# Patient Record
Sex: MALE | Race: WHITE | Employment: PART TIME | URBAN - METROPOLITAN AREA
[De-identification: names, ages, dates, MRNs, and addresses within clinical notes are randomized per-mention and may not be internally consistent; named-entity substitution may affect disease eponyms.]

---

## 2017-01-01 ENCOUNTER — APPOINTMENT (OUTPATIENT)
Dept: PHYSICAL THERAPY | Facility: CLINIC | Age: 57
DRG: 025 | End: 2017-01-01
Attending: NEUROLOGICAL SURGERY
Payer: COMMERCIAL

## 2017-01-01 ENCOUNTER — APPOINTMENT (OUTPATIENT)
Dept: RADIATION ONCOLOGY | Facility: CLINIC | Age: 57
End: 2017-01-01
Attending: RADIOLOGY
Payer: COMMERCIAL

## 2017-01-01 ENCOUNTER — CARE COORDINATION (OUTPATIENT)
Dept: ONCOLOGY | Facility: CLINIC | Age: 57
End: 2017-01-01

## 2017-01-01 ENCOUNTER — OFFICE VISIT (OUTPATIENT)
Dept: NEUROSURGERY | Facility: CLINIC | Age: 57
End: 2017-01-01

## 2017-01-01 ENCOUNTER — OFFICE VISIT (OUTPATIENT)
Dept: NEUROLOGY | Facility: CLINIC | Age: 57
End: 2017-01-01

## 2017-01-01 ENCOUNTER — ANESTHESIA EVENT (OUTPATIENT)
Dept: SURGERY | Facility: CLINIC | Age: 57
DRG: 025 | End: 2017-01-01
Payer: COMMERCIAL

## 2017-01-01 ENCOUNTER — CARE COORDINATION (OUTPATIENT)
Dept: CARE COORDINATION | Facility: CLINIC | Age: 57
End: 2017-01-01

## 2017-01-01 ENCOUNTER — TELEPHONE (OUTPATIENT)
Dept: ONCOLOGY | Facility: CLINIC | Age: 57
End: 2017-01-01

## 2017-01-01 ENCOUNTER — TELEPHONE (OUTPATIENT)
Dept: FAMILY MEDICINE | Facility: CLINIC | Age: 57
End: 2017-01-01

## 2017-01-01 ENCOUNTER — PRE VISIT (OUTPATIENT)
Dept: NEUROLOGY | Facility: CLINIC | Age: 57
End: 2017-01-01

## 2017-01-01 ENCOUNTER — ONCOLOGY VISIT (OUTPATIENT)
Dept: ONCOLOGY | Facility: CLINIC | Age: 57
End: 2017-01-01

## 2017-01-01 ENCOUNTER — ANESTHESIA (OUTPATIENT)
Dept: SURGERY | Facility: CLINIC | Age: 57
DRG: 025 | End: 2017-01-01
Payer: COMMERCIAL

## 2017-01-01 ENCOUNTER — APPOINTMENT (OUTPATIENT)
Dept: CT IMAGING | Facility: CLINIC | Age: 57
DRG: 025 | End: 2017-01-01
Attending: STUDENT IN AN ORGANIZED HEALTH CARE EDUCATION/TRAINING PROGRAM
Payer: COMMERCIAL

## 2017-01-01 ENCOUNTER — APPOINTMENT (OUTPATIENT)
Dept: MRI IMAGING | Facility: CLINIC | Age: 57
DRG: 025 | End: 2017-01-01
Attending: STUDENT IN AN ORGANIZED HEALTH CARE EDUCATION/TRAINING PROGRAM
Payer: COMMERCIAL

## 2017-01-01 ENCOUNTER — ONCOLOGY VISIT (OUTPATIENT)
Dept: ONCOLOGY | Facility: CLINIC | Age: 57
End: 2017-01-01
Attending: PHYSICIAN ASSISTANT
Payer: COMMERCIAL

## 2017-01-01 ENCOUNTER — APPOINTMENT (OUTPATIENT)
Dept: LAB | Facility: CLINIC | Age: 57
End: 2017-01-01
Attending: PHYSICIAN ASSISTANT
Payer: COMMERCIAL

## 2017-01-01 ENCOUNTER — APPOINTMENT (OUTPATIENT)
Dept: MRI IMAGING | Facility: CLINIC | Age: 57
DRG: 025 | End: 2017-01-01
Attending: NEUROLOGICAL SURGERY
Payer: COMMERCIAL

## 2017-01-01 ENCOUNTER — ONCOLOGY VISIT (OUTPATIENT)
Dept: ONCOLOGY | Facility: CLINIC | Age: 57
End: 2017-01-01
Attending: INTERNAL MEDICINE
Payer: COMMERCIAL

## 2017-01-01 ENCOUNTER — HOSPITAL ENCOUNTER (OUTPATIENT)
Dept: MRI IMAGING | Facility: CLINIC | Age: 57
Discharge: HOME | End: 2017-10-31
Attending: RADIOLOGY | Admitting: RADIOLOGY
Payer: COMMERCIAL

## 2017-01-01 ENCOUNTER — ONCOLOGY VISIT (OUTPATIENT)
Dept: RADIATION ONCOLOGY | Facility: CLINIC | Age: 57
End: 2017-01-01

## 2017-01-01 ENCOUNTER — APPOINTMENT (OUTPATIENT)
Dept: OCCUPATIONAL THERAPY | Facility: CLINIC | Age: 57
DRG: 025 | End: 2017-01-01
Attending: NEUROLOGICAL SURGERY
Payer: COMMERCIAL

## 2017-01-01 VITALS
BODY MASS INDEX: 29.33 KG/M2 | WEIGHT: 198 LBS | DIASTOLIC BLOOD PRESSURE: 80 MMHG | SYSTOLIC BLOOD PRESSURE: 135 MMHG | HEART RATE: 113 BPM | HEIGHT: 69 IN

## 2017-01-01 VITALS
WEIGHT: 207.8 LBS | BODY MASS INDEX: 30.69 KG/M2 | SYSTOLIC BLOOD PRESSURE: 124 MMHG | HEART RATE: 113 BPM | DIASTOLIC BLOOD PRESSURE: 70 MMHG

## 2017-01-01 VITALS
OXYGEN SATURATION: 97 % | HEART RATE: 80 BPM | HEIGHT: 69 IN | DIASTOLIC BLOOD PRESSURE: 92 MMHG | BODY MASS INDEX: 29.98 KG/M2 | WEIGHT: 202.4 LBS | TEMPERATURE: 98 F | RESPIRATION RATE: 17 BRPM | SYSTOLIC BLOOD PRESSURE: 132 MMHG

## 2017-01-01 VITALS
DIASTOLIC BLOOD PRESSURE: 86 MMHG | SYSTOLIC BLOOD PRESSURE: 119 MMHG | HEART RATE: 91 BPM | BODY MASS INDEX: 30.72 KG/M2 | WEIGHT: 208 LBS

## 2017-01-01 VITALS
HEIGHT: 69 IN | TEMPERATURE: 97 F | WEIGHT: 210 LBS | RESPIRATION RATE: 18 BRPM | SYSTOLIC BLOOD PRESSURE: 118 MMHG | OXYGEN SATURATION: 97 % | BODY MASS INDEX: 31.1 KG/M2 | HEART RATE: 82 BPM | DIASTOLIC BLOOD PRESSURE: 82 MMHG

## 2017-01-01 VITALS
HEART RATE: 84 BPM | SYSTOLIC BLOOD PRESSURE: 132 MMHG | BODY MASS INDEX: 31.31 KG/M2 | DIASTOLIC BLOOD PRESSURE: 91 MMHG | WEIGHT: 212 LBS

## 2017-01-01 VITALS
SYSTOLIC BLOOD PRESSURE: 132 MMHG | BODY MASS INDEX: 31.47 KG/M2 | HEART RATE: 80 BPM | DIASTOLIC BLOOD PRESSURE: 93 MMHG | WEIGHT: 213.1 LBS

## 2017-01-01 VITALS
DIASTOLIC BLOOD PRESSURE: 89 MMHG | HEIGHT: 69 IN | HEART RATE: 86 BPM | WEIGHT: 210.5 LBS | SYSTOLIC BLOOD PRESSURE: 123 MMHG | BODY MASS INDEX: 31.18 KG/M2

## 2017-01-01 VITALS
DIASTOLIC BLOOD PRESSURE: 81 MMHG | TEMPERATURE: 97 F | BODY MASS INDEX: 31.07 KG/M2 | OXYGEN SATURATION: 97 % | SYSTOLIC BLOOD PRESSURE: 121 MMHG | HEART RATE: 88 BPM | RESPIRATION RATE: 16 BRPM | WEIGHT: 209.8 LBS | HEIGHT: 69 IN

## 2017-01-01 VITALS
BODY MASS INDEX: 29.89 KG/M2 | WEIGHT: 202.4 LBS | SYSTOLIC BLOOD PRESSURE: 132 MMHG | DIASTOLIC BLOOD PRESSURE: 92 MMHG | HEART RATE: 80 BPM

## 2017-01-01 VITALS
WEIGHT: 211.8 LBS | SYSTOLIC BLOOD PRESSURE: 107 MMHG | DIASTOLIC BLOOD PRESSURE: 82 MMHG | HEART RATE: 87 BPM | BODY MASS INDEX: 31.26 KG/M2

## 2017-01-01 VITALS
OXYGEN SATURATION: 98 % | RESPIRATION RATE: 16 BRPM | TEMPERATURE: 97.6 F | DIASTOLIC BLOOD PRESSURE: 88 MMHG | SYSTOLIC BLOOD PRESSURE: 134 MMHG | HEART RATE: 79 BPM

## 2017-01-01 VITALS — BODY MASS INDEX: 30.92 KG/M2 | WEIGHT: 209.5 LBS

## 2017-01-01 DIAGNOSIS — C71.2 GLIOBLASTOMA MULTIFORME OF TEMPORAL LOBE (H): Primary | ICD-10-CM

## 2017-01-01 DIAGNOSIS — C71.2 MALIGNANT NEOPLASM OF TEMPORAL LOBE OF BRAIN (H): Primary | ICD-10-CM

## 2017-01-01 DIAGNOSIS — R56.9 SEIZURES (H): ICD-10-CM

## 2017-01-01 DIAGNOSIS — C71.2 GLIOBLASTOMA MULTIFORME OF TEMPORAL LOBE (H): ICD-10-CM

## 2017-01-01 DIAGNOSIS — Z98.890 POST-OPERATIVE STATE: ICD-10-CM

## 2017-01-01 DIAGNOSIS — Z79.899 ENCOUNTER FOR LONG-TERM (CURRENT) USE OF MEDICATIONS: ICD-10-CM

## 2017-01-01 DIAGNOSIS — G93.89 BRAIN MASS: ICD-10-CM

## 2017-01-01 DIAGNOSIS — C71.9 GLIOBLASTOMA (H): Primary | ICD-10-CM

## 2017-01-01 DIAGNOSIS — G40.201 PARTIAL SYMPTOMATIC EPILEPSY WITH COMPLEX PARTIAL SEIZURES, NOT INTRACTABLE, WITH STATUS EPILEPTICUS (H): Primary | ICD-10-CM

## 2017-01-01 DIAGNOSIS — J06.9 UPPER RESPIRATORY TRACT INFECTION, UNSPECIFIED TYPE: ICD-10-CM

## 2017-01-01 LAB
ABO + RH BLD: NORMAL
ALBUMIN SERPL-MCNC: 3.4 G/DL (ref 3.4–5)
ALBUMIN SERPL-MCNC: 3.5 G/DL (ref 3.4–5)
ALBUMIN SERPL-MCNC: 3.6 G/DL (ref 3.4–5)
ALBUMIN SERPL-MCNC: 3.6 G/DL (ref 3.4–5)
ALP SERPL-CCNC: 67 U/L (ref 40–150)
ALP SERPL-CCNC: 79 U/L (ref 40–150)
ALP SERPL-CCNC: 81 U/L (ref 40–150)
ALP SERPL-CCNC: 97 U/L (ref 40–150)
ALT SERPL W P-5'-P-CCNC: 29 U/L (ref 0–70)
ALT SERPL W P-5'-P-CCNC: 31 U/L (ref 0–70)
ALT SERPL W P-5'-P-CCNC: 31 U/L (ref 0–70)
ALT SERPL W P-5'-P-CCNC: 32 U/L (ref 0–70)
ANION GAP SERPL CALCULATED.3IONS-SCNC: 11 MMOL/L (ref 3–14)
ANION GAP SERPL CALCULATED.3IONS-SCNC: 5 MMOL/L (ref 3–14)
ANION GAP SERPL CALCULATED.3IONS-SCNC: 7 MMOL/L (ref 3–14)
ANION GAP SERPL CALCULATED.3IONS-SCNC: 9 MMOL/L (ref 3–14)
APTT PPP: 25 SEC (ref 22–37)
AST SERPL W P-5'-P-CCNC: 13 U/L (ref 0–45)
AST SERPL W P-5'-P-CCNC: 20 U/L (ref 0–45)
AST SERPL W P-5'-P-CCNC: 20 U/L (ref 0–45)
AST SERPL W P-5'-P-CCNC: 22 U/L (ref 0–45)
BASOPHILS # BLD AUTO: 0 10E9/L (ref 0–0.2)
BASOPHILS NFR BLD AUTO: 0.1 %
BASOPHILS NFR BLD AUTO: 0.1 %
BASOPHILS NFR BLD AUTO: 0.2 %
BASOPHILS NFR BLD AUTO: 0.6 %
BILIRUB SERPL-MCNC: 0.3 MG/DL (ref 0.2–1.3)
BILIRUB SERPL-MCNC: 0.5 MG/DL (ref 0.2–1.3)
BILIRUB SERPL-MCNC: 0.6 MG/DL (ref 0.2–1.3)
BILIRUB SERPL-MCNC: 0.7 MG/DL (ref 0.2–1.3)
BLD GP AB SCN SERPL QL: NORMAL
BLD GP AB SCN SERPL QL: NORMAL
BLOOD BANK CMNT PATIENT-IMP: NORMAL
BLOOD BANK CMNT PATIENT-IMP: NORMAL
BUN SERPL-MCNC: 15 MG/DL (ref 7–30)
BUN SERPL-MCNC: 16 MG/DL (ref 7–30)
BUN SERPL-MCNC: 18 MG/DL (ref 7–30)
BUN SERPL-MCNC: 20 MG/DL (ref 7–30)
BUN SERPL-MCNC: 22 MG/DL (ref 7–30)
BUN SERPL-MCNC: 22 MG/DL (ref 7–30)
CALCIUM SERPL-MCNC: 8.1 MG/DL (ref 8.5–10.1)
CALCIUM SERPL-MCNC: 8.4 MG/DL (ref 8.5–10.1)
CALCIUM SERPL-MCNC: 8.4 MG/DL (ref 8.5–10.1)
CALCIUM SERPL-MCNC: 8.8 MG/DL (ref 8.5–10.1)
CALCIUM SERPL-MCNC: 8.8 MG/DL (ref 8.5–10.1)
CALCIUM SERPL-MCNC: 9.1 MG/DL (ref 8.5–10.1)
CHLORIDE SERPL-SCNC: 100 MMOL/L (ref 94–109)
CHLORIDE SERPL-SCNC: 104 MMOL/L (ref 94–109)
CHLORIDE SERPL-SCNC: 105 MMOL/L (ref 94–109)
CO2 SERPL-SCNC: 21 MMOL/L (ref 20–32)
CO2 SERPL-SCNC: 25 MMOL/L (ref 20–32)
CO2 SERPL-SCNC: 25 MMOL/L (ref 20–32)
CO2 SERPL-SCNC: 27 MMOL/L (ref 20–32)
CO2 SERPL-SCNC: 27 MMOL/L (ref 20–32)
CO2 SERPL-SCNC: 30 MMOL/L (ref 20–32)
COPATH REPORT: NORMAL
CREAT SERPL-MCNC: 0.79 MG/DL (ref 0.66–1.25)
CREAT SERPL-MCNC: 0.89 MG/DL (ref 0.66–1.25)
CREAT SERPL-MCNC: 0.94 MG/DL (ref 0.66–1.25)
CREAT SERPL-MCNC: 1.01 MG/DL (ref 0.66–1.25)
CREAT SERPL-MCNC: 1.03 MG/DL (ref 0.66–1.25)
CREAT SERPL-MCNC: 1.1 MG/DL (ref 0.66–1.25)
CRP SERPL-MCNC: 34 MG/L (ref 0–8)
DIFFERENTIAL METHOD BLD: ABNORMAL
DIFFERENTIAL METHOD BLD: NORMAL
EOSINOPHIL # BLD AUTO: 0 10E9/L (ref 0–0.7)
EOSINOPHIL # BLD AUTO: 0.1 10E9/L (ref 0–0.7)
EOSINOPHIL # BLD AUTO: 0.2 10E9/L (ref 0–0.7)
EOSINOPHIL # BLD AUTO: 0.3 10E9/L (ref 0–0.7)
EOSINOPHIL NFR BLD AUTO: 0.1 %
EOSINOPHIL NFR BLD AUTO: 1.1 %
EOSINOPHIL NFR BLD AUTO: 2.5 %
EOSINOPHIL NFR BLD AUTO: 5 %
ERYTHROCYTE [DISTWIDTH] IN BLOOD BY AUTOMATED COUNT: 12.2 % (ref 10–15)
ERYTHROCYTE [DISTWIDTH] IN BLOOD BY AUTOMATED COUNT: 12.3 % (ref 10–15)
ERYTHROCYTE [DISTWIDTH] IN BLOOD BY AUTOMATED COUNT: 13.1 % (ref 10–15)
ERYTHROCYTE [DISTWIDTH] IN BLOOD BY AUTOMATED COUNT: 13.5 % (ref 10–15)
ERYTHROCYTE [DISTWIDTH] IN BLOOD BY AUTOMATED COUNT: 13.8 % (ref 10–15)
ERYTHROCYTE [DISTWIDTH] IN BLOOD BY AUTOMATED COUNT: 14 % (ref 10–15)
ERYTHROCYTE [SEDIMENTATION RATE] IN BLOOD BY WESTERGREN METHOD: 16 MM/H (ref 0–20)
FLUAV H1 2009 PAND RNA SPEC QL NAA+PROBE: NEGATIVE
FLUAV H1 RNA SPEC QL NAA+PROBE: NEGATIVE
FLUAV H3 RNA SPEC QL NAA+PROBE: NEGATIVE
FLUAV RNA SPEC QL NAA+PROBE: NEGATIVE
FLUAV+FLUBV RNA SPEC QL NAA+PROBE: NEGATIVE
FLUAV+FLUBV RNA SPEC QL NAA+PROBE: NEGATIVE
FLUBV RNA SPEC QL NAA+PROBE: NEGATIVE
GFR SERPL CREATININE-BSD FRML MDRD: 68 ML/MIN/1.7M2
GFR SERPL CREATININE-BSD FRML MDRD: 74 ML/MIN/1.7M2
GFR SERPL CREATININE-BSD FRML MDRD: 75 ML/MIN/1.7M2
GFR SERPL CREATININE-BSD FRML MDRD: 83 ML/MIN/1.7M2
GFR SERPL CREATININE-BSD FRML MDRD: 87 ML/MIN/1.7M2
GFR SERPL CREATININE-BSD FRML MDRD: >90 ML/MIN/1.7M2
GLUCOSE BLDC GLUCOMTR-MCNC: 100 MG/DL (ref 70–99)
GLUCOSE BLDC GLUCOMTR-MCNC: 101 MG/DL (ref 70–99)
GLUCOSE BLDC GLUCOMTR-MCNC: 102 MG/DL (ref 70–99)
GLUCOSE BLDC GLUCOMTR-MCNC: 103 MG/DL (ref 70–99)
GLUCOSE BLDC GLUCOMTR-MCNC: 104 MG/DL (ref 70–99)
GLUCOSE BLDC GLUCOMTR-MCNC: 106 MG/DL (ref 70–99)
GLUCOSE BLDC GLUCOMTR-MCNC: 107 MG/DL (ref 70–99)
GLUCOSE BLDC GLUCOMTR-MCNC: 107 MG/DL (ref 70–99)
GLUCOSE BLDC GLUCOMTR-MCNC: 111 MG/DL (ref 70–99)
GLUCOSE BLDC GLUCOMTR-MCNC: 113 MG/DL (ref 70–99)
GLUCOSE BLDC GLUCOMTR-MCNC: 114 MG/DL (ref 70–99)
GLUCOSE BLDC GLUCOMTR-MCNC: 114 MG/DL (ref 70–99)
GLUCOSE BLDC GLUCOMTR-MCNC: 115 MG/DL (ref 70–99)
GLUCOSE BLDC GLUCOMTR-MCNC: 116 MG/DL (ref 70–99)
GLUCOSE BLDC GLUCOMTR-MCNC: 117 MG/DL (ref 70–99)
GLUCOSE BLDC GLUCOMTR-MCNC: 119 MG/DL (ref 70–99)
GLUCOSE BLDC GLUCOMTR-MCNC: 120 MG/DL (ref 70–99)
GLUCOSE BLDC GLUCOMTR-MCNC: 122 MG/DL (ref 70–99)
GLUCOSE BLDC GLUCOMTR-MCNC: 124 MG/DL (ref 70–99)
GLUCOSE BLDC GLUCOMTR-MCNC: 127 MG/DL (ref 70–99)
GLUCOSE BLDC GLUCOMTR-MCNC: 127 MG/DL (ref 70–99)
GLUCOSE BLDC GLUCOMTR-MCNC: 128 MG/DL (ref 70–99)
GLUCOSE BLDC GLUCOMTR-MCNC: 129 MG/DL (ref 70–99)
GLUCOSE BLDC GLUCOMTR-MCNC: 132 MG/DL (ref 70–99)
GLUCOSE BLDC GLUCOMTR-MCNC: 148 MG/DL (ref 70–99)
GLUCOSE BLDC GLUCOMTR-MCNC: 169 MG/DL (ref 70–99)
GLUCOSE BLDC GLUCOMTR-MCNC: 189 MG/DL (ref 70–99)
GLUCOSE BLDC GLUCOMTR-MCNC: 92 MG/DL (ref 70–99)
GLUCOSE BLDC GLUCOMTR-MCNC: 95 MG/DL (ref 70–99)
GLUCOSE BLDC GLUCOMTR-MCNC: 99 MG/DL (ref 70–99)
GLUCOSE BLDC GLUCOMTR-MCNC: 99 MG/DL (ref 70–99)
GLUCOSE SERPL-MCNC: 106 MG/DL (ref 70–99)
GLUCOSE SERPL-MCNC: 107 MG/DL (ref 70–99)
GLUCOSE SERPL-MCNC: 112 MG/DL (ref 70–99)
GLUCOSE SERPL-MCNC: 83 MG/DL (ref 70–99)
GLUCOSE SERPL-MCNC: 85 MG/DL (ref 70–99)
GLUCOSE SERPL-MCNC: 96 MG/DL (ref 70–99)
HADV DNA SPEC QL NAA+PROBE: NEGATIVE
HADV DNA SPEC QL NAA+PROBE: NEGATIVE
HBA1C MFR BLD: 5.3 % (ref 4.3–6)
HCT VFR BLD AUTO: 37 % (ref 40–53)
HCT VFR BLD AUTO: 39 % (ref 40–53)
HCT VFR BLD AUTO: 40.2 % (ref 40–53)
HCT VFR BLD AUTO: 41.9 % (ref 40–53)
HCT VFR BLD AUTO: 42.2 % (ref 40–53)
HCT VFR BLD AUTO: 42.5 % (ref 40–53)
HGB BLD-MCNC: 12.7 G/DL (ref 13.3–17.7)
HGB BLD-MCNC: 13.2 G/DL (ref 13.3–17.7)
HGB BLD-MCNC: 13.4 G/DL (ref 13.3–17.7)
HGB BLD-MCNC: 13.5 G/DL (ref 13.3–17.7)
HGB BLD-MCNC: 13.8 G/DL (ref 13.3–17.7)
HGB BLD-MCNC: 14 G/DL (ref 13.3–17.7)
HMPV RNA SPEC QL NAA+PROBE: NEGATIVE
HPIV1 RNA SPEC QL NAA+PROBE: NEGATIVE
HPIV2 RNA SPEC QL NAA+PROBE: NEGATIVE
HPIV3 RNA SPEC QL NAA+PROBE: NEGATIVE
IMM GRANULOCYTES # BLD: 0 10E9/L (ref 0–0.4)
IMM GRANULOCYTES # BLD: 0.1 10E9/L (ref 0–0.4)
IMM GRANULOCYTES NFR BLD: 0.1 %
IMM GRANULOCYTES NFR BLD: 0.2 %
IMM GRANULOCYTES NFR BLD: 0.4 %
IMM GRANULOCYTES NFR BLD: 1.1 %
INR PPP: 1.12 (ref 0.86–1.14)
INTERPRETATION ECG - MUSE: NORMAL
LEVETIRACETAM SERPL-MCNC: 31 UG/ML (ref 12–46)
LYMPHOCYTES # BLD AUTO: 1 10E9/L (ref 0.8–5.3)
LYMPHOCYTES # BLD AUTO: 1.5 10E9/L (ref 0.8–5.3)
LYMPHOCYTES # BLD AUTO: 1.9 10E9/L (ref 0.8–5.3)
LYMPHOCYTES # BLD AUTO: 2.2 10E9/L (ref 0.8–5.3)
LYMPHOCYTES NFR BLD AUTO: 10.8 %
LYMPHOCYTES NFR BLD AUTO: 26.6 %
LYMPHOCYTES NFR BLD AUTO: 26.8 %
LYMPHOCYTES NFR BLD AUTO: 28.3 %
MCH RBC QN AUTO: 30.8 PG (ref 26.5–33)
MCH RBC QN AUTO: 31.1 PG (ref 26.5–33)
MCH RBC QN AUTO: 31.2 PG (ref 26.5–33)
MCH RBC QN AUTO: 31.3 PG (ref 26.5–33)
MCH RBC QN AUTO: 31.4 PG (ref 26.5–33)
MCH RBC QN AUTO: 31.5 PG (ref 26.5–33)
MCHC RBC AUTO-ENTMCNC: 32.2 G/DL (ref 31.5–36.5)
MCHC RBC AUTO-ENTMCNC: 32.7 G/DL (ref 31.5–36.5)
MCHC RBC AUTO-ENTMCNC: 32.8 G/DL (ref 31.5–36.5)
MCHC RBC AUTO-ENTMCNC: 32.9 G/DL (ref 31.5–36.5)
MCHC RBC AUTO-ENTMCNC: 34.3 G/DL (ref 31.5–36.5)
MCHC RBC AUTO-ENTMCNC: 34.4 G/DL (ref 31.5–36.5)
MCV RBC AUTO: 91 FL (ref 78–100)
MCV RBC AUTO: 91 FL (ref 78–100)
MCV RBC AUTO: 95 FL (ref 78–100)
MCV RBC AUTO: 96 FL (ref 78–100)
MICROBIOLOGIST REVIEW: ABNORMAL
MONOCYTES # BLD AUTO: 0.4 10E9/L (ref 0–1.3)
MONOCYTES # BLD AUTO: 0.5 10E9/L (ref 0–1.3)
MONOCYTES # BLD AUTO: 0.7 10E9/L (ref 0–1.3)
MONOCYTES # BLD AUTO: 0.8 10E9/L (ref 0–1.3)
MONOCYTES NFR BLD AUTO: 12.3 %
MONOCYTES NFR BLD AUTO: 3.9 %
MONOCYTES NFR BLD AUTO: 7.8 %
MONOCYTES NFR BLD AUTO: 9.9 %
MRSA DNA SPEC QL NAA+PROBE: NEGATIVE
NEUTROPHILS # BLD AUTO: 3 10E9/L (ref 1.6–8.3)
NEUTROPHILS # BLD AUTO: 4.2 10E9/L (ref 1.6–8.3)
NEUTROPHILS # BLD AUTO: 5.1 10E9/L (ref 1.6–8.3)
NEUTROPHILS # BLD AUTO: 7.9 10E9/L (ref 1.6–8.3)
NEUTROPHILS NFR BLD AUTO: 55.1 %
NEUTROPHILS NFR BLD AUTO: 61.2 %
NEUTROPHILS NFR BLD AUTO: 61.8 %
NEUTROPHILS NFR BLD AUTO: 84 %
NRBC # BLD AUTO: 0 10*3/UL
NRBC BLD AUTO-RTO: 0 /100
PLATELET # BLD AUTO: 164 10E9/L (ref 150–450)
PLATELET # BLD AUTO: 209 10E9/L (ref 150–450)
PLATELET # BLD AUTO: 216 10E9/L (ref 150–450)
PLATELET # BLD AUTO: 219 10E9/L (ref 150–450)
PLATELET # BLD AUTO: 244 10E9/L (ref 150–450)
PLATELET # BLD AUTO: 302 10E9/L (ref 150–450)
POTASSIUM SERPL-SCNC: 4.1 MMOL/L (ref 3.4–5.3)
POTASSIUM SERPL-SCNC: 4.2 MMOL/L (ref 3.4–5.3)
POTASSIUM SERPL-SCNC: 4.4 MMOL/L (ref 3.4–5.3)
POTASSIUM SERPL-SCNC: 4.6 MMOL/L (ref 3.4–5.3)
PROT SERPL-MCNC: 7 G/DL (ref 6.8–8.8)
PROT SERPL-MCNC: 7.1 G/DL (ref 6.8–8.8)
PROT SERPL-MCNC: 7.1 G/DL (ref 6.8–8.8)
PROT SERPL-MCNC: 7.2 G/DL (ref 6.8–8.8)
RBC # BLD AUTO: 4.08 10E12/L (ref 4.4–5.9)
RBC # BLD AUTO: 4.23 10E12/L (ref 4.4–5.9)
RBC # BLD AUTO: 4.27 10E12/L (ref 4.4–5.9)
RBC # BLD AUTO: 4.38 10E12/L (ref 4.4–5.9)
RBC # BLD AUTO: 4.39 10E12/L (ref 4.4–5.9)
RBC # BLD AUTO: 4.47 10E12/L (ref 4.4–5.9)
RHINOVIRUS RNA SPEC QL NAA+PROBE: NEGATIVE
RSV RNA SPEC NAA+PROBE: POSITIVE
RSV RNA SPEC QL NAA+PROBE: NEGATIVE
RSV RNA SPEC QL NAA+PROBE: POSITIVE
SODIUM SERPL-SCNC: 134 MMOL/L (ref 133–144)
SODIUM SERPL-SCNC: 136 MMOL/L (ref 133–144)
SODIUM SERPL-SCNC: 136 MMOL/L (ref 133–144)
SODIUM SERPL-SCNC: 138 MMOL/L (ref 133–144)
SODIUM SERPL-SCNC: 139 MMOL/L (ref 133–144)
SODIUM SERPL-SCNC: 139 MMOL/L (ref 133–144)
SPECIMEN EXP DATE BLD: NORMAL
SPECIMEN EXP DATE BLD: NORMAL
SPECIMEN SOURCE: ABNORMAL
SPECIMEN SOURCE: ABNORMAL
SPECIMEN SOURCE: NORMAL
WBC # BLD AUTO: 16 10E9/L (ref 4–11)
WBC # BLD AUTO: 22.5 10E9/L (ref 4–11)
WBC # BLD AUTO: 5.5 10E9/L (ref 4–11)
WBC # BLD AUTO: 6.8 10E9/L (ref 4–11)
WBC # BLD AUTO: 8.3 10E9/L (ref 4–11)
WBC # BLD AUTO: 9.4 10E9/L (ref 4–11)

## 2017-01-01 PROCEDURE — 36415 COLL VENOUS BLD VENIPUNCTURE: CPT | Performed by: INTERNAL MEDICINE

## 2017-01-01 PROCEDURE — 86850 RBC ANTIBODY SCREEN: CPT | Performed by: STUDENT IN AN ORGANIZED HEALTH CARE EDUCATION/TRAINING PROGRAM

## 2017-01-01 PROCEDURE — 25000125 ZZHC RX 250: Performed by: NURSE PRACTITIONER

## 2017-01-01 PROCEDURE — 77386 ZZH IMRT TREATMENT DELIVERY, COMPLEX: CPT | Performed by: RADIOLOGY

## 2017-01-01 PROCEDURE — 40000014 ZZH STATISTIC ARTERIAL MONITORING DAILY

## 2017-01-01 PROCEDURE — 81403 MOPATH PROCEDURE LEVEL 4: CPT | Performed by: INTERNAL MEDICINE

## 2017-01-01 PROCEDURE — 40000803 ZZHCL STATISTIC DNA ISOL HIGH PURITY: Performed by: INTERNAL MEDICINE

## 2017-01-01 PROCEDURE — 12000001 ZZH R&B MED SURG/OB UMMC

## 2017-01-01 PROCEDURE — 81287 MGMT GENE PRMTR MTHYLTN ALYS: CPT | Performed by: INTERNAL MEDICINE

## 2017-01-01 PROCEDURE — 40000170 ZZH STATISTIC PRE-PROCEDURE ASSESSMENT II: Performed by: NEUROLOGICAL SURGERY

## 2017-01-01 PROCEDURE — 00B70ZZ EXCISION OF CEREBRAL HEMISPHERE, OPEN APPROACH: ICD-10-PCS | Performed by: NEUROLOGICAL SURGERY

## 2017-01-01 PROCEDURE — 25000132 ZZH RX MED GY IP 250 OP 250 PS 637: Performed by: NURSE PRACTITIONER

## 2017-01-01 PROCEDURE — 25000128 H RX IP 250 OP 636: Performed by: STUDENT IN AN ORGANIZED HEALTH CARE EDUCATION/TRAINING PROGRAM

## 2017-01-01 PROCEDURE — 40000556 ZZH STATISTIC PERIPHERAL IV START W US GUIDANCE

## 2017-01-01 PROCEDURE — 88307 TISSUE EXAM BY PATHOLOGIST: CPT | Performed by: NEUROLOGICAL SURGERY

## 2017-01-01 PROCEDURE — 00000159 ZZHCL STATISTIC H-SEND OUTS PREP: Performed by: NEUROLOGICAL SURGERY

## 2017-01-01 PROCEDURE — 25000125 ZZHC RX 250: Performed by: NEUROLOGICAL SURGERY

## 2017-01-01 PROCEDURE — 70553 MRI BRAIN STEM W/O & W/DYE: CPT

## 2017-01-01 PROCEDURE — 36415 COLL VENOUS BLD VENIPUNCTURE: CPT | Performed by: PHYSICIAN ASSISTANT

## 2017-01-01 PROCEDURE — 40000275 ZZH STATISTIC RCP TIME EA 10 MIN

## 2017-01-01 PROCEDURE — 87640 STAPH A DNA AMP PROBE: CPT | Performed by: NEUROLOGICAL SURGERY

## 2017-01-01 PROCEDURE — 77336 RADIATION PHYSICS CONSULT: CPT | Performed by: RADIOLOGY

## 2017-01-01 PROCEDURE — 71000017 ZZH RECOVERY PHASE 1 LEVEL 3 EA ADDTL HR: Performed by: NEUROLOGICAL SURGERY

## 2017-01-01 PROCEDURE — 85025 COMPLETE CBC W/AUTO DIFF WBC: CPT | Performed by: INTERNAL MEDICINE

## 2017-01-01 PROCEDURE — 25000128 H RX IP 250 OP 636: Performed by: NEUROLOGICAL SURGERY

## 2017-01-01 PROCEDURE — 90686 IIV4 VACC NO PRSV 0.5 ML IM: CPT | Performed by: NEUROLOGICAL SURGERY

## 2017-01-01 PROCEDURE — 12000003 ZZH R&B CRITICAL UMMC

## 2017-01-01 PROCEDURE — A9585 GADOBUTROL INJECTION: HCPCS | Performed by: NEUROLOGICAL SURGERY

## 2017-01-01 PROCEDURE — 40000193 ZZH STATISTIC PT WARD VISIT

## 2017-01-01 PROCEDURE — 99212 OFFICE O/P EST SF 10 MIN: CPT | Mod: ZF

## 2017-01-01 PROCEDURE — 87641 MR-STAPH DNA AMP PROBE: CPT | Performed by: NEUROLOGICAL SURGERY

## 2017-01-01 PROCEDURE — 27810169 ZZH OR IMPLANT GENERAL: Performed by: NEUROLOGICAL SURGERY

## 2017-01-01 PROCEDURE — 97530 THERAPEUTIC ACTIVITIES: CPT | Mod: GP | Performed by: REHABILITATION PRACTITIONER

## 2017-01-01 PROCEDURE — 86900 BLOOD TYPING SEROLOGIC ABO: CPT | Performed by: NURSE PRACTITIONER

## 2017-01-01 PROCEDURE — 85025 COMPLETE CBC W/AUTO DIFF WBC: CPT | Performed by: PHYSICIAN ASSISTANT

## 2017-01-01 PROCEDURE — 97165 OT EVAL LOW COMPLEX 30 MIN: CPT | Mod: GO | Performed by: OCCUPATIONAL THERAPIST

## 2017-01-01 PROCEDURE — 37000008 ZZH ANESTHESIA TECHNICAL FEE, 1ST 30 MIN: Performed by: NEUROLOGICAL SURGERY

## 2017-01-01 PROCEDURE — 97116 GAIT TRAINING THERAPY: CPT | Mod: GP | Performed by: REHABILITATION PRACTITIONER

## 2017-01-01 PROCEDURE — 00000146 ZZHCL STATISTIC GLUCOSE BY METER IP

## 2017-01-01 PROCEDURE — 97535 SELF CARE MNGMENT TRAINING: CPT | Mod: GO | Performed by: OCCUPATIONAL THERAPIST

## 2017-01-01 PROCEDURE — 8E09XBZ COMPUTER ASSISTED PROCEDURE OF HEAD AND NECK REGION: ICD-10-PCS | Performed by: NEUROLOGICAL SURGERY

## 2017-01-01 PROCEDURE — 25000132 ZZH RX MED GY IP 250 OP 250 PS 637: Performed by: NURSE ANESTHETIST, CERTIFIED REGISTERED

## 2017-01-01 PROCEDURE — 25000125 ZZHC RX 250: Performed by: NURSE ANESTHETIST, CERTIFIED REGISTERED

## 2017-01-01 PROCEDURE — 86901 BLOOD TYPING SEROLOGIC RH(D): CPT | Performed by: NURSE PRACTITIONER

## 2017-01-01 PROCEDURE — 99213 OFFICE O/P EST LOW 20 MIN: CPT | Mod: ZF

## 2017-01-01 PROCEDURE — 36415 COLL VENOUS BLD VENIPUNCTURE: CPT | Performed by: STUDENT IN AN ORGANIZED HEALTH CARE EDUCATION/TRAINING PROGRAM

## 2017-01-01 PROCEDURE — 99205 OFFICE O/P NEW HI 60 MIN: CPT | Mod: ZP | Performed by: INTERNAL MEDICINE

## 2017-01-01 PROCEDURE — 25000128 H RX IP 250 OP 636: Performed by: NURSE PRACTITIONER

## 2017-01-01 PROCEDURE — 70450 CT HEAD/BRAIN W/O DYE: CPT

## 2017-01-01 PROCEDURE — 80053 COMPREHEN METABOLIC PANEL: CPT | Performed by: PHYSICIAN ASSISTANT

## 2017-01-01 PROCEDURE — 36000076 ZZH SURGERY LEVEL 6 EA 15 ADDTL MIN - UMMC: Performed by: NEUROLOGICAL SURGERY

## 2017-01-01 PROCEDURE — 99214 OFFICE O/P EST MOD 30 MIN: CPT | Mod: ZP | Performed by: PHYSICIAN ASSISTANT

## 2017-01-01 PROCEDURE — 83036 HEMOGLOBIN GLYCOSYLATED A1C: CPT | Performed by: STUDENT IN AN ORGANIZED HEALTH CARE EDUCATION/TRAINING PROGRAM

## 2017-01-01 PROCEDURE — 88342 IMHCHEM/IMCYTCHM 1ST ANTB: CPT | Performed by: NEUROLOGICAL SURGERY

## 2017-01-01 PROCEDURE — 85610 PROTHROMBIN TIME: CPT | Performed by: STUDENT IN AN ORGANIZED HEALTH CARE EDUCATION/TRAINING PROGRAM

## 2017-01-01 PROCEDURE — 25000128 H RX IP 250 OP 636: Performed by: ANESTHESIOLOGY

## 2017-01-01 PROCEDURE — 99213 OFFICE O/P EST LOW 20 MIN: CPT | Mod: 25 | Performed by: RADIOLOGY

## 2017-01-01 PROCEDURE — 97110 THERAPEUTIC EXERCISES: CPT | Mod: GP

## 2017-01-01 PROCEDURE — 80053 COMPREHEN METABOLIC PANEL: CPT | Performed by: INTERNAL MEDICINE

## 2017-01-01 PROCEDURE — 25000132 ZZH RX MED GY IP 250 OP 250 PS 637: Performed by: STUDENT IN AN ORGANIZED HEALTH CARE EDUCATION/TRAINING PROGRAM

## 2017-01-01 PROCEDURE — 25000128 H RX IP 250 OP 636: Performed by: NURSE ANESTHETIST, CERTIFIED REGISTERED

## 2017-01-01 PROCEDURE — 87633 RESP VIRUS 12-25 TARGETS: CPT | Performed by: PHYSICIAN ASSISTANT

## 2017-01-01 PROCEDURE — 27210794 ZZH OR GENERAL SUPPLY STERILE: Performed by: NEUROLOGICAL SURGERY

## 2017-01-01 PROCEDURE — 36000074 ZZH SURGERY LEVEL 6 1ST 30 MIN - UMMC: Performed by: NEUROLOGICAL SURGERY

## 2017-01-01 PROCEDURE — 88331 PATH CONSLTJ SURG 1 BLK 1SPC: CPT | Performed by: NEUROLOGICAL SURGERY

## 2017-01-01 PROCEDURE — 40000193 ZZH STATISTIC PT WARD VISIT: Performed by: REHABILITATION PRACTITIONER

## 2017-01-01 PROCEDURE — 86901 BLOOD TYPING SEROLOGIC RH(D): CPT | Performed by: STUDENT IN AN ORGANIZED HEALTH CARE EDUCATION/TRAINING PROGRAM

## 2017-01-01 PROCEDURE — 97164 PT RE-EVAL EST PLAN CARE: CPT | Mod: GP | Performed by: REHABILITATION PRACTITIONER

## 2017-01-01 PROCEDURE — 27210995 ZZH RX 272: Performed by: NEUROLOGICAL SURGERY

## 2017-01-01 PROCEDURE — C9399 UNCLASSIFIED DRUGS OR BIOLOG: HCPCS | Performed by: NURSE ANESTHETIST, CERTIFIED REGISTERED

## 2017-01-01 PROCEDURE — 36415 COLL VENOUS BLD VENIPUNCTURE: CPT

## 2017-01-01 PROCEDURE — 25000131 ZZH RX MED GY IP 250 OP 636 PS 637: Performed by: STUDENT IN AN ORGANIZED HEALTH CARE EDUCATION/TRAINING PROGRAM

## 2017-01-01 PROCEDURE — 85027 COMPLETE CBC AUTOMATED: CPT | Performed by: NURSE PRACTITIONER

## 2017-01-01 PROCEDURE — 37000009 ZZH ANESTHESIA TECHNICAL FEE, EACH ADDTL 15 MIN: Performed by: NEUROLOGICAL SURGERY

## 2017-01-01 PROCEDURE — A9585 GADOBUTROL INJECTION: HCPCS | Performed by: RADIOLOGY

## 2017-01-01 PROCEDURE — 25000565 ZZH ISOFLURANE, EA 15 MIN: Performed by: NEUROLOGICAL SURGERY

## 2017-01-01 PROCEDURE — 80048 BASIC METABOLIC PNL TOTAL CA: CPT | Performed by: STUDENT IN AN ORGANIZED HEALTH CARE EDUCATION/TRAINING PROGRAM

## 2017-01-01 PROCEDURE — 40000133 ZZH STATISTIC OT WARD VISIT: Performed by: OCCUPATIONAL THERAPIST

## 2017-01-01 PROCEDURE — 86850 RBC ANTIBODY SCREEN: CPT | Performed by: NURSE PRACTITIONER

## 2017-01-01 PROCEDURE — 25000128 H RX IP 250 OP 636: Performed by: RADIOLOGY

## 2017-01-01 PROCEDURE — 86900 BLOOD TYPING SEROLOGIC ABO: CPT | Performed by: STUDENT IN AN ORGANIZED HEALTH CARE EDUCATION/TRAINING PROGRAM

## 2017-01-01 PROCEDURE — 97161 PT EVAL LOW COMPLEX 20 MIN: CPT | Mod: GP

## 2017-01-01 PROCEDURE — 87631 RESP VIRUS 3-5 TARGETS: CPT | Performed by: PHYSICIAN ASSISTANT

## 2017-01-01 PROCEDURE — 71000016 ZZH RECOVERY PHASE 1 LEVEL 3 FIRST HR: Performed by: NEUROLOGICAL SURGERY

## 2017-01-01 PROCEDURE — 85027 COMPLETE CBC AUTOMATED: CPT | Performed by: STUDENT IN AN ORGANIZED HEALTH CARE EDUCATION/TRAINING PROGRAM

## 2017-01-01 PROCEDURE — C1763 CONN TISS, NON-HUMAN: HCPCS | Performed by: NEUROLOGICAL SURGERY

## 2017-01-01 PROCEDURE — C1713 ANCHOR/SCREW BN/BN,TIS/BN: HCPCS | Performed by: NEUROLOGICAL SURGERY

## 2017-01-01 PROCEDURE — 70554 FMRI BRAIN BY TECH: CPT

## 2017-01-01 PROCEDURE — 97116 GAIT TRAINING THERAPY: CPT | Mod: GP

## 2017-01-01 PROCEDURE — 85730 THROMBOPLASTIN TIME PARTIAL: CPT | Performed by: STUDENT IN AN ORGANIZED HEALTH CARE EDUCATION/TRAINING PROGRAM

## 2017-01-01 DEVICE — IMP SCR SYN MATRIX LOW PRO 1.5X03MM SELF DRILL 04.503.103.01: Type: IMPLANTABLE DEVICE | Site: CRANIAL | Status: FUNCTIONAL

## 2017-01-01 DEVICE — IMP SCR SYN MATRIX LOW PRO 1.5X04MM SELF DRILL 04.503.104.01: Type: IMPLANTABLE DEVICE | Site: CRANIAL | Status: FUNCTIONAL

## 2017-01-01 DEVICE — IMP BUR HOLE COVER 17MM LOW PROFILE TI 421.527: Type: IMPLANTABLE DEVICE | Site: CRANIAL | Status: FUNCTIONAL

## 2017-01-01 DEVICE — GRAFT DURAGEN 2X2" ID220: Type: IMPLANTABLE DEVICE | Site: CRANIAL | Status: FUNCTIONAL

## 2017-01-01 RX ORDER — DEXAMETHASONE 4 MG/1
4 TABLET ORAL 2 TIMES DAILY WITH MEALS
Qty: 60 TABLET | Refills: 1 | Status: SHIPPED | OUTPATIENT
Start: 2017-01-01 | End: 2017-01-01

## 2017-01-01 RX ORDER — LABETALOL HYDROCHLORIDE 5 MG/ML
5-10 INJECTION, SOLUTION INTRAVENOUS EVERY 10 MIN PRN
Status: DISCONTINUED | OUTPATIENT
Start: 2017-01-01 | End: 2017-01-01 | Stop reason: HOSPADM

## 2017-01-01 RX ORDER — PROPOFOL 10 MG/ML
INJECTION, EMULSION INTRAVENOUS PRN
Status: DISCONTINUED | OUTPATIENT
Start: 2017-01-01 | End: 2017-01-01

## 2017-01-01 RX ORDER — LORAZEPAM 1 MG/1
1-2 TABLET ORAL
Status: COMPLETED | OUTPATIENT
Start: 2017-01-01 | End: 2017-01-01

## 2017-01-01 RX ORDER — ACETAMINOPHEN 325 MG/1
650 TABLET ORAL EVERY 4 HOURS PRN
Status: DISCONTINUED | OUTPATIENT
Start: 2017-01-01 | End: 2017-01-01

## 2017-01-01 RX ORDER — CEFAZOLIN SODIUM 1 G/3ML
1 INJECTION, POWDER, FOR SOLUTION INTRAMUSCULAR; INTRAVENOUS SEE ADMIN INSTRUCTIONS
Status: DISCONTINUED | OUTPATIENT
Start: 2017-01-01 | End: 2017-01-01 | Stop reason: HOSPADM

## 2017-01-01 RX ORDER — GADOBUTROL 604.72 MG/ML
10 INJECTION INTRAVENOUS ONCE
Status: COMPLETED | OUTPATIENT
Start: 2017-01-01 | End: 2017-01-01

## 2017-01-01 RX ORDER — GLYCOPYRROLATE 0.2 MG/ML
INJECTION, SOLUTION INTRAMUSCULAR; INTRAVENOUS PRN
Status: DISCONTINUED | OUTPATIENT
Start: 2017-01-01 | End: 2017-01-01

## 2017-01-01 RX ORDER — CEFAZOLIN SODIUM 2 G/100ML
2 INJECTION, SOLUTION INTRAVENOUS
Status: COMPLETED | OUTPATIENT
Start: 2017-01-01 | End: 2017-01-01

## 2017-01-01 RX ORDER — AMOXICILLIN 250 MG
1-2 CAPSULE ORAL 2 TIMES DAILY
Qty: 100 TABLET | Refills: 0 | Status: SHIPPED | OUTPATIENT
Start: 2017-01-01 | End: 2018-01-01

## 2017-01-01 RX ORDER — DEXAMETHASONE SODIUM PHOSPHATE 4 MG/ML
INJECTION, SOLUTION INTRA-ARTICULAR; INTRALESIONAL; INTRAMUSCULAR; INTRAVENOUS; SOFT TISSUE PRN
Status: DISCONTINUED | OUTPATIENT
Start: 2017-01-01 | End: 2017-01-01

## 2017-01-01 RX ORDER — FENTANYL CITRATE 50 UG/ML
INJECTION, SOLUTION INTRAMUSCULAR; INTRAVENOUS PRN
Status: DISCONTINUED | OUTPATIENT
Start: 2017-01-01 | End: 2017-01-01

## 2017-01-01 RX ORDER — NICOTINE POLACRILEX 4 MG
15-30 LOZENGE BUCCAL
Status: DISCONTINUED | OUTPATIENT
Start: 2017-01-01 | End: 2017-01-01

## 2017-01-01 RX ORDER — OXYCODONE HYDROCHLORIDE 5 MG/1
5-10 TABLET ORAL
Status: DISCONTINUED | OUTPATIENT
Start: 2017-01-01 | End: 2017-01-01 | Stop reason: HOSPADM

## 2017-01-01 RX ORDER — TEMOZOLOMIDE 20 MG/1
75 CAPSULE ORAL AT BEDTIME
Qty: 96 CAPSULE | Refills: 0 | Status: SHIPPED | OUTPATIENT
Start: 2017-01-01 | End: 2017-01-01

## 2017-01-01 RX ORDER — EPHEDRINE SULFATE 50 MG/ML
INJECTION, SOLUTION INTRAMUSCULAR; INTRAVENOUS; SUBCUTANEOUS PRN
Status: DISCONTINUED | OUTPATIENT
Start: 2017-01-01 | End: 2017-01-01

## 2017-01-01 RX ORDER — SODIUM CHLORIDE, SODIUM LACTATE, POTASSIUM CHLORIDE, CALCIUM CHLORIDE 600; 310; 30; 20 MG/100ML; MG/100ML; MG/100ML; MG/100ML
INJECTION, SOLUTION INTRAVENOUS CONTINUOUS
Status: DISCONTINUED | OUTPATIENT
Start: 2017-01-01 | End: 2017-01-01 | Stop reason: HOSPADM

## 2017-01-01 RX ORDER — SODIUM CHLORIDE 9 MG/ML
INJECTION, SOLUTION INTRAVENOUS CONTINUOUS
Status: DISCONTINUED | OUTPATIENT
Start: 2017-01-01 | End: 2017-01-01

## 2017-01-01 RX ORDER — FENTANYL CITRATE 50 UG/ML
25-50 INJECTION, SOLUTION INTRAMUSCULAR; INTRAVENOUS
Status: DISCONTINUED | OUTPATIENT
Start: 2017-01-01 | End: 2017-01-01 | Stop reason: HOSPADM

## 2017-01-01 RX ORDER — ACETAMINOPHEN 325 MG/1
975 TABLET ORAL EVERY 8 HOURS
Status: DISCONTINUED | OUTPATIENT
Start: 2017-01-01 | End: 2017-01-01 | Stop reason: HOSPADM

## 2017-01-01 RX ORDER — LIDOCAINE HYDROCHLORIDE 20 MG/ML
INJECTION, SOLUTION INFILTRATION; PERINEURAL PRN
Status: DISCONTINUED | OUTPATIENT
Start: 2017-01-01 | End: 2017-01-01

## 2017-01-01 RX ORDER — HYDRALAZINE HYDROCHLORIDE 20 MG/ML
10-20 INJECTION INTRAMUSCULAR; INTRAVENOUS EVERY 30 MIN PRN
Status: DISCONTINUED | OUTPATIENT
Start: 2017-01-01 | End: 2017-01-01

## 2017-01-01 RX ORDER — LABETALOL HYDROCHLORIDE 5 MG/ML
10-40 INJECTION, SOLUTION INTRAVENOUS EVERY 10 MIN PRN
Status: DISCONTINUED | OUTPATIENT
Start: 2017-01-01 | End: 2017-01-01 | Stop reason: HOSPADM

## 2017-01-01 RX ORDER — BUPIVACAINE HYDROCHLORIDE AND EPINEPHRINE 5; 5 MG/ML; UG/ML
INJECTION, SOLUTION PERINEURAL PRN
Status: DISCONTINUED | OUTPATIENT
Start: 2017-01-01 | End: 2017-01-01 | Stop reason: HOSPADM

## 2017-01-01 RX ORDER — ONDANSETRON 2 MG/ML
4 INJECTION INTRAMUSCULAR; INTRAVENOUS EVERY 6 HOURS PRN
Status: DISCONTINUED | OUTPATIENT
Start: 2017-01-01 | End: 2017-01-01 | Stop reason: HOSPADM

## 2017-01-01 RX ORDER — SODIUM CHLORIDE, SODIUM LACTATE, POTASSIUM CHLORIDE, CALCIUM CHLORIDE 600; 310; 30; 20 MG/100ML; MG/100ML; MG/100ML; MG/100ML
INJECTION, SOLUTION INTRAVENOUS CONTINUOUS
Status: DISCONTINUED | OUTPATIENT
Start: 2017-01-01 | End: 2017-01-01

## 2017-01-01 RX ORDER — CALCIUM CARBONATE 500 MG/1
1-2 TABLET, CHEWABLE ORAL 4 TIMES DAILY PRN
Status: DISCONTINUED | OUTPATIENT
Start: 2017-01-01 | End: 2017-01-01 | Stop reason: HOSPADM

## 2017-01-01 RX ORDER — HYDROMORPHONE HYDROCHLORIDE 1 MG/ML
.3-.5 INJECTION, SOLUTION INTRAMUSCULAR; INTRAVENOUS; SUBCUTANEOUS
Status: DISPENSED | OUTPATIENT
Start: 2017-01-01 | End: 2017-01-01

## 2017-01-01 RX ORDER — DEXTROSE MONOHYDRATE 25 G/50ML
25-50 INJECTION, SOLUTION INTRAVENOUS
Status: DISCONTINUED | OUTPATIENT
Start: 2017-01-01 | End: 2017-01-01

## 2017-01-01 RX ORDER — LIDOCAINE 40 MG/G
CREAM TOPICAL
Status: DISCONTINUED | OUTPATIENT
Start: 2017-01-01 | End: 2017-01-01

## 2017-01-01 RX ORDER — OXYCODONE HYDROCHLORIDE 5 MG/1
5-10 TABLET ORAL EVERY 4 HOURS PRN
Qty: 60 TABLET | Refills: 0 | Status: SHIPPED | OUTPATIENT
Start: 2017-01-01 | End: 2017-01-01

## 2017-01-01 RX ORDER — LABETALOL HYDROCHLORIDE 5 MG/ML
10-40 INJECTION, SOLUTION INTRAVENOUS EVERY 10 MIN PRN
Status: DISCONTINUED | OUTPATIENT
Start: 2017-01-01 | End: 2017-01-01

## 2017-01-01 RX ORDER — DEXAMETHASONE 4 MG/1
4 TABLET ORAL 2 TIMES DAILY WITH MEALS
Qty: 45 TABLET | Refills: 1 | Status: SHIPPED | OUTPATIENT
Start: 2017-01-01 | End: 2017-01-01

## 2017-01-01 RX ORDER — TEMOZOLOMIDE 20 MG/1
75 CAPSULE ORAL AT BEDTIME
Qty: 240 CAPSULE | Refills: 0 | Status: SHIPPED | OUTPATIENT
Start: 2017-01-01 | End: 2018-01-01

## 2017-01-01 RX ORDER — PANTOPRAZOLE SODIUM 40 MG/1
40 TABLET, DELAYED RELEASE ORAL
Qty: 30 TABLET | Refills: 0 | Status: SHIPPED | OUTPATIENT
Start: 2017-01-01 | End: 2018-01-01

## 2017-01-01 RX ORDER — PROCHLORPERAZINE MALEATE 10 MG
10 TABLET ORAL EVERY 6 HOURS PRN
Qty: 30 TABLET | Refills: 2 | Status: SHIPPED | OUTPATIENT
Start: 2017-01-01 | End: 2018-01-01

## 2017-01-01 RX ORDER — SODIUM CHLORIDE, SODIUM LACTATE, POTASSIUM CHLORIDE, CALCIUM CHLORIDE 600; 310; 30; 20 MG/100ML; MG/100ML; MG/100ML; MG/100ML
INJECTION, SOLUTION INTRAVENOUS CONTINUOUS PRN
Status: DISCONTINUED | OUTPATIENT
Start: 2017-01-01 | End: 2017-01-01

## 2017-01-01 RX ORDER — LEVETIRACETAM 1000 MG/1
1000 TABLET ORAL EVERY 12 HOURS
Qty: 180 TABLET | Refills: 1 | Status: SHIPPED | OUTPATIENT
Start: 2017-01-01 | End: 2017-01-01

## 2017-01-01 RX ORDER — ONDANSETRON 2 MG/ML
INJECTION INTRAMUSCULAR; INTRAVENOUS PRN
Status: DISCONTINUED | OUTPATIENT
Start: 2017-01-01 | End: 2017-01-01

## 2017-01-01 RX ORDER — DIMENHYDRINATE 50 MG/ML
25 INJECTION, SOLUTION INTRAMUSCULAR; INTRAVENOUS
Status: DISCONTINUED | OUTPATIENT
Start: 2017-01-01 | End: 2017-01-01 | Stop reason: HOSPADM

## 2017-01-01 RX ORDER — LABETALOL HYDROCHLORIDE 5 MG/ML
10 INJECTION, SOLUTION INTRAVENOUS
Status: COMPLETED | OUTPATIENT
Start: 2017-01-01 | End: 2017-01-01

## 2017-01-01 RX ORDER — HYDRALAZINE HYDROCHLORIDE 20 MG/ML
10-20 INJECTION INTRAMUSCULAR; INTRAVENOUS EVERY 30 MIN PRN
Status: DISCONTINUED | OUTPATIENT
Start: 2017-01-01 | End: 2017-01-01 | Stop reason: HOSPADM

## 2017-01-01 RX ORDER — LEVETIRACETAM 1000 MG/1
1000 TABLET ORAL EVERY 12 HOURS
Qty: 180 TABLET | Refills: 11 | Status: SHIPPED | OUTPATIENT
Start: 2017-01-01

## 2017-01-01 RX ORDER — MEDROXYPROGESTERONE ACETATE 150 MG/ML
INJECTION, SUSPENSION INTRAMUSCULAR
COMMUNITY
Start: 2017-01-01 | End: 2018-01-01

## 2017-01-01 RX ORDER — TEMOZOLOMIDE 20 MG/1
75 CAPSULE ORAL AT BEDTIME
Qty: 96 CAPSULE | Refills: 0 | Status: SHIPPED | OUTPATIENT
Start: 2017-01-01 | End: 2018-01-01

## 2017-01-01 RX ORDER — PANTOPRAZOLE SODIUM 40 MG/1
40 TABLET, DELAYED RELEASE ORAL
Status: DISCONTINUED | OUTPATIENT
Start: 2017-01-01 | End: 2017-01-01 | Stop reason: HOSPADM

## 2017-01-01 RX ORDER — ONDANSETRON 4 MG/1
4 TABLET, ORALLY DISINTEGRATING ORAL EVERY 30 MIN PRN
Status: DISCONTINUED | OUTPATIENT
Start: 2017-01-01 | End: 2017-01-01 | Stop reason: HOSPADM

## 2017-01-01 RX ORDER — AMOXICILLIN 250 MG
1-2 CAPSULE ORAL 2 TIMES DAILY
Status: DISCONTINUED | OUTPATIENT
Start: 2017-01-01 | End: 2017-01-01 | Stop reason: HOSPADM

## 2017-01-01 RX ORDER — PROCHLORPERAZINE MALEATE 10 MG
10 TABLET ORAL EVERY 6 HOURS PRN
Qty: 30 TABLET | Refills: 2 | Status: CANCELLED | OUTPATIENT
Start: 2017-01-01

## 2017-01-01 RX ORDER — PROCHLORPERAZINE MALEATE 5 MG
5-10 TABLET ORAL EVERY 6 HOURS PRN
Status: DISCONTINUED | OUTPATIENT
Start: 2017-01-01 | End: 2017-01-01 | Stop reason: HOSPADM

## 2017-01-01 RX ORDER — ONDANSETRON 2 MG/ML
4 INJECTION INTRAMUSCULAR; INTRAVENOUS EVERY 30 MIN PRN
Status: DISCONTINUED | OUTPATIENT
Start: 2017-01-01 | End: 2017-01-01

## 2017-01-01 RX ORDER — ESMOLOL HYDROCHLORIDE 10 MG/ML
INJECTION INTRAVENOUS PRN
Status: DISCONTINUED | OUTPATIENT
Start: 2017-01-01 | End: 2017-01-01

## 2017-01-01 RX ORDER — NALOXONE HYDROCHLORIDE 0.4 MG/ML
.1-.4 INJECTION, SOLUTION INTRAMUSCULAR; INTRAVENOUS; SUBCUTANEOUS
Status: DISCONTINUED | OUTPATIENT
Start: 2017-01-01 | End: 2017-01-01 | Stop reason: HOSPADM

## 2017-01-01 RX ORDER — ONDANSETRON 4 MG/1
4 TABLET, ORALLY DISINTEGRATING ORAL EVERY 6 HOURS PRN
Status: DISCONTINUED | OUTPATIENT
Start: 2017-01-01 | End: 2017-01-01 | Stop reason: HOSPADM

## 2017-01-01 RX ORDER — TEMOZOLOMIDE 20 MG/1
75 CAPSULE ORAL AT BEDTIME
Qty: 240 CAPSULE | Refills: 1 | Status: SHIPPED | OUTPATIENT
Start: 2017-01-01 | End: 2017-01-01

## 2017-01-01 RX ORDER — ONDANSETRON 4 MG/1
4 TABLET, ORALLY DISINTEGRATING ORAL EVERY 30 MIN PRN
Status: DISCONTINUED | OUTPATIENT
Start: 2017-01-01 | End: 2017-01-01

## 2017-01-01 RX ORDER — ONDANSETRON 8 MG/1
8 TABLET, FILM COATED ORAL DAILY
Qty: 28 TABLET | Refills: 1 | Status: SHIPPED | OUTPATIENT
Start: 2017-01-01 | End: 2017-01-01

## 2017-01-01 RX ORDER — ONDANSETRON 2 MG/ML
4 INJECTION INTRAMUSCULAR; INTRAVENOUS EVERY 30 MIN PRN
Status: DISCONTINUED | OUTPATIENT
Start: 2017-01-01 | End: 2017-01-01 | Stop reason: HOSPADM

## 2017-01-01 RX ADMIN — DEXAMETHASONE SODIUM PHOSPHATE 4 MG: 4 INJECTION, SOLUTION INTRA-ARTICULAR; INTRALESIONAL; INTRAMUSCULAR; INTRAVENOUS; SOFT TISSUE at 10:36

## 2017-01-01 RX ADMIN — DEXAMETHASONE SODIUM PHOSPHATE 4 MG: 4 INJECTION, SOLUTION INTRA-ARTICULAR; INTRALESIONAL; INTRAMUSCULAR; INTRAVENOUS; SOFT TISSUE at 17:46

## 2017-01-01 RX ADMIN — POLYETHYLENE GLYCOL 3350 17 G: 17 POWDER, FOR SOLUTION ORAL at 07:37

## 2017-01-01 RX ADMIN — PROPOFOL 50 MG: 10 INJECTION, EMULSION INTRAVENOUS at 16:49

## 2017-01-01 RX ADMIN — ACETAMINOPHEN 650 MG: 325 TABLET, FILM COATED ORAL at 06:57

## 2017-01-01 RX ADMIN — GADOBUTROL 10 ML: 604.72 INJECTION INTRAVENOUS at 11:50

## 2017-01-01 RX ADMIN — DEXAMETHASONE SODIUM PHOSPHATE 4 MG: 4 INJECTION, SOLUTION INTRA-ARTICULAR; INTRALESIONAL; INTRAMUSCULAR; INTRAVENOUS; SOFT TISSUE at 09:51

## 2017-01-01 RX ADMIN — DEXAMETHASONE SODIUM PHOSPHATE 4 MG: 4 INJECTION, SOLUTION INTRA-ARTICULAR; INTRALESIONAL; INTRAMUSCULAR; INTRAVENOUS; SOFT TISSUE at 22:57

## 2017-01-01 RX ADMIN — ONDANSETRON 4 MG: 2 INJECTION INTRAMUSCULAR; INTRAVENOUS at 20:25

## 2017-01-01 RX ADMIN — DEXAMETHASONE SODIUM PHOSPHATE 4 MG: 4 INJECTION, SOLUTION INTRA-ARTICULAR; INTRALESIONAL; INTRAMUSCULAR; INTRAVENOUS; SOFT TISSUE at 04:04

## 2017-01-01 RX ADMIN — DEXAMETHASONE SODIUM PHOSPHATE 4 MG: 4 INJECTION, SOLUTION INTRA-ARTICULAR; INTRALESIONAL; INTRAMUSCULAR; INTRAVENOUS; SOFT TISSUE at 04:48

## 2017-01-01 RX ADMIN — ACETAMINOPHEN 650 MG: 325 TABLET, FILM COATED ORAL at 08:26

## 2017-01-01 RX ADMIN — SENNOSIDES AND DOCUSATE SODIUM 2 TABLET: 8.6; 5 TABLET ORAL at 09:03

## 2017-01-01 RX ADMIN — LEVETIRACETAM 1000 MG: 100 INJECTION, SOLUTION INTRAVENOUS at 17:43

## 2017-01-01 RX ADMIN — GADOBUTROL 10 ML: 604.72 INJECTION INTRAVENOUS at 11:04

## 2017-01-01 RX ADMIN — PANTOPRAZOLE SODIUM 40 MG: 40 TABLET, DELAYED RELEASE ORAL at 08:41

## 2017-01-01 RX ADMIN — LEVETIRACETAM 1000 MG: 100 INJECTION, SOLUTION INTRAVENOUS at 04:48

## 2017-01-01 RX ADMIN — PANTOPRAZOLE SODIUM 40 MG: 40 TABLET, DELAYED RELEASE ORAL at 07:39

## 2017-01-01 RX ADMIN — LEVETIRACETAM 1000 MG: 100 INJECTION, SOLUTION INTRAVENOUS at 17:02

## 2017-01-01 RX ADMIN — LEVETIRACETAM 1 G: 100 INJECTION, SOLUTION INTRAVENOUS at 17:10

## 2017-01-01 RX ADMIN — DEXAMETHASONE SODIUM PHOSPHATE 4 MG: 4 INJECTION, SOLUTION INTRA-ARTICULAR; INTRALESIONAL; INTRAMUSCULAR; INTRAVENOUS; SOFT TISSUE at 11:05

## 2017-01-01 RX ADMIN — SODIUM CHLORIDE, POTASSIUM CHLORIDE, SODIUM LACTATE AND CALCIUM CHLORIDE: 600; 310; 30; 20 INJECTION, SOLUTION INTRAVENOUS at 16:06

## 2017-01-01 RX ADMIN — DEXAMETHASONE SODIUM PHOSPHATE 4 MG: 4 INJECTION, SOLUTION INTRA-ARTICULAR; INTRALESIONAL; INTRAMUSCULAR; INTRAVENOUS; SOFT TISSUE at 09:09

## 2017-01-01 RX ADMIN — ACETAMINOPHEN 975 MG: 325 TABLET, FILM COATED ORAL at 15:26

## 2017-01-01 RX ADMIN — ROCURONIUM BROMIDE 70 MG: 10 INJECTION INTRAVENOUS at 16:16

## 2017-01-01 RX ADMIN — PANTOPRAZOLE SODIUM 40 MG: 40 TABLET, DELAYED RELEASE ORAL at 09:04

## 2017-01-01 RX ADMIN — OXYCODONE HYDROCHLORIDE 5 MG: 5 TABLET ORAL at 03:24

## 2017-01-01 RX ADMIN — ESMOLOL HYDROCHLORIDE 5 MG: 10 INJECTION, SOLUTION INTRAVENOUS at 21:42

## 2017-01-01 RX ADMIN — DEXAMETHASONE SODIUM PHOSPHATE 4 MG: 4 INJECTION, SOLUTION INTRA-ARTICULAR; INTRALESIONAL; INTRAMUSCULAR; INTRAVENOUS; SOFT TISSUE at 16:17

## 2017-01-01 RX ADMIN — ROCURONIUM BROMIDE 30 MG: 10 INJECTION INTRAVENOUS at 17:24

## 2017-01-01 RX ADMIN — HYDROMORPHONE HYDROCHLORIDE 0.3 MG: 1 INJECTION, SOLUTION INTRAMUSCULAR; INTRAVENOUS; SUBCUTANEOUS at 20:53

## 2017-01-01 RX ADMIN — LEVETIRACETAM 1000 MG: 100 INJECTION, SOLUTION INTRAVENOUS at 05:32

## 2017-01-01 RX ADMIN — FENTANYL CITRATE 50 MCG: 50 INJECTION, SOLUTION INTRAMUSCULAR; INTRAVENOUS at 16:50

## 2017-01-01 RX ADMIN — LEVETIRACETAM 1000 MG: 100 INJECTION, SOLUTION INTRAVENOUS at 04:34

## 2017-01-01 RX ADMIN — POLYETHYLENE GLYCOL 400 AND PROPYLENE GLYCOL 2 DROP: 4; 3 SOLUTION/ DROPS OPHTHALMIC at 16:22

## 2017-01-01 RX ADMIN — DEXAMETHASONE SODIUM PHOSPHATE 4 MG: 4 INJECTION, SOLUTION INTRA-ARTICULAR; INTRALESIONAL; INTRAMUSCULAR; INTRAVENOUS; SOFT TISSUE at 04:08

## 2017-01-01 RX ADMIN — INSULIN ASPART 1 UNITS: 100 INJECTION, SOLUTION INTRAVENOUS; SUBCUTANEOUS at 09:51

## 2017-01-01 RX ADMIN — DEXAMETHASONE SODIUM PHOSPHATE 4 MG: 4 INJECTION, SOLUTION INTRA-ARTICULAR; INTRALESIONAL; INTRAMUSCULAR; INTRAVENOUS; SOFT TISSUE at 03:48

## 2017-01-01 RX ADMIN — DEXAMETHASONE SODIUM PHOSPHATE 4 MG: 4 INJECTION, SOLUTION INTRA-ARTICULAR; INTRALESIONAL; INTRAMUSCULAR; INTRAVENOUS; SOFT TISSUE at 10:43

## 2017-01-01 RX ADMIN — GADOBUTROL 10 ML: 604.72 INJECTION INTRAVENOUS at 17:50

## 2017-01-01 RX ADMIN — Medication 0.2 MG: at 16:10

## 2017-01-01 RX ADMIN — ESMOLOL HYDROCHLORIDE 10 MG: 10 INJECTION, SOLUTION INTRAVENOUS at 21:13

## 2017-01-01 RX ADMIN — FENTANYL CITRATE 50 MCG: 50 INJECTION, SOLUTION INTRAMUSCULAR; INTRAVENOUS at 16:48

## 2017-01-01 RX ADMIN — SODIUM CHLORIDE, POTASSIUM CHLORIDE, SODIUM LACTATE AND CALCIUM CHLORIDE: 600; 310; 30; 20 INJECTION, SOLUTION INTRAVENOUS at 16:32

## 2017-01-01 RX ADMIN — LEVETIRACETAM 1000 MG: 100 INJECTION, SOLUTION INTRAVENOUS at 16:38

## 2017-01-01 RX ADMIN — MIDAZOLAM HYDROCHLORIDE 2 MG: 1 INJECTION, SOLUTION INTRAMUSCULAR; INTRAVENOUS at 16:06

## 2017-01-01 RX ADMIN — DEXAMETHASONE SODIUM PHOSPHATE 4 MG: 4 INJECTION, SOLUTION INTRA-ARTICULAR; INTRALESIONAL; INTRAMUSCULAR; INTRAVENOUS; SOFT TISSUE at 04:31

## 2017-01-01 RX ADMIN — DEXAMETHASONE SODIUM PHOSPHATE 4 MG: 4 INJECTION, SOLUTION INTRA-ARTICULAR; INTRALESIONAL; INTRAMUSCULAR; INTRAVENOUS; SOFT TISSUE at 22:27

## 2017-01-01 RX ADMIN — LORAZEPAM 2 MG: 1 TABLET ORAL at 11:12

## 2017-01-01 RX ADMIN — LIDOCAINE HYDROCHLORIDE 40 MG: 20 INJECTION, SOLUTION INFILTRATION; PERINEURAL at 16:16

## 2017-01-01 RX ADMIN — PHENYLEPHRINE HYDROCHLORIDE 100 MCG: 10 INJECTION, SOLUTION INTRAMUSCULAR; INTRAVENOUS; SUBCUTANEOUS at 17:34

## 2017-01-01 RX ADMIN — LEVETIRACETAM 1000 MG: 100 INJECTION, SOLUTION INTRAVENOUS at 03:48

## 2017-01-01 RX ADMIN — INFLUENZA A VIRUS A/MICHIGAN/45/2015 X-275 (H1N1) ANTIGEN (FORMALDEHYDE INACTIVATED), INFLUENZA A VIRUS A/HONG KONG/4801/2014 X-263B (H3N2) ANTIGEN (FORMALDEHYDE INACTIVATED), INFLUENZA B VIRUS B/PHUKET/3073/2013 ANTIGEN (FORMALDEHYDE INACTIVATED), AND INFLUENZA B VIRUS B/BRISBANE/60/2008 ANTIGEN (FORMALDEHYDE INACTIVATED) 0.5 ML: 15; 15; 15; 15 INJECTION, SUSPENSION INTRAMUSCULAR at 11:22

## 2017-01-01 RX ADMIN — DEXAMETHASONE SODIUM PHOSPHATE 8 MG: 4 INJECTION, SOLUTION INTRA-ARTICULAR; INTRALESIONAL; INTRAMUSCULAR; INTRAVENOUS; SOFT TISSUE at 16:58

## 2017-01-01 RX ADMIN — LEVETIRACETAM 1000 MG: 100 INJECTION, SOLUTION INTRAVENOUS at 17:45

## 2017-01-01 RX ADMIN — SODIUM CHLORIDE: 9 INJECTION, SOLUTION INTRAVENOUS at 00:00

## 2017-01-01 RX ADMIN — ACETAMINOPHEN 650 MG: 325 TABLET, FILM COATED ORAL at 20:04

## 2017-01-01 RX ADMIN — ACETAMINOPHEN 975 MG: 325 TABLET, FILM COATED ORAL at 09:04

## 2017-01-01 RX ADMIN — DEXAMETHASONE SODIUM PHOSPHATE 4 MG: 4 INJECTION, SOLUTION INTRA-ARTICULAR; INTRALESIONAL; INTRAMUSCULAR; INTRAVENOUS; SOFT TISSUE at 09:00

## 2017-01-01 RX ADMIN — Medication 0.5 MG: at 07:37

## 2017-01-01 RX ADMIN — DEXAMETHASONE SODIUM PHOSPHATE 4 MG: 4 INJECTION, SOLUTION INTRA-ARTICULAR; INTRALESIONAL; INTRAMUSCULAR; INTRAVENOUS; SOFT TISSUE at 17:01

## 2017-01-01 RX ADMIN — SODIUM CHLORIDE, POTASSIUM CHLORIDE, SODIUM LACTATE AND CALCIUM CHLORIDE: 600; 310; 30; 20 INJECTION, SOLUTION INTRAVENOUS at 21:35

## 2017-01-01 RX ADMIN — SENNOSIDES AND DOCUSATE SODIUM 2 TABLET: 8.6; 5 TABLET ORAL at 07:37

## 2017-01-01 RX ADMIN — FENTANYL CITRATE 50 MCG: 50 INJECTION, SOLUTION INTRAMUSCULAR; INTRAVENOUS at 16:16

## 2017-01-01 RX ADMIN — ROCURONIUM BROMIDE 10 MG: 10 INJECTION INTRAVENOUS at 18:50

## 2017-01-01 RX ADMIN — PROPOFOL 40 MG: 10 INJECTION, EMULSION INTRAVENOUS at 17:36

## 2017-01-01 RX ADMIN — CEFAZOLIN SODIUM 2 G: 2 INJECTION, SOLUTION INTRAVENOUS at 16:45

## 2017-01-01 RX ADMIN — LEVETIRACETAM 1000 MG: 100 INJECTION, SOLUTION INTRAVENOUS at 18:13

## 2017-01-01 RX ADMIN — OXYCODONE HYDROCHLORIDE 10 MG: 5 TABLET ORAL at 07:37

## 2017-01-01 RX ADMIN — FENTANYL CITRATE 50 MCG: 50 INJECTION, SOLUTION INTRAMUSCULAR; INTRAVENOUS at 17:45

## 2017-01-01 RX ADMIN — DEXAMETHASONE SODIUM PHOSPHATE 4 MG: 4 INJECTION, SOLUTION INTRA-ARTICULAR; INTRALESIONAL; INTRAMUSCULAR; INTRAVENOUS; SOFT TISSUE at 15:27

## 2017-01-01 RX ADMIN — PANTOPRAZOLE SODIUM 40 MG: 40 INJECTION, POWDER, FOR SOLUTION INTRAVENOUS at 08:20

## 2017-01-01 RX ADMIN — LEVETIRACETAM 1000 MG: 100 INJECTION, SOLUTION INTRAVENOUS at 04:15

## 2017-01-01 RX ADMIN — DEXAMETHASONE SODIUM PHOSPHATE 4 MG: 4 INJECTION, SOLUTION INTRA-ARTICULAR; INTRALESIONAL; INTRAMUSCULAR; INTRAVENOUS; SOFT TISSUE at 22:03

## 2017-01-01 RX ADMIN — PANTOPRAZOLE SODIUM 40 MG: 40 TABLET, DELAYED RELEASE ORAL at 07:37

## 2017-01-01 RX ADMIN — FENTANYL CITRATE 50 MCG: 50 INJECTION, SOLUTION INTRAMUSCULAR; INTRAVENOUS at 20:59

## 2017-01-01 RX ADMIN — DEXAMETHASONE SODIUM PHOSPHATE 4 MG: 4 INJECTION, SOLUTION INTRA-ARTICULAR; INTRALESIONAL; INTRAMUSCULAR; INTRAVENOUS; SOFT TISSUE at 04:55

## 2017-01-01 RX ADMIN — Medication 0.2 MG: at 16:16

## 2017-01-01 RX ADMIN — Medication 10 MG: at 23:42

## 2017-01-01 RX ADMIN — ACETAMINOPHEN 975 MG: 325 TABLET, FILM COATED ORAL at 07:37

## 2017-01-01 RX ADMIN — SODIUM CHLORIDE: 9 INJECTION, SOLUTION INTRAVENOUS at 22:24

## 2017-01-01 RX ADMIN — CEFAZOLIN 1 G: 1 INJECTION, POWDER, FOR SOLUTION INTRAMUSCULAR; INTRAVENOUS at 18:45

## 2017-01-01 RX ADMIN — CEFAZOLIN SODIUM 1 G: 2 INJECTION, SOLUTION INTRAVENOUS at 20:34

## 2017-01-01 RX ADMIN — DEXAMETHASONE SODIUM PHOSPHATE 4 MG: 4 INJECTION, SOLUTION INTRA-ARTICULAR; INTRALESIONAL; INTRAMUSCULAR; INTRAVENOUS; SOFT TISSUE at 22:09

## 2017-01-01 RX ADMIN — ESMOLOL HYDROCHLORIDE 20 MG: 10 INJECTION, SOLUTION INTRAVENOUS at 17:42

## 2017-01-01 RX ADMIN — FENTANYL CITRATE 50 MCG: 50 INJECTION, SOLUTION INTRAMUSCULAR; INTRAVENOUS at 17:21

## 2017-01-01 RX ADMIN — ACETAMINOPHEN 975 MG: 325 TABLET, FILM COATED ORAL at 00:04

## 2017-01-01 RX ADMIN — DEXAMETHASONE SODIUM PHOSPHATE 4 MG: 4 INJECTION, SOLUTION INTRA-ARTICULAR; INTRALESIONAL; INTRAMUSCULAR; INTRAVENOUS; SOFT TISSUE at 04:19

## 2017-01-01 RX ADMIN — PROPOFOL 40 MG: 10 INJECTION, EMULSION INTRAVENOUS at 16:28

## 2017-01-01 RX ADMIN — PROPOFOL 150 MG: 10 INJECTION, EMULSION INTRAVENOUS at 16:16

## 2017-01-01 RX ADMIN — FENTANYL CITRATE 50 MCG: 50 INJECTION, SOLUTION INTRAMUSCULAR; INTRAVENOUS at 19:49

## 2017-01-01 RX ADMIN — OXYCODONE HYDROCHLORIDE 10 MG: 5 TABLET ORAL at 10:33

## 2017-01-01 RX ADMIN — DEXAMETHASONE SODIUM PHOSPHATE 4 MG: 4 INJECTION, SOLUTION INTRA-ARTICULAR; INTRALESIONAL; INTRAMUSCULAR; INTRAVENOUS; SOFT TISSUE at 16:13

## 2017-01-01 RX ADMIN — LEVETIRACETAM 1000 MG: 100 INJECTION, SOLUTION INTRAVENOUS at 05:42

## 2017-01-01 RX ADMIN — ACETAMINOPHEN 650 MG: 325 TABLET, FILM COATED ORAL at 17:11

## 2017-01-01 RX ADMIN — ACETAMINOPHEN 975 MG: 325 TABLET, FILM COATED ORAL at 23:44

## 2017-01-01 RX ADMIN — HYDROMORPHONE HYDROCHLORIDE 0.2 MG: 1 INJECTION, SOLUTION INTRAMUSCULAR; INTRAVENOUS; SUBCUTANEOUS at 20:37

## 2017-01-01 RX ADMIN — LEVETIRACETAM 1000 MG: 100 INJECTION, SOLUTION INTRAVENOUS at 04:55

## 2017-01-01 RX ADMIN — PANTOPRAZOLE SODIUM 40 MG: 40 INJECTION, POWDER, FOR SOLUTION INTRAVENOUS at 08:32

## 2017-01-01 RX ADMIN — OXYCODONE HYDROCHLORIDE 5 MG: 5 TABLET ORAL at 01:18

## 2017-01-01 RX ADMIN — DEXAMETHASONE SODIUM PHOSPHATE 4 MG: 4 INJECTION, SOLUTION INTRA-ARTICULAR; INTRALESIONAL; INTRAMUSCULAR; INTRAVENOUS; SOFT TISSUE at 22:28

## 2017-01-01 RX ADMIN — PROPOFOL 20 MG: 10 INJECTION, EMULSION INTRAVENOUS at 16:51

## 2017-01-01 RX ADMIN — SENNOSIDES AND DOCUSATE SODIUM 1 TABLET: 8.6; 5 TABLET ORAL at 20:56

## 2017-01-01 RX ADMIN — PANTOPRAZOLE SODIUM 40 MG: 40 INJECTION, POWDER, FOR SOLUTION INTRAVENOUS at 09:51

## 2017-01-01 RX ADMIN — SUGAMMADEX 200 MG: 100 INJECTION, SOLUTION INTRAVENOUS at 21:05

## 2017-01-01 RX ADMIN — INSULIN ASPART 1 UNITS: 100 INJECTION, SOLUTION INTRAVENOUS; SUBCUTANEOUS at 13:22

## 2017-01-01 RX ADMIN — DEXAMETHASONE SODIUM PHOSPHATE 4 MG: 4 INJECTION, SOLUTION INTRA-ARTICULAR; INTRALESIONAL; INTRAMUSCULAR; INTRAVENOUS; SOFT TISSUE at 10:18

## 2017-01-01 RX ADMIN — Medication 5 MG: at 20:30

## 2017-01-01 RX ADMIN — SENNOSIDES AND DOCUSATE SODIUM 2 TABLET: 8.6; 5 TABLET ORAL at 19:43

## 2017-01-01 RX ADMIN — SENNOSIDES AND DOCUSATE SODIUM 1 TABLET: 8.6; 5 TABLET ORAL at 20:04

## 2017-01-01 ASSESSMENT — VISUAL ACUITY
OU: NORMAL ACUITY
OU: BASELINE
OU: NORMAL ACUITY
OU: BASELINE
OU: NORMAL ACUITY
OU: BASELINE
OU: NORMAL ACUITY
OU: NORMAL ACUITY
OU: BASELINE
OU: NORMAL ACUITY

## 2017-01-01 ASSESSMENT — ENCOUNTER SYMPTOMS
MYALGIAS: 0
WEIGHT GAIN: 1
NERVOUS/ANXIOUS: 0
INCREASED ENERGY: 1
CHILLS: 0
SPEECH CHANGE: 0
FATIGUE: 1
BLURRED VISION: 0
ALTERED TEMPERATURE REGULATION: 0
BRUISES/BLEEDS EASILY: 0
NIGHT SWEATS: 0
CHILLS: 0
DYSURIA: 0
HEADACHES: 0
HALLUCINATIONS: 0
HEARTBURN: 0
FEVER: 0
COUGH: 0
DIZZINESS: 0
SHORTNESS OF BREATH: 0
POLYDIPSIA: 0
NAUSEA: 0
POLYPHAGIA: 0
DEPRESSION: 0
WEIGHT LOSS: 0
WEIGHT LOSS: 0
FEVER: 0
SENSORY CHANGE: 0
DECREASED APPETITE: 1
NECK PAIN: 0

## 2017-01-01 ASSESSMENT — PAIN DESCRIPTION - DESCRIPTORS
DESCRIPTORS: ACHING
DESCRIPTORS: HEADACHE
DESCRIPTORS: HEADACHE
DESCRIPTORS: ACHING

## 2017-01-01 ASSESSMENT — PAIN SCALES - GENERAL
PAINLEVEL: NO PAIN (0)
PAINLEVEL: MILD PAIN (3)

## 2017-02-27 ENCOUNTER — OFFICE VISIT (OUTPATIENT)
Dept: FAMILY MEDICINE | Facility: CLINIC | Age: 57
End: 2017-02-27
Payer: COMMERCIAL

## 2017-02-27 VITALS
HEIGHT: 69 IN | SYSTOLIC BLOOD PRESSURE: 110 MMHG | HEART RATE: 76 BPM | WEIGHT: 212 LBS | DIASTOLIC BLOOD PRESSURE: 70 MMHG | BODY MASS INDEX: 31.4 KG/M2

## 2017-02-27 DIAGNOSIS — R19.7 DIARRHEA, UNSPECIFIED TYPE: Primary | ICD-10-CM

## 2017-02-27 PROCEDURE — 83516 IMMUNOASSAY NONANTIBODY: CPT | Performed by: FAMILY MEDICINE

## 2017-02-27 PROCEDURE — 36415 COLL VENOUS BLD VENIPUNCTURE: CPT | Performed by: FAMILY MEDICINE

## 2017-02-27 PROCEDURE — 83516 IMMUNOASSAY NONANTIBODY: CPT | Mod: 59 | Performed by: FAMILY MEDICINE

## 2017-02-27 PROCEDURE — 99214 OFFICE O/P EST MOD 30 MIN: CPT | Performed by: FAMILY MEDICINE

## 2017-02-27 NOTE — PROGRESS NOTES
Subjective:  Seth Iglesias is a 56 year old male   Chief Complaint   Patient presents with     Patient Request     concerns with bloating, a lot of gas, diarrhea. X on and off for several months.  pt. has been watching/following gluteon free diet and watching what he is eating.       He has had a tendency towards diarrhea and some abdominal cramping for almost a year. He mentioned that when he saw one of my colleagues last summer and did have some stool specimens checked which were negative. He had a colonoscopy in 2011 which was normal. He has tried cutting out gluten in the last week and hasn't noticed much difference. He does admit that he enjoys drinking milk and dairy products but hasn't noticed a direct connection between consuming those and his symptoms. He ate some kind of a freeze-dried chocolate bar last week and then did have diarrhea and cramping        Encounter Diagnosis   Name Primary?     Diarrhea, unspecified type Yes       ROS:General: No change in weight, sleep or appetite.  Normal energy.  No fever or chills  Resp: No coughing, wheezing or shortness of breath  CV: No chest pains or palpitations  : No urinary frequency or dysuria, bladder or kidney problems    Medical, surgical, social, and family histories, medications and allergies reviewed and updated.    Objective:  Exam:    GENERAL APPEARANCE ADULT: Alert, no acute distress  EYES: PERRL, EOM normal, conjunctiva and lids normal  NECK: No adenopathy,masses or thyromegaly  RESP: lungs clear to auscultation   CV: normal rate, regular rhythm, no murmur or gallop  ABDOMEN: soft, no organomegaly, masses or tenderness      ASSESSMENT:  1. Diarrhea, unspecified type        PLAN:  Orders Placed This Encounter     Tissue transglutaminase jimi IgA and IgG     I ordered stool specimens for looking for infectious diarrhea. If none of these issues help, would entertain a diagnosis of irritable bowel syndrome and make some recommendations for  that    Patient Instructions   Continue to cut out gluten another week. Cut out all dairy for a week. If this helps, just take a lactaid capsule with any serving of dairy products.    If not helping the collect stool specimens again.

## 2017-02-27 NOTE — NURSING NOTE
"Chief Complaint   Patient presents with     Patient Request     concerns with bloating, a lot of gas, diarrhea. X on and off for several months.  pt. has been watching/following gluteon free diet and watching what he is eating.         Initial /70 (BP Location: Right arm, Patient Position: Chair, Cuff Size: Adult Large)  Pulse 76  Ht 5' 9\" (1.753 m)  Wt 212 lb (96.2 kg)  BMI 31.31 kg/m2 Estimated body mass index is 31.31 kg/(m^2) as calculated from the following:    Height as of this encounter: 5' 9\" (1.753 m).    Weight as of this encounter: 212 lb (96.2 kg).  Medication Reconciliation: complete     Eulalia Reyes, MEHDI      "

## 2017-02-27 NOTE — MR AVS SNAPSHOT
After Visit Summary   2/27/2017    Seth Iglesias    MRN: [id number]           Patient Information     Date Of Birth          1960        Visit Information        Provider Department      2/27/2017 4:00 PM JUSTYN Salmon MD [facility]        Today's Diagnoses     Diarrhea, unspecified type    -  1      Care Instructions    Continue to cut out gluten another week. Cut out all dairy for a week. If this helps, just take a lactaid capsule with any serving of dairy products.    If not helping the collect stool specimens again.        Follow-ups after your visit        Future tests that were ordered for you today     Open Future Orders        Priority Expected Expires Ordered    Giardia antigen Routine  2/27/2018 2/27/2017    Enteric Bacteria and Virus Panel by RANDA Stool Routine  2/27/2018 2/27/2017    Ova and Parasite Exam Routine Routine  2/27/2018 2/27/2017    Clostridium difficile toxin B PCR Routine  3/27/2017 2/27/2017            Who to contact     If you have questions or need follow up information about today's clinic visit or your schedule please contact [facility] [place] directly at [phone].  Normal or non-critical lab and imaging results will be communicated to you by [employer], letter or phone within 4 business days after the clinic has received the results. If you do not hear from us within 7 days, please contact the clinic through [employer] or phone. If you have a critical or abnormal lab result, we will notify you by phone as soon as possible.  Submit refill requests through [employer] or call your pharmacy and they will forward the refill request to us. Please allow 3 business days for your refill to be completed.          Additional Information About Your Visit        MyChart Information     [employer] lets you send messages to your doctor, view your test results, renew your prescriptions, schedule appointments and more. To sign up, go to  [email or web address] . Click on \"Log in\" on the left side of the screen, which will take you to the Welcome page. Then click on \"Sign up Now\" on the right side of the page.     You will be asked to enter the access code listed below, as well as some personal information. Please follow the directions to create your username and password.     Your access code is: [email or web address]  Expires: 2017  4:41 PM     Your access code will  in 90 days. If you need help or a new code, please call your [facility] clinic or [phone].        Care EveryWhere ID     This is your Care EveryWhere ID. This could be used by other organizations to access your [facility] medical records  [id number]        Your Vitals Were     Pulse Height BMI (Body Mass Index)             76 5' 9\" (1.753 m) 31.31 kg/m2          Blood Pressure from Last 3 Encounters:   17 110/70   16 114/83   03/18/15 128/80    Weight from Last 3 Encounters:   17 212 lb (96.2 kg)   16 214 lb (97.1 kg)   03/18/15 218 lb (98.9 kg)              We Performed the Following     Tissue transglutaminase jimi IgA and IgG        Primary Care Provider Office Phone # Fax #    R Nigel Salmon [phone]       [address] [address]        Thank you!     Thank you for choosing [facility]  for your care. Our goal is always to provide you with excellent care. Hearing back from our patients is one way we can continue to improve our services. Please take a few minutes to complete the written survey that you may receive in the mail after your visit with us. Thank you!             Your Updated Medication List - Protect others around you: Learn how to safely use, store and throw away your medicines at www.disposemymeds.org.          This list is accurate as of: 17  4:41 PM.  Always use your most recent med list.                   Brand Name Dispense Instructions for use    ENBREL " 50 MG/ML injection   Generic drug:  etanercept      50 MG EVERY WEEK

## 2017-02-27 NOTE — PATIENT INSTRUCTIONS
Continue to cut out gluten another week. Cut out all dairy for a week. If this helps, just take a lactaid capsule with any serving of dairy products.    If not helping the collect stool specimens again.

## 2017-02-28 LAB
TTG IGA SER-ACNC: 2 U/ML
TTG IGG SER-ACNC: 1 U/ML

## 2017-03-16 ENCOUNTER — HOSPITAL ENCOUNTER (EMERGENCY)
Facility: CLINIC | Age: 57
Discharge: HOME | End: 2017-03-16
Attending: EMERGENCY MEDICINE | Admitting: EMERGENCY MEDICINE
Payer: COMMERCIAL

## 2017-03-16 ENCOUNTER — APPOINTMENT (OUTPATIENT)
Dept: CT IMAGING | Facility: CLINIC | Age: 57
End: 2017-03-16
Attending: EMERGENCY MEDICINE
Payer: COMMERCIAL

## 2017-03-16 VITALS
OXYGEN SATURATION: 95 % | SYSTOLIC BLOOD PRESSURE: 149 MMHG | TEMPERATURE: 98 F | RESPIRATION RATE: 14 BRPM | DIASTOLIC BLOOD PRESSURE: 107 MMHG

## 2017-03-16 DIAGNOSIS — N23 RENAL COLIC: ICD-10-CM

## 2017-03-16 LAB
ALBUMIN SERPL-MCNC: 3.9 G/DL (ref 3.4–5)
ALP SERPL-CCNC: 72 U/L (ref 40–150)
ALT SERPL W P-5'-P-CCNC: 21 U/L (ref 0–70)
ANION GAP SERPL CALCULATED.3IONS-SCNC: 8 MMOL/L (ref 3–14)
AST SERPL W P-5'-P-CCNC: 14 U/L (ref 0–45)
BASOPHILS # BLD AUTO: 0 10E9/L (ref 0–0.2)
BASOPHILS NFR BLD AUTO: 0.2 %
BILIRUB SERPL-MCNC: 0.3 MG/DL (ref 0.2–1.3)
BUN SERPL-MCNC: 15 MG/DL (ref 7–30)
CALCIUM SERPL-MCNC: 8.5 MG/DL (ref 8.5–10.1)
CHLORIDE SERPL-SCNC: 107 MMOL/L (ref 94–109)
CO2 SERPL-SCNC: 28 MMOL/L (ref 20–32)
CREAT SERPL-MCNC: 1.07 MG/DL (ref 0.66–1.25)
DIFFERENTIAL METHOD BLD: NORMAL
EOSINOPHIL # BLD AUTO: 0.1 10E9/L (ref 0–0.7)
EOSINOPHIL NFR BLD AUTO: 1.3 %
ERYTHROCYTE [DISTWIDTH] IN BLOOD BY AUTOMATED COUNT: 12.6 % (ref 10–15)
GFR SERPL CREATININE-BSD FRML MDRD: 71 ML/MIN/1.7M2
GLUCOSE SERPL-MCNC: 104 MG/DL (ref 70–99)
HCT VFR BLD AUTO: 42 % (ref 40–53)
HGB BLD-MCNC: 13.9 G/DL (ref 13.3–17.7)
IMM GRANULOCYTES # BLD: 0 10E9/L (ref 0–0.4)
IMM GRANULOCYTES NFR BLD: 0.2 %
LYMPHOCYTES # BLD AUTO: 1.8 10E9/L (ref 0.8–5.3)
LYMPHOCYTES NFR BLD AUTO: 19.5 %
MCH RBC QN AUTO: 30.6 PG (ref 26.5–33)
MCHC RBC AUTO-ENTMCNC: 33.1 G/DL (ref 31.5–36.5)
MCV RBC AUTO: 93 FL (ref 78–100)
MONOCYTES # BLD AUTO: 0.6 10E9/L (ref 0–1.3)
MONOCYTES NFR BLD AUTO: 6.7 %
NEUTROPHILS # BLD AUTO: 6.6 10E9/L (ref 1.6–8.3)
NEUTROPHILS NFR BLD AUTO: 72.1 %
PLATELET # BLD AUTO: 212 10E9/L (ref 150–450)
POTASSIUM SERPL-SCNC: 3.8 MMOL/L (ref 3.4–5.3)
PROT SERPL-MCNC: 7.5 G/DL (ref 6.8–8.8)
RBC # BLD AUTO: 4.54 10E12/L (ref 4.4–5.9)
SODIUM SERPL-SCNC: 143 MMOL/L (ref 133–144)
WBC # BLD AUTO: 9.2 10E9/L (ref 4–11)

## 2017-03-16 PROCEDURE — 74176 CT ABD & PELVIS W/O CONTRAST: CPT

## 2017-03-16 PROCEDURE — 76705 ECHO EXAM OF ABDOMEN: CPT

## 2017-03-16 PROCEDURE — 80053 COMPREHEN METABOLIC PANEL: CPT | Performed by: EMERGENCY MEDICINE

## 2017-03-16 PROCEDURE — 25000128 H RX IP 250 OP 636: Performed by: EMERGENCY MEDICINE

## 2017-03-16 PROCEDURE — 99285 EMERGENCY DEPT VISIT HI MDM: CPT | Mod: 25

## 2017-03-16 PROCEDURE — 85025 COMPLETE CBC W/AUTO DIFF WBC: CPT | Performed by: EMERGENCY MEDICINE

## 2017-03-16 PROCEDURE — 76705 ECHO EXAM OF ABDOMEN: CPT | Mod: 26 | Performed by: EMERGENCY MEDICINE

## 2017-03-16 PROCEDURE — 96374 THER/PROPH/DIAG INJ IV PUSH: CPT

## 2017-03-16 PROCEDURE — 99285 EMERGENCY DEPT VISIT HI MDM: CPT | Mod: 25 | Performed by: EMERGENCY MEDICINE

## 2017-03-16 RX ORDER — HYDROCODONE BITARTRATE AND ACETAMINOPHEN 5; 325 MG/1; MG/1
1-2 TABLET ORAL EVERY 6 HOURS PRN
Qty: 15 TABLET | Refills: 0 | Status: SHIPPED | OUTPATIENT
Start: 2017-03-16 | End: 2017-09-28

## 2017-03-16 RX ORDER — IBUPROFEN 200 MG
800 TABLET ORAL EVERY 8 HOURS PRN
Qty: 60 TABLET | Refills: 0 | COMMUNITY
Start: 2017-03-16 | End: 2017-03-21

## 2017-03-16 RX ORDER — KETOROLAC TROMETHAMINE 30 MG/ML
30 INJECTION, SOLUTION INTRAMUSCULAR; INTRAVENOUS ONCE
Status: COMPLETED | OUTPATIENT
Start: 2017-03-16 | End: 2017-03-16

## 2017-03-16 RX ADMIN — KETOROLAC TROMETHAMINE 30 MG: 30 INJECTION, SOLUTION INTRAMUSCULAR at 06:32

## 2017-03-16 ASSESSMENT — ENCOUNTER SYMPTOMS
FLANK PAIN: 1
CHEST TIGHTNESS: 0
SHORTNESS OF BREATH: 0
VOMITING: 0
LIGHT-HEADEDNESS: 0
CHILLS: 0
ABDOMINAL PAIN: 0
HEADACHES: 0
FEVER: 0
FATIGUE: 0
BACK PAIN: 0
DYSURIA: 0
NAUSEA: 1
APPETITE CHANGE: 0
HEMATURIA: 0
DIARRHEA: 0

## 2017-03-16 NOTE — ED PROVIDER NOTES
History     Chief Complaint   Patient presents with     Flank Pain     right side     HPI  Seth Iglesias is a 56 year old male with a history of psoriasis presents for evaluation of right flank pain tonight.  Patient states he woke up abruptly from sleep (at 241am) with severe right posterior flank pain.  Pain associated with some mild nausea.  No diaphoresis, chest pain, or dyspnea.  Patient reports similar mild symptoms intermittently in the recent past but nothing this intense or severe.  Patient denies any history of hypertension or aortic aneurysm.  Patient is a nonsmoker.     I have reviewed the Medications, Allergies, Past Medical and Surgical History, and Social History in the Epic system.    Review of Systems   Constitutional: Negative for appetite change, chills, fatigue and fever.   HENT: Negative for congestion.    Respiratory: Negative for chest tightness and shortness of breath.    Gastrointestinal: Positive for nausea. Negative for abdominal pain, diarrhea and vomiting.   Genitourinary: Positive for flank pain (right-sided). Negative for decreased urine volume, dysuria and hematuria (urine dark tonight).   Musculoskeletal: Negative for back pain.   Skin: Negative for rash.   Neurological: Negative for light-headedness and headaches.   All other systems reviewed and are negative.      Physical Exam      Physical Exam   Constitutional: He is oriented to person, place, and time. He appears well-developed and well-nourished. No distress.   HENT:   Head: Normocephalic and atraumatic.   Mouth/Throat: Oropharynx is clear and moist.   Eyes: Conjunctivae are normal.   Cardiovascular: Normal rate and regular rhythm.    Pulmonary/Chest: Effort normal and breath sounds normal.   Abdominal: Soft. Bowel sounds are normal. There is no tenderness. There is no rebound, no guarding and no CVA tenderness.   Neurological: He is alert and oriented to person, place, and time.   Skin: Skin is warm and dry.    Psychiatric: He has a normal mood and affect.   Nursing note and vitals reviewed.      ED Course     ED Course   Comment By Time   Reviewed ct images: right mid ureteral stone with hydro Dominic Pittman MD 03/16 0554     Procedures  Results for orders placed during the hospital encounter of 03/16/17   POC US ABDOMEN LIMITED    Impression [facility] Procedure Note      Limited Bedside ED Aorta Ultrasound:    PROCEDURE: PERFORMED BY: Dr. Dominic Pittman  INDICATIONS:  Abdominal Pain and Flank pain    PROBE:  Low frequency convex probe  BODY LOCATION: Abdomen  FINDINGS:  The ultrasound was performed using longitudinal and transverse views.   Normal: Abdominal aorta < 4 cm.  MEASUREMENT:  2 cm     INTERPRETATION:  The evaluation of the aorta was of normal caliber (ie < 4cm in the transverse/longitudinal views) without evidence of aneurysm or dilation.  Aorta visualized in entirety from insertion into the intra-abdominal cavity to bifurcation into iliac vessels.}  IMAGE DOCUMENTATION: Images were archived to PACs system.    [facility] Procedure Note    Limited Bedside ED Renal Ultrasound:    PERFORMED BY: Dr. Dominic Pittman  INDICATIONS:  Flank Pain  PROBE: Low frequency convex probe  BODY LOCATION:  Abdomen  FINDINGS:  The ultrasound was performed with longitudinal and transverse views.   Right Kidney:   Hydronephrosis:  Moderate   Renal cyst:  None       INTERPRETATION:  Right kidney demonstrates hydronephrosis.  IMAGE DOCUMENTATION: Images were archived to PACs system.                 Results for orders placed or performed during the hospital encounter of 03/16/17   POC US ABDOMEN LIMITED    Impression    [facility] Procedure Note      Limited Bedside ED Aorta Ultrasound:    PROCEDURE: PERFORMED BY: Dr. Dominic Pittman  INDICATIONS:  Abdominal Pain and Flank pain    PROBE:  Low frequency convex probe  BODY LOCATION: Abdomen  FINDINGS:  The ultrasound was performed  using longitudinal and transverse views.   Normal: Abdominal aorta < 4 cm.  MEASUREMENT:  2 cm     INTERPRETATION:  The evaluation of the aorta was of normal caliber (ie < 4cm in the transverse/longitudinal views) without evidence of aneurysm or dilation.  Aorta visualized in entirety from insertion into the intra-abdominal cavity to bifurcation into iliac vessels.}  IMAGE DOCUMENTATION: Images were archived to PACs system.    [facility] Procedure Note    Limited Bedside ED Renal Ultrasound:    PERFORMED BY: Dr. Dominic Pittman  INDICATIONS:  Flank Pain  PROBE: Low frequency convex probe  BODY LOCATION:  Abdomen  FINDINGS:  The ultrasound was performed with longitudinal and transverse views.   Right Kidney:   Hydronephrosis:  Moderate   Renal cyst:  None       INTERPRETATION:  Right kidney demonstrates hydronephrosis.  IMAGE DOCUMENTATION: Images were archived to PACs system.            CT Abdomen Pelvis w/o Contrast (stone protocol)    Narrative    CT ABDOMEN AND PELVIS WITHOUT CONTRAST  3/16/2017 5:30 AM     HISTORY: Right flank pain.    COMPARISON: None.    TECHNIQUE: Without intravenous or oral contrast, helical sections were  acquired from the top of the diaphragm through the pubic symphysis.  Coronal reconstructions were generated. Radiation dose for this scan  was reduced using automated exposure control, adjustment of the mA  and/or kV according to the patient's size, or iterative reconstruction  technique. (Renal stone protocol).    FINDINGS:     Right urinary tract: 0.4 cm calculus in the proximal to mid ureter.  Mild to moderate dilatation of the more proximal ureter and intrarenal  collecting system. Mild perinephric haziness. No additional renal or  ureteral calculi.    Left urinary tract: No renal or ureteral calculi. No dilatation of the  intrarenal collecting system or ureter.    Urinary bladder: No visualized calculi. Moderate enlargement of the  prostate gland.    Remainder of the  abdomen and pelvis: The liver, spleen, pancreas and  adrenal glands are unremarkable to the limits of a noncontrast CT  scan. The gallbladder is present. The small and large bowel are normal  in caliber. The appendix is unremarkable. No bowel wall thickening,  pneumatosis or free intraperitoneal gas. No enlarged lymph nodes or  free fluid in the abdomen or pelvis. Circumscribed haziness in the  small bowel mesentery, suggestive of mesenteric panniculitis, of  doubtful clinical significance. Atherosclerotic calcification in the  abdominal aorta.    Scan through the lower chest is unremarkable.      Impression    IMPRESSION:   1. 0.4 cm proximal to mid right ureteral calculus, resulting in mild  to moderate obstruction.  2. No additional renal or ureteral calculi.    SAVITA RASCON MD   CBC with platelets, differential   Result Value Ref Range    WBC 9.2 4.0 - 11.0 10e9/L    RBC Count 4.54 4.4 - 5.9 10e12/L    Hemoglobin 13.9 13.3 - 17.7 g/dL    Hematocrit 42.0 40.0 - 53.0 %    MCV 93 78 - 100 fl    MCH 30.6 26.5 - 33.0 pg    MCHC 33.1 31.5 - 36.5 g/dL    RDW 12.6 10.0 - 15.0 %    Platelet Count 212 150 - 450 10e9/L    Diff Method Automated Method     % Neutrophils 72.1 %    % Lymphocytes 19.5 %    % Monocytes 6.7 %    % Eosinophils 1.3 %    % Basophils 0.2 %    % Immature Granulocytes 0.2 %    Absolute Neutrophil 6.6 1.6 - 8.3 10e9/L    Absolute Lymphocytes 1.8 0.8 - 5.3 10e9/L    Absolute Monocytes 0.6 0.0 - 1.3 10e9/L    Absolute Eosinophils 0.1 0.0 - 0.7 10e9/L    Absolute Basophils 0.0 0.0 - 0.2 10e9/L    Abs Immature Granulocytes 0.0 0 - 0.4 10e9/L   Comprehensive metabolic panel   Result Value Ref Range    Sodium 143 133 - 144 mmol/L    Potassium 3.8 3.4 - 5.3 mmol/L    Chloride 107 94 - 109 mmol/L    Carbon Dioxide 28 20 - 32 mmol/L    Anion Gap 8 3 - 14 mmol/L    Glucose 104 (H) 70 - 99 mg/dL    Urea Nitrogen 15 7 - 30 mg/dL    Creatinine 1.07 0.66 - 1.25 mg/dL    GFR Estimate 71 >60 mL/min/1.7m2    GFR  Estimate If Black 86 >60 mL/min/1.7m2    Calcium 8.5 8.5 - 10.1 mg/dL    Bilirubin Total 0.3 0.2 - 1.3 mg/dL    Albumin 3.9 3.4 - 5.0 g/dL    Protein Total 7.5 6.8 - 8.8 g/dL    Alkaline Phosphatase 72 40 - 150 U/L    ALT 21 0 - 70 U/L    AST 14 0 - 45 U/L         Assessments & Plan (with Medical Decision Making)  48-year-old male with history of psoriasis presents for evaluation of abrupt onset of severe right flank pain FROM SLEEP THIS MORNING.  PAIN ASSOCIATED WITH NAUSEA WITHOUT VOMITING.  THE PATIENT WAS MODERATELY HYPERTENSIVE ON ARRIVAL AND GIVEN HIS AGE WITH HYPERTENSION AND FLANK PAIN, CONCERN FOR POSSIBLE AORTIC ANEURYSM PROMPTED EVALUATION BY EMERGENCY BEDSIDE ULTRASOUND.  EVALUATION OF THE AORTA NORMAL.  RIGHT KIDNEY PORTABLE ULTRASOUND DID SHOW HYDRONEPHROSIS SUGGESTIVE OF KIDNEY STONE.  THIS PATIENT HAS NEVER BEEN DIAGNOSED WITH KIDNEY STONE BEFORE, A CT WAS OBTAINED TO EVALUATE FOR SIZE AND LOCATION OF STONE AND FOR OTHER POSSIBLE ETIOLOGIES.  CT confirmed presence of a 0.4 cm proximal mid right ureteral calculus with mild hydronephrosis proximally.  Symptoms controlled with Toradol and patient discharged home with plan to take ibuprofen 800 mg every 8 hours as well as Norco when necessary.  Patient advised that if symptoms not improving within 1 week, to schedule follow-up with urology.  Patient was advised that if symptoms become unmanageable, to return to the emergency department for repeat evaluation.       I have reviewed the nursing notes.    I have reviewed the findings, diagnosis, plan and need for follow up with the patient.    Discharge Medication List as of 3/16/2017  6:46 AM      START taking these medications    Details   ibuprofen (ADVIL/MOTRIN) 200 MG tablet Take 4 tablets (800 mg) by mouth every 8 hours as needed for mild pain, Disp-60 tablet, R-0, OTC      HYDROcodone-acetaminophen (NORCO) 5-325 MG per tablet Take 1-2 tablets by mouth every 6 hours as needed for moderate to severe  pain, Disp-15 tablet, R-0, Local Print             Final diagnoses:   Renal colic       3/16/2017   [facility] EMERGENCY DEPARTMENT     Pittman, Dominic Stanley MD  03/17/17 7514

## 2017-03-16 NOTE — ED AVS SNAPSHOT
" [facility] Emergency Department    [address] [facility] [address]    Phone:  [phone]    Fax:  [phone]                                       Seth Iglesias   MRN: [id number]    Department:  [facility] Emergency Department   Date of Visit:  3/16/2017           Patient Information     Date Of Birth          1960        Your diagnoses for this visit were:     Renal colic        You were seen by Dominic Pittman MD.      Follow-up Information     Follow up with [facility]. Schedule an appointment as soon as possible for a visit in 1 week.    Specialty:  Urology    Why:  If symptoms not improving    Contact information:    [address] [place] [id number] [phone]    Additional information:    The medical center is located at   [address] (between [address] and   [address] in [place], [place] miles north   of [place]).        Discharge Instructions          * KIDNEY STONE (w/ Colic)    The sharp cramping pain and nausea/vomiting that you have is due to a small stone which has formed in the kidney and is now passing down a narrow tube (ureter) on its way to your bladder. Once it reaches your bladder, the pain will stop. The stone may pass in your urine stream in one piece. [The size may be 1/16\" to 1/4\" (1-6mm)]. Or, the stone may also break up into kaylan fragments which you may not even notice.  Once you have had a kidney stone there is a risk for recurrence in the future.  HOME CARE:    Drink lots of fluid (at least 8-10 glasses of water a day).    Most stones will pass on their own, but may take from a few hours to a few days.    Each time you urinate, do so in a jar. Pour the urine from the jar through the strainer and into the toilet. Continue doing this until 24 hours after your pain stops. By then, if there was a kidney stone, it should pass from your bladder. Some stones dissolve into sand-like particles and pass right " through the strainer. In that case, you won't ever see a stone.    Save any stone that you find in the strainer and bring it to your doctor for analysis. It may be possible to prevent certain types of stones from forming. Therefore, it is important to know what kind of stone you have.    Try to stay as active as possible since this will help the stone pass. Do not stay in bed unless your pain prevents you from getting up. You may notice a red, pink or brown color to your urine. This is normal while passing a kidney stone.  FOLLOW UP with your doctor or return to this facility if the pain lasts more than 48 hours.  GET PROMPT MEDICAL ATTENTION if any of the following occur:    Pain that is not controlled by the medicine given    Repeated vomiting or unable to keep down fluids    Weakness, dizziness or fainting    Fever over 101  F (38.3  C)    Passage of solid red or brown urine (can't see through it) or urine with lots of blood clots    Unable to pass urine for 8 hours and increasing bladder pressure    9853-8979 [place] [address]. All rights reserved. This information is not intended as a substitute for professional medical care. Always follow your healthcare professional's instructions.      24 Hour Appointment Hotline       To make an appointment at any [facility] clinic, call [phone] ([phone]). If you don't have a family doctor or clinic, we will help you find one. [facility] clinics are conveniently located to serve the needs of you and your family.             Review of your medicines      START taking        Dose / Directions Last dose taken    HYDROcodone-acetaminophen 5-325 MG per tablet   Commonly known as:  NORCO   Dose:  1-2 tablet   Quantity:  15 tablet        Take 1-2 tablets by mouth every 6 hours as needed for moderate to severe pain   Refills:  0        ibuprofen 200 MG tablet   Commonly known as:  ADVIL/MOTRIN   Dose:  800 mg   Quantity:  60 tablet         Take 4 tablets (800 mg) by mouth every 8 hours as needed for mild pain   Refills:  0          Our records show that you are taking the medicines listed below. If these are incorrect, please call your family doctor or clinic.        Dose / Directions Last dose taken    ENBREL 50 MG/ML injection   Generic drug:  etanercept        50 MG EVERY WEEK   Refills:  0                Prescriptions were sent or printed at these locations (2 Prescriptions)                   Other Prescriptions                Not Printed or Sent (1 of 2)         ibuprofen (ADVIL/MOTRIN) 200 MG tablet                 Printed at Department/Unit printer (1 of 2)         HYDROcodone-acetaminophen (NORCO) 5-325 MG per tablet                Procedures and tests performed during your visit     CBC with platelets, differential    CT Abdomen Pelvis w/o Contrast (stone protocol)    Comprehensive metabolic panel    POC US ABDOMEN LIMITED      Orders Needing Specimen Collection     Ordered          03/16/17 0435  UA reflex to Microscopic - STAT, Prio: STAT, Needs to be Collected     Scheduled Task Status   03/16/17 0436 Collect UA reflex to Microscopic Open   Order Class:  PCU Collect                  Pending Results     Date and Time Order Name Status Description    3/16/2017 0512 CT Abdomen Pelvis w/o Contrast (stone protocol) Preliminary             Pending Culture Results     No orders found from 3/14/2017 to 3/17/2017.             Test Results from your hospital stay           3/16/2017  5:56 AM - Pittman, Dominic Stanley MD      [facility] Procedure Note      Limited Bedside ED Aorta Ultrasound:    PROCEDURE: PERFORMED BY: Dr. Dominic Pittman  INDICATIONS:  Abdominal Pain and Flank pain    PROBE:  Low frequency convex probe  BODY LOCATION: Abdomen  FINDINGS:  The ultrasound was performed using longitudinal and transverse views.   Normal: Abdominal aorta < 4 cm.  MEASUREMENT:  2 cm     INTERPRETATION:  The evaluation  of the aorta was of normal caliber (ie < 4cm in the transverse/longitudinal views) without evidence of aneurysm or dilation.  Aorta visualized in entirety from insertion into the intra-abdominal cavity to bifurcation into iliac vessels.}  IMAGE DOCUMENTATION: Images were archived to PACs system.    [facility] Procedure Note    Limited Bedside ED Renal Ultrasound:    PERFORMED BY: Dr. Dominic Pittman  INDICATIONS:  Flank Pain  PROBE: Low frequency convex probe  BODY LOCATION:  Abdomen  FINDINGS:  The ultrasound was performed with longitudinal and transverse views.   Right Kidney:   Hydronephrosis:  Moderate   Renal cyst:  None       INTERPRETATION:  Right kidney demonstrates hydronephrosis.  IMAGE DOCUMENTATION: Images were archived to PACs system.                  3/16/2017  6:36 AM - Interface, Radiant Ib      Narrative     CT ABDOMEN AND PELVIS WITHOUT CONTRAST  3/16/2017 5:30 AM     HISTORY: Right flank pain.    COMPARISON: None.    TECHNIQUE: Without intravenous or oral contrast, helical sections were  acquired from the top of the diaphragm through the pubic symphysis.  Coronal reconstructions were generated. Radiation dose for this scan  was reduced using automated exposure control, adjustment of the mA  and/or kV according to the patient's size, or iterative reconstruction  technique. (Renal stone protocol).    FINDINGS:     Right urinary tract: 0.4 cm calculus in the proximal to mid ureter.  Mild to moderate dilatation of the more proximal ureter and intrarenal  collecting system. Mild perinephric haziness. No additional renal or  ureteral calculi.    Left urinary tract: No renal or ureteral calculi. No dilatation of the  intrarenal collecting system or ureter.    Urinary bladder: No visualized calculi. Moderate enlargement of the  prostate gland.    Remainder of the abdomen and pelvis: The liver, spleen, pancreas and  adrenal glands are unremarkable to the limits of a noncontrast CT  scan. The  gallbladder is present. The small and large bowel are normal  in caliber. The appendix is unremarkable. No bowel wall thickening,  pneumatosis or free intraperitoneal gas. No enlarged lymph nodes or  free fluid in the abdomen or pelvis. Circumscribed haziness in the  small bowel mesentery, suggestive of mesenteric panniculitis, of  doubtful clinical significance. Atherosclerotic calcification in the  abdominal aorta.    Scan through the lower chest is unremarkable.        Impression     IMPRESSION:   1. 0.4 cm proximal to mid right ureteral calculus, resulting in mild  to moderate obstruction.  2. No additional renal or ureteral calculi.         3/16/2017  5:33 AM - Interface, Flexilab Results      Component Results     Component Value Ref Range & Units Status    WBC 9.2 4.0 - 11.0 10e9/L Final    RBC Count 4.54 4.4 - 5.9 10e12/L Final    Hemoglobin 13.9 13.3 - 17.7 g/dL Final    Hematocrit 42.0 40.0 - 53.0 % Final    MCV 93 78 - 100 fl Final    MCH 30.6 26.5 - 33.0 pg Final    MCHC 33.1 31.5 - 36.5 g/dL Final    RDW 12.6 10.0 - 15.0 % Final    Platelet Count 212 150 - 450 10e9/L Final    Diff Method Automated Method  Final    % Neutrophils 72.1 % Final    % Lymphocytes 19.5 % Final    % Monocytes 6.7 % Final    % Eosinophils 1.3 % Final    % Basophils 0.2 % Final    % Immature Granulocytes 0.2 % Final    Absolute Neutrophil 6.6 1.6 - 8.3 10e9/L Final    Absolute Lymphocytes 1.8 0.8 - 5.3 10e9/L Final    Absolute Monocytes 0.6 0.0 - 1.3 10e9/L Final    Absolute Eosinophils 0.1 0.0 - 0.7 10e9/L Final    Absolute Basophils 0.0 0.0 - 0.2 10e9/L Final    Abs Immature Granulocytes 0.0 0 - 0.4 10e9/L Final         3/16/2017  5:48 AM - Interface, Flexilab Results      Component Results     Component Value Ref Range & Units Status    Sodium 143 133 - 144 mmol/L Final    Potassium 3.8 3.4 - 5.3 mmol/L Final    Chloride 107 94 - 109 mmol/L Final    Carbon Dioxide 28 20 - 32 mmol/L Final    Anion Gap 8 3 - 14 mmol/L Final     "Glucose 104 (H) 70 - 99 mg/dL Final    Urea Nitrogen 15 7 - 30 mg/dL Final    Creatinine 1.07 0.66 - 1.25 mg/dL Final    GFR Estimate 71 >60 mL/min/1.7m2 Final    Non  GFR Calc    GFR Estimate If Black 86 >60 mL/min/1.7m2 Final    African American GFR Calc    Calcium 8.5 8.5 - 10.1 mg/dL Final    Bilirubin Total 0.3 0.2 - 1.3 mg/dL Final    Albumin 3.9 3.4 - 5.0 g/dL Final    Protein Total 7.5 6.8 - 8.8 g/dL Final    Alkaline Phosphatase 72 40 - 150 U/L Final    ALT 21 0 - 70 U/L Final    AST 14 0 - 45 U/L Final                Thank you for choosing [facility]       Thank you for choosing [facility] for your care. Our goal is always to provide you with excellent care. Hearing back from our patients is one way we can continue to improve our services. Please take a few minutes to complete the written survey that you may receive in the mail after you visit with us. Thank you!        [email or web address] Information     [email or web address] lets you send messages to your doctor, view your test results, renew your prescriptions, schedule appointments and more. To sign up, go to [email or web address] . Click on \"Log in\" on the left side of the screen, which will take you to the Welcome page. Then click on \"Sign up Now\" on the right side of the page.     You will be asked to enter the access code listed below, as well as some personal information. Please follow the directions to create your username and password.     Your access code is: [email or web address]  Expires: 2017  5:41 PM     Your access code will  in 90 days. If you need help or a new code, please call your [facility] clinic or [phone].        Care EveryWhere ID     This is your Care EveryWhere ID. This could be used by other organizations to access your [facility] medical records  [id number]        After Visit Summary       This is your record. Keep this with you and show to your community pharmacist(s) and doctor(s) at your next visit.                  "

## 2017-03-16 NOTE — DISCHARGE INSTRUCTIONS
"   * KIDNEY STONE (w/ Colic)    The sharp cramping pain and nausea/vomiting that you have is due to a small stone which has formed in the kidney and is now passing down a narrow tube (ureter) on its way to your bladder. Once it reaches your bladder, the pain will stop. The stone may pass in your urine stream in one piece. [The size may be 1/16\" to 1/4\" (1-6mm)]. Or, the stone may also break up into kaylan fragments which you may not even notice.  Once you have had a kidney stone there is a risk for recurrence in the future.  HOME CARE:    Drink lots of fluid (at least 8-10 glasses of water a day).    Most stones will pass on their own, but may take from a few hours to a few days.    Each time you urinate, do so in a jar. Pour the urine from the jar through the strainer and into the toilet. Continue doing this until 24 hours after your pain stops. By then, if there was a kidney stone, it should pass from your bladder. Some stones dissolve into sand-like particles and pass right through the strainer. In that case, you won't ever see a stone.    Save any stone that you find in the strainer and bring it to your doctor for analysis. It may be possible to prevent certain types of stones from forming. Therefore, it is important to know what kind of stone you have.    Try to stay as active as possible since this will help the stone pass. Do not stay in bed unless your pain prevents you from getting up. You may notice a red, pink or brown color to your urine. This is normal while passing a kidney stone.  FOLLOW UP with your doctor or return to this facility if the pain lasts more than 48 hours.  GET PROMPT MEDICAL ATTENTION if any of the following occur:    Pain that is not controlled by the medicine given    Repeated vomiting or unable to keep down fluids    Weakness, dizziness or fainting    Fever over 101  F (38.3  C)    Passage of solid red or brown urine (can't see through it) or urine with lots of blood clots    Unable " to pass urine for 8 hours and increasing bladder pressure    [id number] Rich[employer], [address] [place] [address], [place] [address]. All rights reserved. This information is not intended as a substitute for professional medical care. Always follow your healthcare professional's instructions.

## 2017-03-16 NOTE — ED AVS SNAPSHOT
[facility] Emergency Department    [address] [facility] [address]    Phone:  [phone]    Fax:  [phone]                                       Seth Iglesias   MRN: [id number]    Department:  [facility] Emergency Department   Date of Visit:  3/16/2017           After Visit Summary Signature Page     I have received my discharge instructions, and my questions have been answered. I have discussed any challenges I see with this plan with the nurse or doctor.    ..........................................................................................................................................  Patient/Patient Representative Signature      ..........................................................................................................................................  Patient Representative Print Name and Relationship to Patient    ..................................................               ................................................  Date                                            Time    ..........................................................................................................................................  Reviewed by Signature/Title    ...................................................              ..............................................  Date                                                            Time

## 2017-03-16 NOTE — ED NOTES
DC instructions reviewed with pt and spouse states understanding. Sent with printed RX. Pt has  home.

## 2017-09-19 ENCOUNTER — OFFICE VISIT (OUTPATIENT)
Dept: FAMILY MEDICINE | Facility: CLINIC | Age: 57
End: 2017-09-19
Payer: COMMERCIAL

## 2017-09-19 VITALS
BODY MASS INDEX: 31.4 KG/M2 | SYSTOLIC BLOOD PRESSURE: 120 MMHG | HEART RATE: 68 BPM | DIASTOLIC BLOOD PRESSURE: 82 MMHG | WEIGHT: 212 LBS | HEIGHT: 69 IN

## 2017-09-19 DIAGNOSIS — K52.9 CHRONIC DIARRHEA: ICD-10-CM

## 2017-09-19 DIAGNOSIS — Z00.00 ROUTINE GENERAL MEDICAL EXAMINATION AT A HEALTH CARE FACILITY: Primary | ICD-10-CM

## 2017-09-19 PROCEDURE — 99213 OFFICE O/P EST LOW 20 MIN: CPT | Mod: 25 | Performed by: FAMILY MEDICINE

## 2017-09-19 PROCEDURE — 99396 PREV VISIT EST AGE 40-64: CPT | Performed by: FAMILY MEDICINE

## 2017-09-19 NOTE — PROGRESS NOTES
SUBJECTIVE:   CC: Seth Iglesias is an 57 year old male who presents for preventative health visit.     Healthy Habits:    Do you get at least three servings of calcium containing foods daily (dairy, green leafy vegetables, etc.)? yes    Amount of exercise or daily activities, outside of work: pt. Is very active    Problems taking medications regularly No    Medication side effects: No    Have you had an eye exam in the past two years? no    Do you see a dentist twice per year? yes  Do you have sleep apnea, excessive snoring or daytime drowsiness? yes      Chronic diarrhea: He was seen last January because of some persistent diarrhea and had stool specimens collected which all came back normal. He then saw me again about a month later and the diarrhea continued. At that time he had tried cutting out gluten without much benefit, so I recommended that he also cut out dairy for 2 or 3 weeks, and again it did not make any difference. I gave him containers to collect a repeat stool specimens just to be sure we hadn't missed at the first time but he never got around to sending those in. His current situation is he will have multiple somewhat loose or soft stools per day. Every time he said feels like he needs to urinate he will sit down because he will either passed some gas or a small amount of liquid stool.            Today's PHQ-2 Score: PHQ-2 ( 1999 [employer]) 9/19/2017 2/27/2017   Q1: Little interest or pleasure in doing things 0 0   Q2: Feeling down, depressed or hopeless 0 0   PHQ-2 Score 0 0         Abuse: Current or Past(Physical, Sexual or Emotional)- No  Do you feel safe in your environment - Yes  Social History   Substance Use Topics     Smoking status: Never Smoker     Smokeless tobacco: Never Used     Alcohol use Yes      Comment: occasionally     The patient does not drink >3 drinks per day nor >7 drinks per week.    Last PSA:   PSA   Date Value Ref Range Status   07/08/2016 0.82 0 - 4 ug/L Final  "      Reviewed orders with patient. Reviewed health maintenance and updated orders accordingly - Yes      Reviewed and updated as needed this visit by clinical staff  Tobacco  Allergies  Med Hx  Surg Hx  Fam Hx  Soc Hx        Reviewed and updated as needed this visit by Provider              ROS:  C: NEGATIVE for fever, chills, change in weight  I: NEGATIVE for worrisome rashes, moles or lesions  E: NEGATIVE for vision changes or irritation  ENT: NEGATIVE for ear, mouth and throat problems  R: NEGATIVE for significant cough or SOB  CV: NEGATIVE for chest pain, palpitations or peripheral edema  GI: See history of present illness, otherwise negative   male: negative for dysuria, hematuria, decreased urinary stream, erectile dysfunction, urethral discharge  M: NEGATIVE for significant arthralgias or myalgia  N: NEGATIVE for weakness, dizziness or paresthesias  P: NEGATIVE for changes in mood or affect    OBJECTIVE:   /82 (BP Location: Right arm, Patient Position: Chair, Cuff Size: Adult Large)  Pulse 68  Ht 5' 9\" (1.753 m)  Wt 212 lb (96.2 kg)  BMI 31.31 kg/m2  EXAM:  GENERAL: healthy, alert and no distress  EYES: Eyes grossly normal to inspection, PERRL and conjunctivae and sclerae normal  HENT: ear canals and TM's normal, nose and mouth without ulcers or lesions  NECK: no adenopathy, no asymmetry, masses, or scars and thyroid normal to palpation  RESP: lungs clear to auscultation - no rales, rhonchi or wheezes  CV: regular rate and rhythm, normal S1 S2, no S3 or S4, no murmur, click or rub, no peripheral edema and peripheral pulses strong  ABDOMEN: soft, nontender, no hepatosplenomegaly, no masses and bowel sounds normal  MS: no gross musculoskeletal defects noted, no edema  SKIN: no suspicious lesions or rashes  NEURO: Normal strength and tone, mentation intact and speech normal  PSYCH: mentation appears normal, affect normal/bright    ASSESSMENT/PLAN:     ASSESSMENT:  1. Routine general medical " examination at a health care facility    2. Chronic diarrhea        I suspect he probably has irritable bowel syndrome/diarrhea predominant. Before making this diagnosis I think we should collect one more set of stool specimens. Then at this is negative would treat him with a binding agent such as cholestyramine    PLAN:  Orders Placed This Encounter     OFFICE/OUTPT VISIT,[facility]       Patient Instructions   Collect the stool specimens again just to be sure. If all negative, the would recommend a medication the binds up the stool some.          Preventive Health Recommendations  Male Ages 50 - 64    Yearly exam:             See your health care provider every year in order to  o   Review health changes.   o   Discuss preventive care.    o   Review your medicines if your doctor has prescribed any.     Have a cholesterol test every 5 years, or more frequently if you are at risk for high cholesterol/heart disease.     Have a diabetes test (fasting glucose) every three years. If you are at risk for diabetes, you should have this test more often.     Have a colonoscopy at age 50, or have a yearly FIT test (stool test). These exams will check for colon cancer.      Talk with your health care provider about whether or not a prostate cancer screening test (PSA) is right for you.    You should be tested each year for STDs (sexually transmitted diseases), if you re at risk.     Shots: Get a flu shot each year. Get a tetanus shot every 10 years.     Nutrition:    Eat at least 5 servings of fruits and vegetables daily.     Eat whole-grain bread, whole-wheat pasta and brown rice instead of white grains and rice.     Talk to your provider about Calcium and Vitamin D.     Lifestyle    Exercise for at least 150 minutes a week (30 minutes a day, 5 days a week). This will help you control your weight and prevent disease.     Limit alcohol to one drink per day.     No smoking.     Wear sunscreen to prevent skin cancer.     See  "your dentist every six months for an exam and cleaning.     See your eye doctor every 1 to 2 years.       COUNSELING:  Reviewed preventive health counseling, as reflected in patient instructions       Regular exercise       Healthy diet/nutrition       Vision screening       Hearing screening         reports that he has never smoked. He has never used smokeless tobacco.      Estimated body mass index is 31.31 kg/(m^2) as calculated from the following:    Height as of this encounter: 5' 9\" (1.753 m).    Weight as of this encounter: 212 lb (96.2 kg).   Weight management plan: Discussed healthy diet and exercise guidelines and patient will follow up in 12 months in clinic to re-evaluate.    Counseling Resources:  ATP IV Guidelines  Pooled Cohorts Equation Calculator  FRAX Risk Assessment  ICSI Preventive Guidelines  Dietary Guidelines for Americans, 2010  USDA's MyPlate  ASA Prophylaxis  Lung CA Screening    JUSTYN Salmon MD  [facility]  "

## 2017-09-19 NOTE — NURSING NOTE
"Chief Complaint   Patient presents with     Physical     Patient Request     concerns with gastro issues diarrhea frequent X 1 year.      Dizziness     light headed and funny taste in the mouth/or smell 2 times in the last week.        Initial /82 (BP Location: Right arm, Patient Position: Chair, Cuff Size: Adult Large)  Pulse 68  Ht 5' 9\" (1.753 m)  Wt 212 lb (96.2 kg)  BMI 31.31 kg/m2 Estimated body mass index is 31.31 kg/(m^2) as calculated from the following:    Height as of this encounter: 5' 9\" (1.753 m).    Weight as of this encounter: 212 lb (96.2 kg).  Medication Reconciliation: complete     Eulalia Reyes, CMA        "

## 2017-09-19 NOTE — PATIENT INSTRUCTIONS
Collect the stool specimens again just to be sure. If all negative, the would recommend a medication the binds up the stool some.          Preventive Health Recommendations  Male Ages 50   64    Yearly exam:             See your health care provider every year in order to  o   Review health changes.   o   Discuss preventive care.    o   Review your medicines if your doctor has prescribed any.     Have a cholesterol test every 5 years, or more frequently if you are at risk for high cholesterol/heart disease.     Have a diabetes test (fasting glucose) every three years. If you are at risk for diabetes, you should have this test more often.     Have a colonoscopy at age 50, or have a yearly FIT test (stool test). These exams will check for colon cancer.      Talk with your health care provider about whether or not a prostate cancer screening test (PSA) is right for you.    You should be tested each year for STDs (sexually transmitted diseases), if you re at risk.     Shots: Get a flu shot each year. Get a tetanus shot every 10 years.     Nutrition:    Eat at least 5 servings of fruits and vegetables daily.     Eat whole-grain bread, whole-wheat pasta and brown rice instead of white grains and rice.     Talk to your provider about Calcium and Vitamin D.     Lifestyle    Exercise for at least 150 minutes a week (30 minutes a day, 5 days a week). This will help you control your weight and prevent disease.     Limit alcohol to one drink per day.     No smoking.     Wear sunscreen to prevent skin cancer.     See your dentist every six months for an exam and cleaning.     See your eye doctor every 1 to 2 years.

## 2017-09-19 NOTE — MR AVS SNAPSHOT
After Visit Summary   9/19/2017    Seth Iglesias    MRN: [id number]           Patient Information     Date Of Birth          1960        Visit Information        Provider Department      9/19/2017 4:00 PM JUSTYN Salmon MD [facility] Instructions    Collect the stool specimens again just to be sure. If all negative, the would recommend a medication the binds up the stool some.          Preventive Health Recommendations  Male Ages 50 - 64    Yearly exam:             See your health care provider every year in order to  o   Review health changes.   o   Discuss preventive care.    o   Review your medicines if your doctor has prescribed any.     Have a cholesterol test every 5 years, or more frequently if you are at risk for high cholesterol/heart disease.     Have a diabetes test (fasting glucose) every three years. If you are at risk for diabetes, you should have this test more often.     Have a colonoscopy at age 50, or have a yearly FIT test (stool test). These exams will check for colon cancer.      Talk with your health care provider about whether or not a prostate cancer screening test (PSA) is right for you.    You should be tested each year for STDs (sexually transmitted diseases), if you re at risk.     Shots: Get a flu shot each year. Get a tetanus shot every 10 years.     Nutrition:    Eat at least 5 servings of fruits and vegetables daily.     Eat whole-grain bread, whole-wheat pasta and brown rice instead of white grains and rice.     Talk to your provider about Calcium and Vitamin D.     Lifestyle    Exercise for at least 150 minutes a week (30 minutes a day, 5 days a week). This will help you control your weight and prevent disease.     Limit alcohol to one drink per day.     No smoking.     Wear sunscreen to prevent skin cancer.     See your dentist every six months for an exam and cleaning.     See your eye doctor every 1 to 2 years.             "Follow-ups after your visit        Who to contact     If you have questions or need follow up information about today's clinic visit or your schedule please contact [facility] directly at [phone].  Normal or non-critical lab and imaging results will be communicated to you by MyChart, letter or phone within 4 business days after the clinic has received the results. If you do not hear from us within 7 days, please contact the clinic through MyChart or phone. If you have a critical or abnormal lab result, we will notify you by phone as soon as possible.  Submit refill requests through [employer] or call your pharmacy and they will forward the refill request to us. Please allow 3 business days for your refill to be completed.          Additional Information About Your Visit        [employer] Information     [employer]Clock lets you send messages to your doctor, view your test results, renew your prescriptions, schedule appointments and more. To sign up, go to [email or web address] . Click on \"Log in\" on the left side of the screen, which will take you to the Welcome page. Then click on \"Sign up Now\" on the right side of the page.     You will be asked to enter the access code listed below, as well as some personal information. Please follow the directions to create your username and password.     Your access code is: [email or web address]  Expires: 2017  4:33 PM     Your access code will  in 90 days. If you need help or a new code, please call your [facility] clinic or [phone].        Care EveryWhere ID     This is your Care EveryWhere ID. This could be used by other organizations to access your [facility] medical records  [id number]        Your Vitals Were     Pulse Height BMI (Body Mass Index)             68 5' 9\" (1.753 m) 31.31 kg/m2          Blood Pressure from Last 3 Encounters:   17 120/82   17 (!) 149/107   17 110/70    Weight from Last 3 Encounters:   17 212 lb (96.2 " kg)   02/27/17 212 lb (96.2 kg)   07/08/16 214 lb (97.1 kg)              Today, you had the following     No orders found for display       Primary Care Provider Office Phone # Fax #    R Nigel Salmon [phone] [address] [facility] [address]        Equal Access to Services     [facility] : Hadii aad ku hadasho Soomaali, waaxda luqadaha, qaybta kaalmada adeegyada, waxay idiin hayaan adeeg osminjimmy lachris . So [facility] [phone].    ATENCIÓN: Si habla español, tiene a granados disposición servicios gratuitos de asistencia lingüística. Rodney al [phone].    We comply with applicable federal civil rights laws and [place] laws. We do not discriminate on the basis of race, color, national origin, age, disability sex, sexual orientation or gender identity.            Thank you!     Thank you for choosing [facility]  for your care. Our goal is always to provide you with excellent care. Hearing back from our patients is one way we can continue to improve our services. Please take a few minutes to complete the written survey that you may receive in the mail after your visit with us. Thank you!             Your Updated Medication List - Protect others around you: Learn how to safely use, store and throw away your medicines at www.disposemymeds.org.          This list is accurate as of: 9/19/17  4:33 PM.  Always use your most recent med list.                   Brand Name Dispense Instructions for use Diagnosis    ENBREL 50 MG/ML injection   Generic drug:  etanercept      50 MG EVERY WEEK        HYDROcodone-acetaminophen 5-325 MG per tablet    NORCO    15 tablet    Take 1-2 tablets by mouth every 6 hours as needed for moderate to severe pain

## 2017-09-25 ENCOUNTER — TELEPHONE (OUTPATIENT)
Dept: FAMILY MEDICINE | Facility: CLINIC | Age: 57
End: 2017-09-25

## 2017-09-25 DIAGNOSIS — R42 DIZZINESS: Primary | ICD-10-CM

## 2017-09-25 DIAGNOSIS — R00.2 PALPITATIONS: ICD-10-CM

## 2017-09-25 NOTE — TELEPHONE ENCOUNTER
From her description, the most concerning problem would be whether he is having some heart rhythm disturbances, will order a zio patch Holter monitor and that will show if there is a rhythm problem that we need to send him to cardiology for. Have  patient call [phone] to schedule a time to up to apply the monitor

## 2017-09-25 NOTE — TELEPHONE ENCOUNTER
"    S-(situation): Spouse called and is concerned about patient's symptoms of increased dizziness.  Patient is at work at this time. Spouse would like a referral for cardiology.     B-(background): Patient was seen in clinic on 9/19/17.    A-(assessment): Spouse called and is concerned the patient symptoms of dizziness is worsening. Patient is having dizziness at work and at rest.  Clinic does not consent to discuss with spouse regarding his medical history.  All information was obtained from wife.  Spouse states on Saturday 9/23/17 the patient had 3 episodes of dizziness at rest.  Spouse reports the patient had episodes the next day in bed also.  Spouse reports the patient's father has had heart issues and had a pacemaker/defibrillator at he age of 50.  Spouse reports he has had episodes of where \"his hear feels funny and then he gets weak\" for years.  Spouse also reports he has had behavioral changes and some forgetfulness.   Patient at times feels sweaty.      R-(recommendations): Advised spouse if he has dizziness with sudden weakness to go to ER for evaluation.  Advised spouse he may need to be seen in clinic again.  Cardiology may not have appointments for weeks.  Spouse reports it is difficult for patient to be seen in clinic.  Will need to provider for review and advise.     Thank you  Sandy MERLOS RN      "

## 2017-09-25 NOTE — TELEPHONE ENCOUNTER
Reason for Call:  Other dizzy    Detailed comments: Wife is calling to talk to a nurse about  and his dizzy spells.  No other info given.    Phone Number Patient can be reached at: Home number on file [phone] (home)    Best Time: any    Can we leave a detailed message on this number? YES    Call taken on 9/25/2017 at 9:17 AM by Ruth Ventura

## 2017-09-26 ENCOUNTER — HOSPITAL ENCOUNTER (OUTPATIENT)
Dept: CARDIOLOGY | Facility: CLINIC | Age: 57
Discharge: HOME | End: 2017-09-26
Attending: FAMILY MEDICINE | Admitting: FAMILY MEDICINE
Payer: COMMERCIAL

## 2017-09-26 DIAGNOSIS — R42 DIZZINESS: ICD-10-CM

## 2017-09-26 DIAGNOSIS — R00.2 PALPITATIONS: ICD-10-CM

## 2017-09-26 PROCEDURE — 0298T ZZC EXT ECG > 48HR TO 21 DAY REVIEW AND INTERPRETATN: CPT | Performed by: INTERNAL MEDICINE

## 2017-09-26 PROCEDURE — 0296T ZIO PATCH HOLTER: CPT

## 2017-09-26 NOTE — LETTER
October 12, 2017      Seth Iglesias  [address] [place] [id number]        Dear ,    We are writing to inform you of your test results.    The heart monitor did not show anything concerning. Obviously, your symptoms are not from the heart, but from the brain tumor      If you have any questions or concerns, please call the clinic at the number listed above.       Sincerely,        Dr. Nigel Salmon/alden

## 2017-09-28 ENCOUNTER — APPOINTMENT (OUTPATIENT)
Dept: CT IMAGING | Facility: CLINIC | Age: 57
End: 2017-09-28
Attending: EMERGENCY MEDICINE
Payer: COMMERCIAL

## 2017-09-28 ENCOUNTER — APPOINTMENT (OUTPATIENT)
Dept: GENERAL RADIOLOGY | Facility: CLINIC | Age: 57
DRG: 025 | End: 2017-09-28
Attending: NEUROLOGICAL SURGERY
Payer: COMMERCIAL

## 2017-09-28 ENCOUNTER — TELEPHONE (OUTPATIENT)
Dept: FAMILY MEDICINE | Facility: CLINIC | Age: 57
End: 2017-09-28

## 2017-09-28 ENCOUNTER — HOSPITAL ENCOUNTER (INPATIENT)
Facility: CLINIC | Age: 57
LOS: 7 days | Discharge: HOME | DRG: 025 | End: 2017-10-05
Attending: NEUROLOGICAL SURGERY | Admitting: NEUROLOGICAL SURGERY
Payer: COMMERCIAL

## 2017-09-28 ENCOUNTER — HOSPITAL ENCOUNTER (EMERGENCY)
Facility: CLINIC | Age: 57
Discharge: ANOTHER HOSPITAL | End: 2017-09-28
Attending: EMERGENCY MEDICINE | Admitting: EMERGENCY MEDICINE
Payer: COMMERCIAL

## 2017-09-28 VITALS
SYSTOLIC BLOOD PRESSURE: 146 MMHG | RESPIRATION RATE: 17 BRPM | DIASTOLIC BLOOD PRESSURE: 94 MMHG | OXYGEN SATURATION: 97 % | TEMPERATURE: 100 F | HEART RATE: 92 BPM

## 2017-09-28 DIAGNOSIS — G93.89 BRAIN MASS: ICD-10-CM

## 2017-09-28 DIAGNOSIS — C19 COLORECTAL CANCER (H): ICD-10-CM

## 2017-09-28 DIAGNOSIS — G93.89 BRAIN MASS: Primary | ICD-10-CM

## 2017-09-28 LAB
ALBUMIN SERPL-MCNC: 3.7 G/DL (ref 3.4–5)
ALP SERPL-CCNC: 90 U/L (ref 40–150)
ALT SERPL W P-5'-P-CCNC: 22 U/L (ref 0–70)
ANION GAP SERPL CALCULATED.3IONS-SCNC: 7 MMOL/L (ref 3–14)
APTT PPP: 31 SEC (ref 22–37)
AST SERPL W P-5'-P-CCNC: 9 U/L (ref 0–45)
BASOPHILS # BLD AUTO: 0 10E9/L (ref 0–0.2)
BASOPHILS NFR BLD AUTO: 0.1 %
BILIRUB SERPL-MCNC: 1.2 MG/DL (ref 0.2–1.3)
BUN SERPL-MCNC: 14 MG/DL (ref 7–30)
CALCIUM SERPL-MCNC: 8.7 MG/DL (ref 8.5–10.1)
CHLORIDE SERPL-SCNC: 105 MMOL/L (ref 94–109)
CO2 SERPL-SCNC: 26 MMOL/L (ref 20–32)
CREAT SERPL-MCNC: 1.11 MG/DL (ref 0.66–1.25)
DIFFERENTIAL METHOD BLD: ABNORMAL
EOSINOPHIL # BLD AUTO: 0 10E9/L (ref 0–0.7)
EOSINOPHIL NFR BLD AUTO: 0 %
ERYTHROCYTE [DISTWIDTH] IN BLOOD BY AUTOMATED COUNT: 12.3 % (ref 10–15)
GFR SERPL CREATININE-BSD FRML MDRD: 68 ML/MIN/1.7M2
GLUCOSE BLDC GLUCOMTR-MCNC: 111 MG/DL (ref 70–99)
GLUCOSE SERPL-MCNC: 111 MG/DL (ref 70–99)
HCT VFR BLD AUTO: 41.2 % (ref 40–53)
HGB BLD-MCNC: 13.9 G/DL (ref 13.3–17.7)
IMM GRANULOCYTES # BLD: 0 10E9/L (ref 0–0.4)
IMM GRANULOCYTES NFR BLD: 0.2 %
INR PPP: 1.14 (ref 0.86–1.14)
LYMPHOCYTES # BLD AUTO: 1.2 10E9/L (ref 0.8–5.3)
LYMPHOCYTES NFR BLD AUTO: 9.4 %
MCH RBC QN AUTO: 31.2 PG (ref 26.5–33)
MCHC RBC AUTO-ENTMCNC: 33.7 G/DL (ref 31.5–36.5)
MCV RBC AUTO: 92 FL (ref 78–100)
MONOCYTES # BLD AUTO: 0.9 10E9/L (ref 0–1.3)
MONOCYTES NFR BLD AUTO: 6.8 %
NEUTROPHILS # BLD AUTO: 10.5 10E9/L (ref 1.6–8.3)
NEUTROPHILS NFR BLD AUTO: 83.5 %
PLATELET # BLD AUTO: 209 10E9/L (ref 150–450)
POTASSIUM SERPL-SCNC: 3.7 MMOL/L (ref 3.4–5.3)
PROT SERPL-MCNC: 7.9 G/DL (ref 6.8–8.8)
RBC # BLD AUTO: 4.46 10E12/L (ref 4.4–5.9)
SODIUM SERPL-SCNC: 138 MMOL/L (ref 133–144)
TROPONIN I SERPL-MCNC: <0.015 UG/L (ref 0–0.04)
WBC # BLD AUTO: 12.5 10E9/L (ref 4–11)

## 2017-09-28 PROCEDURE — 93005 ELECTROCARDIOGRAM TRACING: CPT

## 2017-09-28 PROCEDURE — 85730 THROMBOPLASTIN TIME PARTIAL: CPT | Performed by: EMERGENCY MEDICINE

## 2017-09-28 PROCEDURE — 85025 COMPLETE CBC W/AUTO DIFF WBC: CPT | Performed by: EMERGENCY MEDICINE

## 2017-09-28 PROCEDURE — 71020 XR CHEST 2 VW: CPT

## 2017-09-28 PROCEDURE — 25000125 ZZHC RX 250: Performed by: NURSE PRACTITIONER

## 2017-09-28 PROCEDURE — 86140 C-REACTIVE PROTEIN: CPT | Performed by: STUDENT IN AN ORGANIZED HEALTH CARE EDUCATION/TRAINING PROGRAM

## 2017-09-28 PROCEDURE — 93010 ELECTROCARDIOGRAM REPORT: CPT | Mod: Z6 | Performed by: EMERGENCY MEDICINE

## 2017-09-28 PROCEDURE — 93005 ELECTROCARDIOGRAM TRACING: CPT | Performed by: EMERGENCY MEDICINE

## 2017-09-28 PROCEDURE — 25000132 ZZH RX MED GY IP 250 OP 250 PS 637: Performed by: NURSE PRACTITIONER

## 2017-09-28 PROCEDURE — 96361 HYDRATE IV INFUSION ADD-ON: CPT | Performed by: EMERGENCY MEDICINE

## 2017-09-28 PROCEDURE — 25000128 H RX IP 250 OP 636: Performed by: EMERGENCY MEDICINE

## 2017-09-28 PROCEDURE — 96374 THER/PROPH/DIAG INJ IV PUSH: CPT | Mod: 59 | Performed by: EMERGENCY MEDICINE

## 2017-09-28 PROCEDURE — 25000128 H RX IP 250 OP 636: Performed by: NURSE PRACTITIONER

## 2017-09-28 PROCEDURE — 85652 RBC SED RATE AUTOMATED: CPT | Performed by: STUDENT IN AN ORGANIZED HEALTH CARE EDUCATION/TRAINING PROGRAM

## 2017-09-28 PROCEDURE — 99285 EMERGENCY DEPT VISIT HI MDM: CPT | Mod: 25 | Performed by: EMERGENCY MEDICINE

## 2017-09-28 PROCEDURE — 93010 ELECTROCARDIOGRAM REPORT: CPT | Performed by: INTERNAL MEDICINE

## 2017-09-28 PROCEDURE — 80053 COMPREHEN METABOLIC PANEL: CPT | Performed by: EMERGENCY MEDICINE

## 2017-09-28 PROCEDURE — 99291 CRITICAL CARE FIRST HOUR: CPT | Mod: 25 | Performed by: EMERGENCY MEDICINE

## 2017-09-28 PROCEDURE — 36415 COLL VENOUS BLD VENIPUNCTURE: CPT | Performed by: STUDENT IN AN ORGANIZED HEALTH CARE EDUCATION/TRAINING PROGRAM

## 2017-09-28 PROCEDURE — 84484 ASSAY OF TROPONIN QUANT: CPT | Performed by: EMERGENCY MEDICINE

## 2017-09-28 PROCEDURE — 70450 CT HEAD/BRAIN W/O DYE: CPT

## 2017-09-28 PROCEDURE — 00000146 ZZHCL STATISTIC GLUCOSE BY METER IP

## 2017-09-28 PROCEDURE — 85610 PROTHROMBIN TIME: CPT | Performed by: EMERGENCY MEDICINE

## 2017-09-28 PROCEDURE — 25000125 ZZHC RX 250: Performed by: EMERGENCY MEDICINE

## 2017-09-28 PROCEDURE — 12000001 ZZH R&B MED SURG/OB UMMC

## 2017-09-28 RX ORDER — POLYETHYLENE GLYCOL 3350 17 G/17G
17 POWDER, FOR SOLUTION ORAL DAILY
Status: DISCONTINUED | OUTPATIENT
Start: 2017-01-01 | End: 2017-01-01 | Stop reason: HOSPADM

## 2017-09-28 RX ORDER — ACETAMINOPHEN 325 MG/1
650 TABLET ORAL EVERY 6 HOURS PRN
Status: DISCONTINUED | OUTPATIENT
Start: 2017-09-28 | End: 2017-01-01 | Stop reason: HOSPADM

## 2017-09-28 RX ORDER — NALOXONE HYDROCHLORIDE 0.4 MG/ML
.1-.4 INJECTION, SOLUTION INTRAMUSCULAR; INTRAVENOUS; SUBCUTANEOUS
Status: DISCONTINUED | OUTPATIENT
Start: 2017-09-28 | End: 2017-01-01

## 2017-09-28 RX ORDER — IOPAMIDOL 755 MG/ML
70 INJECTION, SOLUTION INTRAVASCULAR ONCE
Status: DISCONTINUED | OUTPATIENT
Start: 2017-09-28 | End: 2017-09-28 | Stop reason: HOSPADM

## 2017-09-28 RX ORDER — SODIUM CHLORIDE 9 MG/ML
INJECTION, SOLUTION INTRAVENOUS CONTINUOUS
Status: DISCONTINUED | OUTPATIENT
Start: 2017-09-28 | End: 2017-01-01

## 2017-09-28 RX ORDER — LIDOCAINE 40 MG/G
CREAM TOPICAL
Status: DISCONTINUED | OUTPATIENT
Start: 2017-09-28 | End: 2017-01-01 | Stop reason: HOSPADM

## 2017-09-28 RX ORDER — DEXAMETHASONE SODIUM PHOSPHATE 4 MG/ML
4 INJECTION, SOLUTION INTRA-ARTICULAR; INTRALESIONAL; INTRAMUSCULAR; INTRAVENOUS; SOFT TISSUE EVERY 6 HOURS
Status: DISCONTINUED | OUTPATIENT
Start: 2017-09-28 | End: 2017-01-01 | Stop reason: HOSPADM

## 2017-09-28 RX ORDER — OXYCODONE HYDROCHLORIDE 5 MG/1
5 TABLET ORAL EVERY 4 HOURS PRN
Status: DISCONTINUED | OUTPATIENT
Start: 2017-09-28 | End: 2017-01-01 | Stop reason: DRUGHIGH

## 2017-09-28 RX ORDER — SODIUM CHLORIDE 9 MG/ML
1000 INJECTION, SOLUTION INTRAVENOUS CONTINUOUS
Status: DISCONTINUED | OUTPATIENT
Start: 2017-09-28 | End: 2017-09-28 | Stop reason: HOSPADM

## 2017-09-28 RX ORDER — DEXAMETHASONE SODIUM PHOSPHATE 10 MG/ML
10 INJECTION, SOLUTION INTRAMUSCULAR; INTRAVENOUS ONCE
Status: COMPLETED | OUTPATIENT
Start: 2017-09-28 | End: 2017-09-28

## 2017-09-28 RX ORDER — AMOXICILLIN 250 MG
1 CAPSULE ORAL 2 TIMES DAILY
Status: DISCONTINUED | OUTPATIENT
Start: 2017-01-01 | End: 2017-01-01

## 2017-09-28 RX ADMIN — SODIUM CHLORIDE, PRESERVATIVE FREE: 5 INJECTION INTRAVENOUS at 16:44

## 2017-09-28 RX ADMIN — SODIUM CHLORIDE 1000 ML: 9 INJECTION, SOLUTION INTRAVENOUS at 12:03

## 2017-09-28 RX ADMIN — LEVETIRACETAM 1000 MG: 100 INJECTION, SOLUTION INTRAVENOUS at 17:12

## 2017-09-28 RX ADMIN — PANTOPRAZOLE SODIUM 40 MG: 40 INJECTION, POWDER, FOR SOLUTION INTRAVENOUS at 16:46

## 2017-09-28 RX ADMIN — SODIUM CHLORIDE 1000 ML: 9 INJECTION, SOLUTION INTRAVENOUS at 10:04

## 2017-09-28 RX ADMIN — DEXAMETHASONE SODIUM PHOSPHATE 10 MG: 10 INJECTION, SOLUTION INTRAMUSCULAR; INTRAVENOUS at 10:50

## 2017-09-28 RX ADMIN — DEXAMETHASONE SODIUM PHOSPHATE 4 MG: 4 INJECTION, SOLUTION INTRA-ARTICULAR; INTRALESIONAL; INTRAMUSCULAR; INTRAVENOUS; SOFT TISSUE at 22:59

## 2017-09-28 RX ADMIN — DEXAMETHASONE SODIUM PHOSPHATE 4 MG: 4 INJECTION, SOLUTION INTRA-ARTICULAR; INTRALESIONAL; INTRAMUSCULAR; INTRAVENOUS; SOFT TISSUE at 16:46

## 2017-09-28 ASSESSMENT — ACTIVITIES OF DAILY LIVING (ADL)
AMBULATION: 0-->INDEPENDENT
DRESS: 0-->INDEPENDENT
RETIRED_EATING: 0-->INDEPENDENT
COGNITION: 0 - NO COGNITION ISSUES REPORTED
TOILETING: 0-->INDEPENDENT
FALL_HISTORY_WITHIN_LAST_SIX_MONTHS: NO
WHICH_OF_THE_ABOVE_FUNCTIONAL_RISKS_HAD_A_RECENT_ONSET_OR_CHANGE?: COGNITION
RETIRED_COMMUNICATION: 0-->UNDERSTANDS/COMMUNICATES WITHOUT DIFFICULTY
BATHING: 0-->INDEPENDENT
SWALLOWING: 0-->SWALLOWS FOODS/LIQUIDS WITHOUT DIFFICULTY
TRANSFERRING: 0-->INDEPENDENT

## 2017-09-28 ASSESSMENT — VISUAL ACUITY
OU: NORMAL ACUITY
OU: NORMAL ACUITY

## 2017-09-28 ASSESSMENT — PAIN DESCRIPTION - DESCRIPTORS: DESCRIPTORS: HEADACHE

## 2017-09-28 NOTE — PLAN OF CARE
Problem: Patient Care Overview  Goal: Plan of Care/Patient Progress Review  Outcome: No Change  Pt admitted from [facility] around 1330. Head CT found R edema in frontoparietal area as well as a L mass w. R midline shift. VSS, RA. Per family pt had AMS, confusion, weakness and dizziness this AM. Continues to be lethargic. Oriented x4. Forgetful, BA on for safety. C/o mild HA. No orders placed at the moment, keeping pt NPO at this time. MD paged to see pt.  PIV SL. Voiding via urinal. Pt came in with ZioPatch in place, per family was being used to see if pts heart was causing symptoms. Family bedside and supportive. PCD's ordered. MRI checklist given to family. Cont to monitor and follow POC.

## 2017-09-28 NOTE — TELEPHONE ENCOUNTER
"Seth Iglesias is a 57 year old male who's wife, Maryanne, calls with fatigue, dizziness.    NURSING ASSESSMENT:  Description:  Maryanne states patient has been feeling dizzy since 9-14-17.  He is currently wearing a holter monitor.  2 days ago his symptoms have gotten worse.  She states his fatigue is extreme - he can hardly function.  She also states he looks pale, seems \"out of it\", sweaty, possible fevers.  Maryanne states, \"I am very worried.  Something does not seem right.\"  Maryanne states patient went to bed last evening at 10:00pm and is still currently sleeping which is unusual for patient.    Onset/duration:  Dizziness since 9-14-17, worsening symptoms = 2 days ago  Improves/worsens symptoms:  nothing  Last exam/Treatment:  9-19-17  Allergies:   Allergies   Allergen Reactions     Nkda [No Known Drug Allergies]        MEDICATIONS:   Taking medication(s) as prescribed? Yes  Taking over the counter medication(s?) Yes  Any medication side effects? No significant side effects    Any barriers to taking medication(s) as prescribed?  No  Medication(s) improving/managing symptoms?  No  Medication reconciliation completed: Yes      NURSING PLAN: Nursing advice to patient go to ED for further evaluation    RECOMMENDED DISPOSITION:  To ED, for further evaluation.  Will comply with recommendation: Yes  If further questions/concerns or if symptoms do not improve, worsen or new symptoms develop, call your PCP or [facility] as soon as possible.      Guideline used:  Telephone Triage Protocols for Nurses, Fifth Edition, Maryanne Burnette RN      "

## 2017-09-28 NOTE — PROGRESS NOTES
SPIRITUAL HEALTH SERVICES  [facility] 6A  ON-CALL VISIT     REFERRAL SOURCE: attempted on-call  visit with pt, per pt request.     Pt quite tired, said he would prefer a visit on Friday if possible.      PLAN: refer to unit or on-call  for visit on 9/29.     Scar Rivera) Sukh Garcia M.Div., [facility]  Staff   Pager [phone]

## 2017-09-28 NOTE — ED PROVIDER NOTES
History     Chief Complaint   Patient presents with     Generalized Weakness     HPI   9:50 AM - called emergently for stroke evaluation in patient with headache and altered mental status.  History per patient and his wife.  Seth Iglesias is a 57 year old male who presents with his wife for evaluation of somnolence and decreased level of consciousness noted when she awoke him at approximate 0900 a.m.  Patient was sleeping and later than normal and when she awoke him he was persistently somnolent and drowsy and not responding appropriately to her.  Last at his normal baseline at 1900 p.m. last evening when he went to bed.  Recent history remarkable for 2 weeks of nonvertiginous dizziness, fatigue and malaise with development of a posterior headache and neck pain 2 days ago.  Serious onset of dull aching headache which has been constant, moderate in severity and not associated with nausea, vomiting, visual or neurologic deficit or abnormality until decreased level of consciousness this morning.  He has no motor or sensory deficit or abnormality.  He has no fever or chills.  No recent closed head injury.  He was seen in primary care clinic 10 days ago and had a Zio patch cardiac monitor placed 2 days ago.  He has had no syncope and no chest pain or palpitations    I have reviewed the Medications, Allergies, Past Medical and Surgical History, and Social History in the Epic system.  Patient Active Problem List   Diagnosis     Psoriasis     HYPERLIPIDEMIA LDL GOAL <160     Advanced directives, counseling/discussion     No past medical history on file.  Past Surgical History:   Procedure Laterality Date     COLONOSCOPY  4/4/2011    Procedure:COLONOSCOPY; Surgeon:TC HARE; Location:[facility]     SURGICAL HISTORY OF -   02/05/75    Torn Meniscus Right Knee     SURGICAL HISTORY OF -   11/30/73    Arthrotomy & medial meniscectomy left knee     Current Facility-Administered Medications   Medication     0.9%  sodium chloride infusion     iopamidol (ISOVUE-370) solution 70 mL     sodium chloride 0.9 % bag 500mL for CT scan flush use     Current Outpatient Prescriptions   Medication     Etanercept (ENBREL SURECLICK) 50 MG/ML autoinjector     Allergies   Allergen Reactions     Nkda [No Known Drug Allergies]      Social History   Substance Use Topics     Smoking status: Never Smoker     Smokeless tobacco: Never Used     Alcohol use Yes      Comment: occasionally     Family History   Problem Relation Age of Onset     C.A.D. Father      age 52     CANCER Father      prostate CA     C.A.D. Paternal Uncle      50's     Review of Systems  As mentioned above in the history present illness.  All other systems were reviewed and are negative.    Physical Exam   BP: 143/89  Pulse: 92  Temp: 100  F (37.8  C)  Resp: 18  SpO2: 96 %    Physical Exam   Constitutional: He is oriented to person, place, and time. He appears well-developed and well-nourished. He appears distressed (somnolent).   HENT:   Head: Normocephalic and atraumatic.   Mouth/Throat: No oropharyngeal exudate.   Oral mucosa dry.   Eyes: Conjunctivae and EOM are normal. Pupils are equal, round, and reactive to light.   Neck: Normal range of motion. Neck supple. No JVD present. No rigidity. No tracheal deviation present. No thyromegaly present.   Cardiovascular: Normal rate, regular rhythm, normal heart sounds and intact distal pulses.  Exam reveals no gallop and no friction rub.    No murmur heard.  Pulmonary/Chest: Effort normal and breath sounds normal. No respiratory distress. He has no wheezes. He has no rales.   Abdominal: Soft. Bowel sounds are normal. He exhibits no distension and no mass. There is no tenderness. There is no rebound and no guarding.   Musculoskeletal: Normal range of motion. He exhibits no edema.   Neurological: He is alert and oriented to person, place, and time. He has normal strength. No cranial nerve deficit (2-12 intact) or sensory deficit.    Skin: Skin is warm and dry. No rash noted. He is not diaphoretic. No erythema. No pallor.   Psychiatric:   Somnolent, sleepy/drowsy effect.   Nursing note and vitals reviewed.      ED Course     ED Course     Procedures             EKG Interpretation:      Interpreted by Jaquan Byrne MD  Time reviewed: 0956 am  Symptoms at time of EKG: Altered mental status   Rhythm: normal sinus   Rate: normal  Axis: normal  Ectopy: none  Conduction: normal  ST Segments/ T Waves: No ST-T wave changes  Q Waves: none  Comparison to prior: Unchanged from 7/31/10  Clinical Impression: normal EKG          Results for orders placed or performed during the hospital encounter of 09/28/17   CT Head w/o Contrast    Narrative    CT SCAN OF THE HEAD WITHOUT CONTRAST   9/28/2017 10:26 AM     HISTORY: Headache, posterior neck pain, altered mental status.    TECHNIQUE:  Axial images of the head and coronal reformations without  IV contrast material. Radiation dose for this scan was reduced using  automated exposure control, adjustment of the mA and/or kV according  to patient size, or iterative reconstruction technique.    COMPARISON: None.    FINDINGS: There is a large amount of hypodense vasogenic edema  centered in the right frontotemporal region. Probable underlying mass  is present in that region with peripheral hyperdensity. Measurements  of the mass are difficult with approximation of 4.4 x 2.9 x 3.1 cm.  There is peripheral hyperdensity along the periphery of the mass that  could represent hemorrhage or hypercellularity.    Marked mass effect in the right cerebral hemisphere with 7 mm of  leftward midline shift. There is crowding of the right suprasellar  cistern by the uncus. No evidence of hydrocephalus or ventricular  entrapment at this time.    Questionable 5 mm hypodensity in the paramedian right frontal lobe  (series 3 image 14) could represent an additional intracranial lesion.    The visualized portions of the sinuses and  mastoids appear normal. The  bony calvarium and bones of the skull base appear intact.       Impression    IMPRESSION:      1. Large amount of edema and mass effect centered in the right  frontoparietal region with probable underlying mass that is difficult  to measure, but approximating 4.4 cm. There may be peripheral  intralesional hemorrhage within this mass versus hypercellularity.  Questionable additional 5 mm lesion in the paramedian right frontal  lobe. This is concerning for neoplastic etiology and would be better  evaluated with contrast-enhanced brain MRI. Other etiologies including  infectious or inflammatory etiologies are not excluded and again would  be better evaluated with brain MRI.  2. Marked mass effect on the right cerebral hemisphere and leftward  midline shift of 7 mm. No evidence of hydrocephalus or ventricular  entrapment.    Results discussed with Dr. Jaquan Byrne at 10:47 AM on 9/28/2017.    JOCY IRELAND MD   Glucose by meter   Result Value Ref Range    Glucose 111 (H) 70 - 99 mg/dL   CBC with platelets differential   Result Value Ref Range    WBC 12.5 (H) 4.0 - 11.0 10e9/L    RBC Count 4.46 4.4 - 5.9 10e12/L    Hemoglobin 13.9 13.3 - 17.7 g/dL    Hematocrit 41.2 40.0 - 53.0 %    MCV 92 78 - 100 fl    MCH 31.2 26.5 - 33.0 pg    MCHC 33.7 31.5 - 36.5 g/dL    RDW 12.3 10.0 - 15.0 %    Platelet Count 209 150 - 450 10e9/L    Diff Method Automated Method     % Neutrophils 83.5 %    % Lymphocytes 9.4 %    % Monocytes 6.8 %    % Eosinophils 0.0 %    % Basophils 0.1 %    % Immature Granulocytes 0.2 %    Absolute Neutrophil 10.5 (H) 1.6 - 8.3 10e9/L    Absolute Lymphocytes 1.2 0.8 - 5.3 10e9/L    Absolute Monocytes 0.9 0.0 - 1.3 10e9/L    Absolute Eosinophils 0.0 0.0 - 0.7 10e9/L    Absolute Basophils 0.0 0.0 - 0.2 10e9/L    Abs Immature Granulocytes 0.0 0 - 0.4 10e9/L   Comprehensive metabolic panel   Result Value Ref Range    Sodium 138 133 - 144 mmol/L    Potassium 3.7 3.4 - 5.3 mmol/L     Chloride 105 94 - 109 mmol/L    Carbon Dioxide 26 20 - 32 mmol/L    Anion Gap 7 3 - 14 mmol/L    Glucose 111 (H) 70 - 99 mg/dL    Urea Nitrogen 14 7 - 30 mg/dL    Creatinine 1.11 0.66 - 1.25 mg/dL    GFR Estimate 68 >60 mL/min/1.7m2    GFR Estimate If Black 82 >60 mL/min/1.7m2    Calcium 8.7 8.5 - 10.1 mg/dL    Bilirubin Total 1.2 0.2 - 1.3 mg/dL    Albumin 3.7 3.4 - 5.0 g/dL    Protein Total 7.9 6.8 - 8.8 g/dL    Alkaline Phosphatase 90 40 - 150 U/L    ALT 22 0 - 70 U/L    AST 9 0 - 45 U/L   Troponin I   Result Value Ref Range    Troponin I ES <0.015 0.000 - 0.045 ug/L   INR   Result Value Ref Range    INR 1.14 0.86 - 1.14   Partial thromboplastin time   Result Value Ref Range    PTT 31 22 - 37 sec     Medications   0.9% sodium chloride infusion (1,000 mLs Intravenous New Bag 9/28/17 1203)   iopamidol (ISOVUE-370) solution 70 mL (70 mLs Intravenous Not Given 9/28/17 1019)   sodium chloride 0.9 % bag 500mL for CT scan flush use (100 mLs Intravenous Not Given 9/28/17 1020)   0.9% sodium chloride BOLUS (0 mLs Intravenous Stopped 9/28/17 1203)   dexamethasone (DECADRON) injection 10 mg (10 mg Intravenous Given 9/28/17 1050)     10:34 AM - Discussed CT results with the Radiologist.    10:53 AM - Reviewed case with Dr. Lebron, Neurosurgery Service at [facility].    Critical Care time:  was 45 minutes for this patient excluding procedures.    Assessments & Plan (with Medical Decision Making)   57-year-old male with 2 weeks of nonvertiginous dizziness and 2 days of posterior headache and posterior neck pain who was found to have somnolence and decreased level consciousness when his wife went to wake him up this morning.  The patient is a sleepy, drowsy affect but responds appropriately to voice and is alert and oriented and has no focal neurologic deficits.  He was sent to CT and head CT revealed a right frontoparietal brain mass with surrounding edema and mass effect with midline shift.  He was given Decadron 10 mg IV  and head of the bed was maintained in an elevated position.  Neurosurgery at [facility] was consulted and accepted care of patient in transfer there.    I have reviewed the nursing notes.    I have reviewed the findings, diagnosis, plan and need for follow up with the patient.    New Prescriptions    No medications on file       Final diagnoses:   Brain mass       9/28/2017   [facility] EMERGENCY DEPARTMENT     Jaquan Byrne MD  09/28/17 1248

## 2017-09-28 NOTE — IP AVS SNAPSHOT
MRN:[id number]                      After Visit Summary   2017    Seth Iglesias    MRN: [id number]           Thank you!     Thank you for choosing [facility] for your care. Our goal is always to provide you with excellent care. Hearing back from our patients is one way we can continue to improve our services. Please take a few minutes to complete the written survey that you may receive in the mail after you visit with us. Thank you!        Patient Information     Date Of Birth          1960        Designated Caregiver       Most Recent Value    Caregiver    Will someone help with your care after discharge? yes    Name of designated caregiver Maryanne    Phone number of caregiver [phone]    Caregiver address [address] [place] [address]      About your hospital stay     You were admitted on:  2017 You last received care in the:  [facility]    You were discharged on:  2017        Reason for your hospital stay       You were hospitalized for treatment of a large right frontoparietal brain mass. You underwent a Right Craniotomy for Tumor Resection by Dr. Harry Wiggins on 10/3/2017. You are being treated with Dexamethasone (Decadron) for treatment of Cerebral Edema (Swelling). You were started on Leviteracetam (Keppra) for prevention of seizures.                  Who to Call     For medical emergencies, please call 911.  For non-urgent questions about your medical care, please call your primary care provider or clinic, [phone]  For questions related to your surgery, please call your surgery clinic        Attending Provider     Provider Specialty    Pat Ovalle MD Neurosurgery    Wiggins, Harry Luz MD Neurosurgery       Primary Care Provider Office Phone # Fax #    R Nigel Salmon [phone]       When to contact your care team       Call if you have any of the followin. Temperature greater than  101.5 F.   2. Any redness, swelling or discharge from the wound.   3. Any new weakness, numbness or altered mental status.  4. Worsening pain that is not improving with the pain medications you were prescribed.     Call [phone] or after 5:00 pm or on weekends call [phone] and ask for the neurosurgery resident on call. Thank You.                  After Care Instructions     Activity       After discharge, your activity restrictions are:   - We encourage short frequent walks, increasing as tolerated.  - No driving until you are seen in clinic and cleared by your neurosurgeon.    - If you have had a seizure, you may not drive for at least 3 months according to [place] law.    - No strenuous activity.  - No lifting more than 10 pounds until you are seen in clinic (a gallon of milk weighs approximately 8 pounds)            Diet       Follow this diet upon discharge: Orders Placed This Encounter      Advance Diet as Tolerated: Regular Diet Adult            Discharge Instructions       - Please follow the steroid taper as prescribed.   - Please do not resume Enbrel injections until you are seen by Dr. Wiggins for follow-up.            Wound care and dressings       - You may remove your surgical dressing on post-operative day #2.   - After the surgical dressing is removed, keep your incision open to air.   - You may shower on post-operative day #3.   - After your incision gets wet, pat your incision dry. Do not vigorously rub your incision.  - Do not apply any creams, gels, lotions, or ointments to your incision.  - Do not submerge your incision in water for 4-6 weeks post-operatively.  - Monitor your incision for signs of infection including redness, swelling, drainage or warmth at the surgical site.                  Follow-up Appointments     Follow Up and recommended labs and tests       - Please follow-up in the Neurosurgery Clinic in approximately 2 weeks for Suture/Staple Removal and wound evaluation.    -  "You will be discussed at the Multidisciplinary Brain Tumor Conference on Monday (10/9/2017) at which time further treatment recommendations and a plan of care will be developed for you. You will be contacted with the recommendations.     - At the time of discharge, your final pathology from your operation was still pending (in process). You will be contact with the surgical pathology results when these become available.                  Pending Results     Date and Time Order Name Status Description    10/3/2017 1834 Surgical pathology exam In process     2017 0818 Zio Patch Holter In process             Statement of Approval     Ordered          10/05/17 0848  I have reviewed and agree with all the recommendations and orders detailed in this document.  EFFECTIVE NOW     Approved and electronically signed by:  Malinda Hale APRN CNP             Admission Information     Date & Time Provider Department Dept. Phone    2017 Harry Wiggins MD [facility] [phone]      Your Vitals Were     Blood Pressure Pulse Temperature Respirations Pulse Oximetry       134/88 (BP Location: Right arm) 79 97.6  F (36.4  C) (Oral) 16 98%       MyChart Information     [employer] lets you send messages to your doctor, view your test results, renew your prescriptions, schedule appointments and more. To sign up, go to [email or web address] . Click on \"Log in\" on the left side of the screen, which will take you to the Welcome page. Then click on \"Sign up Now\" on the right side of the page.     You will be asked to enter the access code listed below, as well as some personal information. Please follow the directions to create your username and password.     Your access code is: [email or web address]  Expires: 2017  4:33 PM     Your access code will  in 90 days. If you need help or a new code, please call your [facility] clinic or [phone].        Care EveryWhere ID     This is your Care EveryWhere " ID. This could be used by other organizations to access your [facility] medical records  [id number]        Equal Access to Services     PERICO ESPINOZA : Hadii vero Carrasco, wajadeda lotus, chineduflorence zaragozanadineaddis winters, héctor soriatomasajimmy kerns. So [facility] [phone].    ATENCIÓN: Si habla español, tiene a granados disposición servicios gratuitos de asistencia lingüística. Llame al [phone].    We comply with applicable federal civil rights laws and [place] laws. We do not discriminate on the basis of race, color, national origin, age, disability, sex, sexual orientation, or gender identity.               Review of your medicines      START taking        Dose / Directions    dexamethasone 4 MG tablet   Commonly known as:  DECADRON        Dose:  4 mg   Take 1 tablet (4 mg) by mouth 2 times daily (with meals)   Quantity:  60 tablet   Refills:  1       levETIRAcetam 1000 MG Tabs   Commonly known as:  KEPPRA        Dose:  1000 mg   Take 1,000 mg by mouth every 12 hours   Quantity:  180 tablet   Refills:  1       oxyCODONE 5 MG IR tablet   Commonly known as:  ROXICODONE        Dose:  5-10 mg   Take 1-2 tablets (5-10 mg) by mouth every 4 hours as needed for moderate to severe pain   Quantity:  60 tablet   Refills:  0       pantoprazole 40 MG EC tablet   Commonly known as:  PROTONIX        Dose:  40 mg   Take 1 tablet (40 mg) by mouth every morning (before breakfast)   Quantity:  30 tablet   Refills:  0       senna-docusate 8.6-50 MG per tablet   Commonly known as:  SENOKOT-S;PERICOLACE        Dose:  1-2 tablet   Take 1-2 tablets by mouth 2 times daily   Quantity:  100 tablet   Refills:  0         CONTINUE these medicines which have NOT CHANGED        Dose / Directions    MULTIVITAMIN & MINERAL PO        Dose:  1 tablet   Take 1 tablet by mouth daily   Refills:  0         STOP taking     ADVIL PO           ENBREL SURECLICK 50 MG/ML autoinjector   Generic drug:  Etanercept                Where to get  your medicines      Some of these will need a paper prescription and others can be bought over the counter. Ask your nurse if you have questions.     Bring a paper prescription for each of these medications     dexamethasone 4 MG tablet    levETIRAcetam 1000 MG Tabs    oxyCODONE 5 MG IR tablet    pantoprazole 40 MG EC tablet    senna-docusate 8.6-50 MG per tablet                Protect others around you: Learn how to safely use, store and throw away your medicines at www.disposemymeds.org.             Medication List: This is a list of all your medications and when to take them. Check marks below indicate your daily home schedule. Keep this list as a reference.      Medications           Morning Afternoon Evening Bedtime As Needed    dexamethasone 4 MG tablet   Commonly known as:  DECADRON   Take 1 tablet (4 mg) by mouth 2 times daily (with meals)                                levETIRAcetam 1000 MG Tabs   Commonly known as:  KEPPRA   Take 1,000 mg by mouth every 12 hours                                MULTIVITAMIN & MINERAL PO   Take 1 tablet by mouth daily                                oxyCODONE 5 MG IR tablet   Commonly known as:  ROXICODONE   Take 1-2 tablets (5-10 mg) by mouth every 4 hours as needed for moderate to severe pain   Last time this was given:  10 mg on 10/4/2017 10:33 AM                                pantoprazole 40 MG EC tablet   Commonly known as:  PROTONIX   Take 1 tablet (40 mg) by mouth every morning (before breakfast)   Last time this was given:  40 mg on 10/5/2017  9:04 AM                                senna-docusate 8.6-50 MG per tablet   Commonly known as:  SENOKOT-S;PERICOLACE   Take 1-2 tablets by mouth 2 times daily   Last time this was given:  2 tablets on 10/5/2017  9:03 AM

## 2017-09-28 NOTE — TELEPHONE ENCOUNTER
"Reason for call:  Patient reporting a symptom    Symptom or request: Pt's spouse Maryanne calling.  Pt is currently wearing a holter monitor.  Pt is feeling is dizzy, \"out of it,\" fatigued and very tired.      Duration (how long have symptoms been present): ongoing    Have you been treated for this before? Yes    Additional comments:     Phone Number patient can be reached at:  Home number on file [phone] (home)    Best Time:  any    Can we leave a detailed message on this number:  YES    Call taken on 9/28/2017 at 8:58 AM by Kylee Fong      "

## 2017-09-28 NOTE — H&P
"[facility]  NEUROSURGERY ADMISSION NOTE    REASON FOR ADMISSION: Brain Mass    HPI: Mr. Iglesias is a very pleasant 57 year old  male who presented to [facility] Emergency Department today for evaluation of increasing somnolence, headaches and dizziness. 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 During the dizzy spells, the patient reported that he had an odd smell and taste in his mouth, like something metallic. Over the last 2 weeks, the wife observed the patient to be more tired and fatigued. He was seen by his Primary Care Provider about 1 week ago for evaluation of the dizziness and fatigue, which was thought to be cardiac in origin. He was placed on continuous cardiac monitoring with a Holter Monitor. This past weekend, the patient wife reports that the patient was experiencing very frequent dizzy spells. On Tuesday, the patient had complaints of a severe headache in the posterior aspect of his head that radiated into his neck. The dizzy spells persisted and the patient's tiredness increased. This morning, the patient's wife states that the patient was so lethargic that he was unable to get out of bed. She and her son had to assist the patient out of bed and down the stairs. Additionally, the patient's wife has observed that the patient has had increased periods of forgetfulness and difficulty with his memory, as well as diminished appetite over the last 2 weeks. Today, the patient was observed to have had word finding difficulty. According to the patient's wife, the patient has not experienced any nausea, vomiting, upper or lower extremity weakness, extremity paresthesias, facial asymmetry, seizures, weight loss, night sweats, or chills.     The patient is not on any Aspirin, Warfarin, or another antiplatelet or anticoagulation " medications.     PAST MEDICAL HISTORY:   Past Medical History:   Diagnosis Date     Irritable bowel syndrome      Psoriasis     On Enbrel Injections Q 1 month        PAST SURGICAL HISTORY:   Past Surgical History:   Procedure Laterality Date     COLONOSCOPY  2011    Procedure:COLONOSCOPY; Surgeon:TC HARE; Location:[facility]     SURGICAL HISTORY OF -   73    Torn Meniscus Right Knee     SURGICAL HISTORY OF -   71    Arthrotomy & medial meniscectomy left knee       FAMILY HISTORY:   Family History   Problem Relation Age of Onset     C.A.D. Father      age 52     CANCER Father      prostate CA     C.A.D. Paternal Uncle      50's       SOCIAL HISTORY:   Social History   Substance Use Topics     Smoking status: Never Smoker     Smokeless tobacco: Never Used     Alcohol use Yes      Comment: occasionally       MEDICATIONS:  No current outpatient prescriptions on file.       Allergies:  Allergies   Allergen Reactions     Nkda [No Known Drug Allergies]        ROS: 10 point ROS of systems including Constitutional, Eyes, Respiratory, Cardiovascular, Gastroenterology, Genitourinary, Integumentary, Muscularskeletal, Psychiatric were all negative except for pertinent positives noted in my HPI.    EXAM:  BP (!) 148/91  Pulse 83  Temp 100  F (37.8  C) (Axillary)  Resp 16  SpO2 94%  Neuro:   General Appearance: Lying in Bed; Appears Well-Nourished and Well-Groomed; In No Apparent Distress  Mental Status: Lethargic; Requires repeat stimulation to attend; Oriented to Person, Month/Year, Place, and Situation  Cranial Nerves:   Pupils: 2.5-2 mm bilaterally; Sluggishly reactive to light; Round and Equal   Extra-Ocular Movements Intact   Facial Sensation: Intact to LT in V1-V3 distributions bilaterally   Facial Movement: Symmetric   Hearing: Intact Bilaterally   Tongue Protrusion: Midline   Shoulder Shru/5  Motor: Left Pronator Drift Present  Upper Extremities: 5/5 in RUE; 4/5 in LUE  Lower Extremities:  5/5 in RLE; 4/5 in LLE  Sensation: Equal to LT in BUE and BLE    LABS/IMAGING:  CT Head (9/28/2017):  Large frontoparietal mass with surrounding edema resulting in mass effect with a leftward midline shift of ~ 7 mm.     Lab Results   Component Value Date    WBC 12.5 09/28/2017     Lab Results   Component Value Date    RBC 4.46 09/28/2017     Lab Results   Component Value Date    HGB 13.9 09/28/2017     Lab Results   Component Value Date    HCT 41.2 09/28/2017     No components found for: MCT  Lab Results   Component Value Date    MCV 92 09/28/2017     Lab Results   Component Value Date    MCH 31.2 09/28/2017     Lab Results   Component Value Date    MCHC 33.7 09/28/2017     Lab Results   Component Value Date    RDW 12.3 09/28/2017     Lab Results   Component Value Date     09/28/2017     Last Basic Metabolic Panel:  Lab Results   Component Value Date     09/28/2017      Lab Results   Component Value Date    POTASSIUM 3.7 09/28/2017     Lab Results   Component Value Date    CHLORIDE 105 09/28/2017     Lab Results   Component Value Date    ARA 8.7 09/28/2017     Lab Results   Component Value Date    CO2 26 09/28/2017     Lab Results   Component Value Date    BUN 14 09/28/2017     Lab Results   Component Value Date    CR 1.11 09/28/2017     Lab Results   Component Value Date     09/28/2017     Lab Results   Component Value Date    INR 1.14 09/28/2017       MR Brain w/o & w Contrast    (Results Pending)   XR Chest 2 Views    (Results Pending)       ASSESSMENT: Mr. Iglesias is a 57 year old male with a new large right frontoparietal mass with surrounding edema and midline shift.     RECOMMENDATIONS:  - Admit to Unit 6A  - MRI Brain with and without Contrast/Stealth Protocol   - Decadron 4 mg Q 6 HR for treatment of Cerebral Edema  - Start Protonix 40 mg QD for Stress Ulcer Prophylaxis with concurrent use of Dexamethasone  - Keppra 1G Q 12 HR for seizure prophylaxis (Consider possible seizure work-up  "d/t abnormal \"aura\" with dizzy spells)  - Continue NPO status d/t lethargy  - Start 0.9 NaCl 100 mL/hour for hydration while NPO  - Pre-Operative Work-Up: CMP, CBC w/ Differential, Mg, Phos, PT/INR, PTT, Type and Screen, EKG, Chest X-Ray      Malinda Hale, ACNP-BC, CCRN, CNRN  Department of Neurosurgery  Pager: [phone]              "

## 2017-09-28 NOTE — IP AVS SNAPSHOT
Unit [address] [address]    Phone:  [phone]                                       After Visit Summary   9/28/2017    Seth Iglesias    MRN: [id number]           After Visit Summary Signature Page     I have received my discharge instructions, and my questions have been answered. I have discussed any challenges I see with this plan with the nurse or doctor.    ..........................................................................................................................................  Patient/Patient Representative Signature      ..........................................................................................................................................  Patient Representative Print Name and Relationship to Patient    ..................................................               ................................................  Date                                            Time    ..........................................................................................................................................  Reviewed by Signature/Title    ...................................................              ..............................................  Date                                                            Time

## 2017-09-28 NOTE — ED NOTES
Placement of zio patch on Tuesday awoke this am, with HA, weakness, HA, denies chest pain , altered mental status

## 2017-09-29 NOTE — PLAN OF CARE
Problem: Patient Care Overview  Goal: Plan of Care/Patient Progress Review  PT 6A:  Acknowledge PT order.  Want to confirm plans for potential resection surgery and then determine when to therapy initiation is indicated . Will wait to hear final plans from team and reassess 9/30

## 2017-09-29 NOTE — PLAN OF CARE
Problem: Patient Care Overview  Goal: Plan of Care/Patient Progress Review  VSS. Pt somnolent overnight but became much more alert throughout day shift; still lethargic at times. Pt able to follow an entire neuro assessment successfully, neuros intact; impulsive at times, BA all times.. Up w/SBA + gb, unsteady at times, voiding spont, BM x1. Tylenol for HA. Moves independently in bed. Reg diet. PIV infusing NS at 100cc/hr. Pt getting keppra q8 and decadron q6h IV... MRI completed today; Dr. Wiggins to speak to family about results and future plan of possible Crani early next week and whether pt will be kept here over the weekend.. Continue with POC.

## 2017-09-29 NOTE — PROGRESS NOTES
SPIRITUAL HEALTH SERVICES   [facility]A   ON-CALL VISIT   REFERRAL SOURCE: request on admission   Attempted to visit pt but found him unavailable.  On the first, he was sleep.  On the second, while he had two visitors, two more arrived.   PLAN: Will notify the 9/30 unit  of the unfulfilled request     AURY Cottrell.  Staff   Pager [phone]

## 2017-09-29 NOTE — PLAN OF CARE
Problem: Patient Care Overview  Goal: Plan of Care/Patient Progress Review  VSS. Pt somnolent at beginning of shift becoming more alert t/o the night. Neuros wax and wane. Has not been OOB yet; voiding via urinal at bedside. PRN tylenol given x1 for c/o mild h/a. Moves independently in bed. NPO ex meds.  PIV infusing NS at 100cc/hr. Pt getting keppra q8 and decadron q6h IV. Q4hr BG checks have been wnl this shift. BA on for safety. Plan to have MRI today. Continue with POC.

## 2017-09-29 NOTE — PROGRESS NOTES
[facility], [place]   Neurosurgery Progress Note:    Assessment: Seth Iglesias is a 57 year old left handed male with newly discovered right temporal mass.     Plan:  - Serial neuro exams  - MRI today   - Pain control  - Decadron 4q6  - Protonix  - SSI  - NPO pending improved level of alertness  - Bowel regimen  - PRN antiemetics  - IVF until taking adequate PO  - PT/OT  - SCDs for DVT ppx  - Dispo: 6A    Discussed with Neurosurgery chief, who agrees.    Interval History: sleepy, agitated last night when MRI attempted.        Objective:   Temp:  [97.8  F (36.6  C)-100  F (37.8  C)] 99.3  F (37.4  C)  Pulse:  [76-92] 76  Heart Rate:  [75-87] 82  Resp:  [10-18] 16  BP: (129-149)/(79-94) 129/83  SpO2:  [94 %-98 %] 97 %       Gen: Appears comfortable, NAD, sleepy   Neurologic:  - Alert & Oriented to self and location  - Follows commands briskly  - Speech fluent, spontaneous. No aphasia or dysarthria.  - No gaze preference. No apparent hemineglect.  - PERRL, EOMI  - Face symmetric with sensation intact to light touch  - Palate elevates symmetrically, uvula midline, tongue protrudes midline  - Left pronator drift  - strength 5/5 in RU/RLE, 4/5 in LUE and LLE  Reflexes 2+ throughout    Sensation intact and symmetric to light touch throughout    Please contact neurosurgery resident on call with questions.    Dial * * [phone], enter [phone] when prompted.

## 2017-09-29 NOTE — PLAN OF CARE
Problem: Patient Care Overview  Goal: Plan of Care/Patient Progress Review  IV fluids started.  Remain NPO.  Not alert enough for oral intake.  Started on IV Keppra.  Lethargic all shift and more so after keppra.  Left side less than right.  Left drift. Positions self in bed.  Continue every 4 hour neuros. Possible surgery in a few day.  Unable to lie still in MRI tonight so will need to be repeated.

## 2017-09-30 NOTE — PLAN OF CARE
Problem: Patient Care Overview  Goal: Plan of Care/Patient Progress Review  Outcome: No Change  Pt on 6A s/p new found R frontoparietal mass. Pt AVSS. A&Ox4; lethargic at times. Neuro's intact except for L pupil > R pupil. Pt c/o mild HA x1 relief noted with tylenol. Pt on regular diet; fair intake. Voiding via urinal. Up SBA/GB. PIV SL between medications. Continue POC.

## 2017-09-30 NOTE — PLAN OF CARE
Problem: Patient Care Overview  Goal: Plan of Care/Patient Progress Review  Pt on 6A s/p new found R frontoparietal mass. VSS. Neuros intact; except L pupil > R pupil. Up w/SBA + gb, worked w/therapy.  Voiding spont, BM x1. Pt showered today. Tylenol for HA. Moves independently in bed. Reg diet. PIV SL. Pt getting keppra q8 and decadron q6h IV. Possible surgery next week. NS resident spoke to family this morning in pt room.  Continue with POC.

## 2017-09-30 NOTE — PROGRESS NOTES
" 09/30/17 1600   Quick Adds   Type of Visit Initial PT Evaluation   Living Environment   Lives With spouse;child(inna), dependent   Living Arrangements house   Home Accessibility stairs to enter home;stairs within home   Number of Stairs to Enter Home 3   Number of Stairs Within Home 10   Stair Railings at Home inside, present on left side;outside, present on right side   Transportation Available family or friend will provide   Living Environment Comment Pt lives with wife and 3 adult sons in split level home.  Pt and wife work full time.  Previously IND with all ADL's and mobility. Works in [place] and is on feed most of day.  Active around house along with biking, walks.     Self-Care   Usual Activity Tolerance good   Current Activity Tolerance moderate   Regular Exercise no   Equipment Currently Used at Home none   Functional Level Prior   Ambulation 0-->independent   Transferring 0-->independent   Toileting 0-->independent   Bathing 0-->independent   Dressing 0-->independent   Eating 0-->independent   Communication 0-->understands/communicates without difficulty   Swallowing 0-->swallows foods/liquids without difficulty   Cognition 0 - no cognition issues reported   Fall history within last six months no   Which of the above functional risks had a recent onset or change? ambulation;transferring   Prior Functional Level Comment IND   General Information   Onset of Illness/Injury or Date of Surgery - Date 09/28/17   Referring Physician Evan Lewis MD   Patient/Family Goals Statement return home and work   Pertinent History of Current Problem (include personal factors and/or comorbidities that impact the POC) 57 year old  male who presented to [facility] Emergency Department today for evaluation of increasing somnolence, headaches and dizziness. 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 During the dizzy spells, the patient reported that he had an odd smell and taste in his mouth, like something metallic. Over the last 2 weeks, the wife observed the patient to be more tired and fatigued. He was seen by his Primary Care Provider about 1 week ago for evaluation of the dizziness and fatigue, which was thought to be cardiac in origin. He was placed on continuous cardiac monitoring with a Holter Monitor. This past weekend, the patient wife reports that the patient was experiencing very frequent dizzy spells. On Tuesday, the patient had complaints of a severe headache in the posterior aspect of his head that radiated into his neck. The dizzy spells persisted and the patient's tiredness increased. This morning, the patient's wife states that the patient was so lethargic that he was unable to get out of bed. She and her son had to assist the patient out of bed and down the stairs. Additionally, the patient's wife has observed that the patient has had increased periods of forgetfulness and difficulty with his memory, as well as diminished appetite over the last 2 weeks. Today, the patient was observed to have had word finding difficulty. According to the patient's wife, the patient has not experienced any nausea, vomiting, upper or lower extremity weakness, extremity paresthesias, facial asymmetry, seizures, weight loss, night sweats, or chills.    Precautions/Limitations fall precautions   Heart Disease Risk Factors Gender;Overweight   General Observations very pleasant   Cognitive Status Examination   Orientation orientation to person, place and time   Level of Consciousness alert   Follows Commands and Answers Questions 100% of the time   Personal Safety and Judgment intact   Memory intact   Pain Assessment   Patient Currently in Pain No   Posture    Posture Kyphosis   Range of Motion (ROM)   ROM Comment WFL   Strength   Strength Comments 5/5 throughout BLE   Bed Mobility   Bed  "Mobility Comments SBAx1-IND   Transfer Skills   Transfer Comments STS with CGAx1    Gait   Gait Comments Ambulates 300ft with CGAx1   Balance   Balance Comments occasional sway during gait and at risk for falls   General Therapy Interventions   Planned Therapy Interventions balance training;bed mobility training;gait training;neuromuscular re-education;strengthening;transfer training;home program guidelines;risk factor education;progressive activity/exercise   Clinical Impression   Criteria for Skilled Therapeutic Intervention yes, treatment indicated   PT Diagnosis Impaired gait   Influenced by the following impairments weakness, new onset of gait impairment,    Functional limitations due to impairments IND gait   Clinical Presentation Evolving/Changing   Clinical Presentation Rationale awaiting surgery   Clinical Decision Making (Complexity) Moderate complexity   Therapy Frequency` 2 times/week   Predicted Duration of Therapy Intervention (days/wks) 10/7/17   Anticipated Equipment Needs at Discharge front wheeled walker   Anticipated Discharge Disposition Other (see comments)  (Defer recs until following surgery)   Risk & Benefits of therapy have been explained Yes   Patient, Family & other staff in agreement with plan of care Yes   [facility] [place] TM \"6 Clicks\"   2016, Trustees of [facility], under license to [employer].  All rights reserved.   6 Clicks Short Forms Basic Mobility Inpatient Short Form   [facility] [employer]  \"6 Clicks\" V.2 Basic Mobility Inpatient Short Form   1. Turning from your back to your side while in a flat bed without using bedrails? 4 - None   2. Moving from lying on your back to sitting on the side of a flat bed without using bedrails? 4 - None   3. Moving to and from a bed to a chair (including a wheelchair)? 3 - A Little   4. Standing up from a chair using your arms (e.g., wheelchair, or bedside chair)? 3 - A Little   5. To walk in hospital room? 3 - A Little   6. " Climbing 3-5 steps with a railing? 2 - A Lot   Basic Mobility Raw Score (Score out of 24.Lower scores equate to lower levels of function) 19   Total Evaluation Time   Total Evaluation Time (Minutes) 10

## 2017-09-30 NOTE — PLAN OF CARE
Problem: Patient Care Overview  Goal: Plan of Care/Patient Progress Review  OT 6A:  Acknowledge OT order.  Want to confirm plans for potential resection surgery and then determine when to therapy initiation is indicated . Will wait to hear final plans from team and reassess 10/3.

## 2017-09-30 NOTE — PROGRESS NOTES
[facility], [place]   Neurosurgery Progress Note:    Assessment: Seth Iglesias is a 57 year old left handed male with newly discovered right temporal mass.     Plan:  - Serial neuro exams  - MRI completed    - Pain control  - Decadron 4q6  - Protonix  - SSI  - Regular diet  - Bowel regimen  - PRN antiemetics  - IVF until taking adequate PO  - PT/OT  - SCDs for DVT ppx  - Dispo: 6A pending surgical plan     Discussed with Neurosurgery chief, who agrees.    Interval History: resting comfortably, no acute events.        Objective:   Temp:  [97.5  F (36.4  C)-98.7  F (37.1  C)] 97.5  F (36.4  C)  Pulse:  [56-78] 56  Resp:  [16] 16  BP: (131-146)/(79-98) 143/98  SpO2:  [93 %-98 %] 97 %  I/O last 3 completed shifts:  In: 2841.67 [I.V.:2841.67]  Out: 1225 [Urine:1225]    Gen: Appears comfortable, NAD, sleepy   Neurologic:  - Alert & Oriented to self, date, location, and circumstance for hospitalization  - Follows commands briskly  - Speech fluent, spontaneous. No aphasia or dysarthria.  - No gaze preference. No apparent hemineglect.  - PERRL, EOMI  - Face symmetric with sensation intact to light touch  - Palate elevates symmetrically, uvula midline, tongue protrudes midline  - Left pronator drift  - strength 5/5 in RU/RLE, 4/5 in LUE and LLE  Reflexes 2+ throughout    Sensation intact and symmetric to light touch throughout    Please contact neurosurgery resident on call with questions.    Dial * * [phone], enter [phone] when prompted.

## 2017-09-30 NOTE — PROGRESS NOTES
"SPIRITUAL HEALTH SERVICES  SPIRITUAL ASSESSMENT Progress Note  [facility] ([place]) U6A     REFERRAL SOURCE: Hospital     I met with Seth to discuss his admission to the hospital and his upcoming procedure this evening.  He told me of the days leading up to his admission when he had many \"weak spells.\"  He said he was \"grateful his wife encouraged him to go to the emergency room\" and he \"feels very supported by his family.\"  Seth and I also talked about the loss of his mother a few years back and the loss of his father this past May.  We talked about the \"multiple layers of grief and loss that are existing for him.\" Seth said he feels great support from his family and friends and has had \"the opportunity to grieve with them.\"       PLAN: Spiritual health remains available upon patient's request     Shawna Stewart  Chaplain Resident  Pager [phone]    "

## 2017-09-30 NOTE — PLAN OF CARE
Problem: Patient Care Overview  Goal: Plan of Care/Patient Progress Review  Discharge Planner PT   Patient plan for discharge: Possible ARU candidate.  D/c recs pending until following surgery.   Current status: Bed mobility and transfers with SBA-IND without AD.  Ambulates with CGAx1 and no AD.  Significant decline in amb from baseline.    Barriers to return to prior living situation: Need for CGAx1 for gait. At risk for falls.    Recommendations for discharge: ARU pending mobility status following surgery.    Rationale for recommendations: Will undergo surgery sometime next week.  Functional status to be re-evaled following surgery.         Entered by: Martin Piña 09/30/2017 4:45 PM

## 2017-10-01 NOTE — PLAN OF CARE
Problem: Patient Care Overview  Goal: Plan of Care/Patient Progress Review  Outcome: No Change  Pt on 6A s/p new found R frontoparietal mass. Pt AVSS. A&Ox4; lethargic at times. Neuro's intact except for L pupil > R pupil. Pt c/o mild HA; declined tylenol. Pt on regular diet. Voiding. Up SBA. PIV SL between medications. Continue POC.

## 2017-10-01 NOTE — PROGRESS NOTES
[facility], [place]   Neurosurgery Progress Note:    Assessment: Seth Iglesias is a 57 year old left handed male with newly discovered right temporal mass.     Plan:  - Serial neuro exams  - MRI completed.  Need functional MRI to be completed.     - Pain control  - Decadron 4q6, Protonix  - SSI  - Regular diet  - Bowel regimen  - PRN antiemetics  - IVF until taking adequate PO  - PT/OT  - SCDs for DVT ppx  - Dispo: 6A pending surgical plan     Discussed with Neurosurgery chief, who agrees.    Interval History: resting comfortably, no acute events.        Objective:   Temp:  [95.9  F (35.5  C)-97.7  F (36.5  C)] 96.9  F (36.1  C)  Pulse:  [55-75] 65  Resp:  [16] 16  BP: (116-139)/(62-80) 116/69  SpO2:  [95 %-100 %] 100 %  I/O last 3 completed shifts:  In: 940 [P.O.:240; I.V.:700]  Out: 1225 [Urine:1225]    Gen: Appears comfortable, NAD  Neurologic:  - Alert & Oriented to self, date, location, and circumstance for hospitalization  - Follows commands briskly  - Speech fluent, spontaneous. No aphasia or dysarthria.  - No gaze preference. No apparent hemineglect.  - PERRL, EOMI  - Face symmetric with sensation intact to light touch  - Palate elevates symmetrically, uvula midline, tongue protrudes midline  - Left pronator drift  - strength 5/5 in RU/RLE, 4/5 in LUE and LLE  Reflexes 2+ throughout    Sensation intact and symmetric to light touch throughout    Please contact neurosurgery resident on call with questions.    Dial * * [phone], enter [phone] when prompted.

## 2017-10-01 NOTE — PLAN OF CARE
Problem: Patient Care Overview  Goal: Plan of Care/Patient Progress Review  Pt on 6A s/p new found R frontoparietal mass. VSS. Neuros intact; except L pupil > R pupil. Up w/SBA + gb, worked w/therapy.  Voiding spont, BM x1. Pt showered today. Tylenol for HA. Moves independently in bed. Reg diet. PIV SL. Pt getting keppra q8 and decadron q6h IV. Possible surgery next week. Continue with POC.

## 2017-10-02 NOTE — PLAN OF CARE
Problem: Patient Care Overview  Goal: Plan of Care/Patient Progress Review  Outcome: No Change  VSS. Denies pain. Neuros intact. Pt had MRI this afternoon. Plan is for CT for fiducial placement this evening. Pt had shower this am. Good po intake. Voiding spontaneously, had BM today. Up SBA, walked halls with RN. Plan is for surgery tomorrow, consent signed. Family at bedside during day.

## 2017-10-02 NOTE — PROGRESS NOTES
"SPIRITUAL HEALTH SERVICES  SPIRITUAL ASSESSMENT Progress Note  [facility] ([place]) 6A    REFERRAL SOURCE: Follow up from on-call  visit.    Visited with Seth after his morning walk. Pt was welcoming of my visit, and pleasant. Brief reflective conversation touching on the points named by the patient. Seth raised feelings related to his illness: \"they found a mass. It sounds hopeful.\" Pt raised the recent death of his father, and his processing of this as it relates to his present illness, \"I'm not ready to go... I have work to do here.\" Pt attends [employer]. He affirms good support by his family and dodie community. Seth welcomed follow up visits from Spiritual Health Services.    PLAN: I will follow up with Seth next week for emotional support and reflective conversation around loss of his father and meaning of present illness.                                                                                                                                           Milton Valdivia   Intern  Pager [phone]      "

## 2017-10-02 NOTE — ANESTHESIA PREPROCEDURE EVALUATION
Anesthesia Evaluation     . Pt has had prior anesthetic.     No history of anesthetic complications          ROS/MED HX    ENT/Pulmonary:  - neg pulmonary ROS     Neurologic:  - neg neurologic ROS     Cardiovascular:  - neg cardiovascular ROS   (+) ----. : . . . :. . Previous cardiac testing date:results:date: results:ECG reviewed date:9/2017 results:NSR date: results:          METS/Exercise Tolerance:     Hematologic:  - neg hematologic  ROS       Musculoskeletal:  - neg musculoskeletal ROS       GI/Hepatic: Comment: Irritable bowel syndrome            Renal/Genitourinary:  - ROS Renal section negative       Endo:     (+) Obesity, .      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        Other: Comment: Psoriasis                Labs: below  UPT: n.a.    BMP:  Recent Labs   Lab Test  10/03/17   0753   NA  136   POTASSIUM  4.6   CHLORIDE  104   CO2  25   BUN  22   CR  0.89   GLC  107*   ARA  8.8     LFTs:   Recent Labs   Lab Test  09/28/17   0950   PROTTOTAL  7.9   ALBUMIN  3.7   BILITOTAL  1.2   ALKPHOS  90   AST  9   ALT  22     CBC:   Recent Labs   Lab Test  09/28/17   0950   WBC  12.5*   RBC  4.46   HGB  13.9   HCT  41.2   MCV  92   MCH  31.2   MCHC  33.7   RDW  12.3   PLT  209     Coags:  Recent Labs   Lab Test  10/03/17   0753   INR  1.12   PTT  25                        Anesthesia Plan      History & Physical Review  History and physical reviewed and following examination; no interval change.    ASA Status:  3 .        Plan for General and ETT with Intravenous induction. Maintenance will be Balanced.    PONV prophylaxis:  Ondansetron (or other 5HT-3)  Additional equipment: 2nd IV and Arterial Line      Postoperative Care  Postoperative pain management:  Multi-modal analgesia.      Consents

## 2017-10-02 NOTE — PLAN OF CARE
Problem: Patient Care Overview  Goal: Individualization & Mutuality  Pt A/Ox4. Dx: R frontoparietal mass. Neuro: intact with generalized weakness. Denies pain/nausea. VSS. PIV SL. On regular diet and tolerating well. Voiding spontaneously in toilette without difficulty. Pt's family was at bedside and supportive. Pt's wife would like to know what time is the MRI, planning to have pt's  to come and visit in afternoon, please call the wife when you know the time for MRI/CT. Up/walk with SBA. Continue with POC    Update: YUMIKO ANDRADER

## 2017-10-02 NOTE — PLAN OF CARE
Problem: Patient Care Overview  Goal: Plan of Care/Patient Progress Review  Outcome: No Change  Pt on 6A after new R temporal mass discovered. VSS. Neurologically intact, slight weakness. Slightly unsteady on his feet. Up SBA. Voiding without difficulty. No BM this shift. Regular diet. PIV SL w/intermittent keppra. Plan for MRI and CT today, wife would like to be called when aware of time.  will be visiting this afternoon. Denies pain and nausea. Plan of care tbd post-imaging. Will continue to monitor and follow plan of care.

## 2017-10-03 NOTE — PLAN OF CARE
Problem: Pain, Acute (Adult)  Goal: Identify Related Risk Factors and Signs and Symptoms  Related risk factors and signs and symptoms are identified upon initiation of Human [employer] Clinical Practice Guideline (CPG).   Outcome: No Change  VSS. Denies pain. Neuros intact. Fiducials intact. NPO since midnight. Voiding spontaneously, had BM yesterday. Up indep in room and halls. Spent most of day in family room visiting with family and friends. Report given to 3C -plan is to be in OR around 1600.

## 2017-10-03 NOTE — PLAN OF CARE
Problem: Patient Care Overview  Goal: Plan of Care/Patient Progress Review  OT 6A: cx. pt. scheduled for OR today.  Reassess for OT needs post op.

## 2017-10-03 NOTE — PLAN OF CARE
Problem: Patient Care Overview  Goal: Plan of Care/Patient Progress Review  Outcome: No Change  VSS. Denies pain. Neuros intact ex occasionally forgetful.. Pt had MRI this AM and CT with fiducial placement this evening. Pt had final preop scrub prior to CT. Good po intake. Voiding spontaneously, had BM today. Up SBA, walked halls with RN. NPO at midnight. Plan is for surgery tomorrow, consent signed. Uses call light appropriately. WIll continue to monitor and follow POC.

## 2017-10-03 NOTE — PROGRESS NOTES
[facility], [place]   Neurosurgery Progress Note:    Assessment: Seth Iglesias is a 57 year old left handed male with newly discovered right temporal mass.     Plan:  - Serial neuro exams  - MRI completed.  Functional MRI completed today and CT stealth brain completed.   - To OR today  - Pain control  - Decadron 4q6, Protonix  - SSI  - NPO, mIVF   - Bowel regimen  - PRN antiemetics  - PT/OT  - SCDs for DVT ppx  - Dispo: 6A pending surgical plan     Discussed with Neurosurgery chief, who agrees.    Interval History: No acute events overnight. Has fiducials on head.       Objective:   Temp:  [96.2  F (35.7  C)-96.8  F (36  C)] 96.2  F (35.7  C)  Pulse:  [59-80] 59  Resp:  [16] 16  BP: (119-137)/(69-96) 137/86  SpO2:  [96 %-98 %] 97 %  I/O last 3 completed shifts:  In: 600 [I.V.:600]  Out: -     Gen: Appears comfortable, NAD  Neurologic:  - Alert & Oriented to self, date, location, and circumstance for hospitalization  - Follows commands briskly  - Speech fluent, spontaneous. No aphasia or dysarthria.  - No gaze preference. No apparent hemineglect.  - PERRL, EOMI  - Face symmetric with sensation intact to light touch  - Palate elevates symmetrically, uvula midline, tongue protrudes midline  - Left pronator drift  - strength 5/5 in RU/RLE, 4/5 in LUE and LLE  Reflexes 2+ throughout    Sensation intact and symmetric to light touch throughout    Please contact neurosurgery resident on call with questions.    Dial * * [phone], enter [phone] when prompted.

## 2017-10-03 NOTE — ANESTHESIA PROCEDURE NOTES
Arterial Line Procedure Note  Staff:     Anesthesiologist:  ANDREA KELLY  Location: In OR After Induction  Procedure Start/Stop Times:     patient identified, IV checked, risks and benefits discussed, monitors and equipment checked, pre-op evaluation and at physician/surgeon's request      Correct Patient: Yes      Correct Position: Yes      Correct Site: Yes      Correct Procedure: Yes      Correct Laterality:  Yes    Site Marked:  N/A  Line Placement:     Procedure:  Arterial Line    Insertion Site:  Radial    Insertion laterality:  Left    Skin Prep: Chloraprep      Patient Prep: patient draped, mask, sterile gloves, hat and hand hygiene      Local skin infiltration:  None    Ultrasound Guided?: No      Catheter size:  20 gauge, Quick cath    Cath secured with: other (comment)      Dressing:  Tegaderm    Complications:  None obvious    Arterial waveform: Yes      IBP within 10% of NIBP: Yes    Assessment/Narrative:      Easy insertion, secured, good waveform.  ---rcp

## 2017-10-03 NOTE — PLAN OF CARE
Problem: Patient Care Overview  Goal: Plan of Care/Patient Progress Review  PT 6A:  Pt scheduled for OR today, will reassess for ongoing PT needs post-op.

## 2017-10-03 NOTE — PROGRESS NOTES
SPIRITUAL HEALTH SERVICES  [facility] ([place]) 3C   PRE-SURGERY VISIT    Had pre-surgery visit with pt and spouse.  Provided spiritual support, prayer.     Lance Neal  Chaplain Resident  Pager [phone]

## 2017-10-03 NOTE — PLAN OF CARE
Problem: Patient Care Overview  Goal: Plan of Care/Patient Progress Review  Outcome: No Change  Pt A& O x4, sleeping well overnight and able to make needs known. VSS, neuros intact. NPO since midnight and IVF started at that time. Fiducials remain in place. Denies pain.  Plan: OR at 1600 today.

## 2017-10-03 NOTE — PROGRESS NOTES
[facility], [place]   Neurosurgery Progress Note:    Assessment: Seth Iglesias is a 57 year old left handed male with newly discovered right temporal mass.     Plan:  - Serial neuro exams  - MRI completed.  Functional MRI completed today. CT brain with stealth to be completed tonight.   - Consented and marked for OR tomorrow   - Pain control  - Decadron 4q6, Protonix  - SSI  - NPO at midnight, mIVF while NPO   - Bowel regimen  - PRN antiemetics  - IVF until taking adequate PO  - PT/OT  - SCDs for DVT ppx  - Dispo: 6A pending surgical plan     Discussed with Neurosurgery chief, who agrees.    Interval History: resting comfortably, no acute events.        Objective:   Temp:  [96.6  F (35.9  C)-98.1  F (36.7  C)] 96.8  F (36  C)  Pulse:  [57-72] 72  Resp:  [16-18] 16  BP: (125-144)/(74-96) 133/96  SpO2:  [94 %-98 %] 98 %  I/O last 3 completed shifts:  In: 460 [P.O.:360; I.V.:100]  Out: -     Gen: Appears comfortable, NAD  Neurologic:  - Alert & Oriented to self, date, location, and circumstance for hospitalization  - Follows commands briskly  - Speech fluent, spontaneous. No aphasia or dysarthria.  - No gaze preference. No apparent hemineglect.  - PERRL, EOMI  - Face symmetric with sensation intact to light touch  - Palate elevates symmetrically, uvula midline, tongue protrudes midline  - Left pronator drift  - strength 5/5 in RU/RLE, 4/5 in LUE and LLE  Reflexes 2+ throughout    Sensation intact and symmetric to light touch throughout    Please contact neurosurgery resident on call with questions.    Dial * * [phone], enter [phone] when prompted.       I have reviewed the history above and agree with the resident's assessment and plan. Will review case with Dr. Wiggins, who will be the surgical lead.  MEGAN Ovalle MD

## 2017-10-04 NOTE — PLAN OF CARE
Problem: Patient Care Overview  Goal: Plan of Care/Patient Progress Review  Outcome: Improving  D=Pt came back from PACU at 0030via bed,S/P Craniotomy,pt awake alert and orientedx4. Heart rate in 60s no ectopy,SBP in 130s by A line correlating w/ cuff pressure. Head dsg intact w/ small amt of dried serosanguinous drainage.NS infusing at 100cc/hr.Pt complained of headache medicated w/ Oxycodone 5 mg po w/ some relief.Neuro check done every hour(Please see neuro flowsheet for details)A line was discontinued at 06,Cff pressure in 110-120s.Bonilla was discontinued at 06,not voided yet.OOB to chair w/ stanby assist tolerated wee  P=Continue to closely monitor pt,MRI this AM,possible transfer to floor

## 2017-10-04 NOTE — OR NURSING
Dr. Andrews-Anesthesiologist updated and at bedside assessing Pt. Vitals stable. Improving neurologically. Left leg is as strong as right. Left arm is a little weaker than right. No pronator drift. No divergence of right eye. Pt answering all questions appropriately and correctly.

## 2017-10-04 NOTE — ANESTHESIA POSTPROCEDURE EVALUATION
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    Anesthesia Type:  General, ETT    Note:  Anesthesia Post Evaluation    Patient location during evaluation: PACU  Patient participation: Able to fully participate in evaluation  Level of consciousness: awake and alert  Pain management: adequate  Airway patency: patent  Cardiovascular status: acceptable  Respiratory status: acceptable  Hydration status: acceptable  PONV: none     Anesthetic complications: None          Last vitals:  Vitals:    10/03/17 2200 10/03/17 2215 10/03/17 2230   BP: 139/90 140/90 130/89   Pulse:      Resp: 12 12 12   Temp: 36.7  C (98  F)     SpO2: 95% 98% 98%         Electronically Signed By: Roland Andrews MD  October 3, 2017  11:14 PM

## 2017-10-04 NOTE — TELEPHONE ENCOUNTER
Reason for Call:  Other FYI    Detailed comments: Maryanne Palomino's wife called saying that Candelario is at the [facility].  He had surgery on 10/3/2017 for a brain tumor.  He is doing well.  Maryanne will send back the heart monitor.    Phone Number Patient can be reached at: Other phone number:  Maryanne  [phone]    Best Time: any    Can we leave a detailed message on this number? YES    Call taken on 10/4/2017 at 8:50 AM by Sonam Orlando

## 2017-10-04 NOTE — PROGRESS NOTES
10/04/17 1500   Quick Adds   Type of Visit Initial Occupational Therapy Evaluation   Living Environment   Lives With child(nina), adult;spouse   Living Arrangements house   Home Accessibility bed and bath are not on the first floor;stairs to enter home;stairs within home;tub/shower is not walk in   Number of Stairs to Enter Home 3   Number of Stairs Within Home 10   Stair Railings at Home (yes)   Transportation Available car;family or friend will provide   Living Environment Comment Pt lives in 2 level home; fairly steep stairs up to bedroom with handrail; lives with wife and 3 adult sons   Self-Care   Dominant Hand left   Usual Activity Tolerance good   Current Activity Tolerance fair   Regular Exercise yes   Activity/Exercise Type biking;walking   Equipment Currently Used at Home none   Activity/Exercise/Self-Care Comment Pt was previously independent with all mobility, self cares and works full time at physical [employer], enjoys biking and hunting.  Family very supportive.   Functional Level Prior   Ambulation 0-->independent   Transferring 0-->independent   Toileting 0-->independent   Bathing 0-->independent   Dressing 0-->independent   Cognition 0 - no cognition issues reported   Fall history within last six months no   Which of the above functional risks had a recent onset or change? ambulation;transferring       Present no   Language english   General Information   Onset of Illness/Injury or Date of Surgery - Date 09/28/17   Referring Physician Evan Lewis MD   Patient/Family Goals Statement Return to home; spend time with family and doing activity he enjoys   Additional Occupational Profile Info/Pertinent History of Current Problem Seth Iglesias is a 57 year old left handed male with newly discovered right temporal mass. Now POD #1 s/p right sided craniotomy for tumor resection   Precautions/Limitations (craniotomy precautions)   General Observations Pt alert, pleasant and agreeable;  wife present and supportive; asking appropriate questions   General Info Comments Activity: up with assist   Cognitive Status Examination   Orientation orientation to person, place and time   Level of Consciousness alert   Able to Follow Commands WNL/WFL   Personal Safety (Cognitive) WNL/WFL   Memory intact   Cognitive Comment Pt alert, oriented, apprioriate in conversation, asking questions appropriately, not formally assessed though pt and wife report cognition appears to be at baseline   Visual Perception   Visual Perception Wears glasses   Visual Perception Comments no acute visual changes noted   Sensory Examination   Sensory Quick Adds No deficits were identified   Pain Assessment   Patient Currently in Pain No   Range of Motion (ROM)   ROM Comment BUE WNL   Strength   Strength Comments not formally assessed 2/2 post surgical precautions; however, grasp and active ROM symmetrical in BUE   Mobility   Bed Mobility Bed mobility skill: Sit to supine;Bed mobility skill: Supine to sit   Bed Mobility Skill: Sit to Supine   Level of [place]: Sit/Supine stand-by assist   Physical Assist/Nonphysical Assist: Sit/Supine verbal cues   Bed Mobility Skill: Supine to Sit   Level of [place]: Supine/Sit stand-by assist   Physical Assist/Nonphysical Assist: Supine/Sit verbal cues   Transfer Skill: Sit to Stand   Level of [place]: Sit/Stand contact guard   Balance   Balance Comments no LOB with activity at bedside; PT to address mobility needs   Lower Body Dressing   Level of [place]: Dress Lower Body stand-by assist   Physical Assist/Nonphysical Assist: Dress Lower Body verbal cues   Instrumental Activities of Daily Living (IADL)   Previous Responsibilities meal prep;housekeeping;shopping;yardwork;driving;work   IADL Comments pt wife and sons able/willing to assist prn; report they will provide 24hr assist prn upon discharge; pt planning to retire rather than returning to work   Activities of Daily Living  "Analysis   Impairments Contributing to Impaired Activities of Daily Living balance impaired;pain;post surgical precautions   General Therapy Interventions   Planned Therapy Interventions ADL retraining;IADL retraining;bed mobility training;cognition;transfer training   Clinical Impression   Criteria for Skilled Therapeutic Interventions Met yes, treatment indicated   OT Diagnosis decreased activity tolerance and independence with ADLs   Influenced by the following impairments post surgical precautions; fatigue; impaired balance   Assessment of Occupational Performance 3-5 Performance Deficits   Identified Performance Deficits bed mobility, dressing, bathing, home management   Clinical Decision Making (Complexity) Low complexity   Therapy Frequency (one time eval and treat)   Predicted Duration of Therapy Intervention (days/wks) one time eval and treat   Anticipated Discharge Disposition Home with Assist   Risks and Benefits of Treatment have been explained. Yes   Patient, Family & other staff in agreement with plan of care Yes   Clinical Impression Comments Recommend discharge to home with assist of family - PT to continue to address mobility needs - please refer to PT note for recommendations of possible OP PT needs   [facility] TM \"6 Clicks\"   2016, Trustees of [facility], under license to [employer].  All rights reserved.   6 Clicks Short Forms Daily Activity Inpatient Short Form   [facility]  \"6 Clicks\" Daily Activity Inpatient Short Form   1. Putting on and taking off regular lower body clothing? 3 - A Little   2. Bathing (including washing, rinsing, drying)? 3 - A Little   3. Toileting, which includes using toilet, bedpan or urinal? 4 - None   4. Putting on and taking off regular upper body clothing? 4 - None   5. Taking care of personal grooming such as brushing teeth? 4 - None   6. Eating meals? 4 - None   Daily Activity Raw Score (Score out of 24.Lower scores equate to " lower levels of function) 22   Total Evaluation Time   Total Evaluation Time (Minutes) 5

## 2017-10-04 NOTE — PLAN OF CARE
Problem: Patient Care Overview  Goal: Plan of Care/Patient Progress Review  Discharge Planner PT   Patient plan for discharge: Home with assistance from family  Current status: Pt amb ~ 125 feet with CGA pushing w/c. Pt required CGA->Min A for sit->stand transfers.  Barriers to return to prior living situation: Pt has 10 steep stairs to bedroom and bathroom area.  Recommendations for discharge: Home with 24/7 family assistance.  Rationale for recommendations: Pt will have several family members available to assist him at home.        Entered by: Chastity Levine 10/04/2017 6:35 PM

## 2017-10-04 NOTE — ANESTHESIA CARE TRANSFER NOTE
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    Anesthesia Type:   General, ETT     Note:  Airway :Face Mask  Patient transferred to:PACU  Comments: Anesthesia Care Transfer Note    Patient: Seth Iglesias    Transferred to: PACU    Patient vital signs: stable    Airway: none    Monitors placed. VSS. PIVs, wilbur patent. 8L 02 FM. Patient sleepy, occasionally following commands, agitated at times. Report given and care transferred to RN.     Janeth Agrawal CRNA   10/3/2017        Vitals: (Last set prior to Anesthesia Care Transfer)    CRNA VITALS  10/3/2017 2059 - 10/3/2017 2138      10/3/2017             SpO2: 99 %    EKG: Sinus rhythm                Electronically Signed By: JULIEN Samayoa CRNA  October 3, 2017  9:38 PM

## 2017-10-04 NOTE — PLAN OF CARE
Problem: Patient Care Overview  Goal: Plan of Care/Patient Progress Review  OT 4AB: Evaluation complete and treatment provided.  Pt and wife very receptive to education on post surgical precautions and implications for ADLs and return to home/daily activity.  Handout provided to reinforce education.  Overall pt strength and ROM is symmetrical, speech is clear and pt is alert, oriented and appropriate in conversation.  Provided instruction in proper techniques for bed mobility, dressing, bathing, etc.  SBA-CGA for bed mobility and transfers     Discharge Planner OT   Patient plan for discharge: Home with assist  Current status: See above for details  Barriers to return to prior living situation: post surgical precautions;  Pt with good overall strength though moves cautiously, will need to demonstrate safe stair completion with PT prior to discharge  Recommendations for discharge: Recommend discharge to home with assist prn - family reports they intend to provide 24hr assist initially upon discharge - please refer to PT note for any possible OP mobility/balance needs  Rationale for recommendations: Pt and wife very receptive to education on task modification for self cares within post surgical precautions; pt moving well overall, good support at home       Entered by: Bess Barton 10/04/2017 4:14 PM        Occupational Therapy Discharge Summary     Reason for therapy discharge:    Discharged to home.  All goals and outcomes met, no further needs identified.     Progress towards therapy goal(s). See goals on Care Plan in [facility] health record for goal details.  Goals met     Therapy recommendation(s):    No further OT needs identified; family to assist with higher level tasks; PT to follow to progress mobility and stairs post op

## 2017-10-04 NOTE — PROGRESS NOTES
[facility], [place]   Neurosurgery Progress Note:    Assessment: Seth Iglesias is a 57 year old left handed male with newly discovered right temporal mass. Now POD #1 s/p right sided craniotomy for tumor resection.     Plan:  - Serial neuro exams  - Follow-up pathology  - Pain control  - Decadron 4q6, Protonix. Okay for 2-week taper to 2 BID.   - SSI  - ADAT  - Bowel regimen  - PRN antiemetics  - PT/OT  - SCDs for DVT ppx  - Dispo: transfer to the floor today     Discussed with Neurosurgery chief, who agrees.    Blanca Ward MD  Neurosurgery PGY3    Interval History: No acute events overnight.       Objective:   Temp:  [96.2  F (35.7  C)-98.2  F (36.8  C)] 98.2  F (36.8  C)  Pulse:  [59] 59  Heart Rate:  [65-94] 82  Resp:  [9-29] 29  BP: (108-149)/(84-96) 108/84  MAP:  [92 mmHg-115 mmHg] 102 mmHg  Arterial Line BP: (117-165)/(75-96) 138/87  SpO2:  [95 %-100 %] 96 %  I/O last 3 completed shifts:  In: 2694.25 [P.O.:310; I.V.:2384.25]  Out: 1870 [Urine:1770; Blood:100]    Gen: Appears comfortable, NAD  Neurologic:  - Alert & Oriented to self, date, location, and circumstance for hospitalization  - Follows commands briskly  - Speech fluent, spontaneous. No aphasia or dysarthria.  - No gaze preference. No apparent hemineglect.  - PERRL, EOMI  - Face symmetric with sensation intact to light touch  - Palate elevates symmetrically, uvula midline, tongue protrudes midline  - Left pronator drift  - strength 5/5 in RU/RLE, 5/5 strength in LUE and LLE (improved)  Reflexes 2+ throughout    Sensation intact and symmetric to light touch throughout    Please contact neurosurgery resident on call with questions.    Dial * * [phone], enter [phone] when prompted.

## 2017-10-04 NOTE — OP NOTE
DATE OF SERVICE:  10/03/2017      PREOPERATIVE DIAGNOSIS:  Left temporal tumor.      POSTOPERATIVE DIAGNOSIS:  Right temporal glioblastoma.      PROCEDURE PERFORMED:    1.  Right temporal craniotomy and tumor resection.   2.  Use of intraoperative microscope.   3.  Use of intraoperative neuro navigation.      ATTENDING SURGEON:  Harry Wiggins MD      RESIDENT SURGEON:    1.  Gale Belle MD   2.  Blanca Swanson MD  3  Angeles Wise MD      ESTIMATED BLOOD LOSS:  100 mL.      DRAINS:  None.      SPECIMENS:     1.  Tumor for permanent, frozen, and [employer].   2.  Frozen specimen consistent with glioblastoma.      INDICATIONS FOR PROCEDURE:  Mr. Seth Iglesias is a 57-year-old gentleman who presented to our hospital on the 09/28 with lethargy, confusion and was found to have a right temporal mass on MRI.  He underwent a functional MRI which localizes speech to the left side of his brain and he presented for the above-mentioned procedure.      DESCRIPTION OF PROCEDURE:  After informed consent was obtained, the patient was taken to the operating suite, where general endotracheal anesthesia was induced.  He was transferred to the operating table in supine position with his head in a Catherine head hansen.  His head was affixed to the Catherine frame and his head was then registered to the Stealth navigation system.  A pterional incision was then planned and his head was prepped and draped in the usual sterile fashion.  After an appropriate timeout, a #10 blade was used to make the incision after infiltrating 10 mL of 0.5% lidocaine with epinephrine 1:200,000.  Hemostasis was obtained throughout obtaining the incision and we dissected through until we found the temporalis fascia.  We used monopolar cautery to cut through the temporal fascia.  The myocutaneous flap was then retracted anteriorly out of the operative field.  We again obtained hemostasis and planned our craniotomy using the Stealth neuro navigation system.       We opened the dura in a C-shaped fashion based anteriorly. and we opened the dura and retracted it anteriorly using 4-0 Nurolon sutures.  We then made a corticectomy using bipolar and using suction, we dissected deep until we found the tumor. We dissected around the tumor and were able to send biopsies for frozen.  We found the floor of the middle fossa and we dissected until we found the temporal horn of the lateral ventricle.  We then dissected anteriorly around the tip of the temporal lobe.  We coagulated pial vessels and obtained hemostasis.  We then connected the corticectomies until we were able to remove the entire temporal tip.  After removing the anterior temporal lobe, we were able to see the third nerve as well as the PCOM coursing in the subpial space.  Again, we irrigated copiously and used Stealth to ensure we had removed all tumor.  We felt there was more tumor in the superior temporal gyrus; thus we used menses CUSA to remove this tumor.  When this was felt to be removed and complete, we obtained hemostasis and proceeded to close.  The dura was reapproximated using 4-0 Nurolon sutures and then covered with Duragen.  We then replaced the bone using a Synthes plating system.  We then closed the temporalis fascia using 2-0 Vicryls in an interrupted fashion.  The galea was closed using 2-0 Vicryl in an inverted interrupted fashion and the skin was closed using a running 3-0 nylon.  All counts were correct x2 at the end the case.      Dr. Richardson was present and scrubbed for the critical portions of case and immediately available for the rest of the case.         ESDRAS RICHARDSON MD       As dictated by HI ANDERSON MD            D: 10/03/2017 21:13   T: 10/03/2017 22:33   MT: THANG      Name:     MALINA SMITH   MRN:      [id number]        Account:        [id number]   :      1960           Procedure Date: 10/03/2017      Document: [id number]

## 2017-10-04 NOTE — PROGRESS NOTES
PT Re-Evaluation   10/04/17 1117   Quick Adds   Type of Visit PT Re-evaluation   Living Environment   Lives With child(nina), adult;spouse   Living Arrangements house   Home Accessibility bed and bath are not on the first floor;stairs to enter home;stairs within home   Number of Stairs to Enter Home 3   Number of Stairs Within Home 10  (Pt reports stairs are steep)   Stair Railings at Home inside, present on left side;outside, present on right side   Transportation Available family or friend will provide   Self-Care   Dominant Hand left   Usual Activity Tolerance good   Current Activity Tolerance moderate   Regular Exercise yes   Activity/Exercise Type biking;walking   Equipment Currently Used at Home none   Activity/Exercise/Self-Care Comment Pt was IND up until hospital admission.   Functional Level Prior   Ambulation 0-->independent   Transferring 0-->independent   Toileting 0-->independent   Bathing 0-->independent   Dressing 0-->independent   Eating 0-->independent   Communication 0-->understands/communicates without difficulty   Swallowing 0-->swallows foods/liquids without difficulty   Cognition 0 - no cognition issues reported   Fall history within last six months no   Which of the above functional risks had a recent onset or change? ambulation;transferring   General Information   Onset of Illness/Injury or Date of Surgery - Date 10/03/17   Referring Physician Evan Lewis MD   Pertinent History of Current Problem (include personal factors and/or comorbidities that impact the POC) Pt is s/p right temporal craniotomy and tumor resection for GBM.   Precautions/Limitations fall precautions;other (see comments)  (craniotomy precautions)   Heart Disease Risk Factors Gender;Age   General Observations Pt up in chair at PT arrival, on RA.   Cognitive Status Examination   Orientation orientation to person, place and time   Level of Consciousness alert   Follows Commands and Answers Questions 100% of the  "time   Personal Safety and Judgment intact   Memory intact   Pain Assessment   Patient Currently in Pain Yes, see Vital Sign flowsheet   Integumentary/Edema   Integumentary/Edema other (describe)   Integumentary/Edema Comments incision present   Posture    Posture Kyphosis;Forward head position   Range of Motion (ROM)   ROM Quick Adds No deficits were identified   Strength   Strength Comments Formal MMT not tested due to craniotomy precautions, 3/5 strength observed with functional transfer.   Bed Mobility   Bed Mobility Comments Pt tx sit->supine with Min A.   Transfer Skills   Transfer Comments Pt tx sit->stand with CGA.   Gait   Gait Comments Pt amb ~ 125 feet with CGA, pushing w/c.   Balance   Balance Comments Wide base of support for all mobility, moderate sway noted in static standing.   Sensory Examination   Sensory Perception no deficits were identified   General Therapy Interventions   Planned Therapy Interventions bed mobility training;gait training;neuromuscular re-education;strengthening;transfer training   Clinical Impression   Criteria for Skilled Therapeutic Intervention yes, treatment indicated   PT Diagnosis Impaired Balance   Influenced by the following impairments impaired balance, gait   Functional limitations due to impairments bed mob, transfers, gait   Clinical Presentation Evolving/Changing   Clinical Presentation Rationale Pt s/p craniotomy, with new post-surgical precautions   Clinical Decision Making (Complexity) Moderate complexity   Therapy Frequency` daily   Predicted Duration of Therapy Intervention (days/wks) 10/11/17   Anticipated Discharge Disposition Home with Assist   Risk & Benefits of therapy have been explained Yes   Patient, Family & other staff in agreement with plan of care Yes   [facility] AM-PAC  \"6 Clicks\" V.2 Basic Mobility Inpatient Short Form   1. Turning from your back to your side while in a flat bed without using bedrails? 3 - A Little   2. Moving from lying " on your back to sitting on the side of a flat bed without using bedrails? 3 - A Little   3. Moving to and from a bed to a chair (including a wheelchair)? 3 - A Little   4. Standing up from a chair using your arms (e.g., wheelchair, or bedside chair)? 3 - A Little   5. To walk in hospital room? 3 - A Little   6. Climbing 3-5 steps with a railing? 3 - A Little   Basic Mobility Raw Score (Score out of 24.Lower scores equate to lower levels of function) 18   Total Evaluation Time   Total Evaluation Time (Minutes) 10

## 2017-10-04 NOTE — PROGRESS NOTES
SPIRITUAL HEALTH SERVICES  SPIRITUAL ASSESSMENT Progress Note  [facility] 7B     REFERRAL SOURCE:  Follow up    Unit RN has stated pt would welcome visit by [facility] but is resting at this time.    PLAN: Will follow up as needed while on unit.    Jair Santillan  Chaplain Resident  Pager [phone]

## 2017-10-04 NOTE — PROGRESS NOTES
10/04/17 1500   Quick Adds   Type of Visit Initial Occupational Therapy Evaluation   Living Environment   Lives With child(nina), adult;spouse   Living Arrangements house   Home Accessibility bed and bath are not on the first floor;stairs to enter home;stairs within home;tub/shower is not walk in   Number of Stairs to Enter Home 3   Number of Stairs Within Home 10   Stair Railings at Home (yes)   Transportation Available car;family or friend will provide   Living Environment Comment Pt lives in 2 level home; fairly steep stairs up to bedroom with handrail; lives with wife and 3 adult sons   Self-Care   Dominant Hand left   Usual Activity Tolerance good   Current Activity Tolerance fair   Regular Exercise yes   Activity/Exercise Type biking;walking   Equipment Currently Used at Home none   Activity/Exercise/Self-Care Comment Pt was previously independent with all mobility, self cares and works full time at physical [employer], enjoys biking and hunting.  Family very supportive.   Functional Level Prior   Ambulation 0-->independent   Transferring 0-->independent   Toileting 0-->independent   Bathing 0-->independent   Dressing 0-->independent   Cognition 0 - no cognition issues reported   Fall history within last six months no   Which of the above functional risks had a recent onset or change? ambulation;transferring       Present no   Language english   General Information   Onset of Illness/Injury or Date of Surgery - Date 09/28/17   Referring Physician Evan Lewis MD   Patient/Family Goals Statement Return to home; spend time with family and doing activity he enjoys   Additional Occupational Profile Info/Pertinent History of Current Problem Seth Iglesias is a 57 year old left handed male with newly discovered right temporal mass. Now POD #1 s/p right sided craniotomy for tumor resection   Precautions/Limitations (craniotomy precautions)   General Observations Pt alert, pleasant and agreeable;  wife present and supportive; asking appropriate questions   General Info Comments Activity: up with assist   Cognitive Status Examination   Orientation orientation to person, place and time   Level of Consciousness alert   Able to Follow Commands WNL/WFL   Personal Safety (Cognitive) WNL/WFL   Memory intact   Cognitive Comment Pt alert, oriented, apprioriate in conversation, asking questions appropriately, not formally assessed though pt and wife report cognition appears to be at baseline   Visual Perception   Visual Perception Wears glasses   Visual Perception Comments no acute visual changes noted   Sensory Examination   Sensory Quick Adds No deficits were identified   Pain Assessment   Patient Currently in Pain No   Range of Motion (ROM)   ROM Comment BUE WNL   Strength   Strength Comments not formally assessed 2/2 post surgical precautions; however, grasp and active ROM symmetrical in BUE   Mobility   Bed Mobility Bed mobility skill: Sit to supine;Bed mobility skill: Supine to sit   Bed Mobility Skill: Sit to Supine   Level of [place]: Sit/Supine stand-by assist   Physical Assist/Nonphysical Assist: Sit/Supine verbal cues   Bed Mobility Skill: Supine to Sit   Level of [place]: Supine/Sit stand-by assist   Physical Assist/Nonphysical Assist: Supine/Sit verbal cues   Transfer Skill: Sit to Stand   Level of [place]: Sit/Stand contact guard   Balance   Balance Comments no LOB with activity at bedside; PT to address mobility needs   Lower Body Dressing   Level of [place]: Dress Lower Body stand-by assist   Physical Assist/Nonphysical Assist: Dress Lower Body verbal cues   Instrumental Activities of Daily Living (IADL)   Previous Responsibilities meal prep;housekeeping;shopping;yardwork;driving;work   IADL Comments pt wife and sons able/willing to assist prn; report they will provide 24hr assist prn upon discharge; pt planning to retire rather than returning to work   Activities of Daily Living  "Analysis   Impairments Contributing to Impaired Activities of Daily Living balance impaired;pain;post surgical precautions   General Therapy Interventions   Planned Therapy Interventions ADL retraining;IADL retraining;bed mobility training;cognition;transfer training   Clinical Impression   Criteria for Skilled Therapeutic Interventions Met yes, treatment indicated   OT Diagnosis decreased activity tolerance and independence with ADLs   Influenced by the following impairments post surgical precautions; fatigue; impaired balance   Assessment of Occupational Performance 3-5 Performance Deficits   Identified Performance Deficits bed mobility, dressing, bathing, home management   Clinical Decision Making (Complexity) Low complexity   Therapy Frequency (one time eval and treat)   Predicted Duration of Therapy Intervention (days/wks) one time eval and treat   Anticipated Discharge Disposition Home with Assist   Risks and Benefits of Treatment have been explained. Yes   Patient, Family & other staff in agreement with plan of care Yes   Clinical Impression Comments Recommend discharge to home with assist of family - PT to continue to address mobility needs - please refer to PT note for recommendations of possible OP PT needs   [facility] TM \"6 Clicks\"   2016, Trustees of [facility], under license to [employer].  All rights reserved.   6 Clicks Short Forms Daily Activity Inpatient Short Form   [facility]  \"6 Clicks\" Daily Activity Inpatient Short Form   1. Putting on and taking off regular lower body clothing? 3 - A Little   2. Bathing (including washing, rinsing, drying)? 3 - A Little   3. Toileting, which includes using toilet, bedpan or urinal? 4 - None   4. Putting on and taking off regular upper body clothing? 4 - None   5. Taking care of personal grooming such as brushing teeth? 4 - None   6. Eating meals? 4 - None   Daily Activity Raw Score (Score out of 24.Lower scores equate to " lower levels of function) 22   Total Evaluation Time   Total Evaluation Time (Minutes) 5

## 2017-10-05 NOTE — PLAN OF CARE
Problem: Patient Care Overview  Goal: Plan of Care/Patient Progress Review  Physical Therapy Discharge Summary     Reason for therapy discharge:    Discharged to home.     Progress towards therapy goal(s). See goals on Care Plan in [facility] electronic health record for goal details.  Goals not met.  Barriers to achieving goals:   discharge from facility.     Therapy recommendation(s):    Continue home exercise program.

## 2017-10-05 NOTE — PLAN OF CARE
Problem: Craniotomy/Craniectomy/Cranioplasty (Adult)  Goal: Signs and Symptoms of Listed Potential Problems Will be Absent, Minimized or Managed (Craniotomy/Craniectomy/Cranioplasty)  Signs and symptoms of listed potential problems will be absent, minimized or managed by discharge/transition of care (reference Craniotomy/Craniectomy/Cranioplasty (Adult) CPG).   Outcome: No Change  VSS. A&Ox4. Neuros intact. Pain managed with tylenol . Head incision covered with primapore. Voiding spont via urinal overnight. BS+. PIV SL. Up with SBA. Reg diet. Possible D/C today. Cont to monitor and with POC.

## 2017-10-05 NOTE — PLAN OF CARE
Problem: Patient Care Overview  Goal: Plan of Care/Patient Progress Review  Outcome: Adequate for Discharge Date Met:  10/05/17  Pt is adequate for discharge. Discharge instructions covered w/pt and family. Verbalized understandings, questions answered. PIV removed. Pt spouse driving patient home.

## 2017-10-05 NOTE — PROGRESS NOTES
[facility], [place]   Neurosurgery Progress Note:    Assessment: Seth Iglesias is a 57 year old left handed male with newly discovered right temporal mass. Now POD #2 s/p right sided craniotomy for tumor resection.     Plan:  - Serial neuro exams  - Follow-up pathology  - Pain control  - Decadron 4q6, Protonix. Okay for 2-week taper to 2 BID.   - SSI  - Regular diet  - Bowel regimen  - PRN antiemetics  - PT/OT  - SCDs for DVT ppx  - Dispo: possible dc today     Discussed with Neurosurgery chief, who agrees.    Blanca Ward MD  Neurosurgery PGY3    Interval History: No acute events overnight.       Objective:   Temp:  [96.3  F (35.7  C)-98.5  F (36.9  C)] 96.3  F (35.7  C)  Pulse:  [79-82] 79  Heart Rate:  [] 75  Resp:  [7-31] 16  BP: (104-142)/(69-89) 104/82  SpO2:  [90 %-98 %] 96 %  I/O last 3 completed shifts:  In: 875 [P.O.:650; I.V.:225]  Out: 1100 [Urine:1100]    Gen: Appears comfortable, NAD  Neurologic:  - Alert & Oriented to self, date, location, and circumstance for hospitalization  - Follows commands briskly  - Speech fluent, spontaneous. No aphasia or dysarthria.  - No gaze preference. No apparent hemineglect.  - PERRL, EOMI  - Face symmetric with sensation intact to light touch  - Palate elevates symmetrically, uvula midline, tongue protrudes midline  - Left pronator drift  - strength 5/5 in RU/RLE, 5/5 strength in LUE and LLE   Reflexes 2+ throughout    Sensation intact and symmetric to light touch throughout    Please contact neurosurgery resident on call with questions.    Dial * * [phone], enter [phone] when prompted.

## 2017-10-05 NOTE — DISCHARGE SUMMARY
[facility] Discharge Summary and Instructions    Seth Iglesias MRN# [id number]   Age: 57 year old YOB: 1960     Date of Admission:  9/28/2017  Date of Discharge::  10/5/2017  Admitting Physician:  Harry Wiggins MD  Discharge Physician:  Harry Wiggins MD          Admission Diagnoses:   Brain mass [G93.9]  Brain tumor (H) [D49.6]          Discharge Diagnosis:   Brain mass [G93.9]  Brain tumor (H) [D49.6]          Procedures:   Right Craniotomy for Tumor Resection (10/3/2017) by Dr. Harry Wiggins           Brief History of Illness:   Mr. Iglesias is a very pleasant 57 year old  Left-Handed male who was transferred to [facility] from an outside hospital on 9/28/2017 for evaluation of progressive dizziness, headache, and lethargy. A CT Head obtained at the outside hospital demonstrated a Large Right Frontoparietal Mass. Transfer to [facility] for escalation of care was initiated at that time. An MRI Brain obtained at [facility] upon the patient's arrival demonstrated a large peripherally enhancing lesion in the right temporal lobe with surrounding cerebral edema. These findings were concerning for a high-grade glioma. The patient was initiated on Dexamethasone 4 mg Q 6 HR for treatment of Cerebral Edema as well as on Keppra 1G Q 12 HR for seizure prophylaxis. There was concern that the patient was having seizures prior to his hospital admission because of the fact that he would develop dizzy spells that were associated with an aura of a metalic taste and smell. The patient elected to undergo surgical resection of his Right Frontotemporal Mass, which was performed on 10/3/2017 by Dr. Harry Wiggins.            Hospital Course:   The patient underwent the above operation as described. The procedure was without complication and following his surgery, the patient was transferred to Unit 4A (Neuro ICU) for routine post-operative cares. At the time of discharge, the patient's surgical pathology was  still pending. 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 At the time of the patient's hospitalization, these results were not available and thus were not discussed with the patient. A routine post-operative MRI of the Brain demonstrated gross total resection of the tumor. On post-operative day #1, the patient was transferred to Unit 6A (Neuroscience Med/Surg). He was evaluated by the Physical and Occupational Therapists and was found to be able to safely discharge home with assistance from his family members. On post-operative day #2, the patient had met all his discharge goals in that his pain was well-controlled on oral analgesics, he was tolerating a general diet, voiding without difficulty, passing flatus, and ambulating without difficulty.    The patient was discharged home with instruction to taper the Dexamethasone to 2 mg BID, which he will remain on until he is seen for follow-up in the Neurosurgery Clinic. He will also remain on Keppra 1G Q 12 HR until he is seen for follow-up. The patient will be discussed at the Multidisciplinary Tumor Board on Monday upon which further recommendations for his plan of care will be provided to the patient.     On the day of discharge, the patient's Neurological Examination was as follows:  Gen: Appears comfortable, NAD  Neurologic:  - Alert & Oriented to self, date, location, and circumstance for hospitalization  - Follows commands briskly  - Speech fluent, spontaneous. No aphasia or dysarthria.  - No gaze preference. No apparent hemineglect.  - PERRL, EOMI  - Face symmetric with sensation intact to light touch  - Palate elevates symmetrically, uvula midline, tongue protrudes midline  - Left pronator drift  - strength 5/5 in RU/RLE, 5/5 strength in LUE and LLE   Reflexes 2+ throughout     Sensation intact and symmetric to light touch throughout             Discharge Medications:      Current Discharge Medication List      START taking these medications    Details   oxyCODONE (ROXICODONE) 5 MG IR tablet Take 1-2 tablets (5-10 mg) by mouth every 4 hours as needed for moderate to severe pain  Qty: 60 tablet, Refills: 0    Comments: Indication: Moderate to Severe Post-Operative Pain  Associated Diagnoses: Brain mass      senna-docusate (SENOKOT-S;PERICOLACE) 8.6-50 MG per tablet Take 1-2 tablets by mouth 2 times daily  Qty: 100 tablet, Refills: 0    Comments: Indication: Prevention of Constipation with Concurrent use of Opioid Analgesics  Associated Diagnoses: Brain mass      pantoprazole (PROTONIX) 40 MG EC tablet Take 1 tablet (40 mg) by mouth every morning (before breakfast)  Qty: 30 tablet, Refills: 0    Comments: Indication: Prevention of Stress Ulcer with Concurrent Use of Steroids  Associated Diagnoses: Brain mass      levETIRAcetam (KEPPRA) 1000 MG TABS Take 1,000 mg by mouth every 12 hours  Qty: 180 tablet, Refills: 1    Comments: Indication: Seizure Prophylaxis  Associated Diagnoses: Brain mass      dexamethasone (DECADRON) 4 MG tablet Take 1 tablet (4 mg) by mouth 2 times daily (with meals)  Qty: 60 tablet, Refills: 1    Comments: Indication: Cerebral Edema  Please follow the prescribed taper:  - Take 4 mg every 6 hours on 10/6-10/8  - Take 4 mg every 8 hours on 10/9-10/12  - Take 4 mg every 12 hours 10/13-10/16  - Take 2 mg every 12 hours until seen for follow-up in clinic.  Associated Diagnoses: Brain mass         CONTINUE these medications which have NOT CHANGED    Details   Multiple Vitamins-Minerals (MULTIVITAMIN & MINERAL PO) Take 1 tablet by mouth daily         STOP taking these medications       Etanercept (ENBREL SURECLICK) 50 MG/ML autoinjector Comments:   Reason for Stopping:         Ibuprofen (ADVIL PO) Comments:   Reason for Stopping:                       Discharge Instructions and Follow-Up:   Discharge diet: Regular     Discharge activity: You may advance activity as  tolerated. No strenuous exercise or heay lifting greater than 10 lbs for 4 weeks or until seen and cleared in clinic.     Discharge follow-up: Follow-up in the Neurosurgery Clinic in 2 weeks for suture/staple removal and wound evaluation. Please call [phone] to schedule your appointment.      Wound care: - You may remove your surgical dressing on post-operative day #2.   - After the surgical dressing is removed, keep your incision open to air.   - You may shower on post-operative day #3.   - After your incision gets wet, pat your incision dry.   - Do not vigorously rub your incision.  - Do not apply any creams, gels, lotions, or ointments to your incision.  - Do not submerge your incision in water for 4-6 weeks post-operatively.  - Monitor your incision for signs of infection including redness, swelling, drainage or warmth at the surgical site.       Please call if you have:  1. increased pain, redness, drainage, swelling at your incision  2. fevers > 101.5 F degrees  3. with any questions or concerns.  You may reach the Neurosurgery clinic at [phone] during regular work hours. ER at [phone].    and ask for the Neurosurgery Resident on call at [phone], during off hours or weekends.         Discharge Disposition:   Discharged to home

## 2017-10-05 NOTE — PLAN OF CARE
Problem: Patient Care Overview  Goal: Plan of Care/Patient Progress Review  Outcome: Improving  Patient A&O x4 and intact. Neuro checks complete q 2 hrs. PERRL, 5/5 muscle strength, denies numbness and tingling in extremities. Afebrile, denies pain. SBP goal <140, no PRNs needed. HR sinus tachy. Patient on RA, O2 sat's 97%. Lungs clear. Patient on regular diet, Patient voiding in bathroom. Dressing on right side of scalp intact, with dried drainage. Patient transferred to  for further care.

## 2017-10-12 NOTE — PROGRESS NOTES
Please SEND LETTER    The heart monitor did not show anything concerning. Obviously, your symptoms are not from the heart, but from the brain tumor.

## 2017-10-17 NOTE — LETTER
"10/17/2017       RE: Seth Iglesias  [address]  [facility] [address]     Dear Colleague,    Thank you for referring your patient, Seth Iglesias, to the [facility] NEUROSURGERY at [facility]. Please see a copy of my visit note below.      Neurosurgery clinic post op wound check  Date of visit: 10/17/2017      Procedure: 10/03/2017  Kiki Baldwin Hamade  DIAGNOSIS:  Right temporal glioblastoma.       PROCEDURE PERFORMED:    1.  Right temporal craniotomy and tumor resection.   2.  Use of intraoperative microscope.   3.  Use of intraoperative neuro navigation.        SPECIMENS:     1.  Tumor for permanent, frozen, and [employer].   2.  Frozen specimen consistent with glioblastoma.       INDICATIONS FOR PROCEDURE:  Mr. Seth Iglesias is a 57-year-old gentleman who presented to our hospital on the 09/28 with lethargy, confusion and was found to have a right temporal mass on MRI.  He underwent a functional MRI which localizes speech to the left side of his brain and he presented for the above-mentioned procedure.    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 During the dizzy spells, the patient reported that he had an odd smell and taste in his mouth, like something metallic and was observed to have had word finding difficulty.  HPI:  Inpatient:   The patient underwent the above operation as described. The procedure was without complication and following his surgery, the patient was transferred to Unit 4A (Neuro ICU) for routine post-operative cares. At the time of discharge, the patient's surgical pathology was still pending. 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 At the time of the patient's hospitalization, these results were not available and thus were " "not discussed with the patient. A routine post-operative MRI of the Brain demonstrated gross total resection of the tumor. On post-operative day #1, the patient was transferred to Unit 6A (Neuroscience Med/Surg). He was evaluated by the Physical and Occupational Therapists and was found to be able to safely discharge home with assistance from his family members. On post-operative day #2, the patient had met all his discharge goals in that his pain was well-controlled on oral analgesics, he was tolerating a general diet, voiding without difficulty, passing flatus, and ambulating without difficulty.   The patient was discharged home with instruction to taper the Dexamethasone to 2 mg BID, which he will remain on until he is seen for follow-up in the Neurosurgery Clinic. He will also remain on Keppra 1G Q 12 HR until he is seen for follow-up. The patient will be discussed at the Multidisciplinary Tumor Board on Monday upon which further recommendations for his plan of care will be provided to the patient.   Outpatient:   This left-handed gentleman is now 2 weeks post op.     Since discharge he has had no further seizures. No headaches. He is off all pain meds. He feels quite well, better than expected. . He presents for routine wound check and suture removal.    STATUS REPORT  WORK:         Is not back at work.  Position:  ACTIVITY:     Has been following activity restrictions.    MEDS:                  Pain         discharged with 60 oxycodone . Taking none now                 Steroids   discharged with 60 Decadron pills 4 mg        Keppra    discharged with 180 pills    NICOTINE:   no    SEIZURES:  No further episodes    XRT:  Not arranged  ONC:  Not arranged  FU:   3 months with Dr Wiggins  No apt with neurology have been made.  PATHOLOGY:  A) Brain, right temporal tumor, resection:        Glioblastoma, WHO grade IV        ID (R1) negative on immunohistochemistry     B) Designated \"margin\" on requisition form: No " "tissue was received in   pathology for part B.     C) Designated \"core\" on requisition form: No tissue was received in   pathology for part C.     D) Brain, right temporal tumor, CUSA contents:        Glioblastoma, WHO grade IV        ID (R1) negative on immunohistochemistry     E) Brain, right temporal tumor, resection:        Glioblastoma, WHO grade IV        ID (R1) negative on immunohistochemistry       Patient Supplied Answers To the [facility] Pain Questionnaire  [facility] Pain -  Patient Entered Questionnaire/Answers 10/17/2017   What number best describes your pain right now:  0 = No pain  to  10 = Worst pain imaginable 0   What number best describes your LOWEST pain in past 24 hours:  0 = No pain  to  10 = Worst pain imaginable 0   What number best describes your WORST pain in past 24 hours:  0 = No pain  to  10 = Worst pain imaginable 0   What non-medicine treatments have you already had for your pain? none   Have you tried treating your pain with medication?  No   Are you currently taking medications for your pain? No       Current Outpatient Prescriptions:      oxyCODONE (ROXICODONE) 5 MG IR tablet, Take 1-2 tablets (5-10 mg) by mouth every 4 hours as needed for moderate to severe pain, Disp: 60 tablet, Rfl: 0     senna-docusate (SENOKOT-S;PERICOLACE) 8.6-50 MG per tablet, Take 1-2 tablets by mouth 2 times daily, Disp: 100 tablet, Rfl: 0     pantoprazole (PROTONIX) 40 MG EC tablet, Take 1 tablet (40 mg) by mouth every morning (before breakfast), Disp: 30 tablet, Rfl: 0     levETIRAcetam (KEPPRA) 1000 MG TABS, Take 1,000 mg by mouth every 12 hours, Disp: 180 tablet, Rfl: 1     dexamethasone (DECADRON) 4 MG tablet, Take 1 tablet (4 mg) by mouth 2 times daily (with meals), Disp: 60 tablet, Rfl: 1     Multiple Vitamins-Minerals (MULTIVITAMIN & MINERAL PO), Take 1 tablet by mouth daily, Disp: , Rfl:   Allergies   Allergen Reactions     Nkda [No Known Drug Allergies]      PMH, SOC HIST, FAM HIST, PROBLEM LIST:  All reviewed " "in EPIC.    OBJECTIVE:  /80  Pulse 113  Ht 1.753 m (5' 9\")  Wt 89.8 kg (198 lb)  BMI 29.24 kg/m2    Imaging:  None new.    EXAM:  Well developed well nourished male found seated comfortably in exam chair.  No apparent distress. He is accompanied.  A&O X3.  Mood and affect WNL. Language and fund of knowledge intact.  Is able to sit and rise independently.   He has a nicely healing incision.  I prepped the wound with chloroprep and cleanly removed nylon sutures/staples without difficulty.    Exam at discharge:   - Alert & Oriented to self, date, location, and circumstance for hospitalization  - Follows commands briskly  - Speech fluent, spontaneous. No aphasia or dysarthria.  - No gaze preference. No apparent uche neglect.  - PERR, EOMI  - Face symmetric with sensation intact to light touch  - Palate elevates symmetrically, uvula midline, tongue protrudes midline  - Left pronator drift  - strength 5/5 in RU/LÉRI , 5/5 strength in KLÜVER  and LLE  Exam today:  Unchanged except drift is very faint.    Assessment/plan:  1. Malignant neoplasm of temporal lobe of brain (H)    2. Post-operative state       Seth Iglesias is doing well after his right temporal lobectomy for tumor.The wound is healthy and healing well.      We did discuss the pathology. He had not heard until now and had not had any appointment with anyone up until this time. I told them that ordinarily this discussion would be held with the oncologist but I could give them the basics for the purposes of today's visit.     I briefly discussed gliomas in general, and the grading.  I advised that this is a grade 4. I briefly discussed that grade 4 is generally considered to be a treatable cancer though generally not considered to be curable. I briefly discussed that the triad of treatment is surgery, followed by chemotherapy and radiation therapy usually given concurrently.  I discussed that further details about the disease course and process I " would defer to the oncologist.    We reached out to [facility] to find out what brain tumor conference had to say, it was recommended he be seen in oncology, but he has not been contacted by them. So she was going to message them to contact him.  His sutures are out now, and he needs to see oncology and radiation oncology as soon as can be arranged. This week if possible to get started on treatment. I gave him the contact information for Bess and asked him to call her if he does not hear from oncology and radiation by Thursday. He should stay on his Decadron.    Regarding his seizures, the tumor is out now and presumably he won't have any more seizures. Ordinarily we could manage his Keppra for the 3 months or so but I'm not sure we ought to. I don't have a good answer about him driving. I'm not sure if a negative EEG in the face of a GBM diagnosis is reasonable reason to stop Keppra. And so I recommend he see neurology for evaluation and management.      Follow-up with neurosurgery will be at about 3 months with Dr. Wiggins, a new MRI brain with and without will be needed.    All his questions have been answered. He has our contact information and business cards. He knows he can call us if anything comes up.   We appreciate the opportunity to be of service in the care of this pleasant patient.  Please do call if there is anything more we can do    Martha Esposito PA-C  [facility]  Department of Neurosurgery  Phone: [phone]  Fax: [phone]    This note was generated using voice recognition software. While edited for content some inaccurate phrasing may be found.    Again, thank you for allowing me to participate in the care of your patient.      Sincerely,    Martha Esposito PA-C

## 2017-10-17 NOTE — NURSING NOTE
Chief Complaint   Patient presents with     RECHECK     2 week post op DOS 10/3 Brain mass    Yumi Rubi, CMA

## 2017-10-17 NOTE — MR AVS SNAPSHOT
After Visit Summary   10/17/2017    Seth Iglesias    MRN: [id number]           Patient Information     Date Of Birth          1960        Visit Information        Provider Department      10/17/2017 3:00 PM Martha Esposito PA-C [facility] Neurosurgery        Today's Diagnoses     Malignant neoplasm of temporal lobe of brain (H)    -  1    Post-operative state        Seizures (H)           Follow-ups after your visit        Additional Services     Neurology Referral [0084]       Your provider has referred you for the following:   [facility] Neurology Department    Please be aware that coverage of these services is subject to the terms and limitations of your health insurance plan.  Call member services at your health plan with any benefit or coverage questions.      Please bring the following with you to your appointment:    (1) Any X-Rays, CTs or MRIs which have been performed.  Contact the facility where they were done to arrange for  prior to your scheduled appointment.    (2) List of current medications  (3) This referral request   (4) Any documents/labs given to you for this referral                  Future tests that were ordered for you today     Open Future Orders        Priority Expected Expires Ordered    MRI Brain with & without gadolinium [PSY048] Routine 1/17/2018 10/17/2018 10/17/2017            Who to contact     Please call your clinic at [phone] to:    Ask questions about your health    Make or cancel appointments    Discuss your medicines    Learn about your test results    Speak to your doctor   If you have compliments or concerns about an experience at your clinic, or if you wish to file a complaint, please contact [facility] Patient Relations at [phone] or email us at [email or web address]         Additional Information About Your Visit        [employer] Information     [employer] is an electronic gateway that provides easy,  "online access to your medical records. With [employer], you can request a clinic appointment, read your test results, renew a prescription or communicate with your care team.     To sign up for [employer] visit the website at [email or web address]   You will be asked to enter the access code listed below, as well as some personal information. Please follow the directions to create your username and password.     Your access code is: [email or web address]  Expires: 2017  4:33 PM     Your access code will  in 90 days. If you need help or a new code, please contact your [facility] or call [phone] for assistance.        Care EveryWhere ID     This is your Care EveryWhere ID. This could be used by other organizations to access your [facility] medical records  [id number]        Your Vitals Were     Pulse Height BMI (Body Mass Index)             113 1.753 m (5' 9\") 29.24 kg/m2          Blood Pressure from Last 3 Encounters:   10/17/17 135/80   10/05/17 134/88   17 (!) 146/94    Weight from Last 3 Encounters:   10/17/17 89.8 kg (198 lb)   17 96.2 kg (212 lb)   17 96.2 kg (212 lb)              We Performed the Following     Neurology Referral [[id number]]        Primary Care Provider Office Phone # Fax #    R Nigel Salmon [phone] [address] [facility] [address]        Equal Access to Services     PERICO ESPINOZA AH: Hadii aad ku hadasho Soomaali, waaxda luqadaha, qaybta kaalmada adeegyada, waxay rafaela rand . So [facility] [phone].    ATENCIÓN: Si habla español, tiene a granados disposición servicios gratuitos de asistencia lingüística. Llame al [phone].    We comply with applicable federal civil rights laws and [place] laws. We do not discriminate on the basis of race, color, national origin, age, disability, sex, sexual orientation, or gender identity.            Thank you!     Thank you for choosing [facility] " NEUROSURGERY  for your care. Our goal is always to provide you with excellent care. Hearing back from our patients is one way we can continue to improve our services. Please take a few minutes to complete the written survey that you may receive in the mail after your visit with us. Thank you!             Your Updated Medication List - Protect others around you: Learn how to safely use, store and throw away your medicines at www.disposemymeds.org.          This list is accurate as of: 10/17/17  7:27 PM.  Always use your most recent med list.                   Brand Name Dispense Instructions for use Diagnosis    dexamethasone 4 MG tablet    DECADRON    60 tablet    Take 1 tablet (4 mg) by mouth 2 times daily (with meals)    Brain mass       levETIRAcetam 1000 MG Tabs    KEPPRA    180 tablet    Take 1,000 mg by mouth every 12 hours    Brain mass       MULTIVITAMIN & MINERAL PO      Take 1 tablet by mouth daily        oxyCODONE 5 MG IR tablet    ROXICODONE    60 tablet    Take 1-2 tablets (5-10 mg) by mouth every 4 hours as needed for moderate to severe pain    Brain mass       pantoprazole 40 MG EC tablet    PROTONIX    30 tablet    Take 1 tablet (40 mg) by mouth every morning (before breakfast)    Brain mass       senna-docusate 8.6-50 MG per tablet    SENOKOT-S;PERICOLACE    100 tablet    Take 1-2 tablets by mouth 2 times daily    Brain mass

## 2017-10-17 NOTE — PROGRESS NOTES
"  Neurosurgery clinic post op wound check  Date of visit: 10/17/2017      Procedure: 10/03/2017  Kiki Baldwin Hamade  DIAGNOSIS:  Right temporal glioblastoma.       PROCEDURE PERFORMED:    1.  Right temporal craniotomy and tumor resection.   2.  Use of intraoperative microscope.   3.  Use of intraoperative neuro navigation.        SPECIMENS:     1.  Tumor for permanent, frozen, and [employer].   2.  Frozen specimen consistent with glioblastoma.       INDICATIONS FOR PROCEDURE:  Mr. Seth Iglesias is a 57-year-old gentleman who presented to our hospital on the 09/28 with lethargy, confusion and was found to have a right temporal mass on MRI.  He underwent a functional MRI which localizes speech to the left side of his brain and he presented for the above-mentioned procedure.    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 During the dizzy spells, the patient reported that he had an odd smell and taste in his mouth, like something metallic and was observed to have had word finding difficulty.  HPI:  Inpatient:   The patient underwent the above operation as described. The procedure was without complication and following his surgery, the patient was transferred to Unit 4A (Neuro ICU) for routine post-operative cares. At the time of discharge, the patient's surgical pathology was still pending. 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 At the time of the patient's hospitalization, these results were not available and thus were not discussed with the patient. A routine post-operative MRI of the Brain demonstrated gross total resection of the tumor. On post-operative day #1, the patient was transferred to Unit 6A (Neuroscience Med/Surg). He was evaluated by the Physical and Occupational Therapists and was found to be " "able to safely discharge home with assistance from his family members. On post-operative day #2, the patient had met all his discharge goals in that his pain was well-controlled on oral analgesics, he was tolerating a general diet, voiding without difficulty, passing flatus, and ambulating without difficulty.   The patient was discharged home with instruction to taper the Dexamethasone to 2 mg BID, which he will remain on until he is seen for follow-up in the Neurosurgery Clinic. He will also remain on Keppra 1G Q 12 HR until he is seen for follow-up. The patient will be discussed at the Multidisciplinary Tumor Board on Monday upon which further recommendations for his plan of care will be provided to the patient.   Outpatient:   This left-handed gentleman is now 2 weeks post op.     Since discharge he has had no further seizures. No headaches. He is off all pain meds. He feels quite well, better than expected. . He presents for routine wound check and suture removal.    STATUS REPORT  WORK:         Is not back at work.  Position:  ACTIVITY:     Has been following activity restrictions.    MEDS:                  Pain         discharged with 60 oxycodone . Taking none now                 Steroids   discharged with 60 Decadron pills 4 mg        Keppra    discharged with 180 pills    NICOTINE:   no    SEIZURES:  No further episodes    XRT:  Not arranged  ONC:  Not arranged  FU:   3 months with Dr Wiggins  No apt with neurology have been made.  PATHOLOGY:  A) Brain, right temporal tumor, resection:        Glioblastoma, WHO grade IV        ID (R1) negative on immunohistochemistry     B) Designated \"margin\" on requisition form: No tissue was received in   pathology for part B.     C) Designated \"core\" on requisition form: No tissue was received in   pathology for part C.     D) Brain, right temporal tumor, CUSA contents:        Glioblastoma, WHO grade IV        ID (R1) negative on immunohistochemistry     E) Brain, right " "temporal tumor, resection:        Glioblastoma, WHO grade IV        ID (R1) negative on immunohistochemistry       Patient Supplied Answers To the [facility] Pain Questionnaire  [facility] Pain -  Patient Entered Questionnaire/Answers 10/17/2017   What number best describes your pain right now:  0 = No pain  to  10 = Worst pain imaginable 0   What number best describes your LOWEST pain in past 24 hours:  0 = No pain  to  10 = Worst pain imaginable 0   What number best describes your WORST pain in past 24 hours:  0 = No pain  to  10 = Worst pain imaginable 0   What non-medicine treatments have you already had for your pain? none   Have you tried treating your pain with medication?  No   Are you currently taking medications for your pain? No       Current Outpatient Prescriptions:      oxyCODONE (ROXICODONE) 5 MG IR tablet, Take 1-2 tablets (5-10 mg) by mouth every 4 hours as needed for moderate to severe pain, Disp: 60 tablet, Rfl: 0     senna-docusate (SENOKOT-S;PERICOLACE) 8.6-50 MG per tablet, Take 1-2 tablets by mouth 2 times daily, Disp: 100 tablet, Rfl: 0     pantoprazole (PROTONIX) 40 MG EC tablet, Take 1 tablet (40 mg) by mouth every morning (before breakfast), Disp: 30 tablet, Rfl: 0     levETIRAcetam (KEPPRA) 1000 MG TABS, Take 1,000 mg by mouth every 12 hours, Disp: 180 tablet, Rfl: 1     dexamethasone (DECADRON) 4 MG tablet, Take 1 tablet (4 mg) by mouth 2 times daily (with meals), Disp: 60 tablet, Rfl: 1     Multiple Vitamins-Minerals (MULTIVITAMIN & MINERAL PO), Take 1 tablet by mouth daily, Disp: , Rfl:   Allergies   Allergen Reactions     Nkda [No Known Drug Allergies]      PMH, SOC HIST, FAM HIST, PROBLEM LIST:  All reviewed in EPIC.    OBJECTIVE:  /80  Pulse 113  Ht 1.753 m (5' 9\")  Wt 89.8 kg (198 lb)  BMI 29.24 kg/m2    Imaging:  None new.    EXAM:  Well developed well nourished male found seated comfortably in exam chair.  No apparent distress. He is accompanied.  A&O X3.  Mood and affect WNL. Language " and fund of knowledge intact.  Is able to sit and rise independently.   He has a nicely healing incision.  I prepped the wound with chloroprep and cleanly removed nylon sutures/staples without difficulty.    Exam at discharge:   - Alert & Oriented to self, date, location, and circumstance for hospitalization  - Follows commands briskly  - Speech fluent, spontaneous. No aphasia or dysarthria.  - No gaze preference. No apparent uche neglect.  - PERR, EOMI  - Face symmetric with sensation intact to light touch  - Palate elevates symmetrically, uvula midline, tongue protrudes midline  - Left pronator drift  - strength 5/5 in RU/LÉRI, 5/5 strength in KLÜVER and LLE  Exam today:  Unchanged except drift is very faint.    Assessment/plan:  1. Malignant neoplasm of temporal lobe of brain (H)    2. Post-operative state       Seth Iglesias is doing well after his right temporal lobectomy for tumor.The wound is healthy and healing well.      We did discuss the pathology. He had not heard until now and had not had any appointment with anyone up until this time. I told them that ordinarily this discussion would be held with the oncologist but I could give them the basics for the purposes of today's visit.     I briefly discussed gliomas in general, and the grading.  I advised that this is a grade 4. I briefly discussed that grade 4 is generally considered to be a treatable cancer though generally not considered to be curable. I briefly discussed that the triad of treatment is surgery, followed by chemotherapy and radiation therapy usually given concurrently.  I discussed that further details about the disease course and process I would defer to the oncologist.    We reached out to [facility] to find out what brain tumor conference had to say, it was recommended he be seen in oncology, but he has not been contacted by them. So she was going to message them to contact him.  His sutures are out now, and he needs to see  oncology and radiation oncology as soon as can be arranged. This week if possible to get started on treatment. I gave him the contact information for Bess and asked him to call her if he does not hear from oncology and radiation by Thursday. He should stay on his Decadron.    Regarding his seizures, the tumor is out now and presumably he won't have any more seizures. Ordinarily we could manage his Keppra for the 3 months or so but I'm not sure we ought to. I don't have a good answer about him driving. I'm not sure if a negative EEG in the face of a GBM diagnosis is reasonable reason to stop Keppra. And so I recommend he see neurology for evaluation and management.      Follow-up with neurosurgery will be at about 3 months with Dr. Wiggins, a new MRI brain with and without will be needed.    All his questions have been answered. He has our contact information and business cards. He knows he can call us if anything comes up.   We appreciate the opportunity to be of service in the care of this pleasant patient.  Please do call if there is anything more we can do    Martha Esposito PA-C  [facility]  Department of Neurosurgery  Phone: [phone]  Fax: [phone]    This note was generated using voice recognition software. While edited for content some inaccurate phrasing may be found.

## 2017-10-27 PROBLEM — C71.2 GLIOBLASTOMA MULTIFORME OF TEMPORAL LOBE (H): Status: ACTIVE | Noted: 2017-01-01

## 2017-10-27 NOTE — MR AVS SNAPSHOT
After Visit Summary   10/27/2017    Seth Iglesias    MRN: [id number]           Patient Information     Date Of Birth          1960        Visit Information        Provider Department      10/27/2017 10:00 AM Augustus Armstrong MD [facility]        Today's Diagnoses     Glioblastoma (H)    -  1    Glioblastoma multiforme of temporal lobe (H)           Follow-ups after your visit        Your next 10 appointments already scheduled     Oct 31, 2017  8:45 AM CDT   Return Visit with [facility] Rad Onc Nurse   Radiation Oncology Clinic ([facility])    [facility]  1st Floor  [address] [address]   [phone]            Oct 31, 2017  9:00 AM CDT   TCT/SIM Suite Visit with Dominic Louis MD   Radiation Oncology Clinic ([facility])    [facility]  1st Floor  [address] [address]   [phone]            Oct 31, 2017 11:00 AM CDT   MR BRAIN W/O & W CONTRAST with UUMR2   [facility] [place] MRI ([facility])    [address] [address]   [phone]           Take your medicines as usual, unless your doctor tells you not to. Bring a list of your current medicines to your exam (including vitamins, minerals and over-the-counter drugs).  You will be given intravenous contrast for this exam. To prepare:   The day before your exam, drink extra fluids at least six 8-ounce glasses (unless your doctor tells you to restrict your fluids).   Have a blood test (creatinine test) within 30 days of your exam. Go to your clinic or Diagnostic Imaging Department for this test.  The MRI machine uses a strong magnet. Please wear clothes without metal (snaps, zippers). A sweatsuit works well, or we may give you a hospital gown.  Please remove any body piercings and hair extensions before you arrive. You will also remove  watches, jewelry, hairpins, wallets, dentures, partial dental plates and hearing aids. You may wear contact lenses, and you may be able to wear your rings. We have a safe place to keep your personal items, but it is safer to leave them at home.   **IMPORTANT** THE INSTRUCTIONS BELOW ARE ONLY FOR THOSE PATIENTS WHO HAVE BEEN TOLD THEY WILL RECEIVE SEDATION OR GENERAL ANESTHESIA DURING THEIR MRI PROCEDURE:  IF YOU WILL RECEIVE SEDATION (take medicine to help you relax during your exam):   You must get the medicine from your doctor before you arrive. Bring the medicine to the exam. Do not take it at home.   Arrive one hour early. Bring someone who can take you home after the test. Your medicine will make you sleepy. After the exam, you may not drive, take a bus or take a taxi by yourself.   No eating 8 hours before your exam. You may have clear liquids up until 4 hours before your exam. (Clear liquids include water, clear tea, black coffee and fruit juice without pulp.)  IF YOU WILL RECEIVE ANESTHESIA (be asleep for your exam):   Arrive 1 1/2 hours early. Bring someone who can take you home after the test. You may not drive, take a bus or take a taxi by yourself.   No eating 8 hours before your exam. You may have clear liquids up until 4 hours before your exam. (Clear liquids include water, clear tea, black coffee and fruit juice without pulp.)  Please call the Imaging Department at your exam site with any questions.            Nov 24, 2017 11:15 AM CST   [facility] Lab Draw with  [facility] DRAW   [facility] Lab Draw ([facility])    [address] [place] [address]   [phone]            Nov 24, 2017 12:00 PM CST   (Arrive by 11:45 AM)   Return Visit with SCARLETT Pederson   [facility] ([facility])    [address] [place] [address]   [phone]            Dec 08, 2017  9:30 AM  CST   (Arrive by 9:15 AM)   New Seizure with Rafael Boyer MD   [facility] Neurology ([facility])    [address]  3rd Floor  [facility] [address] [phone]            Jan 02, 2018  1:00 PM CST   (Arrive by 12:45 PM)   MR BRAIN W/O & W CONTRAST with UCMR1   [facility] MRI ([facility])    [address]  1st Floor  [facility] [address] [phone]           Take your medicines as usual, unless your doctor tells you not to. Bring a list of your current medicines to your exam (including vitamins, minerals and over-the-counter drugs).  You will be given intravenous contrast for this exam. To prepare:   The day before your exam, drink extra fluids at least six 8-ounce glasses (unless your doctor tells you to restrict your fluids).   Have a blood test (creatinine test) within 30 days of your exam. Go to your clinic or Diagnostic Imaging Department for this test.  The MRI machine uses a strong magnet. Please wear clothes without metal (snaps, zippers). A sweatsuit works well, or we may give you a hospital gown.  Please remove any body piercings and hair extensions before you arrive. You will also remove watches, jewelry, hairpins, wallets, dentures, partial dental plates and hearing aids. You may wear contact lenses, and you may be able to wear your rings. We have a safe place to keep your personal items, but it is safer to leave them at home.   **IMPORTANT** THE INSTRUCTIONS BELOW ARE ONLY FOR THOSE PATIENTS WHO HAVE BEEN TOLD THEY WILL RECEIVE SEDATION OR GENERAL ANESTHESIA DURING THEIR MRI PROCEDURE:  IF YOU WILL RECEIVE SEDATION (take medicine to help you relax during your exam):   You must get the medicine from your doctor before you arrive. Bring the medicine to the exam. Do not take it at home.   Arrive one hour early. Bring someone who can take you home after the test. Your medicine will make you sleepy. After the exam,  you may not drive, take a bus or take a taxi by yourself.   No eating 8 hours before your exam. You may have clear liquids up until 4 hours before your exam. (Clear liquids include water, clear tea, black coffee and fruit juice without pulp.)  IF YOU WILL RECEIVE ANESTHESIA (be asleep for your exam):   Arrive 1 1/2 hours early. Bring someone who can take you home after the test. You may not drive, take a bus or take a taxi by yourself.   No eating 8 hours before your exam. You may have clear liquids up until 4 hours before your exam. (Clear liquids include water, clear tea, black coffee and fruit juice without pulp.)  Please call the Imaging Department at your exam site with any questions.            Jan 08, 2018  8:45 AM CST   (Arrive by 8:30 AM)   Return Visit with Harry Wiggins MD   [facility] Neurosurgery ([facility])    [address] [place] [address] [phone]              Future tests that were ordered for you today     Open Future Orders        Priority Expected Expires Ordered    MR Brain w/o & w Contrast Routine 10/27/2017 10/28/2018 10/27/2017            Who to contact     If you have questions or need follow up information about today's clinic visit or your schedule please contact [facility] directly at [phone].  Normal or non-critical lab and imaging results will be communicated to you by MyChart, letter or phone within 4 business days after the clinic has received the results. If you do not hear from us within 7 days, please contact the clinic through [employer] or phone. If you have a critical or abnormal lab result, we will notify you by phone as soon as possible.  Submit refill requests through [employer] or call your pharmacy and they will forward the refill request to us. Please allow 3 business days for your refill to be completed.          Additional Information About Your Visit        [facility] Local Information     [employer]  "lets you send messages to your doctor, view your test results, renew your prescriptions, schedule appointments and more. To sign up, go to [email or web address] . Click on \"Log in\" on the left side of the screen, which will take you to the Welcome page. Then click on \"Sign up Now\" on the right side of the page.     You will be asked to enter the access code listed below, as well as some personal information. Please follow the directions to create your username and password.     Your access code is: [email or web address]  Expires: 2017  4:33 PM     Your access code will  in 90 days. If you need help or a new code, please call your [facility] clinic or [phone].        Care EveryWhere ID     This is your Care EveryWhere ID. This could be used by other organizations to access your [facility] medical records  [id number]        Your Vitals Were     Pulse Temperature Respirations Height Pulse Oximetry BMI (Body Mass Index)    80 98  F (36.7  C) (Oral) 17 1.753 m (5' 9\") 97% 29.89 kg/m2       Blood Pressure from Last 3 Encounters:   10/27/17 (!) 132/92   10/27/17 (!) 132/92   10/17/17 135/80    Weight from Last 3 Encounters:   10/27/17 91.8 kg (202 lb 6.4 oz)   10/27/17 91.8 kg (202 lb 6.4 oz)   10/17/17 89.8 kg (198 lb)              We Performed the Following     CBC with platelets differential     Comprehensive metabolic panel     Next Generation Sequencing Oncology: Glioma Panel          Today's Medication Changes          These changes are accurate as of: 10/27/17 12:29 PM.  If you have any questions, ask your nurse or doctor.               Start taking these medicines.        Dose/Directions    ondansetron 8 MG tablet   Commonly known as:  ZOFRAN   Used for:  Glioblastoma multiforme of temporal lobe (H)   Started by:  Mac Silva [facility]        Dose:  8 mg   Take 1 tablet (8 mg) by mouth daily for 28 days Take before each dose of temozolomide.   Quantity:  28 tablet   Refills:  1       prochlorperazine 10 " MG tablet   Commonly known as:  COMPAZINE   Used for:  Glioblastoma multiforme of temporal lobe (H)   Started by:  Mac Silva RPH        Dose:  10 mg   Take 1 tablet (10 mg) by mouth every 6 hours as needed (Nausea/Vomiting)   Quantity:  30 tablet   Refills:  2       temozolomide 20 MG capsule CHEMO   Commonly known as:  TEMODAR   Used for:  Glioblastoma multiforme of temporal lobe (H)   Started by:  Mac Silva RPH        Dose:  75 mg/m2/day   Take 8 capsules (160 mg) by mouth At Bedtime for 42 doses Take on an empty stomach. Take a dose of nausea medication 30-60 min before temozolomide.   Quantity:  240 capsule   Refills:  1         These medicines have changed or have updated prescriptions.        Dose/Directions    dexamethasone 4 MG tablet   Commonly known as:  DECADRON   This may have changed:  additional instructions   Used for:  Brain mass        Dose:  4 mg   Take 1 tablet (4 mg) by mouth 2 times daily (with meals)   Quantity:  60 tablet   Refills:  1            Where to get your medicines      These medications were sent to [address] [address]    Hours:  TRANSPLANT PHONE NUMBER [phone] Phone:  [phone]     ondansetron 8 MG tablet    prochlorperazine 10 MG tablet         Some of these will need a paper prescription and others can be bought over the counter.  Ask your nurse if you have questions.     Bring a paper prescription for each of these medications     temozolomide 20 MG capsule CHEMO                Primary Care Provider Office Phone # Fax #    R Nigel Salmon [phone] [address] [address]        Equal Access to Services     [facility]: Beto Carrasco, peggy gautam, qaybhéctor alexandra. [facility] [phone].    ATENCIÓN: Si lorie lyle, roseline cummins granados  disposición servicios gratuitos de asistencia lingüística. Rodney lopez [phone].    We comply with applicable federal civil rights laws and [place] laws. We do not discriminate on the basis of race, color, national origin, age, disability, sex, sexual orientation, or gender identity.            Thank you!     Thank you for choosing [facility]  for your care. Our goal is always to provide you with excellent care. Hearing back from our patients is one way we can continue to improve our services. Please take a few minutes to complete the written survey that you may receive in the mail after your visit with us. Thank you!             Your Updated Medication List - Protect others around you: Learn how to safely use, store and throw away your medicines at www.disposemymeds.org.          This list is accurate as of: 10/27/17 12:29 PM.  Always use your most recent med list.                   Brand Name Dispense Instructions for use Diagnosis    dexamethasone 4 MG tablet    DECADRON    60 tablet    Take 1 tablet (4 mg) by mouth 2 times daily (with meals)    Brain mass       levETIRAcetam 1000 MG Tabs    KEPPRA    180 tablet    Take 1,000 mg by mouth every 12 hours    Brain mass       MULTIVITAMIN & MINERAL PO      Take 1 tablet by mouth daily        ondansetron 8 MG tablet    ZOFRAN    28 tablet    Take 1 tablet (8 mg) by mouth daily for 28 days Take before each dose of temozolomide.    Glioblastoma multiforme of temporal lobe (H)       pantoprazole 40 MG EC tablet    PROTONIX    30 tablet    Take 1 tablet (40 mg) by mouth every morning (before breakfast)    Brain mass       prochlorperazine 10 MG tablet    COMPAZINE    30 tablet    Take 1 tablet (10 mg) by mouth every 6 hours as needed (Nausea/Vomiting)    Glioblastoma multiforme of temporal lobe (H)       senna-docusate 8.6-50 MG per tablet    SENOKOT-S;PERICOLACE    100 tablet    Take 1-2 tablets by mouth 2 times daily    Brain mass        temozolomide 20 MG capsule CHEMO    TEMODAR    240 capsule    Take 8 capsules (160 mg) by mouth At Bedtime for 42 doses Take on an empty stomach. Take a dose of nausea medication 30-60 min before temozolomide.    Glioblastoma multiforme of temporal lobe (H)

## 2017-10-27 NOTE — MR AVS SNAPSHOT
After Visit Summary   10/27/2017    Seth Iglesias    MRN: [id number]           Patient Information     Date Of Birth          1960        Visit Information        Provider Department      10/27/2017 11:47 AM Mac Silva [facility]        Today's Diagnoses     Glioblastoma multiforme of temporal lobe (H)    -  1    Encounter for long-term (current) use of medications           Follow-ups after your visit        Your next 10 appointments already scheduled     Oct 31, 2017  8:45 AM CDT   Return Visit with [facility] Rad Onc Nurse   Radiation Oncology Clinic ([facility])    [facility]  1st Floor  [address] [address]   [phone]            Oct 31, 2017  9:00 AM CDT   TCT/SIM Suite Visit with Dominic Louis MD   Radiation Oncology Clinic ([facility])    [facility]  1st Floor  [address] [address]   [phone]            Oct 31, 2017 11:00 AM CDT   MR BRAIN W/O & W CONTRAST with UUMR2   [facility] ([facility])    [address] [address]   [phone]           Take your medicines as usual, unless your doctor tells you not to. Bring a list of your current medicines to your exam (including vitamins, minerals and over-the-counter drugs).  You will be given intravenous contrast for this exam. To prepare:   The day before your exam, drink extra fluids at least six 8-ounce glasses (unless your doctor tells you to restrict your fluids).   Have a blood test (creatinine test) within 30 days of your exam. Go to your clinic or Diagnostic Imaging Department for this test.  The MRI machine uses a strong magnet. Please wear clothes without metal (snaps, zippers). A sweatsuit works well, or we may give you a hospital gown.  Please remove any body piercings and hair  extensions before you arrive. You will also remove watches, jewelry, hairpins, wallets, dentures, partial dental plates and hearing aids. You may wear contact lenses, and you may be able to wear your rings. We have a safe place to keep your personal items, but it is safer to leave them at home.   **IMPORTANT** THE INSTRUCTIONS BELOW ARE ONLY FOR THOSE PATIENTS WHO HAVE BEEN TOLD THEY WILL RECEIVE SEDATION OR GENERAL ANESTHESIA DURING THEIR MRI PROCEDURE:  IF YOU WILL RECEIVE SEDATION (take medicine to help you relax during your exam):   You must get the medicine from your doctor before you arrive. Bring the medicine to the exam. Do not take it at home.   Arrive one hour early. Bring someone who can take you home after the test. Your medicine will make you sleepy. After the exam, you may not drive, take a bus or take a taxi by yourself.   No eating 8 hours before your exam. You may have clear liquids up until 4 hours before your exam. (Clear liquids include water, clear tea, black coffee and fruit juice without pulp.)  IF YOU WILL RECEIVE ANESTHESIA (be asleep for your exam):   Arrive 1 1/2 hours early. Bring someone who can take you home after the test. You may not drive, take a bus or take a taxi by yourself.   No eating 8 hours before your exam. You may have clear liquids up until 4 hours before your exam. (Clear liquids include water, clear tea, black coffee and fruit juice without pulp.)  Please call the Imaging Department at your exam site with any questions.            Nov 24, 2017 11:15 AM CST   [facility] Lab Draw with  [facility] LAB DRAW   [facility] Lab Draw ([facility])    [address] [place] [address]   [phone]            Nov 24, 2017 12:00 PM CST   (Arrive by 11:45 AM)   Return Visit with SCARLETT Pederson   [facility] ([facility])    [address] [place] [address]  [address]   [phone]            Dec 08, 2017  9:30 AM CST   (Arrive by 9:15 AM)   New Seizure with Rafael Boyer MD   [facility] Neurology ([facility])    [address] [place] [address]  3rd [facility] [address]   [phone]            Jan 02, 2018  1:00 PM CST   (Arrive by 12:45 PM)   MR BRAIN W/O & W CONTRAST with UC1   [facility] MRI ([facility])    [address] [place] [address]  1st [facility] [address]   [phone]           Take your medicines as usual, unless your doctor tells you not to. Bring a list of your current medicines to your exam (including vitamins, minerals and over-the-counter drugs).  You will be given intravenous contrast for this exam. To prepare:   The day before your exam, drink extra fluids at least six 8-ounce glasses (unless your doctor tells you to restrict your fluids).   Have a blood test (creatinine test) within 30 days of your exam. Go to your clinic or Diagnostic Imaging Department for this test.  The MRI machine uses a strong magnet. Please wear clothes without metal (snaps, zippers). A sweatsuit works well, or we may give you a hospital gown.  Please remove any body piercings and hair extensions before you arrive. You will also remove watches, jewelry, hairpins, wallets, dentures, partial dental plates and hearing aids. You may wear contact lenses, and you may be able to wear your rings. We have a safe place to keep your personal items, but it is safer to leave them at home.   **IMPORTANT** THE INSTRUCTIONS BELOW ARE ONLY FOR THOSE PATIENTS WHO HAVE BEEN TOLD THEY WILL RECEIVE SEDATION OR GENERAL ANESTHESIA DURING THEIR MRI PROCEDURE:  IF YOU WILL RECEIVE SEDATION (take medicine to help you relax during your exam):   You must get the medicine from your doctor before you arrive. Bring the medicine to the exam. Do not take it at home.   Arrive one hour early. Bring someone who can take you home after the  test. Your medicine will make you sleepy. After the exam, you may not drive, take a bus or take a taxi by yourself.   No eating 8 hours before your exam. You may have clear liquids up until 4 hours before your exam. (Clear liquids include water, clear tea, black coffee and fruit juice without pulp.)  IF YOU WILL RECEIVE ANESTHESIA (be asleep for your exam):   Arrive 1 1/2 hours early. Bring someone who can take you home after the test. You may not drive, take a bus or take a taxi by yourself.   No eating 8 hours before your exam. You may have clear liquids up until 4 hours before your exam. (Clear liquids include water, clear tea, black coffee and fruit juice without pulp.)  Please call the Imaging Department at your exam site with any questions.            Jan 08, 2018  8:45 AM CST   (Arrive by 8:30 AM)   Return Visit with Harry Wiggins MD   [facility] Neurosurgery ([facility])    [address] [place] [address] [phone]              Future tests that were ordered for you today     Open Standing Orders        Priority Remaining Interval Expires Ordered    Comprehensive metabolic panel Routine 99/99 every 3 weeks and as needed 10/27/2018 10/27/2017    CBC with platelets differential Routine 99/99 every week and as needed 10/27/2018 10/27/2017          Open Future Orders        Priority Expected Expires Ordered    MR Brain w/o & w Contrast Routine 10/27/2017 10/28/2018 10/27/2017            Who to contact     If you have questions or need follow up information about today's clinic visit or your schedule please contact [facility] directly at [phone].  Normal or non-critical lab and imaging results will be communicated to you by MyChart, letter or phone within 4 business days after the clinic has received the results. If you do not hear from us within 7 days, please contact the clinic through MyChart or phone. If you have a critical  "or abnormal lab result, we will notify you by phone as soon as possible.  Submit refill requests through [employer] or call your pharmacy and they will forward the refill request to us. Please allow 3 business days for your refill to be completed.          Additional Information About Your Visit        [employer] Information     [employer] lets you send messages to your doctor, view your test results, renew your prescriptions, schedule appointments and more. To sign up, go to [email or web address] . Click on \"Log in\" on the left side of the screen, which will take you to the Welcome page. Then click on \"Sign up Now\" on the right side of the page.     You will be asked to enter the access code listed below, as well as some personal information. Please follow the directions to create your username and password.     Your access code is: [email or web address]  Expires: 2017  4:33 PM     Your access code will  in 90 days. If you need help or a new code, please call your [facility] clinic or [phone].        Care EveryWhere ID     This is your Care EveryWhere ID. This could be used by other organizations to access your [facility] medical records  [id number]         Blood Pressure from Last 3 Encounters:   10/27/17 (!) 132/92   10/27/17 (!) 132/92   10/17/17 135/80    Weight from Last 3 Encounters:   10/27/17 91.8 kg (202 lb 6.4 oz)   10/27/17 91.8 kg (202 lb 6.4 oz)   10/17/17 89.8 kg (198 lb)                 Today's Medication Changes          These changes are accurate as of: 10/27/17  1:38 PM.  If you have any questions, ask your nurse or doctor.               Start taking these medicines.        Dose/Directions    ondansetron 8 MG tablet   Commonly known as:  ZOFRAN   Used for:  Glioblastoma multiforme of temporal lobe (H)   Started by:  Mac Silva [facility]        Dose:  8 mg   Take 1 tablet (8 mg) by mouth daily for 28 days Take before each dose of temozolomide.   Quantity:  28 tablet   Refills:  1       " prochlorperazine 10 MG tablet   Commonly known as:  COMPAZINE   Used for:  Glioblastoma multiforme of temporal lobe (H)   Started by:  Mac Silva RPH        Dose:  10 mg   Take 1 tablet (10 mg) by mouth every 6 hours as needed (Nausea/Vomiting)   Quantity:  30 tablet   Refills:  2       temozolomide 20 MG capsule CHEMO   Commonly known as:  TEMODAR   Used for:  Glioblastoma multiforme of temporal lobe (H)   Started by:  Mac Silva RPH        Dose:  75 mg/m2/day   Take 8 capsules (160 mg) by mouth At Bedtime for 42 doses Take on an empty stomach. Take a dose of nausea medication 30-60 min before temozolomide.   Quantity:  240 capsule   Refills:  1         These medicines have changed or have updated prescriptions.        Dose/Directions    dexamethasone 4 MG tablet   Commonly known as:  DECADRON   This may have changed:  additional instructions   Used for:  Brain mass        Dose:  4 mg   Take 1 tablet (4 mg) by mouth 2 times daily (with meals)   Quantity:  60 tablet   Refills:  1            Where to get your medicines      These medications were sent to [address] [address]    Hours:  TRANSPLANT PHONE NUMBER [phone] Phone:  [phone]     ondansetron 8 MG tablet    prochlorperazine 10 MG tablet         Some of these will need a paper prescription and others can be bought over the counter.  Ask your nurse if you have questions.     Bring a paper prescription for each of these medications     temozolomide 20 MG capsule CHEMO                Primary Care Provider Office Phone # Fax #    R Nigel Salmon [phone] [address] [facility] [address]        Equal Access to Services     JANEY ESPINOZA : peggy Whyte, héctor gordon. So [facility] [phone].    ATENCIÓN: Sara melo  español, tiene a granados disposición servicios gratuitos de asistencia lingüística. Rodney lopez [phone].    We comply with applicable federal civil rights laws and [place] laws. We do not discriminate on the basis of race, color, national origin, age, disability, sex, sexual orientation, or gender identity.            Thank you!     Thank you for choosing [facility]  for your care. Our goal is always to provide you with excellent care. Hearing back from our patients is one way we can continue to improve our services. Please take a few minutes to complete the written survey that you may receive in the mail after your visit with us. Thank you!             Your Updated Medication List - Protect others around you: Learn how to safely use, store and throw away your medicines at www.disposemymeds.org.          This list is accurate as of: 10/27/17  1:38 PM.  Always use your most recent med list.                   Brand Name Dispense Instructions for use Diagnosis    dexamethasone 4 MG tablet    DECADRON    60 tablet    Take 1 tablet (4 mg) by mouth 2 times daily (with meals)    Brain mass       levETIRAcetam 1000 MG Tabs    KEPPRA    180 tablet    Take 1,000 mg by mouth every 12 hours    Brain mass       MULTIVITAMIN & MINERAL PO      Take 1 tablet by mouth daily        ondansetron 8 MG tablet    ZOFRAN    28 tablet    Take 1 tablet (8 mg) by mouth daily for 28 days Take before each dose of temozolomide.    Glioblastoma multiforme of temporal lobe (H)       pantoprazole 40 MG EC tablet    PROTONIX    30 tablet    Take 1 tablet (40 mg) by mouth every morning (before breakfast)    Brain mass       prochlorperazine 10 MG tablet    COMPAZINE    30 tablet    Take 1 tablet (10 mg) by mouth every 6 hours as needed (Nausea/Vomiting)    Glioblastoma multiforme of temporal lobe (H)       senna-docusate 8.6-50 MG per tablet    SENOKOT-S;PERICOLACE    100 tablet    Take 1-2 tablets by mouth 2 times daily    Brain  mass       temozolomide 20 MG capsule CHEMO    TEMODAR    240 capsule    Take 8 capsules (160 mg) by mouth At Bedtime for 42 doses Take on an empty stomach. Take a dose of nausea medication 30-60 min before temozolomide.    Glioblastoma multiforme of temporal lobe (H)

## 2017-10-27 NOTE — PROGRESS NOTES
"NEURO-ONCOLOGY/MEDICAL ONCOLOGY NEW PATIENT VISIT NOTE      REFERRING PHYSICIAN:  Dr. Harry Wiggins, Neurosurgical Service, [facility].      PATIENT ID:  Right temporal glioblastoma, status post gross total resection 10/3/17     HISTORY OF PRESENT ILLNESS:  Mr. Seth Iglesias is a 57-year-old left-handed gentleman with the new diagnosis of right temporal glioblastoma.      He was in his usual state of good health until approximately late August or early 09/2017, when he developed what he characterizes as \"dizzy spells,\" his first of which was on 09/04.  He had subsequently at least 2 more on 09/14.  He works at an electrical plant, and was using a forklift during one of the episodes, and also driving during another one of them.  He denies losing consciousness.  These incidents were not witnessed by others.  He felt that there was some sort of a metallic taste, an odd smell, and word-finding difficulty associated with these incidents.      On 09/28, he presented to [facility] Emergency Room with lethargy, confusion and non-vertiginous dizziness.  A CT scan was done and revealed that there was likely a mass in the right temporal lobe with significant edema; thus he was given immediately Decadron 10 mg IV, and a stroke code was called out of concern for possible stroke.  An MRI was quickly done and showed a large peripherally enhancing tumor in the anterior right temporal lobe.  There was associated intratumoral hemorrhage and some cerebellar mass effect as well.  The tumor measured 4.4 cm in greatest diameter, and there was also a potential additional lesion measuring 5 mm in the paramedian right frontal lobe.    The patient was admitted to the [facility] where, on 10/03/2017, Dr. Harry Wiggins took the patient to the operating room for right temporal craniotomy.  He performed a gross total resection of the tumor, which was confirmed as WHO grade 4 glioblastoma.  IDH " "testing was negative on the tumor.  MGMT promoter methylation has not yet been performed.      The patient states he recovered quite well without incident or complication.  In fact, he is back at the [facility] walking 1-2 miles per day.  He has had no further dizziness spells or seizure-like episodes, and he is feeling great.  He is very enthusiastic, and he has fast speech that I imagine is not due to tumor-related surgery or the dexamethasone that he is continued at 2 mg b.i.d.  We arranged for the patient to be seen by Dr. Dominic Louis from our Radiation Oncology Team immediately prior to my evaluation, and Dr. Louis recommended a taper down to 1 mg b.i.d., and then daily, and then to taper off over the next 7-10 days.  Arrangements have been made for radiation to be started on Monday, .      In general, he is feeling well.  He denies any headache, blurred vision or any other neurologic compromise.  He is accompanied by his wife, and he exudes an extremely positive attitude.      PAST MEDICAL HISTORY:   1.  Glioblastoma, status post gross total resection on 10/03/2017 of the right temporal lobe preceded and indicated by some seizure-like activity without loss of consciousness.   2.  Chronic back pain.   3.  Ganglionic cysts.   4.  Hyperlipidemia.   5.  He has psoriasis and was taking what sounds like a TNF and an alpha inhibitor up until the summer of .      PAST SURGICAL HISTORY:     1.  The aforementioned right temporal craniotomy for gross total resection of right temporal glioblastoma done 10/03/2017 with Dr. Harry Wiggins at the [facility].   2.  Colonoscopy performed 2011, unremarkable.      FAMILY HISTORY:  He had a Father and 2 paternal uncles who had prostate cancer.  His mother had some sort of \"tumor of the rib,\" and  at age 54 of this unknown cancer.      SOCIAL HISTORY:  The patient lives in [place], [place].  He is accompanied by his wife, who is extremely " "supportive.  They have 3 sons ages 18, 23 and 25.  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  He has since then been working for [employer], about 27 miles from his home.    Tobacco:  Never smoker.    Alcohol:  He used to drink 2 or 3 alcohol drinks on a weekend, but since the diagnosis of glioblastoma he has not had any further drinks.    Drugs:  Denies illicit drug use.  Denies IV drug use specifically.      MEDICATIONS:  Fully reviewed in Epic.   Current Outpatient Prescriptions   Medication     pantoprazole (PROTONIX) 40 MG EC tablet     levETIRAcetam (KEPPRA) 1000 MG TABS     dexamethasone (DECADRON) 4 MG tablet     prochlorperazine (COMPAZINE) 10 MG tablet     ondansetron (ZOFRAN) 8 MG tablet     temozolomide (TEMODAR) 20 MG capsule CHEMO     [DISCONTINUED] temozolomide (TEMODAR) 20 MG capsule CHEMO     senna-docusate (SENOKOT-S;PERICOLACE) 8.6-50 MG per tablet     Multiple Vitamins-Minerals (MULTIVITAMIN & MINERAL PO)     No current facility-administered medications for this visit.           ALLERGIES:  Fully reviewed in Epic.      REVIEW OF SYSTEMS:  Full 10-point review of systems was performed.  Pertinent symptoms are reviewed above per HPI.      PHYSICAL EXAM:   KPS:  90%.   BP (!) 132/92  Pulse 80  Temp 98  F (36.7  C) (Oral)  Resp 17  Ht 1.753 m (5' 9\")  Wt 91.8 kg (202 lb 6.4 oz)  SpO2 97%  BMI 29.89 kg/m2    GENERAL:  Very pleasant middle-aged  gentleman in no acute distress, alert and oriented x3.   HEENT:  Normocephalic, atraumatic.  PERRLA, oropharynx clear with no mucositis or thrush.  The site of right craniotomy has healed very well.  There is no overlying fluctuance, erythema or tenderness over the site of the surgical scar.   LYMPH NODES:  No palpable pre/post-auricular, cervical, axillary, or inguinal lymphadenopathy appreciated.   CV:  RRR, normal S1 S2.  No murmurs, gallops, or rubs. "   LUNGS:  Clear to auscultation bilaterally.  No dullness to percussion.   ABDOMEN:  Soft, nontender and nondistended.  Bowel sounds heard x4.  No apparent hepatosplenomegaly.   EXTREMITIES:  No clubbing, cyanosis, or edema.   NEUROLOGIC:  No evidence of horizontal or vertical nystagmus.  No evidence of cerebellar signs.  He has a negative Romberg sign.  Negative dysdiadochokinesis.  He had no evidence of significant tremor on finger-to-nose evaluation.  No abnormalities on heel-to-shin.  His gait is normal.  Examination is largely unremarkable.      LABORATORY DATA:  Pending, including a CBC.   Lab Results   Component Value Date    WBC 9.4 10/27/2017     Lab Results   Component Value Date    RBC 4.47 10/27/2017     Lab Results   Component Value Date    HGB 14.0 10/27/2017     Lab Results   Component Value Date    HCT 42.5 10/27/2017     No components found for: MCT  Lab Results   Component Value Date    MCV 95 10/27/2017     Lab Results   Component Value Date    MCH 31.3 10/27/2017     Lab Results   Component Value Date    MCHC 32.9 10/27/2017     Lab Results   Component Value Date    RDW 14.0 10/27/2017     Lab Results   Component Value Date     10/27/2017          RADIOLOGY:  I personally reviewed the pre and postoperative MRIs.  I printed out a copy of the postoperative MRI obtained on 10/04/2017.  I provided that to the patient, as well as a copy of the pathology report from gross total resection.      IMPRESSION/PLAN:  A 57-year-old left-handed gentleman, with no significant prior past medical history other than the psoriasis, diagnosed with a right temporal glioblastoma that is IDH negative in 09/2017.  He underwent gross total resection by Dr. Harry Wiggins on 10/03/2017.  He presented today for Medical Oncology/Neuro-Oncology evaluation.      I carefully reviewed the natural course, biology, diagnosis and treatment of glioblastoma.  I explained the grading system; that this is a WHO grade 4 glioma,  making it glioblastoma.  I explained that for the most part it is a highly aggressive form of tumor, which for the most part is incurable, but the best prognostic subgroups would be the ones that have had gross total resection as he has already had.  I also note that he has an excellent performance status, is actively walking, is close to postoperative recovery, and is doing quite well.      His KPS is excellent.  He is here to arrange concurrent chemoradiation.  We had arranged for him to see Dr. Dominic Louis just immediately prior to my evaluation.  This was already setup by Dr. Louis for Monday, 11/13.  I also explained the principles of adjuvant therapy to decrease and delay the chance of recurrence of glioblastoma despite resection.  He stated understanding of this concept, as had been explained to him previously by other providers.  I explained that the standard of care from the Perlita et. al., study in the [place] Journal of Medicine 2005 is 75 mg/m2 daily temozolomide 7 days per week for a 6-week period during the course of the radiation, and then administration of the temozolomide is preceded by about 90 minutes with ondansetron to mitigate any potential nausea or vomiting.  We reviewed the potential risks and side effects up to and including, but not limited to, fatigue, potential alopecia, nausea, vomiting, diarrhea and constipation, and also leukopenia.  A  CBC will be checked weekly during the course of radiation while on temozolomide at this dose.      I briefly also explained further adjuvant treatment would also encompass standard-of-care temozolomide at 150-200 mg/m2 on days 1 through 5 of each 28-day cycle.  I also briefly reviewed tumor treating fields concept with the use of the Optune device that is now FDA approved in the last year for first-line treatment of glioblastoma.  I will defer that in-depth discussion until further.  One of our expert Pharmacology Team/pharmacist teammates  will come and do oral chemotherapy teaching for the temozolomide.      I reviewed all the potential concerns that he had, in terms of driving and hunting.  I suggested to him that if he truly had a seizure, then in the [facility] it is illegal to drive for at least 6 months, unless he has remained seizure free.      He does have a referral in place to Dr. Rafael Boyer from our Neurology Team for 12/26.  I would like to try and move that up to much sooner than that, hopefully within the next few weeks.  The patient remains on Keppra b.i.d. dosing.  Ideally, since he has not had any further episodes and has achieved a decrease in mass effect by the gross total resection, there would be no further indication to continue on Keppra indefinitely, so I would imagine that Dr. Boyer may aim to taper that, but I will defer that further to Dr. Boyer, and Dr. Louis has already initiated the patient on dexamethasone taper.  I agree with proceeding with dexamethasone taper, and advised the patient to call us if he develops any recurrent seizures or any other headache, blurred vision or any other neurologic symptoms or concerning symptoms whatsoever.      He stated understanding of all of the above.  His wife shared with me some other questions, including concerns that particularly his youngest son has some concerns about the patient's overall prognosis and difficulty coping with the cancer diagnosis.  Thus, I offered a referral to our Cancer Clinical Psychologist Team or the Social Work Team from Palliative Care, and I would be happy to make that referral at any time.  They will let us know if they wish to go forward with that.      All of their other questions were answered to the best of my ability.  Please see Dr. Tristian Augustine's note, our Radiation Oncology Resident, for further details.      Thank you very much for this kind referral.      I spent greater than 60 minutes in consultation, including history and physical,  and 45 minutes in discussion.  Over 50% of the time was spent in counseling and coordination of care.  I spent 45 minutes prior to the visit reviewing the patient's medical records, images, imaging reports, outside clinical records and lab values.      cc: Harry Wiggins MD   Neurosurgical Service    [facility]      MD NEHEMIAH Toussaint MD/PhD             D: 10/27/2017 11:30   T: 10/27/2017 15:30   MT: [id number]      Name:     MALINA SMITH   MRN:      [id number]        Account:      [id number]   :      1960           Service Date: 10/27/2017      Document: [id number]

## 2017-10-27 NOTE — LETTER
10/27/2017      RE: Seth Iglesias  [address]  [facility] [address]         MEDICAL ONCOLOGY CONSULT   Oct 27, 2017    CHIEF COMPLAINT: Mr. Iglesias is a 57 year old male referred to our clinic by Dr. Wiggins for right frontotemporal glioblastoma     HISTORY OF PRESENT ILLNESS:   Mr. Iglesias is a 57 year old male with recent diagnosis of right frontotemporal glioblastoma s/p gross total resection. He initially presented to [facility] emergency department on 9/28/2017 with complaints of increasing somnolence, headaches, and dizziness. A head CT demonstrated a right-sided frontoparietal mass measuring approximate 4.4 cm with a significant amount of associated edema. He was transferred to [facility]. A brain MRI demonstrated a peripherally enhancing mass centered in the anterior right temporal horn, measuring 6.0 x 4.0 x 3.8 cm, with significant right cerebral mass effect. He was taken back to the OR on 10/3/2017 by Dr. Wiggins where he underwent a right temporal craniotomy and tumor resection. An immediate postop MRI demonstrated a gross total resection with early postoperative changes. The surgical pathology ([id number]) demonstrated glioblastoma, WHO grade IV, with IDH-1 negative on immunohistochemistry. The MGMT premotor methylation status was not tested. He recovered well from his surgery and was altered we discharge on 10/5/2017. He was seen for postop follow-up by neurosurgery on 10/17/2017. A neurologic exam at that time was unremarkable aside from a slight left pronator drift. A referral was placed to our clinic for discussion regarding treatment with systemic therapy. He has not yet been seen by radiation oncology      -Fatigue improving, walking at [employer] regularly. No significant headaches. Some right sided jaw pain. No vision changes, nausea or vomiting. No focal weakness or numbness. No dizziness or balance difficulties. No strange smells since surgery. He is currently on dexamethasone 2 mg BID.  "He continues on Keppra 1000 mg BID    PAST MEDICAL HISTORY:   -Psorasis, had been doing Enbrel them every week, quit a couple of months ago    PAST SURGICAL HISTORY:   -Craniotomy with tumor resection  -Repair of torn meniscus in       CHEMOTHERAPY HISTORY: None    RADIATION THERAPY HISTORY: None    PREGNANCY STATUS: N/A    IMPLANTED CARDIAC DEVICE: No      MEDICATIONS:  Keppra 1000 mg  BID  Dexamethasone 2 mg BID  Protonix 40 mg daily   Senna-docusate     ALLERGIES:   Seasonal allergies. No known drug allergies.       SOCIAL HISTORY:  -Lives at home with wife and three sons aged 25, 23 and 18.   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           FAMILY HISTORY:   -Father with prostate cancer, at around age 60   -Prostate cancer in 2 paternal uncles  -Thoracic cancer of uknown origin with aortic invasion,  at age 54       REVIEW OF SYMPTOMS:  A 10-point review of systems was performed. Pertinent findings are noted in the HPI.      PHYSICAL EXAMINATION:    BP (!) 132/92  Pulse 80  Temp 98  F (36.7  C) (Oral)  Resp 17  Ht 1.753 m (5' 9\")  Wt 91.8 kg (202 lb 6.4 oz)  SpO2 97%  BMI 29.89 kg/m2  ECOG PS: 0  Gen: Alert, in NAD  Head: Well healed surgical scar along the frontal and bilateral parietal scalp.   Pulm: Clear to auscultation bilaterally. No wheezing, stridor or respiratory distress  CV: Regular rate and rhythm. No murmurs, gallops or rubs. Well-perfused, no cyanosis, no pedal edema  Musculoskeletal: Normal muscle bulk and tone  Skin: Normal color and turgor  Neurologic: A/Ox3, CN II-XII intact. 5/5 motor strength in upper and lower extremities bilaterally. Normal sensation to light touch. Normal finger to nose test. Rapid alternating movements in tact. Normal gait and station.   Psychiatric: Appropriate mood and affect    ASSESSMENT    Mr. Iglesias is a 57 year old male with a right " "frontotemporal glioblastoma s/p gross total resection on 10/3/2017. He presents today for discussion regarding adjuvant therapy.     PLAN:  We reviewed Mr. Marshall's presentation, treatment course, recent imaging and general management principles for GBM.  We explained that for non-elderly patients with good performance status,  treatment generally consists of maximal surgical resection with adjuvant Temodar and radiation therapy. He met with Dr. Louis in radiation oncology earlier today to discuss treatment with radiation therapy. He is planned to start on 11/13/2017. We explained that during treatment, he will recieved daily temodar. After completion of chemoradiation, he will likely receive treatment with adjuvant temodar. This would be started approximately 1 month after completion of radiation and will consist of a 5 day course of temodar every 28 days. The patient was understanding of, and agreeable with this plan. Additionally, we will request that the MGMT promotor methylation status be included in his initial path report. We will also order next generation sequencing on the specimen.       Mr. Iglesias was seen and discussed with staff, Dr. Armstrong.      Tristian Augustine MD  Resident, Radiation Oncology           NEURO-ONCOLOGY/MEDICAL ONCOLOGY NEW PATIENT VISIT NOTE      REFERRING PHYSICIAN:  Dr. Harry Wiggins, Neurosurgical Service, [facility].      PATIENT ID:  Right temporal glioblastoma, status post gross total resection 10/3/17     HISTORY OF PRESENT ILLNESS:  Mr. Seth Iglesias is a 57-year-old left-handed gentleman with the new diagnosis of right temporal glioblastoma.      He was in his usual state of good health until approximately late August or early 09/2017, when he developed what he characterizes as \"dizzy spells,\" his first of which was on 09/04.  He had subsequently at least 2 more on 09/14.  He works at an electrical plant, and was using a forklift during one of the episodes, " and also driving during another one of them.  He denies losing consciousness.  These incidents were not witnessed by others.  He felt that there was some sort of a metallic taste, an odd smell, and word-finding difficulty associated with these incidents.      On 09/28, he presented to [facility] Emergency Room with lethargy, confusion and non-vertiginous dizziness.  A CT scan was done and revealed that there was likely a mass in the right temporal lobe with significant edema; thus he was given immediately Decadron 10 mg IV, and a stroke code was called out of concern for possible stroke.  An MRI was quickly done and showed a large peripherally enhancing tumor in the anterior right temporal lobe.  There was associated intratumoral hemorrhage and some cerebellar mass effect as well.  The tumor measured 4.4 cm in greatest diameter, and there was also a potential additional lesion measuring 5 mm in the paramedian right frontal lobe.    The patient was admitted to the [facility] where, on 10/03/2017, Dr. Harry Wiggins took the patient to the operating room for right temporal craniotomy.  He performed a gross total resection of the tumor, which was confirmed as WHO grade 4 glioblastoma.  IDH testing was negative on the tumor.  MGMT promoter methylation has not yet been performed.      The patient states he recovered quite well without incident or complication.  In fact, he is back at the [facility] walking 1-2 miles per day.  He has had no further dizziness spells or seizure-like episodes, and he is feeling great.  He is very enthusiastic, and he has fast speech that I imagine is not due to tumor-related surgery or the dexamethasone that he is continued at 2 mg b.i.d.  We arranged for the patient to be seen by Dr. Dominic Louis from our Radiation Oncology Team immediately prior to my evaluation, and Dr. Louis recommended a taper down to 1 mg b.i.d., and then daily, and then to taper off over  "the next 7-10 days.  Arrangements have been made for radiation to be started on Monday, .      In general, he is feeling well.  He denies any headache, blurred vision or any other neurologic compromise.  He is accompanied by his wife, and he exudes an extremely positive attitude.      PAST MEDICAL HISTORY:   1.  Glioblastoma, status post gross total resection on 10/03/2017 of the right temporal lobe preceded and indicated by some seizure-like activity without loss of consciousness.   2.  Chronic back pain.   3.  Ganglionic cysts.   4.  Hyperlipidemia.   5.  He has psoriasis and was taking what sounds like a TNF and an alpha inhibitor up until the summer of .      PAST SURGICAL HISTORY:     1.  The aforementioned right temporal craniotomy for gross total resection of right temporal glioblastoma done 10/03/2017 with Dr. Harry Wiggins at the [facility].   2.  Colonoscopy performed 2011, unremarkable.      FAMILY HISTORY:  He had a Father and 2 paternal uncles who had prostate cancer.  His mother had some sort of \"tumor of the rib,\" and  at age 54 of this unknown cancer.      SOCIAL HISTORY:  The patient lives in [place], [place].  He is accompanied by his wife, who is extremely supportive.  They have 3 sons ages 18, 23 and 25.  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  He has since then been working for [employer], about 27 miles from his home.    Tobacco:  Never smoker.    Alcohol:  He used to drink 2 or 3 alcohol drinks on a weekend, but since the diagnosis of glioblastoma he has not had any further drinks.    Drugs:  Denies illicit drug use.  Denies IV drug use specifically.      MEDICATIONS:  Fully reviewed in Epic.   Current Outpatient Prescriptions   Medication     pantoprazole (PROTONIX) 40 MG EC tablet     levETIRAcetam (KEPPRA) 1000 MG TABS     dexamethasone (DECADRON) 4 MG tablet " "    prochlorperazine (COMPAZINE) 10 MG tablet     ondansetron (ZOFRAN) 8 MG tablet     temozolomide (TEMODAR) 20 MG capsule CHEMO     [DISCONTINUED] temozolomide (TEMODAR) 20 MG capsule CHEMO     senna-docusate (SENOKOT-S;PERICOLACE) 8.6-50 MG per tablet     Multiple Vitamins-Minerals (MULTIVITAMIN & MINERAL PO)     No current facility-administered medications for this visit.           ALLERGIES:  Fully reviewed in Epic.      REVIEW OF SYSTEMS:  Full 10-point review of systems was performed.  Pertinent symptoms are reviewed above per HPI.      PHYSICAL EXAM:   KPS:  90%.   BP (!) 132/92  Pulse 80  Temp 98  F (36.7  C) (Oral)  Resp 17  Ht 1.753 m (5' 9\")  Wt 91.8 kg (202 lb 6.4 oz)  SpO2 97%  BMI 29.89 kg/m2    GENERAL:  Very pleasant middle-aged  gentleman in no acute distress, alert and oriented x3.   HEENT:  Normocephalic, atraumatic.  PERRLA, oropharynx clear with no mucositis or thrush.  The site of right craniotomy has healed very well.  There is no overlying fluctuance, erythema or tenderness over the site of the surgical scar.   LYMPH NODES:  No palpable pre/post-auricular, cervical, axillary, or inguinal lymphadenopathy appreciated.   CV:  RRR, normal S1 S2.  No murmurs, gallops, or rubs.   LUNGS:  Clear to auscultation bilaterally.  No dullness to percussion.   ABDOMEN:  Soft, nontender and nondistended.  Bowel sounds heard x4.  No apparent hepatosplenomegaly.   EXTREMITIES:  No clubbing, cyanosis, or edema.   NEUROLOGIC:  No evidence of horizontal or vertical nystagmus.  No evidence of cerebellar signs.  He has a negative Romberg sign.  Negative dysdiadochokinesis.  He had no evidence of significant tremor on finger-to-nose evaluation.  No abnormalities on heel-to-shin.  His gait is normal.  Examination is largely unremarkable.      LABORATORY DATA:  Pending, including a CBC.   Lab Results   Component Value Date    WBC 9.4 10/27/2017     Lab Results   Component Value Date    RBC 4.47 " 10/27/2017     Lab Results   Component Value Date    HGB 14.0 10/27/2017     Lab Results   Component Value Date    HCT 42.5 10/27/2017     No components found for: MCT  Lab Results   Component Value Date    MCV 95 10/27/2017     Lab Results   Component Value Date    MCH 31.3 10/27/2017     Lab Results   Component Value Date    MCHC 32.9 10/27/2017     Lab Results   Component Value Date    RDW 14.0 10/27/2017     Lab Results   Component Value Date     10/27/2017          RADIOLOGY:  I personally reviewed the pre and postoperative MRIs.  I printed out a copy of the postoperative MRI obtained on 10/04/2017.  I provided that to the patient, as well as a copy of the pathology report from gross total resection.      IMPRESSION/PLAN:  A 57-year-old left-handed gentleman, with no significant prior past medical history other than the psoriasis, diagnosed with a right temporal glioblastoma that is IDH negative in 09/2017.  He underwent gross total resection by Dr. Harry Wiggins on 10/03/2017.  He presented today for Medical Oncology/Neuro-Oncology evaluation.      I carefully reviewed the natural course, biology, diagnosis and treatment of glioblastoma.  I explained the grading system; that this is a WHO grade 4 glioma, making it glioblastoma.  I explained that for the most part it is a highly aggressive form of tumor, which for the most part is incurable, but the best prognostic subgroups would be the ones that have had gross total resection as he has already had.  I also note that he has an excellent performance status, is actively walking, is close to postoperative recovery, and is doing quite well.      His KPS is excellent.  He is here to arrange concurrent chemoradiation.  We had arranged for him to see Dr. Dominic Louis just immediately prior to my evaluation.  This was already setup by Dr. Louis for Monday, 11/13.  I also explained the principles of adjuvant therapy to decrease and delay the chance of  recurrence of glioblastoma despite resection.  He stated understanding of this concept, as had been explained to him previously by other providers.  I explained that the standard of care from the Perlita et. al., study in the [place] Journal of Medicine 2005 is 75 mg/m2 daily temozolomide 7 days per week for a 6-week period during the course of the radiation, and then administration of the temozolomide is preceded by about 90 minutes with ondansetron to mitigate any potential nausea or vomiting.  We reviewed the potential risks and side effects up to and including, but not limited to, fatigue, potential alopecia, nausea, vomiting, diarrhea and constipation, and also leukopenia.  A  CBC will be checked weekly during the course of radiation while on temozolomide at this dose.      I briefly also explained further adjuvant treatment would also encompass standard-of-care temozolomide at 150-200 mg/m2 on days 1 through 5 of each 28-day cycle.  I also briefly reviewed tumor treating fields concept with the use of the Optune device that is now FDA approved in the last year for first-line treatment of glioblastoma.  I will defer that in-depth discussion until further.  One of our expert Pharmacology Team/pharmacist teammates will come and do oral chemotherapy teaching for the temozolomide.      I reviewed all the potential concerns that he had, in terms of driving and hunting.  I suggested to him that if he truly had a seizure, then in the [facility] it is illegal to drive for at least 6 months, unless he has remained seizure free.      He does have a referral in place to Dr. Rafael Boyer from our Neurology Team for 12/26.  I would like to try and move that up to much sooner than that, hopefully within the next few weeks.  The patient remains on Keppra b.i.d. dosing.  Ideally, since he has not had any further episodes and has achieved a decrease in mass effect by the gross total resection, there would be no  further indication to continue on Keppra indefinitely, so I would imagine that Dr. Boyer may aim to taper that, but I will defer that further to Dr. Boyer, and Dr. Louis has already initiated the patient on dexamethasone taper.  I agree with proceeding with dexamethasone taper, and advised the patient to call us if he develops any recurrent seizures or any other headache, blurred vision or any other neurologic symptoms or concerning symptoms whatsoever.      He stated understanding of all of the above.  His wife shared with me some other questions, including concerns that particularly his youngest son has some concerns about the patient's overall prognosis and difficulty coping with the cancer diagnosis.  Thus, I offered a referral to our Cancer Clinical Psychologist Team or the Social Work Team from Palliative Care, and I would be happy to make that referral at any time.  They will let us know if they wish to go forward with that.      All of their other questions were answered to the best of my ability.  Please see Dr. Tristian Augustine's note, our Radiation Oncology Resident, for further details.      Thank you very much for this kind referral.      I spent greater than 60 minutes in consultation, including history and physical, and 45 minutes in discussion.  Over 50% of the time was spent in counseling and coordination of care.  I spent 45 minutes prior to the visit reviewing the patient's medical records, images, imaging reports, outside clinical records and lab values.      cc: Harry Wiggins MD   Neurosurgical Service    [facility]      MD NEHEMIAH Toussaint MD/PhD             D: 10/27/2017 11:30   T: 10/27/2017 15:30   MT: [id number]      Name:     MALINA SMITH   MRN:      [id number]        Account:      [id number]   :      1960           Service Date: 10/27/2017      Document: [id number]        Nehemiah Armstrong MD

## 2017-10-27 NOTE — LETTER
10/27/2017       RE: Seth Iglesias  [address]  [facility] [address]     Dear Colleague,    Thank you for referring your patient, Seth Iglesias, to the [facility]. Please see a copy of my visit note below.      MEDICAL ONCOLOGY CONSULT   Oct 27, 2017    CHIEF COMPLAINT: Mr. Iglesias is a 57 year old male referred to our clinic by Dr. Wiggins for right frontotemporal glioblastoma     HISTORY OF PRESENT ILLNESS:   Mr. Iglesias is a 57 year old male with recent diagnosis of right frontotemporal glioblastoma s/p gross total resection. He initially presented to [facility] emergency department on 9/28/2017 with complaints of increasing somnolence, headaches, and dizziness. A head CT demonstrated a right-sided frontoparietal mass measuring approximate 4.4 cm with a significant amount of associated edema. He was transferred to [facility]. A brain MRI demonstrated a peripherally enhancing mass centered in the anterior right temporal horn, measuring 6.0 x 4.0 x 3.8 cm, with significant right cerebral mass effect. He was taken back to the OR on 10/3/2017 by Dr. Wiggins where he underwent a right temporal craniotomy and tumor resection. An immediate postop MRI demonstrated a gross total resection with early postoperative changes. The surgical pathology ([id number]) demonstrated glioblastoma, WHO grade IV, with IDH-1 negative on immunohistochemistry. The MGMT premotor methylation status was not tested. He recovered well from his surgery and was altered we discharge on 10/5/2017. He was seen for postop follow-up by neurosurgery on 10/17/2017. A neurologic exam at that time was unremarkable aside from a slight left pronator drift. A referral was placed to our clinic for discussion regarding treatment with systemic therapy. He has not yet been seen by radiation oncology      -Fatigue improving, walking at [facility] regularly. No significant headaches. Some right sided jaw pain. No vision changes, nausea  "or vomiting. No focal weakness or numbness. No dizziness or balance difficulties. No strange smells since surgery. He is currently on dexamethasone 2 mg BID. He continues on Keppra 1000 mg BID    PAST MEDICAL HISTORY:   -Psorasis, had been doing Enbrel them every week, quit a couple of months ago    PAST SURGICAL HISTORY:   -Craniotomy with tumor resection  -Repair of torn meniscus in       CHEMOTHERAPY HISTORY: None    RADIATION THERAPY HISTORY: None    PREGNANCY STATUS: N/A    IMPLANTED CARDIAC DEVICE: No      MEDICATIONS:  Keppra 1000 mg  BID  Dexamethasone 2 mg BID  Protonix 40 mg daily   Senna-docusate     ALLERGIES:   Seasonal allergies. No known drug allergies.       SOCIAL HISTORY:  -Lives at home with wife and three sons aged 25, 23 and 18.   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           FAMILY HISTORY:   -Father with prostate cancer, at around age 60   -Prostate cancer in 2 paternal uncles  -Thoracic cancer of uknown origin with aortic invasion,  at age 54       REVIEW OF SYMPTOMS:  A 10-point review of systems was performed. Pertinent findings are noted in the HPI.      PHYSICAL EXAMINATION:    BP (!) 132/92  Pulse 80  Temp 98  F (36.7  C) (Oral)  Resp 17  Ht 1.753 m (5' 9\")  Wt 91.8 kg (202 lb 6.4 oz)  SpO2 97%  BMI 29.89 kg/m2  ECOG PS: 0  Gen: Alert, in NAD  Head: Well healed surgical scar along the frontal and bilateral parietal scalp.   Pulm: Clear to auscultation bilaterally. No wheezing, stridor or respiratory distress  CV: Regular rate and rhythm. No murmurs, gallops or rubs. Well-perfused, no cyanosis, no pedal edema  Musculoskeletal: Normal muscle bulk and tone  Skin: Normal color and turgor  Neurologic: A/Ox3, CN II-XII intact. 5/5 motor strength in upper and lower extremities bilaterally. Normal sensation to light touch. Normal finger to nose test. Rapid alternating " "movements in tact. Normal gait and station.   Psychiatric: Appropriate mood and affect    ASSESSMENT    Mr. Iglesias is a 57 year old male with a right frontotemporal glioblastoma s/p gross total resection on 10/3/2017. He presents today for discussion regarding adjuvant therapy.     PLAN:  The risks and benefits of the proposed treatment were discussed at length with the patient and his questions were answered in a satisfactory manner. ***.    Mr. Iglesias was seen and discussed with staff,  ***.      Tristian Augustine MD  Resident, PGY-5   Department of Radiation Oncology   [facility]    ***         NEURO-ONCOLOGY/MEDICAL ONCOLOGY NEW PATIENT VISIT NOTE      REFERRING PHYSICIAN:  Dr. Harry Wiggins, Neurosurgical Service, [facility].      PATIENT ID:  Right temporal glioblastoma, status post gross total resection 10/3/17     HISTORY OF PRESENT ILLNESS:  Mr. Seth Iglesias is a 57-year-old left-handed gentleman with the new diagnosis of right temporal glioblastoma.      He was in his usual state of good health until approximately late August or early 09/2017, when he developed what he characterizes as \"dizzy spells,\" his first of which was on 09/04.  He had subsequently at least 2 more on 09/14.  He works at an electrical plant, and was using a forklift during one of the episodes, and also driving during another one of them.  He denies losing consciousness.  These incidents were not witnessed by others.  He felt that there was some sort of a metallic taste, an odd smell, and word-finding difficulty associated with these incidents.      On 09/28, he presented to [facility] Emergency Room with lethargy, confusion and non-vertiginous dizziness.  A CT scan was done and revealed that there was likely a mass in the right temporal lobe with significant edema; thus he was given immediately Decadron 10 mg IV, and a stroke code was called out of concern for possible stroke.  An MRI was " quickly done and showed a large peripherally enhancing tumor in the anterior right temporal lobe.  There was associated intratumoral hemorrhage and some cerebellar mass effect as well.  The tumor measured 4.4 cm in greatest diameter, and there was also a potential additional lesion measuring 5 mm in the paramedian right frontal lobe.    The patient was admitted to the [facility] where, on 10/03/2017, Dr. Harry Wiggins took the patient to the operating room for right temporal craniotomy.  He performed a gross total resection of the tumor, which was confirmed as WHO grade 4 glioblastoma.  IDH testing was negative on the tumor.  MGMT promoter methylation has not yet been performed.      The patient states he recovered quite well without incident or complication.  In fact, he is back at the [facility] walking 1-2 miles per day.  He has had no further dizziness spells or seizure-like episodes, and he is feeling great.  He is very enthusiastic, and he has fast speech that I imagine is not due to tumor-related surgery or the dexamethasone that he is continued at 2 mg b.i.d.  We arranged for the patient to be seen by Dr. Dominic Louis from our Radiation Oncology Team immediately prior to my evaluation, and Dr. Louis recommended a taper down to 1 mg b.i.d., and then daily, and then to taper off over the next 7-10 days.  Arrangements have been made for radiation to be started on Monday, 11/13.      In general, he is feeling well.  He denies any headache, blurred vision or any other neurologic compromise.  He is accompanied by his wife, and he exudes an extremely positive attitude.      PAST MEDICAL HISTORY:   1.  Glioblastoma, status post gross total resection on 10/03/2017 of the right temporal lobe preceded and indicated by some seizure-like activity without loss of consciousness.   2.  Chronic back pain.   3.  Ganglionic cysts.   4.  Hyperlipidemia.   5.  He has psoriasis and was taking what sounds  "like a TNF and an alpha inhibitor up until the summer of .      PAST SURGICAL HISTORY:     1.  The aforementioned right temporal craniotomy for gross total resection of right temporal glioblastoma done 10/03/2017 with Dr. Harry Wiggins at the [facility].   2.  Colonoscopy performed 2011, unremarkable.      FAMILY HISTORY:  He had a Father and 2 paternal uncles who had prostate cancer.  His mother had some sort of \"tumor of the rib,\" and  at age 54 of this unknown cancer.      SOCIAL HISTORY:  The patient lives in [place], [place].  He is accompanied by his wife, who is extremely supportive.  They have 3 sons ages 18, 23 and 25.  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  He has since then been working for [employer], about 27 miles from his home.    Tobacco:  Never smoker.    Alcohol:  He used to drink 2 or 3 alcohol drinks on a weekend, but since the diagnosis of glioblastoma he has not had any further drinks.    Drugs:  Denies illicit drug use.  Denies IV drug use specifically.      MEDICATIONS:  Fully reviewed in Epic.   Current Outpatient Prescriptions   Medication     pantoprazole (PROTONIX) 40 MG EC tablet     levETIRAcetam (KEPPRA) 1000 MG TABS     dexamethasone (DECADRON) 4 MG tablet     prochlorperazine (COMPAZINE) 10 MG tablet     ondansetron (ZOFRAN) 8 MG tablet     temozolomide (TEMODAR) 20 MG capsule CHEMO     [DISCONTINUED] temozolomide (TEMODAR) 20 MG capsule CHEMO     senna-docusate (SENOKOT-S;PERICOLACE) 8.6-50 MG per tablet     Multiple Vitamins-Minerals (MULTIVITAMIN & MINERAL PO)     No current facility-administered medications for this visit.           ALLERGIES:  Fully reviewed in Epic.      REVIEW OF SYSTEMS:  Full 10-point review of systems was performed.  Pertinent symptoms are reviewed above per HPI.      PHYSICAL EXAM:   KPS:  90%.   BP (!) 132/92  Pulse 80  Temp " "98  F (36.7  C) (Oral)  Resp 17  Ht 1.753 m (5' 9\")  Wt 91.8 kg (202 lb 6.4 oz)  SpO2 97%  BMI 29.89 kg/m2    GENERAL:  Very pleasant middle-aged  gentleman in no acute distress, alert and oriented x3.   HEENT:  Normocephalic, atraumatic.  PERRLA, oropharynx clear with no mucositis or thrush.  The site of right craniotomy has healed very well.  There is no overlying fluctuance, erythema or tenderness over the site of the surgical scar.   LYMPH NODES:  No palpable pre/post-auricular, cervical, axillary, or inguinal lymphadenopathy appreciated.   CV:  RRR, normal S1 S2.  No murmurs, gallops, or rubs.   LUNGS:  Clear to auscultation bilaterally.  No dullness to percussion.   ABDOMEN:  Soft, nontender and nondistended.  Bowel sounds heard x4.  No apparent hepatosplenomegaly.   EXTREMITIES:  No clubbing, cyanosis, or edema.   NEUROLOGIC:  No evidence of horizontal or vertical nystagmus.  No evidence of cerebellar signs.  He has a negative Romberg sign.  Negative dysdiadochokinesis.  He had no evidence of significant tremor on finger-to-nose evaluation.  No abnormalities on heel-to-shin.  His gait is normal.  Examination is largely unremarkable.      LABORATORY DATA:  Pending, including a CBC.   Lab Results   Component Value Date    WBC 9.4 10/27/2017     Lab Results   Component Value Date    RBC 4.47 10/27/2017     Lab Results   Component Value Date    HGB 14.0 10/27/2017     Lab Results   Component Value Date    HCT 42.5 10/27/2017     No components found for: MCT  Lab Results   Component Value Date    MCV 95 10/27/2017     Lab Results   Component Value Date    MCH 31.3 10/27/2017     Lab Results   Component Value Date    MCHC 32.9 10/27/2017     Lab Results   Component Value Date    RDW 14.0 10/27/2017     Lab Results   Component Value Date     10/27/2017          RADIOLOGY:  I personally reviewed the pre and postoperative MRIs.  I printed out a copy of the postoperative MRI obtained on 10/04/2017. "  I provided that to the patient, as well as a copy of the pathology report from gross total resection.      IMPRESSION/PLAN:  A 57-year-old left-handed gentleman, with no significant prior past medical history other than the psoriasis, diagnosed with a right temporal glioblastoma that is IDH negative in 09/2017.  He underwent gross total resection by Dr. Harry Wiggins on 10/03/2017.  He presented today for Medical Oncology/Neuro-Oncology evaluation.      I carefully reviewed the natural course, biology, diagnosis and treatment of glioblastoma.  I explained the grading system; that this is a WHO grade 4 glioma, making it glioblastoma.  I explained that for the most part it is a highly aggressive form of tumor, which for the most part is incurable, but the best prognostic subgroups would be the ones that have had gross total resection as he has already had.  I also note that he has an excellent performance status, is actively walking, is close to postoperative recovery, and is doing quite well.      His KPS is excellent.  He is here to arrange concurrent chemoradiation.  We had arranged for him to see Dr. Dominic Louis just immediately prior to my evaluation.  This was already setup by Dr. Louis for Monday, 11/13.  I also explained the principles of adjuvant therapy to decrease and delay the chance of recurrence of glioblastoma despite resection.  He stated understanding of this concept, as had been explained to him previously by other providers.  I explained that the standard of care from the Perlita et. al., study in the [place] Journal of Medicine 2005 is 75 mg/m2 daily temozolomide 7 days per week for a 6-week period during the course of the radiation, and then administration of the temozolomide is preceded by about 90 minutes with ondansetron to mitigate any potential nausea or vomiting.  We reviewed the potential risks and side effects up to and including, but not limited to, fatigue, potential alopecia,  nausea, vomiting, diarrhea and constipation, and also leukopenia.  A  CBC will be checked weekly during the course of radiation while on temozolomide at this dose.      I briefly also explained further adjuvant treatment would also encompass standard-of-care temozolomide at 150-200 mg/m2 on days 1 through 5 of each 28-day cycle.  I also briefly reviewed tumor treating fields concept with the use of the Optune device that is now FDA approved in the last year for first-line treatment of glioblastoma.  I will defer that in-depth discussion until further.  One of our expert Pharmacology Team/pharmacist teammates will come and do oral chemotherapy teaching for the temozolomide.      I reviewed all the potential concerns that he had, in terms of driving and hunting.  I suggested to him that if he truly had a seizure, then in the [facility] it is illegal to drive for at least 6 months, unless he has remained seizure free.      He does have a referral in place to Dr. Rafael Boyer from our Neurology Team for 12/26.  I would like to try and move that up to much sooner than that, hopefully within the next few weeks.  The patient remains on Keppra b.i.d. dosing.  Ideally, since he has not had any further episodes and has achieved a decrease in mass effect by the gross total resection, there would be no further indication to continue on Keppra indefinitely, so I would imagine that Dr. Boyer may aim to taper that, but I will defer that further to Dr. Boyer, and Dr. Louis has already initiated the patient on dexamethasone taper.  I agree with proceeding with dexamethasone taper, and advised the patient to call us if he develops any recurrent seizures or any other headache, blurred vision or any other neurologic symptoms or concerning symptoms whatsoever.      He stated understanding of all of the above.  His wife shared with me some other questions, including concerns that particularly his youngest son has some concerns about  the patient's overall prognosis and difficulty coping with the cancer diagnosis.  Thus, I offered a referral to our Cancer Clinical Psychologist Team or the Social Work Team from Palliative Care, and I would be happy to make that referral at any time.  They will let us know if they wish to go forward with that.      All of their other questions were answered to the best of my ability.  Please see Dr. Tristian Augustine's note, our Radiation Oncology Resident, for further details.      Thank you very much for this kind referral.      I spent greater than 60 minutes in consultation, including history and physical, and 45 minutes in discussion.  Over 50% of the time was spent in counseling and coordination of care.  I spent 45 minutes prior to the visit reviewing the patient's medical records, images, imaging reports, outside clinical records and lab values.        NEHEMIAH KAPOOR MD/PhD            cc: Harry Wiggins MD   Neurosurgical Service    [facility]      Dominic Louis MD              D: 10/27/2017 11:30   T: 10/27/2017 15:30   MT: AYAD      Name:     MALINA SMITH   MRN:      [id number]        Account:      [id number]   :      1960           Service Date: 10/27/2017      Document: [id number]

## 2017-10-27 NOTE — NURSING NOTE
"Oncology Rooming Note    October 27, 2017 10:04 AM   Seth Iglesias is a 57 year old male who presents for:    Chief Complaint   Patient presents with     Oncology Clinic Visit     new patient visit for consultation related to glioblastoma     Initial Vitals: BP (!) 132/92  Pulse 80  Temp 98  F (36.7  C) (Oral)  Resp 17  Ht 1.753 m (5' 9\")  Wt 91.8 kg (202 lb 6.4 oz)  SpO2 97%  BMI 29.89 kg/m2 Estimated body mass index is 29.89 kg/(m^2) as calculated from the following:    Height as of this encounter: 1.753 m (5' 9\").    Weight as of this encounter: 91.8 kg (202 lb 6.4 oz). Body surface area is 2.11 meters squared.  No Pain (0) Comment: Data Unavailable   No LMP for male patient.  Allergies reviewed: Yes  Medications reviewed: Yes    Medications: Medication refills not needed today.  Pharmacy name entered into [facility]:    [id number] - [place], [place] - [id number] - [place], [place] - [address]    Clinical concerns: no concerns - no pain/discomfort dr. sanchez  was notified.    7  minutes for nursing intake (face to face time)     Tita Gutierrez CMA              "

## 2017-10-27 NOTE — PROGRESS NOTES
Department of Radiation Oncology  [address] [address]  [phone]       Consultation Note    Name: Seth Iglesias MRN: [id number]   : 1960   Date of Service: 10/27/2017  Referring: Dr. Augustus Armstrong / medical oncology      Reason for consultation: Consideration of adjuvant radiotherapy for treatment of a glioblastoma of the right temporal lobe status post gross total resection    History of Present Illness   Mr. Iglesias is a 57 year old male who is in his usual state of health when he presented to the [facility] Emergency Department on 2017 with symptoms of decreased consciousness, dizziness and posterior headaches along with neck pain. Imaging demonstrated a large (6 x 4 x 3.8 cm) peripherally enhancing tumor centered in the right anterior temporal lobe with intratumoral hemorrhage and surrounding edema causing mass effect on the right cerebral hemisphere.     Mr. Iglesias had a functional MRI scan performed on 10/2/2017 which suggested left-sided speech center lateralization and on 10/3/2017 he underwent a right temporal craniotomy and tumor resection by Dr. Harry Wiggins. Surgical pathology (specimen: [id number]) was positive for an IDH1 wild-type, WHO grade IV glioblastoma with postoperative imaging demonstrating no evidence of gross residual tumor. His case was discussed at the [facility]'s interdisciplinary CNS tumor board with the consensus recommendation to proceed with adjuvant chemoradiotherapy. He presents to clinic today with his wife for a further discussion regarding partial brain radiation.     On exam, Mr. Iglesias states that he is doing very well and has no pressing concerns or complaints. He specifically denies any vision changes, headaches, seizures, loss of consciousness, motor weakness, sensory deficits or loss of bowel/bladder function. His remaining ROS are otherwise unremarkable.    Past Medical  History:     Psoriasis    Past Surgical History:     Craniotomy and tumor excision 10/3/2017    Chemotherapy History:  None    Radiation History:  None    Pregnant: Not Applicable  Implanted Cardiac Devices: No    Medications:  Current Outpatient Prescriptions   Medication     prochlorperazine (COMPAZINE) 10 MG tablet     ondansetron (ZOFRAN) 8 MG tablet     temozolomide (TEMODAR) 20 MG capsule CHEMO     senna-docusate (SENOKOT-S;PERICOLACE) 8.6-50 MG per tablet     pantoprazole (PROTONIX) 40 MG EC tablet     levETIRAcetam (KEPPRA) 1000 MG TABS     dexamethasone (DECADRON) 4 MG tablet     Multiple Vitamins-Minerals (MULTIVITAMIN & MINERAL PO)     Allergies:    NKDA    Social History:  Tobacco: None  Alcohol: Social  Employment: Retired from the [employer], 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 Pertinent findings are noted in the HPI.    Physical Exam   ECOG Status: 0    Vitals:  B/P: 132/92  P: 80  Weight: 91.8 kg  Pain: 0/10    Gen: Alert, in NAD  Head: Right-sided craniotomy incision is well-healed and intact with no surrounding erythema, induration or nodularity  Oral cavity/oropharynx: MMM, no visible oral cavity lesions   Pulm: No wheezing, stridor or respiratory distress  CV: Extremities are warm and well-perfused, no cyanosis  Abdominal: Normal bowel sounds, soft, nontender, no masses  Musculoskeletal: Normal bulk and tone  Skin: Normal color and turgor    Neuro: A/Ox3. 5/5 motor strength in bilateral biceps and triceps as well as the bilateral hip/knee/ankle flexors/extensors. Normal finger-nose-finger. Normal tandem gait without ataxia. Remembered 3/3 items on delayed recall.    Cranial Nerve Exam  I: Not tested  II: Visual fields full by confrontation. 20/20 vision in right  eye. 20/20 vision in left eye.  III/IV/VI: PERRL. EOMI.   V: Sensation to light touch is intact and symmetric in the bilateral V1-V3 distributions.  VII: No facial weakness or asymmetry.   VIII: Not tested  IX/X: Palate elevates symmetrically. Normal phonation.  XI: Strength is 5/5 in bilateral trapezius and SCM musculature.  XII: Tongue protrudes in the midline. No atrophy or fasciculations.       Imaging/Path/Labs   Imaging: Reviewed    Path: Reviewed    Labs: Reviewed    Assessment    Mr. Iglesias is a 57 year old male with recently diagnosed IDH1 wild-type, WHO grade IV glioblastoma of the right temporal lobe status post gross total resection.    Plan   I had a long discussion with Mr. Iglesias and his wife reviewing his treatment course to date as well as the use of adjuvant chemoradiotherapy for improved disease control. I specifically recommended a treatment course consisting of 60 Gy delivered to the post-operative resection cavity and surrounding high-risk tissues in 30 daily fractions with concurrent temozolomide. I reviewed the potential acute and late-term radiation-induced toxicities associated with this regimen and answered he and his wife's questions to their stated satisfaction.    At this time, I will have Mr. Iglesias return to clinic next Tuesday, 10/31/2017, for a CT and MR-based simulation session for radiotherapy planning purposes. Due to the opening of deer season in early November, he expressed his interest in delaying the start date of therapy until 11/13/2017 which we will accommodate. In the interim, I instructed him to contact our clinic with any additional questions or concerns which may arise following our visit today.    Dominic Louis MD/PhD  Dept of Radiation Oncology  [facility]

## 2017-10-27 NOTE — PROGRESS NOTES
MEDICAL ONCOLOGY CONSULT   Oct 27, 2017    CHIEF COMPLAINT: Mr. Iglesias is a 57 year old male referred to our clinic by Dr. Wiggins for right frontotemporal glioblastoma     HISTORY OF PRESENT ILLNESS:   Mr. Iglesias is a 57 year old male with recent diagnosis of right frontotemporal glioblastoma s/p gross total resection. He initially presented to [facility] emergency department on 9/28/2017 with complaints of increasing somnolence, headaches, and dizziness. A head CT demonstrated a right-sided frontoparietal mass measuring approximate 4.4 cm with a significant amount of associated edema. He was transferred to [facility]. A brain MRI demonstrated a peripherally enhancing mass centered in the anterior right temporal horn, measuring 6.0 x 4.0 x 3.8 cm, with significant right cerebral mass effect. He was taken back to the OR on 10/3/2017 by Dr. Wiggins where he underwent a right temporal craniotomy and tumor resection. An immediate postop MRI demonstrated a gross total resection with early postoperative changes. The surgical pathology ([id number]) demonstrated glioblastoma, WHO grade IV, with IDH-1 negative on immunohistochemistry. The MGMT premotor methylation status was not tested. He recovered well from his surgery and was altered we discharge on 10/5/2017. He was seen for postop follow-up by neurosurgery on 10/17/2017. A neurologic exam at that time was unremarkable aside from a slight left pronator drift. A referral was placed to our clinic for discussion regarding treatment with systemic therapy. He has not yet been seen by radiation oncology      -Fatigue improving, walking at [employer] regularly. No significant headaches. Some right sided jaw pain. No vision changes, nausea or vomiting. No focal weakness or numbness. No dizziness or balance difficulties. No strange smells since surgery. He is currently on dexamethasone 2 mg BID. He continues on Keppra 1000 mg BID    PAST MEDICAL HISTORY:   -Psorasis, had been doing  "Enbrel them every week, quit a couple of months ago    PAST SURGICAL HISTORY:   -Craniotomy with tumor resection  -Repair of torn meniscus in       CHEMOTHERAPY HISTORY: None    RADIATION THERAPY HISTORY: None    PREGNANCY STATUS: N/A    IMPLANTED CARDIAC DEVICE: No      MEDICATIONS:  Keppra 1000 mg  BID  Dexamethasone 2 mg BID  Protonix 40 mg daily   Senna-docusate     ALLERGIES:   Seasonal allergies. No known drug allergies.       SOCIAL HISTORY:  -Lives at home with wife and three sons aged 25, 23 and 18.   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           FAMILY HISTORY:   -Father with prostate cancer, at around age 60   -Prostate cancer in 2 paternal uncles  -Thoracic cancer of uknown origin with aortic invasion,  at age 54       REVIEW OF SYMPTOMS:  A 10-point review of systems was performed. Pertinent findings are noted in the HPI.      PHYSICAL EXAMINATION:    BP (!) 132/92  Pulse 80  Temp 98  F (36.7  C) (Oral)  Resp 17  Ht 1.753 m (5' 9\")  Wt 91.8 kg (202 lb 6.4 oz)  SpO2 97%  BMI 29.89 kg/m2  ECOG PS: 0  Gen: Alert, in NAD  Head: Well healed surgical scar along the frontal and bilateral parietal scalp.   Pulm: Clear to auscultation bilaterally. No wheezing, stridor or respiratory distress  CV: Regular rate and rhythm. No murmurs, gallops or rubs. Well-perfused, no cyanosis, no pedal edema  Musculoskeletal: Normal muscle bulk and tone  Skin: Normal color and turgor  Neurologic: A/Ox3, CN II-XII intact. 5/5 motor strength in upper and lower extremities bilaterally. Normal sensation to light touch. Normal finger to nose test. Rapid alternating movements in tact. Normal gait and station.   Psychiatric: Appropriate mood and affect    ASSESSMENT    Mr. Iglesias is a 57 year old male with a right frontotemporal glioblastoma s/p gross total resection on 10/3/2017. He presents today for " discussion regarding adjuvant therapy.     PLAN:  We reviewed Mr. Marshall's presentation, treatment course, recent imaging and general management principles for GBM.  We explained that for non-elderly patients with good performance status,  treatment generally consists of maximal surgical resection with adjuvant Temodar and radiation therapy. He met with Dr. Louis in radiation oncology earlier today to discuss treatment with radiation therapy. He is planned to start on 11/13/2017. We explained that during treatment, he will recieved daily temodar. After completion of chemoradiation, he will likely receive treatment with adjuvant temodar. This would be started approximately 1 month after completion of radiation and will consist of a 5 day course of temodar every 28 days. The patient was understanding of, and agreeable with this plan. Additionally, we will request that the MGMT promotor methylation status be included in his initial path report. We will also order next generation sequencing on the specimen.       Mr. Iglesias was seen and discussed with staff, Dr. Armstrong.      Tristian Augustine MD  Resident, Radiation Oncology

## 2017-10-27 NOTE — LETTER
10/27/2017       RE: Seth Iglesias  [address] [facility] [id number]     Dear Colleague,    Thank you for referring your patient, Seth Iglesias, to the RADIATION ONCOLOGY CLINIC. Please see a copy of my visit note below.      HPI  INITIAL PATIENT ASSESSMENT    Diagnosis: glioblastoma    Prior radiation therapy: None    Prior chemotherapy: None    Prior hormonal therapy:No    Pain Eval:  Denies    Psychosocial  Living arrangements: with wife, on medical leave, works at [employer].  3 sons, youngest 18 living at home.  Fall Risk: independent   referral needs: Yes: resources for family    Advanced Directive: Yes - Location: [employer]  Implantable Cardiac Device? No      Nurse face-to-face time: Level 3:  10 min face to face time    Review of Systems   Constitutional: Negative for chills, fever and weight loss.   HENT: Negative for hearing loss and tinnitus.    Eyes: Negative for blurred vision.   Respiratory: Negative for cough and shortness of breath.    Cardiovascular: Negative for chest pain.   Gastrointestinal: Negative for heartburn and nausea.   Genitourinary: Negative for dysuria and urgency.   Musculoskeletal: Negative for myalgias and neck pain.   Skin:        Incision healing- no pain or drainage   Neurological: Negative for dizziness, sensory change, speech change and headaches.   Endo/Heme/Allergies: Does not bruise/bleed easily.   Psychiatric/Behavioral: Negative for depression. The patient is not nervous/anxious.                     Department of Radiation Oncology  [facility]  [address] [place] [address]  [phone]       Consultation Note    Name: Seth Iglesias MRN: [id number]   : 1960   Date of Service: 10/27/2017  Referring: Dr. Augustus Armstrong / medical oncology      Reason for consultation: Consideration of adjuvant radiotherapy for treatment of a glioblastoma of the right temporal lobe status  post gross total resection    History of Present Illness   Mr. Iglesias is a 57 year old male who is in his usual state of health when he presented to the [facility] Emergency Department on 9/28/2017 with symptoms of decreased consciousness, dizziness and posterior headaches along with neck pain. Imaging demonstrated a large (6 x 4 x 3.8 cm) peripherally enhancing tumor centered in the right anterior temporal lobe with intratumoral hemorrhage and surrounding edema causing mass effect on the right cerebral hemisphere.     Mr. Iglesias had a functional MRI scan performed on 10/2/2017 which suggested left-sided speech center lateralization and on 10/3/2017 he underwent a right temporal craniotomy and tumor resection by Dr. Harry Wiggins. Surgical pathology (specimen: [id number]) was positive for an IDH1 wild-type, WHO grade IV glioblastoma with postoperative imaging demonstrating no evidence of gross residual tumor. His case was discussed at the [facility]'s interdisciplinary CNS tumor board with the consensus recommendation to proceed with adjuvant chemoradiotherapy. He presents to clinic today with his wife for a further discussion regarding partial brain radiation.     On exam, Mr. Iglesias states that he is doing very well and has no pressing concerns or complaints. He specifically denies any vision changes, headaches, seizures, loss of consciousness, motor weakness, sensory deficits or loss of bowel/bladder function. His remaining ROS are otherwise unremarkable.    Past Medical History:     Psoriasis    Past Surgical History:     Craniotomy and tumor excision 10/3/2017    Chemotherapy History:  None    Radiation History:  None    Pregnant: Not Applicable  Implanted Cardiac Devices: No    Medications:  Current Outpatient Prescriptions   Medication     prochlorperazine (COMPAZINE) 10 MG tablet     ondansetron (ZOFRAN) 8 MG tablet     temozolomide (TEMODAR) 20 MG capsule CHEMO     senna-docusate  (SENOKOT-S;PERICOLACE) 8.6-50 MG per tablet     pantoprazole (PROTONIX) 40 MG EC tablet     levETIRAcetam (KEPPRA) 1000 MG TABS     dexamethasone (DECADRON) 4 MG tablet     Multiple Vitamins-Minerals (MULTIVITAMIN & MINERAL PO)     Allergies:    NKDA    Social History:  Tobacco: None  Alcohol: Social  Employment: Retired from the [employer], 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 Pertinent findings are noted in the HPI.    Physical Exam   ECOG Status: 0    Vitals:  B/P: 132/92  P: 80  Weight: 91.8 kg  Pain: 0/10    Gen: Alert, in NAD  Head: Right-sided craniotomy incision is well-healed and intact with no surrounding erythema, induration or nodularity  Oral cavity/oropharynx: MMM, no visible oral cavity lesions   Pulm: No wheezing, stridor or respiratory distress  CV: Extremities are warm and well-perfused, no cyanosis  Abdominal: Normal bowel sounds, soft, nontender, no masses  Musculoskeletal: Normal bulk and tone  Skin: Normal color and turgor    Neuro: A/Ox3. 5/5 motor strength in bilateral biceps and triceps as well as the bilateral hip/knee/ankle flexors/extensors. Normal finger-nose-finger. Normal tandem gait without ataxia. Remembered 3/3 items on delayed recall.    Cranial Nerve Exam  I: Not tested  II: Visual fields full by confrontation. 20/20 vision in right eye. 20/20 vision in left eye.  III/IV/VI: PERRL. EOMI.   V: Sensation to light touch is intact and symmetric in the bilateral V1-V3 distributions.  VII: No facial weakness or asymmetry.   VIII: Not tested  IX/X: Palate elevates symmetrically. Normal phonation.  XI: Strength is 5/5 in bilateral trapezius and SCM musculature.  XII: Tongue protrudes in the midline. No atrophy or fasciculations.       Imaging/Path/Labs    Imaging: Reviewed    Path: Reviewed    Labs: Reviewed    Assessment    Mr. Iglesias is a 57 year old male with recently diagnosed IDH1 wild-type, WHO grade IV glioblastoma of the right temporal lobe status post gross total resection.    Plan   I had a long discussion with Mr. Iglesias and his wife reviewing his treatment course to date as well as the use of adjuvant chemoradiotherapy for improved disease control. I specifically recommended a treatment course consisting of 60 Gy delivered to the post-operative resection cavity and surrounding high-risk tissues in 30 daily fractions with concurrent temozolomide. I reviewed the potential acute and late-term radiation-induced toxicities associated with this regimen and answered he and his wife's questions to their stated satisfaction.    At this time, I will have Mr. Iglesias return to clinic next Tuesday, 10/31/2017, for a CT and MR-based simulation session for radiotherapy planning purposes. Due to the opening of deer season in early November, he expressed his interest in delaying the start date of therapy until 11/13/2017 which we will accommodate. In the interim, I instructed him to contact our clinic with any additional questions or concerns which may arise following our visit today.    Dominic Louis MD/PhD  Dept of Radiation Oncology  [facility]

## 2017-10-27 NOTE — ORAL ONC MGMT
"Oral Chemotherapy Monitoring Program    Primary Oncologist: Dr. Armstrong  Primary Oncology Clinic: [facility]  Cancer Diagnosis: Glioblastoma    Drug: Temodar  Start Date: 11/12/2017 with radiation to follow the next day  Dose is appropriate for patients: 2.11m2 BSA   Expected duration of therapy: 42 days concurrent with radiation    Drug Interaction Assessment: No drug interactions found at this time.    Drugs check include: Ondansetron -Ativan -Compazine -Dexamethasone  -LevETIRAcetam -Multivitamin -Pantoprazole -Senna -Docusate -Temodar    Lab Monitoring Plan  CBC weekly  CMP Q3 weeks    Subjective/Objective:  Seth Iglesias is a 57 year old male seen in clinic for an initial visit for oral chemotherapy education.      ORAL CHEMOTHERAPY 10/27/2017   Drug Name Temodar (Temozolomide)   Current Dosage 160mg   Current Schedule Daily   Cycle Details Continuous for 42 days during XRT   Planned next cycle start date 11/12/2017   Any new drug interactions? No   Is the dose as ordered appropriate for the patient? Yes         Vitals:  BP:   BP Readings from Last 1 Encounters:   10/27/17 (!) 132/92     Wt Readings from Last 1 Encounters:   10/27/17 91.8 kg (202 lb 6.4 oz)     Estimated body surface area is 2.11 meters squared as calculated from the following:    Height as of an earlier encounter on 10/27/17: 1.753 m (5' 9\").    Weight as of an earlier encounter on 10/27/17: 91.8 kg (202 lb 6.4 oz).      Labs:  Lab Results   Component Value Date     10/27/2017      Lab Results   Component Value Date    POTASSIUM 4.1 10/27/2017     Lab Results   Component Value Date    ARA 8.4 10/27/2017     No results found for: MAG  No results found for: PHOS  Lab Results   Component Value Date    ALBUMIN 3.5 10/27/2017     Lab Results   Component Value Date    BUN 20 10/27/2017     Lab Results   Component Value Date    CR 0.94 10/27/2017       Lab Results   Component Value Date    AST 13 10/27/2017     Lab Results "   Component Value Date    ALT 31 10/27/2017     Lab Results   Component Value Date    BILITOTAL 0.7 10/27/2017       Lab Results   Component Value Date    WBC 9.4 10/27/2017     Lab Results   Component Value Date    HGB 14.0 10/27/2017     Lab Results   Component Value Date     10/27/2017     Lab Results   Component Value Date    ANEU 7.9 10/27/2017         Assessment:  Patient is appropriate to start therapy.    Plan:  Basic chemotherapy teaching was reviewed with the patient and the patient's family including indication, start date of therapy, dose, administration, adverse effects, missed doses, food and drug interactions, monitoring, side effect management, office contact information, and safe handling. Written materials were provided and all questions answered to Candelario's stated satisfaction.    Follow-Up:  About one week after starting Temodar/XRT     Mac Silva PharmD  [facility]  [phone]  October 27, 2017

## 2017-10-27 NOTE — LETTER
10/27/2017       RE: Seth Iglesias  [address] [place] [id number]     Dear Colleague,    Thank you for referring your patient, Seth Iglesias, to the [facility]. Please see a copy of my visit note below.    See Oral Onc Mgmt note.    Again, thank you for allowing me to participate in the care of your patient.      Sincerely,    Mac Silva RPH

## 2017-10-27 NOTE — MR AVS SNAPSHOT
After Visit Summary   10/27/2017    Seth Iglesias    MRN: [id number]           Patient Information     Date Of Birth          1960        Visit Information        Provider Department      10/27/2017 8:30 AM Dominic Louis MD [facility]        Today's Diagnoses     Glioblastoma multiforme of temporal lobe (H)    -  1       Follow-ups after your visit        Your next 10 appointments already scheduled     Oct 31, 2017  8:45 AM CDT   Return Visit with [facility] Rad Onc Nurse   Radiation Oncology Clinic ([facility])    [facility]  1st Floor  [address] [address]   [phone]            Oct 31, 2017  9:00 AM CDT   TCT/SIM Suite Visit with Dominic Louis MD   [facility]cology Clinic ([facility])    [facility]  1st Floor  [address] [address]   [phone]            Oct 31, 2017 11:00 AM CDT   MR BRAIN W/O & W CONTRAST with UUMR2   [facility], [place], [facility] ([facility], [facility])    [address] [address]   [phone]           Take your medicines as usual, unless your doctor tells you not to. Bring a list of your current medicines to your exam (including vitamins, minerals and over-the-counter drugs).  You will be given intravenous contrast for this exam. To prepare:   The day before your exam, drink extra fluids at least six 8-ounce glasses (unless your doctor tells you to restrict your fluids).   Have a blood test (creatinine test) within 30 days of your exam. Go to your clinic or Diagnostic Imaging Department for this test.  The MRI machine uses a strong magnet. Please wear clothes without metal (snaps, zippers). A sweatsuit works well, or we may give you a hospital gown.  Please remove any body piercings and hair extensions before you arrive. You will also remove watches, jewelry,  hairpins, wallets, dentures, partial dental plates and hearing aids. You may wear contact lenses, and you may be able to wear your rings. We have a safe place to keep your personal items, but it is safer to leave them at home.   **IMPORTANT** THE INSTRUCTIONS BELOW ARE ONLY FOR THOSE PATIENTS WHO HAVE BEEN TOLD THEY WILL RECEIVE SEDATION OR GENERAL ANESTHESIA DURING THEIR MRI PROCEDURE:  IF YOU WILL RECEIVE SEDATION (take medicine to help you relax during your exam):   You must get the medicine from your doctor before you arrive. Bring the medicine to the exam. Do not take it at home.   Arrive one hour early. Bring someone who can take you home after the test. Your medicine will make you sleepy. After the exam, you may not drive, take a bus or take a taxi by yourself.   No eating 8 hours before your exam. You may have clear liquids up until 4 hours before your exam. (Clear liquids include water, clear tea, black coffee and fruit juice without pulp.)  IF YOU WILL RECEIVE ANESTHESIA (be asleep for your exam):   Arrive 1 1/2 hours early. Bring someone who can take you home after the test. You may not drive, take a bus or take a taxi by yourself.   No eating 8 hours before your exam. You may have clear liquids up until 4 hours before your exam. (Clear liquids include water, clear tea, black coffee and fruit juice without pulp.)  Please call the Imaging Department at your exam site with any questions.            Nov 24, 2017 11:15 AM CST   [facility] Lab Draw with  [facility] LAB DRAW   [facility] Lab Draw ([facility])    [address] [place] [address]   [phone]            Nov 24, 2017 12:00 PM CST   (Arrive by 11:45 AM)   Return Visit with SCARLETT Pederson   [facility] ([facility])    [address] [place] [address]   [phone]            Dec 08, 2017  9:30 AM CST   (Arrive by  9:15 AM)   New Seizure with Rafael Boyer MD   [facility] Neurology ([facility])    [address]  3rd Floor  [facility] [address] [phone]            Jan 02, 2018  1:00 PM CST   (Arrive by 12:45 PM)   MR BRAIN W/O & W CONTRAST with UCMR1   [facility] MRI ([facility])    [address]  1st Floor  [facility] [address] [phone]           Take your medicines as usual, unless your doctor tells you not to. Bring a list of your current medicines to your exam (including vitamins, minerals and over-the-counter drugs).  You will be given intravenous contrast for this exam. To prepare:   The day before your exam, drink extra fluids at least six 8-ounce glasses (unless your doctor tells you to restrict your fluids).   Have a blood test (creatinine test) within 30 days of your exam. Go to your clinic or Diagnostic Imaging Department for this test.  The MRI machine uses a strong magnet. Please wear clothes without metal (snaps, zippers). A sweatsuit works well, or we may give you a hospital gown.  Please remove any body piercings and hair extensions before you arrive. You will also remove watches, jewelry, hairpins, wallets, dentures, partial dental plates and hearing aids. You may wear contact lenses, and you may be able to wear your rings. We have a safe place to keep your personal items, but it is safer to leave them at home.   **IMPORTANT** THE INSTRUCTIONS BELOW ARE ONLY FOR THOSE PATIENTS WHO HAVE BEEN TOLD THEY WILL RECEIVE SEDATION OR GENERAL ANESTHESIA DURING THEIR MRI PROCEDURE:  IF YOU WILL RECEIVE SEDATION (take medicine to help you relax during your exam):   You must get the medicine from your doctor before you arrive. Bring the medicine to the exam. Do not take it at home.   Arrive one hour early. Bring someone who can take you home after the test. Your medicine will make you sleepy. After the exam, you may not drive,  take a bus or take a taxi by yourself.   No eating 8 hours before your exam. You may have clear liquids up until 4 hours before your exam. (Clear liquids include water, clear tea, black coffee and fruit juice without pulp.)  IF YOU WILL RECEIVE ANESTHESIA (be asleep for your exam):   Arrive 1 1/2 hours early. Bring someone who can take you home after the test. You may not drive, take a bus or take a taxi by yourself.   No eating 8 hours before your exam. You may have clear liquids up until 4 hours before your exam. (Clear liquids include water, clear tea, black coffee and fruit juice without pulp.)  Please call the Imaging Department at your exam site with any questions.            Jan 08, 2018  8:45 AM CST   (Arrive by 8:30 AM)   Return Visit with Harry Wiggins MD   [facility] ([facility])    [address] [place] [address] [phone]              Who to contact     Please call your clinic at [phone] to:    Ask questions about your health    Make or cancel appointments    Discuss your medicines    Learn about your test results    Speak to your doctor   If you have compliments or concerns about an experience at your clinic, or if you wish to file a complaint, please contact [facility] Patient Relations at [phone] or email us at [email or web address]         Additional Information About Your Visit        MyChart Information     [employer] is an electronic gateway that provides easy, online access to your medical records. With [employer], you can request a clinic appointment, read your test results, renew a prescription or communicate with your care team.     To sign up for [employer] visit the website at [email or web address]   You will be asked to enter the access code listed below, as well as some personal information. Please follow the directions to create your username and password.     Your  access code is: [email or web address]  Expires: 2017  4:33 PM     Your access code will  in 90 days. If you need help or a new code, please contact your [facility] or call [phone] for assistance.        Care EveryWhere ID     This is your Care EveryWhere ID. This could be used by other organizations to access your [place] medical records  [id number]        Your Vitals Were     Pulse BMI (Body Mass Index)                80 29.89 kg/m2           Blood Pressure from Last 3 Encounters:   10/27/17 (!) 132/92   10/27/17 (!) 132/92   10/17/17 135/80    Weight from Last 3 Encounters:   10/27/17 91.8 kg (202 lb 6.4 oz)   10/27/17 91.8 kg (202 lb 6.4 oz)   10/17/17 89.8 kg (198 lb)                 Today's Medication Changes          These changes are accurate as of: 10/27/17 11:59 PM.  If you have any questions, ask your nurse or doctor.               Start taking these medicines.        Dose/Directions    ondansetron 8 MG tablet   Commonly known as:  ZOFRAN   Used for:  Glioblastoma multiforme of temporal lobe (H)   Started by:  Mac Silva RPH        Dose:  8 mg   Take 1 tablet (8 mg) by mouth daily for 28 days Take before each dose of temozolomide.   Quantity:  28 tablet   Refills:  1       prochlorperazine 10 MG tablet   Commonly known as:  COMPAZINE   Used for:  Glioblastoma multiforme of temporal lobe (H)   Started by:  Mac Silva RPH        Dose:  10 mg   Take 1 tablet (10 mg) by mouth every 6 hours as needed (Nausea/Vomiting)   Quantity:  30 tablet   Refills:  2       temozolomide 20 MG capsule CHEMO   Commonly known as:  TEMODAR   Used for:  Glioblastoma multiforme of temporal lobe (H)   Started by:  Mac Silva RPH        Dose:  75 mg/m2/day   Take 8 capsules (160 mg) by mouth At Bedtime Take on an empty stomach. Take a dose of nausea medication 30-60 min before temozolomide.   Quantity:  240 capsule   Refills:  0         These medicines have changed  or have updated prescriptions.        Dose/Directions    dexamethasone 4 MG tablet   Commonly known as:  DECADRON   This may have changed:  additional instructions   Used for:  Brain mass        Dose:  4 mg   Take 1 tablet (4 mg) by mouth 2 times daily (with meals)   Quantity:  60 tablet   Refills:  1            Where to get your medicines      These medications were sent to [facility] [place], [place] - [address]  [address], [facility] [address]    Hours:  TRANSPLANT PHONE NUMBER [phone] Phone:  [phone]     ondansetron 8 MG tablet    prochlorperazine 10 MG tablet    temozolomide 20 MG capsule CHEMO                Primary Care Provider Office Phone # Fax #    R Nigel Salmon [phone] [address] [address]        Equal Access to Services     PERICO ESPINOZA : Hadii aad ku hadasho Soneha, waaxda luqadaha, qaybta kaalmada adedeysi, héctor kerns. So [facility] [phone].    ATENCIÓN: Si habla español, tiene a granados disposición servicios gratuitos de asistencia lingüística. [place] al [phone].    We comply with applicable federal civil rights laws and [place] laws. We do not discriminate on the basis of race, color, national origin, age, disability, sex, sexual orientation, or gender identity.            Thank you!     Thank you for choosing [facility]  for your care. Our goal is always to provide you with excellent care. Hearing back from our patients is one way we can continue to improve our services. Please take a few minutes to complete the written survey that you may receive in the mail after your visit with us. Thank you!             Your Updated Medication List - Protect others around you: Learn how to safely use, store and throw away your medicines at www.disposemymeds.org.          This list is accurate as of: 10/27/17 11:59 PM.  Always use your most recent  med list.                   Brand Name Dispense Instructions for use Diagnosis    dexamethasone 4 MG tablet    DECADRON    60 tablet    Take 1 tablet (4 mg) by mouth 2 times daily (with meals)    Brain mass       levETIRAcetam 1000 MG Tabs    KEPPRA    180 tablet    Take 1,000 mg by mouth every 12 hours    Brain mass       MULTIVITAMIN & MINERAL PO      Take 1 tablet by mouth daily        ondansetron 8 MG tablet    ZOFRAN    28 tablet    Take 1 tablet (8 mg) by mouth daily for 28 days Take before each dose of temozolomide.    Glioblastoma multiforme of temporal lobe (H)       pantoprazole 40 MG EC tablet    PROTONIX    30 tablet    Take 1 tablet (40 mg) by mouth every morning (before breakfast)    Brain mass       prochlorperazine 10 MG tablet    COMPAZINE    30 tablet    Take 1 tablet (10 mg) by mouth every 6 hours as needed (Nausea/Vomiting)    Glioblastoma multiforme of temporal lobe (H)       senna-docusate 8.6-50 MG per tablet    SENOKOT-S;PERICOLACE    100 tablet    Take 1-2 tablets by mouth 2 times daily    Brain mass       temozolomide 20 MG capsule CHEMO    TEMODAR    240 capsule    Take 8 capsules (160 mg) by mouth At Bedtime Take on an empty stomach. Take a dose of nausea medication 30-60 min before temozolomide.    Glioblastoma multiforme of temporal lobe (H)

## 2017-10-27 NOTE — PROGRESS NOTES
HPI  INITIAL PATIENT ASSESSMENT    Diagnosis: glioblastoma    Prior radiation therapy: None    Prior chemotherapy: None    Prior hormonal therapy:No    Pain Eval:  Denies    Psychosocial  Living arrangements: with wife, on medical leave, works at [employer].  3 sons, youngest 18 living at home.  Fall Risk: independent   referral needs: Yes: resources for family    Advanced Directive: Yes - Location: [employer]  Implantable Cardiac Device? No      Nurse face-to-face time: Level 3:  10 min face to face time    Review of Systems   Constitutional: Negative for chills, fever and weight loss.   HENT: Negative for hearing loss and tinnitus.    Eyes: Negative for blurred vision.   Respiratory: Negative for cough and shortness of breath.    Cardiovascular: Negative for chest pain.   Gastrointestinal: Negative for heartburn and nausea.   Genitourinary: Negative for dysuria and urgency.   Musculoskeletal: Negative for myalgias and neck pain.   Skin:        Incision healing- no pain or drainage   Neurological: Negative for dizziness, sensory change, speech change and headaches.   Endo/Heme/Allergies: Does not bruise/bleed easily.   Psychiatric/Behavioral: Negative for depression. The patient is not nervous/anxious.

## 2017-10-31 NOTE — MR AVS SNAPSHOT
After Visit Summary   10/31/2017    Seth Iglesias    MRN: [id number]           Patient Information     Date Of Birth          1960        Visit Information        Provider Department      10/31/2017 9:00 AM Dominic Louis MD Radiation Oncology Clinic        Today's Diagnoses     Glioblastoma multiforme of temporal lobe (H)    -  1       Follow-ups after your visit        Your next 10 appointments already scheduled     Oct 31, 2017 11:00 AM CDT   MR BRAIN W/O & W CONTRAST with UUMR2   [facility] ([facility], [facility])    [address] [facility] [id number] [phone]           Take your medicines as usual, unless your doctor tells you not to. Bring a list of your current medicines to your exam (including vitamins, minerals and over-the-counter drugs).  You will be given intravenous contrast for this exam. To prepare:   The day before your exam, drink extra fluids at least six 8-ounce glasses (unless your doctor tells you to restrict your fluids).   Have a blood test (creatinine test) within 30 days of your exam. Go to your clinic or Diagnostic Imaging Department for this test.  The MRI machine uses a strong magnet. Please wear clothes without metal (snaps, zippers). A sweatsuit works well, or we may give you a hospital gown.  Please remove any body piercings and hair extensions before you arrive. You will also remove watches, jewelry, hairpins, wallets, dentures, partial dental plates and hearing aids. You may wear contact lenses, and you may be able to wear your rings. We have a safe place to keep your personal items, but it is safer to leave them at home.   **IMPORTANT** THE INSTRUCTIONS BELOW ARE ONLY FOR THOSE PATIENTS WHO HAVE BEEN TOLD THEY WILL RECEIVE SEDATION OR GENERAL ANESTHESIA DURING THEIR MRI PROCEDURE:  IF YOU WILL RECEIVE SEDATION (take medicine to help you relax during your exam):   You must get the  medicine from your doctor before you arrive. Bring the medicine to the exam. Do not take it at home.   Arrive one hour early. Bring someone who can take you home after the test. Your medicine will make you sleepy. After the exam, you may not drive, take a bus or take a taxi by yourself.   No eating 8 hours before your exam. You may have clear liquids up until 4 hours before your exam. (Clear liquids include water, clear tea, black coffee and fruit juice without pulp.)  IF YOU WILL RECEIVE ANESTHESIA (be asleep for your exam):   Arrive 1 1/2 hours early. Bring someone who can take you home after the test. You may not drive, take a bus or take a taxi by yourself.   No eating 8 hours before your exam. You may have clear liquids up until 4 hours before your exam. (Clear liquids include water, clear tea, black coffee and fruit juice without pulp.)  Please call the Imaging Department at your exam site with any questions.            Nov 24, 2017 11:15 AM CST   [facility] Lab Draw with  [facility] LAB DRAW   [facility] Lab Draw ([facility])    [address] [place] [address]   [phone]            Nov 24, 2017 12:00 PM CST   (Arrive by 11:45 AM)   Return Visit with SCARLETT Pederson   [facility] ([facility])    [address] [place] [address]   [phone]            Dec 08, 2017  9:30 AM CST   (Arrive by 9:15 AM)   New Seizure with Rafael Boyer MD   [facility] Neurology ([facility])    [address] [place] [address]   [phone]            Jan 02, 2018  1:00 PM CST   (Arrive by 12:45 PM)   MR BRAIN W/O & W CONTRAST with [address] [facility] ([facility])    [address] [place] [id number] [phone]           Take your medicines as usual, unless your doctor tells  you not to. Bring a list of your current medicines to your exam (including vitamins, minerals and over-the-counter drugs).  You will be given intravenous contrast for this exam. To prepare:   The day before your exam, drink extra fluids at least six 8-ounce glasses (unless your doctor tells you to restrict your fluids).   Have a blood test (creatinine test) within 30 days of your exam. Go to your clinic or Diagnostic Imaging Department for this test.  The MRI machine uses a strong magnet. Please wear clothes without metal (snaps, zippers). A sweatsuit works well, or we may give you a hospital gown.  Please remove any body piercings and hair extensions before you arrive. You will also remove watches, jewelry, hairpins, wallets, dentures, partial dental plates and hearing aids. You may wear contact lenses, and you may be able to wear your rings. We have a safe place to keep your personal items, but it is safer to leave them at home.   **IMPORTANT** THE INSTRUCTIONS BELOW ARE ONLY FOR THOSE PATIENTS WHO HAVE BEEN TOLD THEY WILL RECEIVE SEDATION OR GENERAL ANESTHESIA DURING THEIR MRI PROCEDURE:  IF YOU WILL RECEIVE SEDATION (take medicine to help you relax during your exam):   You must get the medicine from your doctor before you arrive. Bring the medicine to the exam. Do not take it at home.   Arrive one hour early. Bring someone who can take you home after the test. Your medicine will make you sleepy. After the exam, you may not drive, take a bus or take a taxi by yourself.   No eating 8 hours before your exam. You may have clear liquids up until 4 hours before your exam. (Clear liquids include water, clear tea, black coffee and fruit juice without pulp.)  IF YOU WILL RECEIVE ANESTHESIA (be asleep for your exam):   Arrive 1 1/2 hours early. Bring someone who can take you home after the test. You may not drive, take a bus or take a taxi by yourself.   No eating 8 hours before your exam. You may have clear liquids up  until 4 hours before your exam. (Clear liquids include water, clear tea, black coffee and fruit juice without pulp.)  Please call the Imaging Department at your exam site with any questions.            2018  8:45 AM CST   (Arrive by 8:30 AM)   Return Visit with Harry Wiggins MD   [facility] ([facility])    [address] [facility]  [address] [facility] [address] [phone]              Who to contact     Please call your clinic at [phone] to:    Ask questions about your health    Make or cancel appointments    Discuss your medicines    Learn about your test results    Speak to your doctor   If you have compliments or concerns about an experience at your clinic, or if you wish to file a complaint, please contact [facility] Patient Relations at [phone] or email us at [email or web address]         Additional Information About Your Visit        MyChart Information     [employer] is an electronic gateway that provides easy, online access to your medical records. With JumpIn, you can request a clinic appointment, read your test results, renew a prescription or communicate with your care team.     To sign up for JumpIn visit the website at [email or web address]   You will be asked to enter the access code listed below, as well as some personal information. Please follow the directions to create your username and password.     Your access code is: [email or web address]  Expires: 2017  4:33 PM     Your access code will  in 90 days. If you need help or a new code, please contact your [facility] Clinic or call [phone] for assistance.        Care EveryWhere ID     This is your Care EveryWhere ID. This could be used by other organizations to access your [place] medical records  [id number]         Blood Pressure from Last 3 Encounters:   10/27/17 (!) 132/92   10/27/17 (!) 13292   10/17/17  135/80    Weight from Last 3 Encounters:   10/27/17 91.8 kg (202 lb 6.4 oz)   10/27/17 91.8 kg (202 lb 6.4 oz)   10/17/17 89.8 kg (198 lb)              Today, you had the following     No orders found for display         Today's Medication Changes          These changes are accurate as of: 10/31/17  9:23 AM.  If you have any questions, ask your nurse or doctor.               These medicines have changed or have updated prescriptions.        Dose/Directions    dexamethasone 4 MG tablet   Commonly known as:  DECADRON   This may have changed:  additional instructions   Used for:  Brain mass        Dose:  4 mg   Take 1 tablet (4 mg) by mouth 2 times daily (with meals)   Quantity:  60 tablet   Refills:  1                Primary Care Provider Office Phone # Fax #    R Nigel Salmon [phone] [address] [facility] [address]        Equal Access to Services     [facility] : Hadii vero Carrasco, waaxda lotus, qaybta kaalmada singh, héctor rand . So [facility] [phone].    ATENCIÓN: Si habla español, tiene a granados disposición servicios gratuitos de asistencia lingüística. Rodney al [phone].    We comply with applicable federal civil rights laws and [place] laws. We do not discriminate on the basis of race, color, national origin, age, disability, sex, sexual orientation, or gender identity.            Thank you!     Thank you for choosing [facility]  for your care. Our goal is always to provide you with excellent care. Hearing back from our patients is one way we can continue to improve our services. Please take a few minutes to complete the written survey that you may receive in the mail after your visit with us. Thank you!             Your Updated Medication List - Protect others around you: Learn how to safely use, store and throw away your medicines at www.disposemymeds.org.          This list is accurate as of: 10/31/17  9:23  AM.  Always use your most recent med list.                   Brand Name Dispense Instructions for use Diagnosis    dexamethasone 4 MG tablet    DECADRON    60 tablet    Take 1 tablet (4 mg) by mouth 2 times daily (with meals)    Brain mass       levETIRAcetam 1000 MG Tabs    KEPPRA    180 tablet    Take 1,000 mg by mouth every 12 hours    Brain mass       MULTIVITAMIN & MINERAL PO      Take 1 tablet by mouth daily        ondansetron 8 MG tablet    ZOFRAN    28 tablet    Take 1 tablet (8 mg) by mouth daily for 28 days Take before each dose of temozolomide.    Glioblastoma multiforme of temporal lobe (H)       pantoprazole 40 MG EC tablet    PROTONIX    30 tablet    Take 1 tablet (40 mg) by mouth every morning (before breakfast)    Brain mass       prochlorperazine 10 MG tablet    COMPAZINE    30 tablet    Take 1 tablet (10 mg) by mouth every 6 hours as needed (Nausea/Vomiting)    Glioblastoma multiforme of temporal lobe (H)       senna-docusate 8.6-50 MG per tablet    SENOKOT-S;PERICOLACE    100 tablet    Take 1-2 tablets by mouth 2 times daily    Brain mass       temozolomide 20 MG capsule CHEMO    TEMODAR    240 capsule    Take 8 capsules (160 mg) by mouth At Bedtime Take on an empty stomach. Take a dose of nausea medication 30-60 min before temozolomide.    Glioblastoma multiforme of temporal lobe (H)

## 2017-10-31 NOTE — PROGRESS NOTES
Radiation Therapy Patient Education    Person involved with teaching: Patient    Patient educational needs for self management of treatment-related side effects assessment completed.  [facility] Patient Ed tab contains Patient Learning Assessment    Education Materials Given  Radiation Therapy and You    Educational Topics Discussed  Side effects expected, Pain management and When to call MD/RN    Response To Teaching  Verbalizes understanding    GYN Only  Vaginal Dilator-given and educated: N/A    Referrals sent: None    Chemotherapy?  Yes: Oral chemo and radiation  to start 11/13/17.

## 2017-10-31 NOTE — TELEPHONE ENCOUNTER
Reason for Call:  Other call back    Detailed comments: Geni from [facility]  is unable to get a hold of Seth.  She is asking that if he calls or has an appt that we let her know so she can get a hold of him.  She is doing a post hospital check.    Phone Number Patient can be reached at: Other phone number:  Geni  [facility]  [phone]    Best Time: any    Can we leave a detailed message on this number? YES    Call taken on 10/31/2017 at 9:26 AM by Sonam Orlando

## 2017-10-31 NOTE — TELEPHONE ENCOUNTER
Left message for Geni that she should call the [facility] as he is not going to be seen here any time soon. Yelitza JEFFERS RN

## 2017-11-10 NOTE — TELEPHONE ENCOUNTER
Reason for Call:  Other FYI    Detailed comments: Geni from [employer] states she has lost connection with patient and is closing his case.    Phone Number Patient can be reached at: Other phone number: [phone]    Best Time: any    Can we leave a detailed message on this number? YES    Call taken on 11/10/2017 at 1:28 PM by Lorraine Gonzalez

## 2017-11-13 NOTE — PROGRESS NOTES
RADIATION ONCOLOGY WEEKLY ON TREATMENT VISIT   Encounter Date: 2017    Patient Name: Seth Iglesias  MRN: [id number]  : 1960     Disease and Stage: Right frontotemporal glioblastoma  Treatment Site: Partial brain  Current Dose/Planned Total Dose: 200 / 6000 cGy  Daily Fraction Size: 200 cGy/day, 5 times/week  Concurrent Chemotherapy: Yes  Drug and Frequency: Daily Temodar    Subjective: Mr. Iglesias presents to clinic today for his first weekly on-treatment visit. He has tolerated initiation of adjuvant therapy very well with no immediate acute treatment-related toxicities. He specifically denies any headaches, weakness, speech abnormalities, visual disturbances, gait instability or bowel/bladder incontinence. His remaining ROS are otherwise unremarkable.    ROS:   Skin  Skin Reaction: No changes    CNS Alteration  CNS note: No headache     Pain Assessment  0-10 Pain Scale: 0    Objective:   Weight: 94.3 kg  BP: 119/86  Pulse: 91    General: Alert, oriented and in no acute distress  Cardiac: Extremities are warm and well-perfused  Respiratory: No wheezing, stridor or respiratory distress  Skin: No erythema    Treatment-related toxicities (CTCAE v4.0):  None    Assessment:    Mr. 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 He is undergoing adjuvant chemoradiotherapy which he is tolerating well with no acute treatment-related toxicities.    Plan:   1. Continue radiotherapy    Mosaiq chart and setup information reviewed  MVCT images reviewed    Educational Topic Discussed  Additional Instructions: Weekly CBC on     Dominic Louis MD/PhD  Department of Radiation Oncology  [facility]

## 2017-11-13 NOTE — MR AVS SNAPSHOT
After Visit Summary   11/13/2017    Seth Iglesias    MRN: [id number]           Patient Information     Date Of Birth          1960        Visit Information        Provider Department      11/13/2017 3:15 PM Dominic Louis MD Radiation Oncology Clinic        Today's Diagnoses     Glioblastoma multiforme of temporal lobe (H)    -  1       Follow-ups after your visit        Your next 10 appointments already scheduled     Nov 15, 2017  3:00 PM CST   EXTERNAL RADIATION TREATMENT with [facility] RAD ONC ANAY   Radiation Oncology Clinic ([facility])    [facility]  1st Floor  [address] [facility] [address]   [phone]            Nov 16, 2017  3:00 PM CST   EXTERNAL RADIATION TREATMENT with [facility] RAD ONC ANAY   Radiation Oncology Clinic ([facility])    [facility]  1st Floor  [address] [facility] [address]   [phone]            Nov 17, 2017  3:00 PM CST   EXTERNAL RADIATION TREATMENT with [facility] RAD ONC ANAY   Radiation Oncology Clinic ([facility])    [facility]  1st Floor  [address] [address] [facility] [address]   [phone]            Nov 20, 2017  3:00 PM CST   EXTERNAL RADIATION TREATMENT with [facility] RAD ONC ANAY   Radiation Oncology Clinic ([facility])    [facility]  1st Floor  [address] [address]   [phone]            Nov 20, 2017  3:15 PM CST   ON TREATMENT VISIT with Dominic Louis MD   Radiation Oncology Clinic ([facility])    [facility]  1st Floor  [address] [address] [address]   [phone]            Nov 21, 2017  3:00 PM CST   EXTERNAL RADIATION TREATMENT with [facility] RAD ONC ANAY   Radiation Oncology Clinic ([facility])    [facility]  1st Floor  [address] [address] [address]    [phone]            Nov 22, 2017  3:00 PM CST   EXTERNAL RADIATION TREATMENT with [facility] RAD ONC ANAY   Radiation Oncology Clinic ([facility])    [facility]  1st Floor  [address] [facility] [address]   [phone]            Nov 24, 2017 11:15 AM CST   [facility] Lab Draw with  [facility] LAB DRAW   [facility])    [address]  2nd Floor  [facility] [address] [phone]            Nov 24, 2017 12:00 PM CST   (Arrive by 11:45 AM)   Return Visit with SCARLETT Pederson   [facility] ([facility])    [address]  2nd Floor  [facility] [address] [phone]            Nov 24, 2017  3:00 PM CST   EXTERNAL RADIATION TREATMENT with [facility] RAD ONC ANAY   Radiation Oncology Clinic ([facility])    [facility]  1st Floor  [address] [facility] [address] [phone]              Who to contact     Please call your clinic at [phone] to:    Ask questions about your health    Make or cancel appointments    Discuss your medicines    Learn about your test results    Speak to your doctor   If you have compliments or concerns about an experience at your clinic, or if you wish to file a complaint, please contact [facility] Patient Relations at [phone] or email us at [email or web address]         Additional Information About Your Visit        "T[employer]ie [employer]"hart Information     [employer] is an electronic gateway that provides easy, online access to your medical records. With [employer], you can request a clinic appointment, read your test results, renew a prescription or communicate with your care team.     To sign up for [employer] visit the website at [email or web address]   You will be asked to enter the access code listed below, as well as some personal information. Please  follow the directions to create your username and password.     Your access code is: [email or web address]  Expires: 2017  3:33 PM     Your access code will  in 90 days. If you need help or a new code, please contact your [facility] Clinic or call [phone] for assistance.        Care EveryWhere ID     This is your Care EveryWhere ID. This could be used by other organizations to access your [place] medical records  [id number]        Your Vitals Were     Pulse BMI (Body Mass Index)                91 30.72 kg/m2           Blood Pressure from Last 3 Encounters:   17 119/86   10/27/17 (!) 132/92   10/27/17 (!) 132/92    Weight from Last 3 Encounters:   17 94.3 kg (208 lb)   10/27/17 91.8 kg (202 lb 6.4 oz)   10/27/17 91.8 kg (202 lb 6.4 oz)              Today, you had the following     No orders found for display       Primary Care Provider Office Phone # Fax #    R Nigel Salmon [phone] [address] [place] [address] [address]        Equal Access to Services     PERICO ESPINOZA AH: Hadii vero ku hadalinao Soneha, waaxda luqadaha, qaybta kaalmada adeegyada, héctor kerns. So [facility] [phone].    ATENCIÓN: Si habla español, tiene a granados disposición servicios gratuitos de asistencia lingüística. [facility] [phone].    We comply with applicable federal civil rights laws and [place] laws. We do not discriminate on the basis of race, color, national origin, age, disability, sex, sexual orientation, or gender identity.            Thank you!     Thank you for choosing [facility]  for your care. Our goal is always to provide you with excellent care. Hearing back from our patients is one way we can continue to improve our services. Please take a few minutes to complete the written survey that you may receive in the mail after your visit with us. Thank you!             Your Updated Medication List - Protect others  around you: Learn how to safely use, store and throw away your medicines at www.disposemymeds.org.          This list is accurate as of: 11/13/17 11:59 PM.  Always use your most recent med list.                   Brand Name Dispense Instructions for use Diagnosis    levETIRAcetam 1000 MG Tabs    KEPPRA    180 tablet    Take 1,000 mg by mouth every 12 hours    Brain mass       MULTIVITAMIN & MINERAL PO      Take 1 tablet by mouth daily        ondansetron 8 MG tablet    ZOFRAN    28 tablet    Take 1 tablet (8 mg) by mouth daily for 28 days Take before each dose of temozolomide.    Glioblastoma multiforme of temporal lobe (H)       pantoprazole 40 MG EC tablet    PROTONIX    30 tablet    Take 1 tablet (40 mg) by mouth every morning (before breakfast)    Brain mass       prochlorperazine 10 MG tablet    COMPAZINE    30 tablet    Take 1 tablet (10 mg) by mouth every 6 hours as needed (Nausea/Vomiting)    Glioblastoma multiforme of temporal lobe (H)       senna-docusate 8.6-50 MG per tablet    SENOKOT-S;PERICOLACE    100 tablet    Take 1-2 tablets by mouth 2 times daily    Brain mass       temozolomide 20 MG capsule CHEMO    TEMODAR    240 capsule    Take 8 capsules (160 mg) by mouth At Bedtime Take on an empty stomach. Take a dose of nausea medication 30-60 min before temozolomide.    Glioblastoma multiforme of temporal lobe (H)

## 2017-11-20 NOTE — MR AVS SNAPSHOT
After Visit Summary   11/20/2017    Seth Iglesias    MRN: [id number]           Patient Information     Date Of Birth          1960        Visit Information        Provider Department      11/20/2017 11:45 AM Dominic Louis MD Radiation Oncology Clinic        Today's Diagnoses     Glioblastoma multiforme of temporal lobe (H)    -  1       Follow-ups after your visit        Your next 10 appointments already scheduled     Nov 22, 2017 10:45 AM CST   EXTERNAL RADIATION TREATMENT with [facility] RAD ONC [facility]NA[facility]   [facility]    [facility]  1st Floor  [address] [address]   [phone]            Nov 24, 2017 10:45 AM CST   EXTERNAL RADIATION TREATMENT with [facility] RAD ONC ANAY   [facility]    [facility]  1st Floor  [address] [address]   [phone]            Nov 24, 2017 11:15 AM CST   [facility] Draw with  [facility]   [facility] Draw ([facility])    [address]  [address] [facility] [address]   [phone]            Nov 24, 2017 12:00 PM CST   (Arrive by 11:45 AM)   Return Visit with SCARLETT Pederson   [facility] ([facility])    [address]  2nd Floor  [facility] [address]   [phone]            Nov 27, 2017 10:15 AM CST   EXTERNAL RADIATION TREATMENT with [facility] RAD ONC ANAY   [facility]    [facility]  1st Floor  [address] [address]   [phone]            Nov 27, 2017 10:30 AM CST   ON TREATMENT VISIT with Dominic Louis MD   [facility]    [facility]  1st Floor  [address] [address]   [phone]            Nov 28,  2017 10:15 AM CST   EXTERNAL RADIATION TREATMENT with [facility] RAD ONC [facility]NA[facility]   Radiation Oncology Clinic ([facility])    [facility]  1st Floor  [address] [facility] [address]   [phone]            Nov 29, 2017 10:15 AM CST   EXTERNAL RADIATION TREATMENT with [facility] RAD ONC ANAY   Radiation Oncology Clinic ([facility])    [facility]  1st Floor  [address] [facility] [address]   [phone]            Nov 30, 2017 10:15 AM CST   EXTERNAL RADIATION TREATMENT with [facility] RAD ONC ANAY   Radiation Oncology Clinic ([facility])    [facility]  1st Floor  [address] [facility] [address]   [phone]            Dec 01, 2017 10:15 AM CST   EXTERNAL RADIATION TREATMENT with [facility] RAD ONC ANAY   Radiation Oncology Clinic ([facility])    [facility]  1st Floor  [address] [facility] [address]   [phone]              Who to contact     Please call your clinic at [phone] to:    Ask questions about your health    Make or cancel appointments    Discuss your medicines    Learn about your test results    Speak to your doctor   If you have compliments or concerns about an experience at your clinic, or if you wish to file a complaint, please contact [facility] Patient Relations at [phone] or email us at [email or web address]         Additional Information About Your Visit        Convene Information     Convene is an electronic gateway that provides easy, online access to your medical records. With Convene, you can request a clinic appointment, read your test results, renew a prescription or communicate with your care team.     To sign up for [employer] visit the website at [email or web address]   You will be asked to enter the access code listed below, as well as some personal information. Please follow the  directions to create your username and password.     Your access code is: [email or web address]  Expires: 2017  3:33 PM     Your access code will  in 90 days. If you need help or a new code, please contact your [facility] or call [phone] for assistance.        Care EveryWhere ID     This is your Care EveryWhere ID. This could be used by other organizations to access your [place] medical records  [id number]        Your Vitals Were     Pulse BMI (Body Mass Index)                84 31.31 kg/m2           Blood Pressure from Last 3 Encounters:   17 (!) 132/91   17 119/86   10/27/17 (!) 132/92    Weight from Last 3 Encounters:   17 96.2 kg (212 lb)   17 94.3 kg (208 lb)   10/27/17 91.8 kg (202 lb 6.4 oz)              Today, you had the following     No orders found for display         Today's Medication Changes          These changes are accurate as of: 17 11:59 PM.  If you have any questions, ask your nurse or doctor.               These medicines have changed or have updated prescriptions.        Dose/Directions    * temozolomide 20 MG capsule CHEMO   Commonly known as:  TEMODAR   This may have changed:  Another medication with the same name was added. Make sure you understand how and when to take each.   Used for:  Glioblastoma multiforme of temporal lobe (H)   Changed by:  Mac Silva RPH        Dose:  75 mg/m2/day   Take 8 capsules (160 mg) by mouth At Bedtime Take on an empty stomach. Take a dose of nausea medication 30-60 min before temozolomide.   Quantity:  240 capsule   Refills:  0       * temozolomide 20 MG capsule CHEMO   Commonly known as:  TEMODAR   This may have changed:  You were already taking a medication with the same name, and this prescription was added. Make sure you understand how and when to take each.   Used for:  Glioblastoma multiforme of temporal lobe (H)   Changed by:  Anyi Hicks RPH        Dose:  75 mg/m2/day    Take 8 capsules (160 mg) by mouth At Bedtime Take on an empty stomach.   Quantity:  96 capsule   Refills:  0       * Notice:  This list has 2 medication(s) that are the same as other medications prescribed for you. Read the directions carefully, and ask your doctor or other care provider to review them with you.         Where to get your medicines      These medications were sent to [place], [place] - [address]  [address], [facility] [address]    Hours:  TRANSPLANT PHONE NUMBER [phone] Phone:  [phone]     temozolomide 20 MG capsule CHEMO                Primary Care Provider Office Phone # Fax #    R Nigel Salmon [phone] [address] [facility] [address]        Equal Access to Services     PERICO ESPINOZA AH: Beto wright Soneha, waaxda luqadaha, qaybta kaalmada adeegyada, héctor kerns. So [facility] [phone].    ATENCIÓN: Si habla español, tiene a granados disposición servicios gratuitos de asistencia lingüística. Llame al [phone].    We comply with applicable federal civil rights laws and [place] laws. We do not discriminate on the basis of race, color, national origin, age, disability, sex, sexual orientation, or gender identity.            Thank you!     Thank you for choosing [facility] ONCOL[facility]Y CLINIC  for your care. Our goal is always to provide you with excellent care. Hearing back from our patients is one way we can continue to improve our services. Please take a few minutes to complete the written survey that you may receive in the mail after your visit with us. Thank you!             Your Updated Medication List - Protect others around you: Learn how to safely use, store and throw away your medicines at www.disposemymeds.org.          This list is accurate as of: 11/20/17 11:59 PM.  Always use your most recent med list.                   Brand Name Dispense  Instructions for use Diagnosis    levETIRAcetam 1000 MG Tabs    KEPPRA    180 tablet    Take 1,000 mg by mouth every 12 hours    Brain mass       MULTIVITAMIN & MINERAL PO      Take 1 tablet by mouth daily        ondansetron 8 MG tablet    ZOFRAN    28 tablet    Take 1 tablet (8 mg) by mouth daily for 28 days Take before each dose of temozolomide.    Glioblastoma multiforme of temporal lobe (H)       pantoprazole 40 MG EC tablet    PROTONIX    30 tablet    Take 1 tablet (40 mg) by mouth every morning (before breakfast)    Brain mass       prochlorperazine 10 MG tablet    COMPAZINE    30 tablet    Take 1 tablet (10 mg) by mouth every 6 hours as needed (Nausea/Vomiting)    Glioblastoma multiforme of temporal lobe (H)       senna-docusate 8.6-50 MG per tablet    SENOKOT-S;PERICOLACE    100 tablet    Take 1-2 tablets by mouth 2 times daily    Brain mass       * temozolomide 20 MG capsule CHEMO    TEMODAR    240 capsule    Take 8 capsules (160 mg) by mouth At Bedtime Take on an empty stomach. Take a dose of nausea medication 30-60 min before temozolomide.    Glioblastoma multiforme of temporal lobe (H)       * temozolomide 20 MG capsule CHEMO    TEMODAR    96 capsule    Take 8 capsules (160 mg) by mouth At Bedtime Take on an empty stomach.    Glioblastoma multiforme of temporal lobe (H)       * Notice:  This list has 2 medication(s) that are the same as other medications prescribed for you. Read the directions carefully, and ask your doctor or other care provider to review them with you.

## 2017-11-20 NOTE — PROGRESS NOTES
RADIATION ONCOLOGY WEEKLY ON TREATMENT VISIT   Encounter Date: 2017    Patient Name: Seth Iglesias  MRN: [id number]  : 1960     Disease and Stage: Right frontotemporal glioblastoma  Treatment Site: Partial brain  Current Dose/Planned Total Dose: 1200 / 6000 cGy  Daily Fraction Size: 200 cGy/day, 5 times/week  Concurrent Chemotherapy: Yes  Drug and Frequency: Daily Temodar    Subjective: Mr. Iglesias presents to clinic today for his weekly on-treatment visit. He continues to tolerate chemoradiotherapy very well and has no pressing concerns or complaints on exam outside of some mild to moderate pain radiating down his right upper extremity which he attributes to a pinched nerve. He has had these symptoms previously in the past and has been treated treated at a local chiropractor with relief of his symptoms. Regarding his ongoing chemoradiotherapy, he has noted some slightly increased fatigue over the weekend although denies any seizures, loss of consciousness, motor weakness, sensory deficits, headaches or nausea/vomiting. His remaining ROS are unremarkable.     ROS:   Nutrition Alteration  Diet Type: Patient's Preference    Skin  Skin Reaction: No changes    CNS Alteration  CNS note: No seizures/LOC/headaches    ENT and Mouth Exam  Mucositis - Current: None      Gastrointestinal  Nausea: None      Pain Assessment  0-10 Pain Scale: 3  Pain Note: Right neck pain radiating down the right arm    Objective:   Weight: 96.2 kg  BP: 132/91  Pulse: 84    General: Alert, oriented and in NAD  Cardiac: Extremities are warm and well-perfused  Respiratory: No wheezing, stridor or respiratory distress  Skin: No erythema  Neuro: CN II-XII grossly intact, 5/5 motor strength in bilateral upper/lower extremities, normal gait and station    Treatment-related toxicities (CTCAE v4.0):  1. Fatigue: Grade 1: Fatigue relieved by rest    Assessment:    Mr. Iglesias is a 57 year old male with a right frontotemporal  IDH1-wild type glioblastoma s/p gross total resection. He is undergoing adjuvant chemoradiotherapy which he is tolerating well with no acute treatment-related toxicities.    Plan:   1. Continue radiotherapy  2. Start daily moisturizer use to the right frontotemporal scalp    Mosaiq chart and setup information reviewed  MVCT images reviewed    Educational Topic Discussed  Additional Instructions: Weekly CBC on Mondays    Dominic Louis MD/PhD  Department of Radiation Oncology  [facility]

## 2017-11-21 NOTE — TELEPHONE ENCOUNTER
Oral Chemotherapy Monitoring Program    Placed a call out to patient to follow up on Temodar therapy that started last Sunday 11-. His wife answered the phone and we talked for a little bit about Seth's therapy. She reports things are going great and he is not experiencing any nausea or side effects limiting his quality of life. He is experiencing some fatigue; we talked about combating this with more regular exercise and increased water intake (only drinks about 2 cups of water per day at this time). Otherwise she reports things are going well and that they have no questions at this time.     Currently our refill QNR date says that he will not need another prescription, but his wife said that they were only given a months supply and will need to be on therapy for 2 more weeks to finish out the prescribed 6 week cycle plan. I let her know we would update our records and gave her our number for further follow up. She verbalized understanding of everything that we discussed, our role in her 's care, and thanked me for the call.    Meliza Resendiz, Pharmacy Intern  Oral Chemotherapy Monitoring Program  [facility]  [phone]

## 2017-11-24 NOTE — PROGRESS NOTES
"Hematology-Oncology Visit  Nov 24, 2017    Reason for Visit: follow-up glioblastoma     HPI: Seth Iglesias is a 57 year old gentleman with past medical history of psoriasis, HLD with glioblastoma. He presented with \"dizzy spells\" 9/2017 and went to [facility] ED 9/28 with lethargy, confusion, and dizziness. CT was done showing R temporal lobe mass with edema. He was given IV steroids and had MRI showing large tumor in anterior right temporal lobe with associated intratumoral hemorrhage and cerebellar mass effect. Tumor was 4.4 cm. There was a potential additional lesion measuring 5 mm in paramedian right frontal lobe. He was admitted to [facility] and had right temporal craniotomy on 10/3 by Dr. Wiggins. He performed gross total resection of the tumor, confirming WHO grade 4 glioblastoma. IDH testing was negative. MGMT promoter methylation was absent. He started concurrent chemoradiation with Temodar on 11/12/17.     Interval History: Mr. Iglesias presents in routine follow-up today while on chemoradiation. He is feeling well. He has some fatigue with treatment but is still working. He will rest when he needs to and sometimes will leave work early. He denies any nausea with taking zofran prior to chemo. His appetite is a little lower. Denies any bowel changes, fevers/chills, or infectious concerns. He still has some R jaw pain since surgery but this overall continues with time. Denies any hearing changes, changes with smell, headaches, vision changes, weakness, paraesthesias, seizures, or other neurologic changes. ROS otherwise negative.      Current Outpatient Prescriptions   Medication     temozolomide (TEMODAR) 20 MG capsule CHEMO     prochlorperazine (COMPAZINE) 10 MG tablet     ondansetron (ZOFRAN) 8 MG tablet     temozolomide (TEMODAR) 20 MG capsule CHEMO     senna-docusate (SENOKOT-S;PERICOLACE) 8.6-50 MG per tablet     pantoprazole (PROTONIX) 40 MG EC tablet     levETIRAcetam (KEPPRA) 1000 MG TABS     Multiple " "Vitamins-Minerals (MULTIVITAMIN & MINERAL PO)     No current facility-administered medications for this visit.        PHYSICAL EXAM:  /82  Pulse 82  Temp 97  F (36.1  C) (Tympanic)  Resp 18  Ht 1.753 m (5' 9.02\")  Wt 95.3 kg (210 lb)  SpO2 97%  BMI 31 kg/m2  General: Alert, oriented, pleasant, NAD  Skin: No rashes on exposed skin   HEENT: Normocephalic, atraumatic, PERRLA, EOMI. Moist mucus membranes, no lesions or thrush  Lungs: CTA bilaterally, normal work of breathing  Cardiac: RRR, S1, S2, no murmurs  Abdomen: Soft, nontender, nondistended. Normoactive bowel sounds.   Neuro: CNII-XII intact, patellar and biceps reflexes 2+ and equal, strength is 5/5 UE and LE and equal, negative Romberg, intact finger to nose, no issues with tandem gait, heel, or toe walking. Gait is normal.   Extremities: No pedal edema    Labs:    11/20/2017 11:53   Sodium 138   Potassium 4.2   Chloride 104   Carbon Dioxide 25   Urea Nitrogen 16   Creatinine 1.01   GFR Estimate 75   GFR Estimate If Black >90   Calcium 8.8   Anion Gap 9   Albumin 3.4   Protein Total 7.1   Bilirubin Total 0.5   Alkaline Phosphatase 97   ALT 32   AST 22   Glucose 83   WBC 8.3   Hemoglobin 13.2 (L)   Hematocrit 40.2   Platelet Count 302   RBC Count 4.23 (L)   MCV 95   MCH 31.2   MCHC 32.8   RDW 13.5   Diff Method Automated Method   % Neutrophils 61.8   % Lymphocytes 26.6   % Monocytes 9.9   % Eosinophils 1.1   % Basophils 0.2   % Immature Granulocytes 0.4   Nucleated RBCs 0   Absolute Neutrophil 5.1   Absolute Lymphocytes 2.2   Absolute Monocytes 0.8   Absolute Eosinophils 0.1   Absolute Basophils 0.0   Abs Immature Granulocytes 0.0   Absolute Nucleated RBC 0.0       Assessment & Plan:     1. Right temporal GBM: S/p gross total resection on 10/3/17. He initiated chemoradiation on 11/13/17. He is tolerating treatment well with mild fatigue. His CBC from Monday shows mild anemia otherwise no myelosuppression. Discussed fatigue from treatment would be " cumulative otherwise he should continue to tolerate Temodar well. Plan will be to complete chemoradiation around 12/22. He has follow-up brain MRI early January and follow-up with Dr. Wiggins. Then we would start adjuvant Temodar about a month after chemoradiation. This may or may not been given with [employer]. Adjuvant Temodar is given monthly for typically 1 year of treatment. I will see him back the end of treatment. Labs every 2 weeks. He will call with any interval concerns.     Helena Landin PA-C    [facility]  [address] [place] [address]  [phone]

## 2017-11-24 NOTE — LETTER
"11/24/2017      RE: Seth Iglesias  [address]  [facility] [address]       Hematology-Oncology Visit  Nov 24, 2017    Reason for Visit: follow-up glioblastoma     HPI: Seth Iglesias is a 57 year old gentleman with past medical history of psoriasis, HLD with glioblastoma. He presented with \"dizzy spells\" 9/2017 and went to [facility] ED 9/28 with lethargy, confusion, and dizziness. CT was done showing R temporal lobe mass with edema. He was given IV steroids and had MRI showing large tumor in anterior right temporal lobe with associated intratumoral hemorrhage and cerebellar mass effect. Tumor was 4.4 cm. There was a potential additional lesion measuring 5 mm in paramedian right frontal lobe. He was admitted [facility] and had right temporal craniotomy on 10/3 by Dr. Wiggins. He performed gross total resection of the tumor, confirming WHO grade 4 glioblastoma. IDH testing was negative. MGMT promoter methylation was absent. He started concurrent chemoradiation with Temodar on 11/12/17.     Interval History: Mr. Iglesias presents in routine follow-up today while on chemoradiation. He is feeling well. He has some fatigue with treatment but is still working. He will rest when he needs to and sometimes will leave work early. He denies any nausea with taking zofran prior to chemo. His appetite is a little lower. Denies any bowel changes, fevers/chills, or infectious concerns. He still has some R jaw pain since surgery but this overall continues with time. Denies any hearing changes, changes with smell, headaches, vision changes, weakness, paraesthesias, seizures, or other neurologic changes. ROS otherwise negative.      Current Outpatient Prescriptions   Medication     temozolomide (TEMODAR) 20 MG capsule CHEMO     prochlorperazine (COMPAZINE) 10 MG tablet     ondansetron (ZOFRAN) 8 MG tablet     temozolomide (TEMODAR) 20 MG capsule CHEMO     senna-docusate (SENOKOT-S;PERICOLACE) 8.6-50 MG per tablet     " "pantoprazole (PROTONIX) 40 MG EC tablet     levETIRAcetam (KEPPRA) 1000 MG TABS     Multiple Vitamins-Minerals (MULTIVITAMIN & MINERAL PO)     No current facility-administered medications for this visit.        PHYSICAL EXAM:  /82  Pulse 82  Temp 97  F (36.1  C) (Tympanic)  Resp 18  Ht 1.753 m (5' 9.02\")  Wt 95.3 kg (210 lb)  SpO2 97%  BMI 31 kg/m2  General: Alert, oriented, pleasant, NAD  Skin: No rashes on exposed skin   HEENT: Normocephalic, atraumatic, PERRLA, EOMI. Moist mucus membranes, no lesions or thrush  Lungs: CTA bilaterally, normal work of breathing  Cardiac: RRR, S1, S2, no murmurs  Abdomen: Soft, nontender, nondistended. Normoactive bowel sounds.   Neuro: CNII-XII intact, patellar and biceps reflexes 2+ and equal, strength is 5/5 UE and LE and equal, negative Romberg, intact finger to nose, no issues with tandem gait, heel, or toe walking. Gait is normal.   Extremities: No pedal edema    Labs:    11/20/2017 11:53   Sodium 138   Potassium 4.2   Chloride 104   Carbon Dioxide 25   Urea Nitrogen 16   Creatinine 1.01   GFR Estimate 75   GFR Estimate If Black >90   Calcium 8.8   Anion Gap 9   Albumin 3.4   Protein Total 7.1   Bilirubin Total 0.5   Alkaline Phosphatase 97   ALT 32   AST 22   Glucose 83   WBC 8.3   Hemoglobin 13.2 (L)   Hematocrit 40.2   Platelet Count 302   RBC Count 4.23 (L)   MCV 95   MCH 31.2   MCHC 32.8   RDW 13.5   Diff Method Automated Method   % Neutrophils 61.8   % Lymphocytes 26.6   % Monocytes 9.9   % Eosinophils 1.1   % Basophils 0.2   % Immature Granulocytes 0.4   Nucleated RBCs 0   Absolute Neutrophil 5.1   Absolute Lymphocytes 2.2   Absolute Monocytes 0.8   Absolute Eosinophils 0.1   Absolute Basophils 0.0   Abs Immature Granulocytes 0.0   Absolute Nucleated RBC 0.0       Assessment & Plan:     1. Right temporal GBM: S/p gross total resection on 10/3/17. He initiated chemoradiation on 11/13/17. He is tolerating treatment well with mild fatigue. His CBC from Monday " shows mild anemia otherwise no myelosuppression. Discussed fatigue from treatment would be cumulative otherwise he should continue to tolerate Temodar well. Plan will be to complete chemoradiation around 12/22. He has follow-up brain MRI early January and follow-up with Dr. Wiggins. Then we would start adjuvant Temodar about a month after chemoradiation. This may or may not been given with [employer] Device. Adjuvant Temodar is given monthly for typically 1 year of treatment. I will see him back the end of treatment. Labs every 2 weeks. He will call with any interval concerns.     Helena Landin PA-C    [facility]  [address] [place] [id number] [phone]

## 2017-11-24 NOTE — MR AVS SNAPSHOT
After Visit Summary   11/24/2017    Seth Iglesias    MRN: [id number]           Patient Information     Date Of Birth          1960        Visit Information        Provider Department      11/24/2017 12:00 PM Helena Landin PA [facility]         Follow-ups after your visit        Your next 10 appointments already scheduled     Nov 27, 2017 10:15 AM CST   EXTERNAL RADIATION TREATMENT with [facility] RAD ONC ANAY   Radiation Oncology Clinic ([facility])    [facility]  1st Floor  [address] [address]   [phone]            Nov 27, 2017 10:30 AM CST   ON TREATMENT VISIT with Dominic Louis MD   Radiation Oncology Clinic ([facility])    [facility]  1st Floor  [address] [address]   [phone]            Nov 28, 2017 10:15 AM CST   EXTERNAL RADIATION TREATMENT with [facility] RAD ONC ANAY   Radiation Oncology Clinic ([facility])    [facility]  1st Floor  [address] [address]   [phone]            Nov 29, 2017 10:15 AM CST   EXTERNAL RADIATION TREATMENT with [facility] RAD ONC ANAY   Radiation Oncology Clinic ([facility])    [facility]  1st Floor  [address] [address]   [phone]            Nov 30, 2017 10:15 AM CST   EXTERNAL RADIATION TREATMENT with [facility] RAD ONC ANAY   Radiation Oncology Clinic ([facility])    [facility]  1st Floor  [address] [address]   [phone]            Dec 01, 2017 10:15 AM CST   EXTERNAL RADIATION TREATMENT with [facility] RAD ONC ANAY   Radiation Oncology Clinic ([facility])    [facility]  1st Floor  [address] [address]   [phone]            Dec 02, 2017  9:00 AM CST   EXTERNAL RADIATION TREATMENT with  [facility] RAD ONC ANAY   Radiation Oncology Clinic ([facility])    [facility]  1st Floor  [address] [address]   [phone]            Dec 04, 2017 10:30 AM CST   ON TREATMENT VISIT with Dominic Louis MD   Radiation Oncology Clinic ([facility])    [facility]  1st Floor  [address] [address]   [phone]            Dec 04, 2017 10:45 AM CST   LAB with ACUTE CARE LAB [facility], [facility] ([facility])    [address] [facility] [address]              Please do not eat 10-12 hours before your appointment if you are coming in fasting for labs on lipids, cholesterol, or glucose (sugar). This does not apply to pregnant women. Water, hot tea and black coffee (with nothing added) are okay. Do not drink other fluids, diet soda or chew gum.            Dec 05, 2017 10:15 AM CST   EXTERNAL RADIATION TREATMENT with [facility] RAD ONC ANAY   Radiation Oncology Clinic ([facility])    [facility]  1st Floor  [address] [address]   [phone]              Who to contact     If you have questions or need follow up information about today's clinic visit or your schedule please contact [facility] directly at [phone].  Normal or non-critical lab and imaging results will be communicated to you by MyChart, letter or phone within 4 business days after the clinic has received the results. If you do not hear from us within 7 days, please contact the clinic through MyChart or phone. If you have a critical or abnormal lab result, we will notify you by phone as soon as possible.  Submit refill requests through [employer] or call your pharmacy and they will forward the refill request to us. Please allow 3 business days for your refill to be completed.          Additional Information About  "Your Visit        MyChart Information     PhoneFusion lets you send messages to your doctor, view your test results, renew your prescriptions, schedule appointments and more. To sign up, go to [email or web address] . Click on \"Log in\" on the left side of the screen, which will take you to the Welcome page. Then click on \"Sign up Now\" on the right side of the page.     You will be asked to enter the access code listed below, as well as some personal information. Please follow the directions to create your username and password.     Your access code is: [email or web address]  Expires: 2017  3:33 PM     Your access code will  in 90 days. If you need help or a new code, please call your [place] clinic or [phone].        Care EveryWhere ID     This is your Care EveryWhere ID. This could be used by other organizations to access your [place] medical records  [id number]        Your Vitals Were     Pulse Temperature Respirations Height Pulse Oximetry BMI (Body Mass Index)    82 97  F (36.1  C) (Tympanic) 18 1.753 m (5' 9.02\") 97% 31 kg/m2       Blood Pressure from Last 3 Encounters:   17 118/82   17 (!) 132/91   17 119/86    Weight from Last 3 Encounters:   17 95.3 kg (210 lb)   17 96.2 kg (212 lb)   17 94.3 kg (208 lb)              Today, you had the following     No orders found for display       Primary Care Provider Office Phone # Fax #    R Nigel Salmon [phone] [address] [facility] [address]        Equal Access to Services     [facility]: Beto Carrasco, waalex gautam, dashawn kaalmada singh, héctor kerns. So [facility] [phone].    ATENCIÓN: Si habla español, tiene a granados disposición servicios gratuitos de asistencia lingüística. Llame al [phone].    We comply with applicable federal civil rights laws and [place] laws. We do not discriminate on the basis of race, color, national " origin, age, disability, sex, sexual orientation, or gender identity.            Thank you!     Thank you for choosing [facility]  for your care. Our goal is always to provide you with excellent care. Hearing back from our patients is one way we can continue to improve our services. Please take a few minutes to complete the written survey that you may receive in the mail after your visit with us. Thank you!             Your Updated Medication List - Protect others around you: Learn how to safely use, store and throw away your medicines at www.disposemymeds.org.          This list is accurate as of: 11/24/17 12:33 PM.  Always use your most recent med list.                   Brand Name Dispense Instructions for use Diagnosis    levETIRAcetam 1000 MG Tabs    KEPPRA    180 tablet    Take 1,000 mg by mouth every 12 hours    Brain mass       MULTIVITAMIN & MINERAL PO      Take 1 tablet by mouth daily        ondansetron 8 MG tablet    ZOFRAN    28 tablet    Take 1 tablet (8 mg) by mouth daily for 28 days Take before each dose of temozolomide.    Glioblastoma multiforme of temporal lobe (H)       pantoprazole 40 MG EC tablet    PROTONIX    30 tablet    Take 1 tablet (40 mg) by mouth every morning (before breakfast)    Brain mass       prochlorperazine 10 MG tablet    COMPAZINE    30 tablet    Take 1 tablet (10 mg) by mouth every 6 hours as needed (Nausea/Vomiting)    Glioblastoma multiforme of temporal lobe (H)       senna-docusate 8.6-50 MG per tablet    SENOKOT-S;PERICOLACE    100 tablet    Take 1-2 tablets by mouth 2 times daily    Brain mass       * temozolomide 20 MG capsule CHEMO    TEMODAR    240 capsule    Take 8 capsules (160 mg) by mouth At Bedtime Take on an empty stomach. Take a dose of nausea medication 30-60 min before temozolomide.    Glioblastoma multiforme of temporal lobe (H)       * temozolomide 20 MG capsule CHEMO    TEMODAR    96 capsule    Take 8 capsules (160 mg) by mouth At  Bedtime Take on an empty stomach.    Glioblastoma multiforme of temporal lobe (H)       * Notice:  This list has 2 medication(s) that are the same as other medications prescribed for you. Read the directions carefully, and ask your doctor or other care provider to review them with you.

## 2017-11-24 NOTE — NURSING NOTE
"Oncology Rooming Note    November 24, 2017 11:29 AM   Seth Iglesias is a 57 year old male who presents for:    Chief Complaint   Patient presents with     Oncology Clinic Visit     Return visit related to Glioblastoma     Initial Vitals: /82  Pulse 82  Temp 97  F (36.1  C) (Tympanic)  Resp 18  Ht 1.753 m (5' 9.02\")  Wt 95.3 kg (210 lb)  SpO2 97%  BMI 31 kg/m2 Estimated body mass index is 31 kg/(m^2) as calculated from the following:    Height as of this encounter: 1.753 m (5' 9.02\").    Weight as of this encounter: 95.3 kg (210 lb). Body surface area is 2.15 meters squared.  Mild Pain (3) Comment: Data Unavailable   No LMP for male patient.  Allergies reviewed: Yes  Medications reviewed: Yes    Medications: MEDICATION REFILLS NEEDED TODAY. Provider was notified.  Pharmacy name entered into [employer]:    [id number] - [place], [place] - [address]  [id number] - [place], [place] - [address]    Clinical concerns: Refill needed for 2 weeks supply temozolomide 20 MG capsule. Also refill for ant nausea.     Provider was notified.    10 minutes for nursing intake (face to face time)     Ana Edmondson LPN            "

## 2017-11-24 NOTE — NURSING NOTE
Lab orders do not meet draw parameters, patient checked in for provider visit.  Mariam Sanders, CMA

## 2017-11-24 NOTE — NURSING NOTE
Chief Complaint   Patient presents with     Blood Draw     venipuncture     Vitals done and labs drawn, see flow sheets.  KANE BOB, CMA

## 2017-11-27 NOTE — PROGRESS NOTES
"Patient's wife called with questions regarding Enbrel and chemotherapy. She states in the past patient was on Enbrel for psoriasis and canker sores. He has not restarted medication since having surgery and starting chemo/radiation. Patient has started to develop \"lots of canker sores in his mouth since he hasn't been on the Enbrel. It is starting to get very painful for him.\"  States he has a small patch of psoriasis that has started \"overall his skin is not too bad yet.\" Wife is questioning if it is possible for him to restart. Message sent to Dr. Armstrong for further advise.      "

## 2017-11-27 NOTE — PROGRESS NOTES
RADIATION ONCOLOGY WEEKLY ON TREATMENT VISIT   Encounter Date: 2017    Patient Name: Seth Iglesias  MRN: [id number]  : 1960     Disease and Stage: Right frontotemporal glioblastoma  Treatment Site: Partial brain  Current Dose/Planned Total Dose: 2000 / 6000 cGy  Daily Fraction Size: 200 cGy/day, 5 times/week  Concurrent Chemotherapy: Yes  Drug and Frequency: Daily Temodar    Subjective: Mr. Iglesias presents to clinic today for his weekly on-treatment visit. He continues to tolerate radiotherapy with concurrent Temodar very well with no significant adverse treatment-related toxicities outside of some mild fatigue. His review of systems is only otherwise positive for ongoing mild-to-moderate right upper extremity pain which has been attributed to a pinched nerve.    ROS:   Nutrition Alteration  Diet Type: Patient's Preference    Skin  Skin Reaction: No changes    ENT and Mouth Exam  Mucositis - Current: None      Gastrointestinal  Nausea: None      Pain Assessment  Pain Note: Mild to moderate right neck pain radiating to the right upper extremity    Objective:   Weight: 95 kg    General: Alert, oriented in no acute distress  Cardiac: Extremities are warm and well-perfused  Respiratory: No wheezing, stridor or respiratory distress  Skin: Mild diffuse erythema over the treatment field  Neuro: CN II-XII grossly intact with 5/5 motor strength in bilateral upper/lower extremities. Normal gait.    Treatment-related toxicities (CTCAE v4.0):  1. Fatigue: Grade 1: Fatigue relieved by rest  2. Dermatitis: Grade 1: Faint erythema or dry desquamation    Assessment:    Mr. 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 He is undergoing adjuvant chemoradiotherapy with concurrent Temodar which he is tolerating very well with minimal acute treatment-related toxicities.    Plan:   1. Continue radiotherapy  2. Continue daily  moisturizer to the right frontotemporal scalp    Mosaiq chart and setup information reviewed  MVCT images reviewed    Dominic Louis MD/PhD  Department of Radiation Oncology  [facility]

## 2017-11-27 NOTE — MR AVS SNAPSHOT
After Visit Summary   11/27/2017    Seth Iglesias    MRN: [id number]           Patient Information     Date Of Birth          1960        Visit Information        Provider Department      11/27/2017 10:30 AM Dominic Louis MD Radiation Oncology Clinic        Today's Diagnoses     Glioblastoma multiforme of temporal lobe (H)    -  1       Follow-ups after your visit        Your next 10 appointments already scheduled     Nov 28, 2017 10:15 AM CST   EXTERNAL RADIATION TREATMENT with [facility] RAD ONC ANAY   Radiation Oncology Clinic ([facility])    [facility]  1st Floor  [address] [address]   [phone]            Nov 29, 2017 10:15 AM CST   EXTERNAL RADIATION TREATMENT with [facility] RAD ONC ANAY   Radiation Oncology Clinic ([facility])    [facility]  1st Floor  [address] [address]   [phone]            Nov 30, 2017 10:15 AM CST   EXTERNAL RADIATION TREATMENT with [facility] RAD ONC ANAY   Radiation Oncology Clinic ([facility])    [facility]  1st Floor  [address] [address]   [phone]            Dec 01, 2017 10:15 AM CST   EXTERNAL RADIATION TREATMENT with P RAD ONC ANAY   Radiation Oncology Clinic ([facility])    [facility]  1st Floor  [address] [address]   [phone]            Dec 02, 2017  9:00 AM CST   EXTERNAL RADIATION TREATMENT with [facility] RAD ONC ANAY   Radiation Oncology Clinic ([facility])    [facility]  1st Floor  [address] [address]   [phone]            Dec 04, 2017 10:30 AM CST   ON TREATMENT VISIT with Dominic Louis MD   Radiation Oncology Clinic ([facility])    [facility]  1st Floor  [address] [address]    [phone]            Dec 04, 2017 10:45 AM CST   LAB with ACUTE CARE LAB [facility] [place], Lab ([facility] [place])    [address] [facility] [address] [facility] [address]              Please do not eat 10-12 hours before your appointment if you are coming in fasting for labs on lipids, cholesterol, or glucose (sugar). This does not apply to pregnant women. Water, hot tea and black coffee (with nothing added) are okay. Do not drink other fluids, diet soda or chew gum.            Dec 05, 2017 10:15 AM CST   EXTERNAL RADIATION TREATMENT with [facility] RAD ONC ANAY   Radiation Oncology Clinic ([facility])    [facility]  1st Floor  [address] [facility] [address] [address] [phone]            Dec 06, 2017 10:15 AM CST   EXTERNAL RADIATION TREATMENT with [facility] RAD ONC AANY   Radiation Oncology Clinic ([facility])    [facility]  1st Floor  [address] [facility] [address] [address] [phone]            Dec 07, 2017 10:15 AM CST   EXTERNAL RADIATION TREATMENT with [facility] RAD ONC ANAY   Radiation Oncology Clinic ([facility])    [facility]  1st Floor  [address] [facility] [address] [phone]              Who to contact     Please call your clinic at [phone] to:    Ask questions about your health    Make or cancel appointments    Discuss your medicines    Learn about your test results    Speak to your doctor   If you have compliments or concerns about an experience at your clinic, or if you wish to file a complaint, please contact [facility] Patient Relations at [phone] or email us at [email or web address]         Additional Information About Your Visit        "[employer]" Information     "[employer]" is an electronic gateway that provides easy, online access to your medical records. With "[employer]", you can request a clinic  appointment, read your test results, renew a prescription or communicate with your care team.     To sign up for [employer] visit the website at [email or web address]   You will be asked to enter the access code listed below, as well as some personal information. Please follow the directions to create your username and password.     Your access code is: [email or web address]  Expires: 2017  3:33 PM     Your access code will  in 90 days. If you need help or a new code, please contact your [facility] or call [phone] for assistance.        Care EveryWhere ID     This is your Care EveryWhere ID. This could be used by other organizations to access your [place] medical records  [id number]        Your Vitals Were     BMI (Body Mass Index)                   30.92 kg/m2            Blood Pressure from Last 3 Encounters:   17 118/82   17 (!) 132/91   17 119/86    Weight from Last 3 Encounters:   17 95 kg (209 lb 8 oz)   17 95.3 kg (210 lb)   17 96.2 kg (212 lb)              Today, you had the following     No orders found for display       Primary Care Provider Office Phone # Fax #    R Nigel Salmon [phone] [address] [id number]        Equal Access to Services     PERICO ESPINOZA : Hadii vero qureshi hadasho Soneha, waaxda luqadaha, qaybta kaalmada singh, héctor kerns. So [facility] [phone].    ATENCIÓN: Si habla español, tiene a granados disposición servicios gratuitos de asistencia lingüística. Rodney al [phone].    We comply with applicable federal civil rights laws and [place] laws. We do not discriminate on the basis of race, color, national origin, age, disability, sex, sexual orientation, or gender identity.            Thank you!     Thank you for choosing RADIATION ONCOLOGY CLINIC  for your care. Our goal is always to provide you with excellent care. Hearing back from  our patients is one way we can continue to improve our services. Please take a few minutes to complete the written survey that you may receive in the mail after your visit with us. Thank you!             Your Updated Medication List - Protect others around you: Learn how to safely use, store and throw away your medicines at www.disposemymeds.org.          This list is accurate as of: 11/27/17 11:59 PM.  Always use your most recent med list.                   Brand Name Dispense Instructions for use Diagnosis    levETIRAcetam 1000 MG Tabs    KEPPRA    180 tablet    Take 1,000 mg by mouth every 12 hours    Brain mass       MULTIVITAMIN & MINERAL PO      Take 1 tablet by mouth daily        pantoprazole 40 MG EC tablet    PROTONIX    30 tablet    Take 1 tablet (40 mg) by mouth every morning (before breakfast)    Brain mass       prochlorperazine 10 MG tablet    COMPAZINE    30 tablet    Take 1 tablet (10 mg) by mouth every 6 hours as needed (Nausea/Vomiting)    Glioblastoma multiforme of temporal lobe (H)       senna-docusate 8.6-50 MG per tablet    SENOKOT-S;PERICOLACE    100 tablet    Take 1-2 tablets by mouth 2 times daily    Brain mass       * temozolomide 20 MG capsule CHEMO    TEMODAR    240 capsule    Take 8 capsules (160 mg) by mouth At Bedtime Take on an empty stomach. Take a dose of nausea medication 30-60 min before temozolomide.    Glioblastoma multiforme of temporal lobe (H)       * temozolomide 20 MG capsule CHEMO    TEMODAR    96 capsule    Take 8 capsules (160 mg) by mouth At Bedtime Take on an empty stomach.    Glioblastoma multiforme of temporal lobe (H)       * Notice:  This list has 2 medication(s) that are the same as other medications prescribed for you. Read the directions carefully, and ask your doctor or other care provider to review them with you.

## 2017-11-29 NOTE — PROGRESS NOTES
Previous Messages       ----- Message -----      From: Helena Landin PA      Sent: 11/29/2017  10:32 AM        To: Ana Weaver RN, Yolette Baker [facility], *   Subject: RE: Enbrel                                       I would say it is okay to proceed with caution for now. If he develops an infection then his dermatologist or whoever is his primary on these issues needs to find an alternative therapy.     Helena Santacruz   ----- Message -----      From: Yolette Baker [facility]      Sent: 11/29/2017  10:25 AM        To: Ana Weaver RN, SCARLE[facility]erson, *   Subject: RE: Enbrel                                       Helena,     I did review Enbrel and Temodar, there are no interactions. Infection is going to be a large concern, with Enbrel alone there is >50% likelihood of infections. He would need to be closely monitored if it was decided to go forward with Enbrel.     Thank you,   Yolette Baker [facility]   [phone]        The patient's wife was called and the pharmacist and PA recommendations were reviewed with her.  She was told that it is okay for Seth to restart the Enbrel, but they should monitor closely for signs of infection such as T> 100.4, feeling feverish, chilled, shaking chills, cough, congestion, skin infections, or other concerns for infection.  She was asked to call the clinic promptly for any concerns for infection symptoms.

## 2017-12-04 NOTE — PROGRESS NOTES
RADIATION ONCOLOGY WEEKLY ON TREATMENT VISIT   Encounter Date: 2017    Patient Name: Seth Iglesias  MRN: [id number]  : 1960     Disease and Stage: Right frontotemporal glioblastoma  Treatment Site: Partial brain  Current Dose/Planned Total Dose: 3000 / 6000 cGy  Daily Fraction Size: 200 cGy/day, 5 times/week  Concurrent Chemotherapy: Yes  Drug and Frequency: Daily Temodar    Subjective: Mr. Iglesias presents to clinic today for his weekly on-treatment visit. He continues to do very well and has no pressing concerns or complaints. He specifically denies any headaches, visual changes, seizures/LOC, motor weakness or sensory deficits. His remaining ROS are unremarkable.    ROS:   Nutrition Alteration  Diet Type: Patient's Preference    Skin  Skin Reaction: Mild erythema     ENT and Mouth Exam  Mucositis - Current: None      Gastrointestinal  Nausea: None    Objective:   Weight: 96.1 kg  BP: 107/82  Pulse: 87    General: Alert, oriented and in NAD  Cardiac: Extremities are warm and well-perfused  Respiratory: No wheezing, stridor or respiratory distress  Skin: Mild diffuse erythema over treatment field  Neuro: 5/5 motor strength in bilateral upper/lower extremities. Normal gait and station    Treatment-related toxicities (CTCAE v4.0):  1. Fatigue: Grade 1: Fatigue relieved by rest  2. Dermatitis: Grade 1: Faint erythema or dry desquamation    Assessment:    Mr. Iglesias is a 57 year old male with a right frontotemporal IDH1 wild-type glioblastoma s/p gross total resection. He is undergoing adjuvant chemoradiotherapy with concurrent Temodar which he is tolerating very well with mild radiation-induced toxicities.    Plan:   1. Continue radiotherapy  2. CBC tomorrow (2017)  3. Continue daily/BID moisturizer use to the right frontal scalp    Mosaiq chart and setup information reviewed  MVCT images reviewed    Dominic Louis MD/PhD  Department of Radiation Oncology  [facility]

## 2017-12-04 NOTE — MR AVS SNAPSHOT
After Visit Summary   12/4/2017    Seth Iglesias    MRN: [id number]           Patient Information     Date Of Birth          1960        Visit Information        Provider Department      12/4/2017 10:30 AM Dominic Louis MD Radiation Oncology Clinic        Today's Diagnoses     Glioblastoma multiforme of temporal lobe (H)    -  1       Follow-ups after your visit        Your next 10 appointments already scheduled     Dec 05, 2017 10:15 AM CST   EXTERNAL RADIATION TREATMENT with P RAD ONC ANAY   Radiation Oncology Clinic ([facility])    [facility]  1st Floor  [address] [address]   [phone]            Dec 06, 2017 10:15 AM CST   EXTERNAL RADIATION TREATMENT with UMP RAD ONC ANAY   Radiation Oncology Clinic ([facility])    [facility]  1st Floor  [address] [address]   [phone]            Dec 07, 2017 10:15 AM CST   EXTERNAL RADIATION TREATMENT with P RAD ONC ANAY   Radiation Oncology Clinic ([facility])    [facility]  1st Floor  [address] [address]   [phone]            Dec 08, 2017  9:30 AM CST   (Arrive by 9:15 AM)   New Seizure with Rafael Boyer MD   [facility] Neurology ([facility])    [address]  3rd Floor  [facility] [address] [phone]            Dec 08, 2017 10:15 AM CST   EXTERNAL RADIATION TREATMENT with P RAD ONC ANAY   Radiation Oncology Clinic ([facility])    [facility]  1st Floor  [address] [address]   [phone]            Dec 09, 2017 10:15 AM CST   EXTERNAL RADIATION TREATMENT with P RAD ONC ANAY   Radiation Oncology Clinic ([facility])    [facility]  1st Floor  [address] [address]   [phone]            Dec  11, 2017 10:15 AM CST   EXTERNAL RADIATION TREATMENT with [facility] RAD ONC ANAY   Radiation Oncology Clinic ([facility])    [facility]  1st Floor  [address] [address]   [phone]            Dec 11, 2017 10:30 AM CST   ON TREATMENT VISIT with Dominic Louis MD   Radiation Oncology Clinic ([facility])    [facility]  1st Floor  [address] [address]   [phone]            Dec 12, 2017 10:15 AM CST   EXTERNAL RADIATION TREATMENT with [facility] RAD ONC ANAY   Radiation Oncology Clinic ([facility])    [facility]  1st Floor  [address] [address]   [phone]            Dec 13, 2017 10:15 AM CST   EXTERNAL RADIATION TREATMENT with [facility] RAD ONC ANAY   Radiation Oncology Clinic ([facility])    [facility]  1st Floor  [address] [address] [phone]              Who to contact     Please call your clinic at [phone] to:    Ask questions about your health    Make or cancel appointments    Discuss your medicines    Learn about your test results    Speak to your doctor   If you have compliments or concerns about an experience at your clinic, or if you wish to file a complaint, please contact [facility] Patient Relations at [phone] or email us at [email or web address]         Additional Information About Your Visit        Leverage SoftwareharFriend.ly Information     The [employer]et [employer] is an electronic gateway that provides easy, online access to your medical records. With [employer], you can request a clinic appointment, read your test results, renew a prescription or communicate with your care team.     To sign up for Baileyut visit the website at [email or web address]   You will be asked to enter the access code listed below, as well as some personal information. Please  follow the directions to create your username and password.     Your access code is: [email or web address]  Expires: 2017  3:33 PM     Your access code will  in 90 days. If you need help or a new code, please contact your [facility] or call [phone] for assistance.        Care EveryWhere ID     This is your Care EveryWhere ID. This could be used by other organizations to access your [place] medical records  [id number]        Your Vitals Were     Pulse BMI (Body Mass Index)                87 31.26 kg/m2           Blood Pressure from Last 3 Encounters:   17 107/82   17 118/82   17 (!) 132/91    Weight from Last 3 Encounters:   17 96.1 kg (211 lb 12.8 oz)   17 95 kg (209 lb 8 oz)   17 95.3 kg (210 lb)              Today, you had the following     No orders found for display       Primary Care Provider Office Phone # Fax #    R Nigel Salmon [phone] [address] [address]        Equal Access to Services     [facility]: Hadii aad ku hadasho Solgali, waaxda luqadaha, qaybta kaalmada adeegyada, héctor rand . So [facility] [phone].    ATENCIÓN: Si habla español, tiene a granados disposición servicios gratuitos de asistencia lingüística. Llame al [phone].    We comply with applicable federal civil rights laws and [place] laws. We do not discriminate on the basis of race, color, national origin, age, disability, sex, sexual orientation, or gender identity.            Thank you!     Thank you for choosing RADIATION ONCOLOGY CLINIC  for your care. Our goal is always to provide you with excellent care. Hearing back from our patients is one way we can continue to improve our services. Please take a few minutes to complete the written survey that you may receive in the mail after your visit with us. Thank you!             Your Updated Medication List - Protect others around you:  Learn how to safely use, store and throw away your medicines at www.disposemymeds.org.          This list is accurate as of: 12/4/17 11:24 AM.  Always use your most recent med list.                   Brand Name Dispense Instructions for use Diagnosis    levETIRAcetam 1000 MG Tabs    KEPPRA    180 tablet    Take 1,000 mg by mouth every 12 hours    Brain mass       MULTIVITAMIN & MINERAL PO      Take 1 tablet by mouth daily        pantoprazole 40 MG EC tablet    PROTONIX    30 tablet    Take 1 tablet (40 mg) by mouth every morning (before breakfast)    Brain mass       prochlorperazine 10 MG tablet    COMPAZINE    30 tablet    Take 1 tablet (10 mg) by mouth every 6 hours as needed (Nausea/Vomiting)    Glioblastoma multiforme of temporal lobe (H)       senna-docusate 8.6-50 MG per tablet    SENOKOT-S;PERICOLACE    100 tablet    Take 1-2 tablets by mouth 2 times daily    Brain mass       * temozolomide 20 MG capsule CHEMO    TEMODAR    240 capsule    Take 8 capsules (160 mg) by mouth At Bedtime Take on an empty stomach. Take a dose of nausea medication 30-60 min before temozolomide.    Glioblastoma multiforme of temporal lobe (H)       * temozolomide 20 MG capsule CHEMO    TEMODAR    96 capsule    Take 8 capsules (160 mg) by mouth At Bedtime Take on an empty stomach.    Glioblastoma multiforme of temporal lobe (H)       * Notice:  This list has 2 medication(s) that are the same as other medications prescribed for you. Read the directions carefully, and ask your doctor or other care provider to review them with you.

## 2017-12-06 NOTE — TELEPHONE ENCOUNTER
APPT INFO    Date /Time: 12/8/17, 9:30am    Reason for Appt: Seizures    Ref Provider/Clinic: EMILY SLOAN   Are there internal records? If yes, list: [facility]   Patient Contact (Y/N) & Call Details: No, referred    Action:      OUTSIDE RECORDS CHECKLIST     CLINIC NAME COMMENTS REC (x) IMG (x)

## 2017-12-08 NOTE — MR AVS SNAPSHOT
After Visit Summary   12/8/2017    Seth Iglesias    MRN: [id number]           Patient Information     Date Of Birth          1960        Visit Information        Provider Department      12/8/2017 9:30 AM Rafael Boyer MD [facility] Neurology        Today's Diagnoses     Partial symptomatic epilepsy with complex partial seizures, not intractable, with status epilepticus (H)    -  1    Brain mass           Follow-ups after your visit        Follow-up notes from your care team     Return in about 4 months (around 4/8/2018).      Your next 10 appointments already scheduled     Dec 08, 2017 10:15 AM CST   EXTERNAL RADIATION TREATMENT with P RAD ONC ANAY   Radiation Oncology Clinic ([facility])    [facility]  1st Floor  [address] [address]   [phone]            Dec 09, 2017 11:00 AM CST   EXTERNAL RADIATION TREATMENT with [facility] RAD ONC ANAY   Radiation Oncology Clinic ([facility])    [facility]  1st Floor  [address] [address]   [phone]            Dec 11, 2017 10:15 AM CST   EXTERNAL RADIATION TREATMENT with [facility] RAD ONC ANAY   Radiation Oncology Clinic ([facility])    [facility]  1st Floor  [address] [address]   [phone]            Dec 11, 2017 10:30 AM CST   ON TREATMENT VISIT with Dominic Louis MD   Radiation Oncology Clinic ([facility])    [facility]  1st Floor  [address] [address]   [phone]            Dec 12, 2017 10:15 AM CST   EXTERNAL RADIATION TREATMENT with [facility] RAD ONC ANAY   Radiation Oncology Clinic ([facility])    [facility]  1st Floor  [address] [address]   [phone]            Dec 13, 2017 10:15 AM CST   EXTERNAL RADIATION TREATMENT with [facility] RAD ONC ANAY    Radiation Oncology Clinic ([facility])    [facility]  1st Floor  [address] [address]   [phone]            Dec 14, 2017 10:15 AM CST   EXTERNAL RADIATION TREATMENT with [facility] RAD ONC ANAY   Radiation Oncology Clinic ([facility])    [facility]  1st Floor  [address] [address]   [phone]            Dec 15, 2017 10:15 AM CST   EXTERNAL RADIATION TREATMENT with [facility] RAD ONC ANAY   Radiation Oncology Clinic ([facility])    [facility]  1st Floor  [address] [address]   [phone]            Dec 18, 2017 10:15 AM CST   EXTERNAL RADIATION TREATMENT with [facility] RAD ONC ANAY   Radiation Oncology Clinic ([facility])    [facility]  1st Floor  [address] [address]   [phone]            Dec 18, 2017 10:30 AM CST   ON TREATMENT VISIT with Dominic Louis MD   Radiation Oncology Clinic ([facility])    [facility]  1st Floor  [address] [address]   [phone]              Who to contact     Please call your clinic at [phone] to:    Ask questions about your health    Make or cancel appointments    Discuss your medicines    Learn about your test results    Speak to your doctor   If you have compliments or concerns about an experience at your clinic, or if you wish to file a complaint, please contact [facility] Patient Relations at [phone] or email us at [email or web address]         Additional Information About Your Visit        Catapulter Information     Catapulter is an electronic gateway that provides easy, online access to your medical records. With Catapulter, you can request a clinic appointment, read your test results, renew a prescription or communicate with your care team.     To  "sign up for Courseloadhart visit the website at [email or web address]   You will be asked to enter the access code listed below, as well as some personal information. Please follow the directions to create your username and password.     Your access code is: [email or web address]  Expires: 2017  3:33 PM     Your access code will  in 90 days. If you need help or a new code, please contact your [facility] or call [phone] for assistance.        Care EveryWhere ID     This is your Care EveryWhere ID. This could be used by other organizations to access your [place] medical records  [id number]        Your Vitals Were     Pulse Height BMI (Body Mass Index)             86 1.753 m (5' 9\") 31.09 kg/m2          Blood Pressure from Last 3 Encounters:   17 123/89   17 107/82   17 118/82    Weight from Last 3 Encounters:   17 95.5 kg (210 lb 8 oz)   17 96.1 kg (211 lb 12.8 oz)   17 95 kg (209 lb 8 oz)              We Performed the Following     Keppra (Levetiracetam) Level          Where to get your medicines      These medications were sent to [facility] - [address] [address]     Phone:  [phone]     levETIRAcetam 1000 MG Tabs          Primary Care Provider Office Phone # Fax #    R Nigel Salmon [phone] [address] [address]        Equal Access to Services     PERICO ESPINOZA AH: Hadbuddy Carrasco, peggy gautam, qahéctor oquendo. So [facility] [phone].    ATENCIÓN: Si habla español, tiene a granados disposición servicios gratuitos de asistencia lingüística. Rodney al [phone].    We comply with applicable federal civil rights laws and [place] laws. We do not discriminate on the basis of race, color, national origin, age, disability, sex, sexual orientation, or " gender identity.            Thank you!     Thank you for choosing [facility] NEUROLOGY  for your care. Our goal is always to provide you with excellent care. Hearing back from our patients is one way we can continue to improve our services. Please take a few minutes to complete the written survey that you may receive in the mail after your visit with us. Thank you!             Your Updated Medication List - Protect others around you: Learn how to safely use, store and throw away your medicines at www.disposemymeds.org.          This list is accurate as of: 12/8/17 10:01 AM.  Always use your most recent med list.                   Brand Name Dispense Instructions for use Diagnosis    levETIRAcetam 1000 MG Tabs    KEPPRA    180 tablet    Take 1,000 mg by mouth every 12 hours    Brain mass       MULTIVITAMIN & MINERAL PO      Take 1 tablet by mouth daily        pantoprazole 40 MG EC tablet    PROTONIX    30 tablet    Take 1 tablet (40 mg) by mouth every morning (before breakfast)    Brain mass       prochlorperazine 10 MG tablet    COMPAZINE    30 tablet    Take 1 tablet (10 mg) by mouth every 6 hours as needed (Nausea/Vomiting)    Glioblastoma multiforme of temporal lobe (H)       senna-docusate 8.6-50 MG per tablet    SENOKOT-S;PERICOLACE    100 tablet    Take 1-2 tablets by mouth 2 times daily    Brain mass       * temozolomide 20 MG capsule CHEMO    TEMODAR    240 capsule    Take 8 capsules (160 mg) by mouth At Bedtime Take on an empty stomach. Take a dose of nausea medication 30-60 min before temozolomide.    Glioblastoma multiforme of temporal lobe (H)       * temozolomide 20 MG capsule CHEMO    TEMODAR    96 capsule    Take 8 capsules (160 mg) by mouth At Bedtime Take on an empty stomach.    Glioblastoma multiforme of temporal lobe (H)       * Notice:  This list has 2 medication(s) that are the same as other medications prescribed for you. Read the directions carefully, and ask your doctor or other care provider  to review them with you.

## 2017-12-08 NOTE — LETTER
2017       RE: Seth Iglesias  [address]  [place] [facility] [address]     Dear Colleague,    Thank you for referring your patient, Seth Iglesias, to the [facility] NEUROLOGY at [facility]. Please see a copy of my visit note below.    2017          RE: Seth Iglesias   MRN: [id number]   : 1960              Dear Hilda Salmon and Feliciano and Ms. Esposito:        This man is 48 years old and has been diagnosed with IDH1, IDH2, negative GBM of the right temporal lobe with a total resection.  This story goes that he started around September to have these episodes where he suddenly blanked out or got fatigued and weak and could not move but seemingly maintained his consciousness.  This occurred about 10 times during that and they thought he may have been overtired and did not get surgical help until eventually an MRI of the brain for a seizure was done and was found to have a very large mass in the right temporal lobe which underwent resection on 10/02.  He was started on anticonvulsant levetiracetam 1000 mg twice a day before the surgery.        He has been treated with Temodar and will be undergoing radiation.  He has had a total resection with clear margins and has done well postoperatively.      PAST MEDICAL HISTORY:  Indicates he has been a relatively healthy man with no medical issues except a diagnosis of hyperlipidemia, psoriasis.      SURGICAL HISTORY:  Indicates a right knee meniscal tear and surgery.      CURRENT MEDICATIONS:     1. Temodar.     2. Compazine.     3. Vitamins.      FAMILY HISTORY:  Interesting that multiple family members, 3 of these, with prostate cancer.  There is no brain cancer or seizures and father with blocked arteries.  There is no history of glioma in the family.      SOCIAL HISTORY:  He is a retired  and drives a forklift.  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      REVIEW OF SYSTEMS:  Is as above.       PHYSICAL EXAMINATION:  Shows a very pleasant, talkative man.  His blood pressure is 123/89.  Pulse is 86.  Weight is 210.  His mental status exam is normal.  Memory seems normal.  He is a bit jocular and seems in a little bit of a high.  His cranial nerves are normal.  His scar is anterior, more like a frontal approach and appears well healed.  His strength is good, and his coordination is very good.  His reflexes are brisk, symmetrical.  Plantars are flexor.  No cranial nerve findings.  Neck is supple.      In summary, this man had a very successful resection of a right temporal lobe and had seizures for about a month before the surgery.  It would be best to keep him on the anticonvulsants indefinitely and he is on a dose of 1000 b.i.d.  We will check a concentration today.  He is in the process of completing Temodar and is going to have radiation.      I would like to keep him indefinitely on levetiracetam as he had seizures prior to surgery and the risks are significant for having seizures.  We talked about treatment of driving rule and he understands.  We noted the high incidence of prostate cancer in the family and this will be pursued farther as there may be an association with the GBM.              RAFAEL BOYER MD             D: 2017 10:32   T: 2017 12:38   MT: tb      Name:     MALINA SIMTH   MRN:      [id number]        Account:      [id number]   :      1960           Service Date: 2017      Document: [id number]       Again, thank you for allowing me to participate in the care of your patient.      Sincerely,    Rafael Boyer MD    CC:  JUSTYN Salmon MD   [facility]    [address] [place] [address]      Martha Esposito PA-C   [facility]    [address] [place] [address]      Harry Wiggins MD   [facility]    [address], [address]   [place] [address]

## 2017-12-08 NOTE — PROGRESS NOTES
2017        JUSTYN Salmon MD   [facility]    [address] [place] [address]      Martha Esposito PA-C   [facility]    [address] [place] [address]      Harry Wiggins MD   [facility]    [address], [address]   [place] [address]      RE: Seth Iglesias   MRN: [id number]   : 1960              Dear Hilda Salmon and Feliciano and Ms. Esposito:        This man is 48 years old and has been diagnosed with IDH1, IDH2, negative GBM of the right temporal lobe with a total resection.  This story goes that he started around September to have these episodes where he suddenly blanked out or got fatigued and weak and could not move but seemingly maintained his consciousness.  This occurred about 10 times during that and they thought he may have been overtired and did not get surgical help until eventually an MRI of the brain for a seizure was done and was found to have a very large mass in the right temporal lobe which underwent resection on 10/02.  He was started on anticonvulsant levetiracetam 1000 mg twice a day before the surgery.        He has been treated with Temodar and will be undergoing radiation.  He has had a total resection with clear margins and has done well postoperatively.      PAST MEDICAL HISTORY:  Indicates he has been a relatively healthy man with no medical issues except a diagnosis of hyperlipidemia, psoriasis.      SURGICAL HISTORY:  Indicates a right knee meniscal tear and surgery.      CURRENT MEDICATIONS:     1. Temodar.     2. Compazine.     3. Vitamins.      FAMILY HISTORY:  Interesting that multiple family members, 3 of these, with prostate cancer.  There is no brain cancer or seizures and father with blocked arteries.  There is no history of glioma in the family.      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      REVIEW OF SYSTEMS:  Is as above.       PHYSICAL EXAMINATION:  Shows a very pleasant, talkative man.  His blood pressure is 123/89.  Pulse is 86.  Weight is 210.  His mental status exam is normal.  Memory seems normal.  He is a bit jocular and seems in a little bit of a high.  His cranial nerves are normal.  His scar is anterior, more like a frontal approach and appears well healed.  His strength is good, and his coordination is very good.  His reflexes are brisk, symmetrical.  Plantars are flexor.  No cranial nerve findings.  Neck is supple.      In summary, this man had a very successful resection of a right temporal lobe and had seizures for about a month before the surgery.  It would be best to keep him on the anticonvulsants indefinitely and he is on a dose of 1000 b.i.d.  We will check a concentration today.  He is in the process of completing Temodar and is going to have radiation.      I would like to keep him indefinitely on levetiracetam as he had seizures prior to surgery and the risks are significant for having seizures.  We talked about treatment of driving rule and he understands.  We noted the high incidence of prostate cancer in the family and this will be pursued farther as there may be an association with the GBM.        Sincerely,        MD ELMA Williamson MD             D: 2017 10:32   T: 2017 12:38   MT: tb      Name:     MALINA SMITH   MRN:      [id number]        Account:      [id number]   :      1960           Service Date: 2017      Document: [id number]

## 2017-12-08 NOTE — ORAL ONC MGMT
Oral Chemotherapy Monitoring Program     Placed call to patient in follow up of Temodar therapy.     Left message to please call back in follow-up of therapy. No patient or drug names were mentioned.     Yolette Baker, PharmD  [facility]  [phone]

## 2017-12-11 NOTE — ORAL ONC MGMT
Oral Chemotherapy Monitoring Program     Placed call to patient in follow up of Temodar/XRT therapy.     Left message to please call back in follow-up of therapy. No patient or drug names were mentioned.     Mac Silva PharmD  [facility]  [phone]  December 11, 2017

## 2017-12-11 NOTE — MR AVS SNAPSHOT
After Visit Summary   12/11/2017    Seth Iglesias    MRN: [id number]           Patient Information     Date Of Birth          1960        Visit Information        Provider Department      12/11/2017 10:30 AM Dominic Louis MD Radiation Oncology Clinic        Today's Diagnoses     Glioblastoma multiforme of temporal lobe (H)    -  1       Follow-ups after your visit        Your next 10 appointments already scheduled     Dec 12, 2017 10:15 AM CST   EXTERNAL RADIATION TREATMENT with [facility]   Radiation Oncology Clinic ([facility])    [facility]  1st Floor  [address] [address]   [phone]            Dec 13, 2017 10:15 AM CST   EXTERNAL RADIATION TREATMENT with [facility] RAD ON[facility]   Radiation Oncology Clinic ([facility])    [facility]  1st Floor  [address] [address] [phone]            Dec 14, 2017 10:15 AM CST   EXTERNAL RADIATION TREATMENT with [facility] RAD ONC [facility]   Radiation Oncology Clinic ([facility])    [facility]  1st Floor  [address] [address] [phone]            Dec 15, 2017 10:15 AM CST   EXTERNAL RADIATION TREATMENT with [facility] RAD ONC [facility]   Radiation Oncology Clinic ([facility])    [facility]  1st Floor  [address] [address]   [phone]            Dec 18, 2017 10:15 AM CST   EXTERNAL RADIATION TREATMENT with [facility] RAD ONC [facility]   Radiation Oncology Clinic ([facility])    [facility]  1st Floor  [address] [address] [phone]            Dec 18, 2017 10:30 AM CST   ON TREATMENT VISIT with Dominic Louis MD   Radiation Oncology Clinic ([facility])    [facility]  1st Floor  [address] [address]    [phone]            Dec 19, 2017 10:15 AM CST   EXTERNAL RADIATION TREATMENT with [facility] RAD ONC [facility]   Radiation Oncology Clinic ([facility])    [facility]  1st Floor  [address] [address] [phone]            Dec 20, 2017 10:15 AM CST   EXTERNAL RADIATION TREATMENT with [facility] RAD ONC [facility]   Radiation Oncology Clinic ([facility])    [facility]  1st Floor  [address] [address] [phone]            Dec 20, 2017  3:30 PM CST   (Arrive by 3:15 PM)   Return Visit with SCARLETT Pederson   [facility] ([facility] [place])    [address]  2nd Floor  [facility] [address] [phone]            Dec 21, 2017 10:15 AM CST   EXTERNAL RADIATION TREATMENT with [facility] RAD ONC [facility]   Radiation Oncology Clinic ([facility])    [facility]  1st Floor  [address] [address] [phone]              Who to contact     Please call your clinic at [phone] to:    Ask questions about your health    Make or cancel appointments    Discuss your medicines    Learn about your test results    Speak to your doctor   If you have compliments or concerns about an experience at your clinic, or if you wish to file a complaint, please contact [facility] Patient Relations at [phone] or email us at [email or web address]         Additional Information About Your Visit        [email or web address] Information     Taxizu is an electronic gateway that provides easy, online access to your medical records. With Taxizu, you can request a clinic appointment, read your test results, renew a prescription or communicate with your care team.     To sign up for [employer] visit the website at [email or web address]   You will be asked to enter the access code listed below, as well as some  personal information. Please follow the directions to create your username and password.     Your access code is: [email or web address]  Expires: 2017  3:33 PM     Your access code will  in 90 days. If you need help or a new code, please contact your [facility] or call [phone] for assistance.        Care EveryWhere ID     This is your Care EveryWhere ID. This could be used by other organizations to access your [facility] medical records  [id number]        Your Vitals Were     Pulse BMI (Body Mass Index)                80 31.47 kg/m2           Blood Pressure from Last 3 Encounters:   17 (!) 132/93   17 123/89   17 107/82    Weight from Last 3 Encounters:   17 96.7 kg (213 lb 1.6 oz)   17 95.5 kg (210 lb 8 oz)   17 96.1 kg (211 lb 12.8 oz)              Today, you had the following     No orders found for display       Primary Care Provider Office Phone # Fax #    R Nigel Salmon [phone] [address] [address]        Equal Access to Services     PERICO ESPINOZA AH: Hadii vero qureshi hadasho Soomaali, waaxda luqadaha, qaybta kaalmada adeegyada, héctor kerns. So [facility] [phone].    ATENCIÓN: Si habla español, tiene a granados disposición servicios gratuitos de asistencia lingüística. Llame al [phone].    We comply with applicable federal civil rights laws and [place] laws. We do not discriminate on the basis of race, color, national origin, age, disability, sex, sexual orientation, or gender identity.            Thank you!     Thank you for choosing [facility]  for your care. Our goal is always to provide you with excellent care. Hearing back from our patients is one way we can continue to improve our services. Please take a few minutes to complete the written survey that you may receive in the mail after your visit with us. Thank you!             Your Updated  Medication List - Protect others around you: Learn how to safely use, store and throw away your medicines at www.disposemymeds.org.          This list is accurate as of: 12/11/17 12:45 PM.  Always use your most recent med list.                   Brand Name Dispense Instructions for use Diagnosis    levETIRAcetam 1000 MG Tabs    KEPPRA    180 tablet    Take 1,000 mg by mouth every 12 hours    Brain mass       MULTIVITAMIN & MINERAL PO      Take 1 tablet by mouth daily        pantoprazole 40 MG EC tablet    PROTONIX    30 tablet    Take 1 tablet (40 mg) by mouth every morning (before breakfast)    Brain mass       prochlorperazine 10 MG tablet    COMPAZINE    30 tablet    Take 1 tablet (10 mg) by mouth every 6 hours as needed (Nausea/Vomiting)    Glioblastoma multiforme of temporal lobe (H)       senna-docusate 8.6-50 MG per tablet    SENOKOT-S;PERICOLACE    100 tablet    Take 1-2 tablets by mouth 2 times daily    Brain mass       * temozolomide 20 MG capsule CHEMO    TEMODAR    240 capsule    Take 8 capsules (160 mg) by mouth At Bedtime Take on an empty stomach. Take a dose of nausea medication 30-60 min before temozolomide.    Glioblastoma multiforme of temporal lobe (H)       * temozolomide 20 MG capsule CHEMO    TEMODAR    96 capsule    Take 8 capsules (160 mg) by mouth At Bedtime Take on an empty stomach.    Glioblastoma multiforme of temporal lobe (H)       * Notice:  This list has 2 medication(s) that are the same as other medications prescribed for you. Read the directions carefully, and ask your doctor or other care provider to review them with you.

## 2017-12-11 NOTE — PROGRESS NOTES
RADIATION ONCOLOGY WEEKLY ON TREATMENT VISIT   Encounter Date: 2017    Patient Name: Seth Iglesias  MRN: [id number]  : 1960     Disease and Stage: Right frontotemporal glioblastoma  Treatment Site: Partial brain  Current Dose/Planned Total Dose: 4200 / 6000 cGy  Daily Fraction Size: 200 cGy/day, 5 times/week  Concurrent Chemotherapy: Yes  Drug and Frequency: Daily Temodar    Subjective: Mr. Iglesias presents to clinic today for his weekly on-treatment visit. He has noted some slightly increased dermatitis over the right frontotemporal scalp as well as mild to moderately increased fatigue over the past week. He is otherwise tolerating radiotherapy very well and has no pressing concerns or complaints on examination.    ROS:   Nutrition Alteration  Diet Type: Patient's Preference    Skin  Skin Reaction: Mild erythema     ENT and Mouth Exam  Mucositis - Current: None      Gastrointestinal  Nausea: None     Objective:   Weight: 96.7 kg  BP: 132/93  Pulse: 80    General: Alert, oriented and in no acute distress  Cardiac: Extremities are warm and well-perfused  Respiratory: No wheezing, stridor or respiratory distress  Skin: Mild diffuse erythema over treatment field without desquamation or ulceration  Neuro: Grossly normal motor strength in bilateral upper and lower extremities. Normal gait.    Treatment-related toxicities (CTCAE v4.0):  1. Fatigue: Grade 1: Fatigue relieved by rest  2. Dermatitis: Grade 1: Faint erythema or dry desquamation    Assessment:    Mr. 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 He is undergoing adjuvant chemoradiotherapy and is tolerating treatment well with the expected acute radiation-induced fatigue and dermatitis.    Plan:   1. Continue radiotherapy  2. Continue twice daily moisturizer use to the right frontotemporal scalp    Mosaiq chart and setup information reviewed  MVCT images  reviewed    Dominic Louis MD/PhD  Department of Radiation Oncology  [facility]

## 2017-12-12 NOTE — TELEPHONE ENCOUNTER
Oral Chemotherapy Monitoring Program    Primary Oncologist: Dr. Armstrong  Primary Oncology Clinic: [facility]  Cancer Diagnosis: Glioblastoma     Drug: Temodar  Start Date: 11/12/2017 with radiation to follow the next day  Expected duration of therapy: 42 days concurrent with radiation     Drug Interaction Assessment: No drug interactions found at this time.     Subjective/Objective:  Seth Iglesias is a 57 year old male contacted his wife, Maryanne by phone  for a follow-up visit for oral chemotherapy.  She reports Seth is doing well, he has fatigue in the afternoon and mild short-term memory impairment. He has not had nausea or vomiting. He did run out of Zofran a few days ago and has been fine but would like it refilled. The Zofr[facility] the patient will pick it up today or tomorrow. Maryanne is please how well Seth is doing    ORAL CHEMOTHERAPY 10/27/2017 12/12/2017   Drug Name Temodar (Temozolomide) Temodar (Temozolomide)   Current Dosage 160mg 160mg   Current Schedule Daily Daily   Cycle Details Continuous for 42 days during XRT Continuous for 42 days during XRT   Start Date of Last Cycle - 11/12/2017   Planned next cycle start date 11/12/2017 -   Doses missed in last 2 weeks - 0   Adherence Assessment - Adherent   Adverse Effects - Fatigue   Fatigue - Grade 1   Pharmacist Intervention(fatigue) - No   Home BPs - not needed   Any new drug interactions? No No   Is the dose as ordered appropriate for the patient? Yes -       Last PHQ-2 Score on record:   PHQ-2 ( 1999 [employer]) 9/19/2017 2/27/2017   Q1: Little interest or pleasure in doing things 0 0   Q2: Feeling down, depressed or hopeless 0 0   PHQ-2 Score 0 0     Vitals:  BP:   BP Readings from Last 1 Encounters:   12/11/17 (!) 132/93     Wt Readings from Last 1 Encounters:   12/11/17 96.7 kg (213 lb 1.6 oz)     Estimated body surface area is 2.17 meters squared as calculated from the following:    Height as of 12/8/17: 1.753 m (5'  "9\").    Weight as of 12/11/17: 96.7 kg (213 lb 1.6 oz).    Labs:  Lab Results   Component Value Date     12/05/2017      Lab Results   Component Value Date    POTASSIUM 4.2 12/05/2017     Lab Results   Component Value Date    ARA 9.1 12/05/2017     Lab Results   Component Value Date    ALBUMIN 3.6 12/05/2017     No results found for: MAG  No results found for: PHOS  Lab Results   Component Value Date    BUN 15 12/05/2017     Lab Results   Component Value Date    CR 1.03 12/05/2017       Lab Results   Component Value Date    AST 20 12/05/2017     Lab Results   Component Value Date    ALT 31 12/05/2017     Lab Results   Component Value Date    BILITOTAL 0.6 12/05/2017       Lab Results   Component Value Date    WBC 6.8 12/05/2017     Lab Results   Component Value Date    HGB 13.5 12/05/2017     Lab Results   Component Value Date     12/05/2017     Lab Results   Component Value Date    ANEU 4.2 12/05/2017     Assessment:  Seth is tolerating Temodar well at this time except for the fatigue and mild short-term memory impairment.    Plan:  Continue Temodar and  the Zofran.  Finish with Temodar 12/18/17    Follow-Up:  Next appointment (12/20, likely to be rescheduled)    Refill Due:  N/A    Yolette Baker, PharmD  [facility]  [phone]      "

## 2017-12-18 NOTE — MR AVS SNAPSHOT
After Visit Summary   12/18/2017    Seth Iglesias    MRN: [id number]           Patient Information     Date Of Birth          1960        Visit Information        Provider Department      12/18/2017 10:30 AM Dominic Louis MD Radiation Oncology Clinic        Today's Diagnoses     Glioblastoma multiforme of temporal lobe (H)    -  1       Follow-ups after your visit        Your next 10 appointments already scheduled     Dec 19, 2017 10:15 AM CST   EXTERNAL RADIATION TREATMENT with [facility]   Radiation Oncology Clinic ([facility])    [facility]  1st Floor  [address] [address]   [phone]            Dec 20, 2017 10:15 AM CST   EXTERNAL RADIATION TREATMENT with [facility]   Radiation Oncology Clinic ([facility])    [facility]  1st Floor  [address] [address]   [phone]            Dec 20, 2017  3:30 PM CST   (Arrive by 3:15 PM)   Return Visit with SCARLETT Pederson   [facility] ([facility])    [address]  2nd Floor  [facility] [address]   [phone]            Dec 21, 2017 10:15 AM CST   EXTERNAL RADIATION TREATMENT with [facility] ON[facility]   Radiation Oncology Clinic ([facility])    [facility]  1st Floor  [address] [address]   [phone]            Dec 22, 2017 10:15 AM CST   EXTERNAL RADIATION TREATMENT with [facility] ON[facility]   Radiation Oncology Clinic ([facility])    [facility]  1st Floor  [address] [address]   [phone]            Jan 19, 2018 12:15 PM CST   (Arrive by 12:00 PM)   MR BRAIN W/O & W CONTRAST with [address] [facility] ([facility])    [address]  1st Floor  [facility] [address] [phone]            Take your medicines as usual, unless your doctor tells you not to. Bring a list of your current medicines to your exam (including vitamins, minerals and over-the-counter drugs).  You will be given intravenous contrast for this exam. To prepare:   The day before your exam, drink extra fluids at least six 8-ounce glasses (unless your doctor tells you to restrict your fluids).   Have a blood test (creatinine test) within 30 days of your exam. Go to your clinic or Diagnostic Imaging Department for this test.  The MRI machine uses a strong magnet. Please wear clothes without metal (snaps, zippers). A sweatsuit works well, or we may give you a hospital gown.  Please remove any body piercings and hair extensions before you arrive. You will also remove watches, jewelry, hairpins, wallets, dentures, partial dental plates and hearing aids. You may wear contact lenses, and you may be able to wear your rings. We have a safe place to keep your personal items, but it is safer to leave them at home.   **IMPORTANT** THE INSTRUCTIONS BELOW ARE ONLY FOR THOSE PATIENTS WHO HAVE BEEN TOLD THEY WILL RECEIVE SEDATION OR GENERAL ANESTHESIA DURING THEIR MRI PROCEDURE:  IF YOU WILL RECEIVE SEDATION (take medicine to help you relax during your exam):   You must get the medicine from your doctor before you arrive. Bring the medicine to the exam. Do not take it at home.   Arrive one hour early. Bring someone who can take you home after the test. Your medicine will make you sleepy. After the exam, you may not drive, take a bus or take a taxi by yourself.   No eating 8 hours before your exam. You may have clear liquids up until 4 hours before your exam. (Clear liquids include water, clear tea, black coffee and fruit juice without pulp.)  IF YOU WILL RECEIVE ANESTHESIA (be asleep for your exam):   Arrive 1 1/2 hours early. Bring someone who can take you home after the test. You may not drive, take a bus or take a taxi by yourself.   No  eating 8 hours before your exam. You may have clear liquids up until 4 hours before your exam. (Clear liquids include water, clear tea, black coffee and fruit juice without pulp.)  Please call the Imaging Department at your exam site with any questions.            Jan 22, 2018  9:30 AM CST   [facility] Draw with  [facility]   [facility] ([facility])    [address]  2nd [facility] [id number] [phone]            Jan 22, 2018 10:00 AM CST   (Arrive by 9:45 AM)   Return Visit with Augustus Armstrong MD   [facility] ([facility])    [address]  [address]nd [facility] [id number] [phone]            Jan 22, 2018  1:00 PM CST   Return Visit with Dominic Louis MD   Radiation Oncology Clinic ([facility])    [facility]  1st Floor  [address] [id number] [phone]            Jan 22, 2018  2:45 PM CST   (Arrive by 2:30 PM)   Return Visit with Harry Wiggins MD   [facility] ([facility])    [address] [place] [address]  [address] [facility] [id number] [phone]              Who to contact     Please call your clinic at [phone] to:    Ask questions about your health    Make or cancel appointments    Discuss your medicines    Learn about your test results    Speak to your doctor   If you have compliments or concerns about an experience at your clinic, or if you wish to file a complaint, please contact [facility] Patient Relations at [phone] or email us at [email or web address]         Additional Information About Your Visit        MyChart Information     [employer] gives you secure access to your electronic health record. If you see a primary care provider, you can also send messages to your care team and make appointments. If you have  questions, please call your primary care clinic.  If you do not have a primary care provider, please call [phone] and they will assist you.      Zoom[employer] is an electronic gateway that provides easy, online access to your medical records. With [employer], you can request a clinic appointment, read your test results, renew a prescription or communicate with your care team.     To access your existing account, please contact your [facility] or call [phone] for assistance.        Care EveryWhere ID     This is your Care EveryWhere ID. This could be used by other organizations to access your [place] medical records  [id number]        Your Vitals Were     Pulse BMI (Body Mass Index)                113 30.69 kg/m2           Blood Pressure from Last 3 Encounters:   12/18/17 124/70   12/11/17 (!) 132/93   12/08/17 123/89    Weight from Last 3 Encounters:   12/18/17 94.3 kg (207 lb 12.8 oz)   12/11/17 96.7 kg (213 lb 1.6 oz)   12/08/17 95.5 kg (210 lb 8 oz)              Today, you had the following     No orders found for display       Primary Care Provider Office Phone # Fax #    R Nigel Salmon [phone] [address] [address]        Equal Access to Services     PERICO ESPINOZA AH: Hadii vero ku hadasho Soomaali, waaxda luqadaha, qaybta kaalmada adeegyada, héctor apontein hayeagle kerns. So [facility] [phone].    ATENCIÓN: Si habla español, tiene a granados disposición servicios gratuitos de asistencia lingüística. Llame al [phone].    We comply with applicable federal civil rights laws and [place] laws. We do not discriminate on the basis of race, color, national origin, age, disability, sex, sexual orientation, or gender identity.            Thank you!     Thank you for choosing [facility]IATION ONCOLOGY CLINIC  for your care. Our goal is always to provide you with excellent care. Hearing back from our patients is one way we can continue to  improve our services. Please take a few minutes to complete the written survey that you may receive in the mail after your visit with us. Thank you!             Your Updated Medication List - Protect others around you: Learn how to safely use, store and throw away your medicines at www.disposemymeds.org.          This list is accurate as of: 12/18/17 11:59 PM.  Always use your most recent med list.                   Brand Name Dispense Instructions for use Diagnosis    levETIRAcetam 1000 MG Tabs    KEPPRA    180 tablet    Take 1,000 mg by mouth every 12 hours    Brain mass       MULTIVITAMIN & MINERAL PO      Take 1 tablet by mouth daily        pantoprazole 40 MG EC tablet    PROTONIX    30 tablet    Take 1 tablet (40 mg) by mouth every morning (before breakfast)    Brain mass       prochlorperazine 10 MG tablet    COMPAZINE    30 tablet    Take 1 tablet (10 mg) by mouth every 6 hours as needed (Nausea/Vomiting)    Glioblastoma multiforme of temporal lobe (H)       senna-docusate 8.6-50 MG per tablet    SENOKOT-S;PERICOLACE    100 tablet    Take 1-2 tablets by mouth 2 times daily    Brain mass       * temozolomide 20 MG capsule CHEMO    TEMODAR    240 capsule    Take 8 capsules (160 mg) by mouth At Bedtime Take on an empty stomach. Take a dose of nausea medication 30-60 min before temozolomide.    Glioblastoma multiforme of temporal lobe (H)       * temozolomide 20 MG capsule CHEMO    TEMODAR    96 capsule    Take 8 capsules (160 mg) by mouth At Bedtime Take on an empty stomach.    Glioblastoma multiforme of temporal lobe (H)       * Notice:  This list has 2 medication(s) that are the same as other medications prescribed for you. Read the directions carefully, and ask your doctor or other care provider to review them with you.

## 2017-12-18 NOTE — PROGRESS NOTES
RADIATION ONCOLOGY WEEKLY ON TREATMENT VISIT   Encounter Date: 2017    Patient Name: Seth Iglesias  MRN: [id number]  : 1960     Disease and Stage: Right frontotemporal glioblastoma  Treatment Site: Partial brain  Current Dose/Planned Total Dose: 5200 / 6000 cGy  Daily Fraction Size: 200 cGy/day, 5 times/week  Concurrent Chemotherapy: Yes  Drug and Frequency: Daily Temodar    Subjective: Mr. Iglesias presents to clinic today for his weekly on-treatment visit.  He continues to tolerate treatment well although has noted increased fatigue over the past week. His review of systems are also positive for a mild upper respiratory tract infection which he developed over the weekend. He otherwise denies any headaches, motor weakness, seizures, loss of consciousness, gait instability or bowel/bladder dysfunction.    ROS:   Nutrition Alteration  Diet Type: Patient's Preference    Skin  Skin Reaction: Mild dermatitis     ENT and Mouth Exam  Mucositis - Current: None      Gastrointestinal  Nausea: None     Psychosocial  Pyschosocial Note: Moderate fatigue    Pain Assessment  0-10 Pain Scale: 0    Objective:   Weight: 94.3 kg  BP: 124/70  Pulse: 113    General: Moderately fatigued-appearing but in no acute distress  HEENT: No mucositis or oral thrush  Cardiac: Extremities are warm and well-perfused  Respiratory: Mild upper respiratory tract congestion but no wheezing, stridor or respiratory distress  Skin: Mild erythema over the right temporal scalp with corresponding alopecia. No desquamation or ulceration.    Treatment-related toxicities (CTCAE v4.0):  1. Fatigue: Grade 2: Fatigue not relieved by rest; limiting instrumental ADL  2. Dermatitis: Grade 1: Faint erythema or dry desquamation    Assessment:    Mr. 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 He is undergoing adjuvant chemoradiotherapy which he has tolerated well with  the expected radiation-induced toxicities.    Plan:   1. Complete radiotherapy on 12/22/2017  2. Repeat MRI scheduled for 1/19/2018  3. Follow-up in radiation oncology clinic in coordination with medical oncology (Dr. Armstrong) and neurosurgery (Dr. Wiggins) follow-up appointments on 1/22/2018    Mosaiq chart and setup information reviewed  MVCT images reviewed    Dominic Louis MD/PhD  Department of Radiation Oncology  [facility]

## 2017-12-20 NOTE — NURSING NOTE
"Oncology Rooming Note    December 20, 2017 3:24 PM   Seth Iglesias is a 57 year old male who presents for:    Chief Complaint   Patient presents with     Oncology Clinic Visit     Return: Glioblastoma      Initial Vitals: /81  Pulse 88  Temp 97  F (36.1  C) (Oral)  Resp 16  Ht 1.753 m (5' 9.02\")  Wt 95.2 kg (209 lb 12.8 oz)  SpO2 97%  BMI 30.97 kg/m2 Estimated body mass index is 30.97 kg/(m^2) as calculated from the following:    Height as of this encounter: 1.753 m (5' 9.02\").    Weight as of this encounter: 95.2 kg (209 lb 12.8 oz). Body surface area is 2.15 meters squared.  No Pain (0) Comment: Data Unavailable   No LMP for male patient.  Allergies reviewed: Yes  Medications reviewed: Yes    Medications: Medication refills not needed today.  Pharmacy name entered into [facility]:    [id number] - [facility] [id number] - [facility] [address]    Clinical concerns: no new concerns     6 minutes for nursing intake (face to face time)     Bridget Sylvester CMA  Unable to give flu vaccine to patient for research reasons.  Bridget Sylvester CMA                  "

## 2017-12-20 NOTE — LETTER
"12/20/2017      RE: Seth Iglesias  [address]  [facility] [address]       Hematology-Oncology Visit  Dec 20, 2017    Reason for Visit: follow-up glioblastoma     HPI: Seth Iglesias is a 57 year old gentleman with past medical history of psoriasis, HLD with glioblastoma. He presented with \"dizzy spells\" 9/2017 and went to [facility] ED 9/28 with lethargy, confusion, and dizziness. CT was done showing R temporal lobe mass with edema. He was given IV steroids and had MRI showing large tumor in anterior right temporal lobe with associated intratumoral hemorrhage and cerebellar mass effect. Tumor was 4.4 cm. There was a potential additional lesion measuring 5 mm in paramedian right frontal lobe. He was admitted to [facility] and had right temporal craniotomy on 10/3 by Dr. Wiggins. He performed gross total resection of the tumor, confirming WHO grade 4 glioblastoma. IDH testing was negative. MGMT promoter methylation was absent. He started concurrent chemoradiation with Temodar on 11/12/17. Will complete treatment on 12/22/17.     Interval History: Mr. Iglesias presents in routine follow-up today while on chemoradiation. He will complete treatment in 2 days. Overall it has been going well still other than fatigue. This has been worse the past several days due to sinus congestion, pressure, and intermittent cough. Denies any fevers/chills. His wife is also sick and has body aches and extreme fatigue. He did get flu shot this year. Denies any n/v/d, no mucositis. Constipation is managed with Senna-S 1 tab BID.  Denies any hearing changes, changes with smell, headaches, vision changes, weakness, paraesthesias, seizures, or other neurologic changes. ROS otherwise negative.      Current Outpatient Prescriptions   Medication     ENBREL SURECLICK 50 MG/ML autoinjector     levETIRAcetam (KEPPRA) 1000 MG TABS     temozolomide (TEMODAR) 20 MG capsule CHEMO     senna-docusate (SENOKOT-S;PERICOLACE) 8.6-50 MG per tablet " "    temozolomide (TEMODAR) 20 MG capsule CHEMO     prochlorperazine (COMPAZINE) 10 MG tablet     pantoprazole (PROTONIX) 40 MG EC tablet     Multiple Vitamins-Minerals (MULTIVITAMIN & MINERAL PO)     No current facility-administered medications for this visit.        PHYSICAL EXAM:  /81  Pulse 88  Temp 97  F (36.1  C) (Oral)  Resp 16  Ht 1.753 m (5' 9.02\")  Wt 95.2 kg (209 lb 12.8 oz)  SpO2 97%  BMI 30.97 kg/m2  General: Alert, oriented, pleasant, NAD  Skin: No rashes on exposed skin   HEENT: Normocephalic, atraumatic, PERRLA, EOMI. Moist mucus membranes, no lesions or thrush. No pharyngeal erythema or exudate   Lungs: CTA bilaterally, normal work of breathing  Cardiac: RRR, S1, S2, no murmurs  Abdomen: Soft, nontender, nondistended. Normoactive bowel sounds.   Neuro: Deferred today, CN II-XII grossly intact   Extremities: No pedal edema    Labs:    12/20/2017 14:52   Sodium 139   Potassium 4.4   Chloride 104   Carbon Dioxide 30   Urea Nitrogen 18   Creatinine 1.10   GFR Estimate 68   GFR Estimate If Black 83   Calcium 8.4 (L)   Anion Gap 5   Albumin 3.6   Protein Total 7.2   Bilirubin Total 0.3   Alkaline Phosphatase 79   ALT 29   AST 20   Glucose 106 (H)   WBC 5.5   Hemoglobin 13.8   Hematocrit 42.2   Platelet Count 219   RBC Count 4.38 (L)   MCV 96   MCH 31.5   MCHC 32.7   RDW 13.1   Diff Method Automated Method   % Neutrophils 55.1   % Lymphocytes 26.8   % Monocytes 12.3   % Eosinophils 5.0   % Basophils 0.6   % Immature Granulocytes 0.2   Nucleated RBCs 0   Absolute Neutrophil 3.0   Absolute Lymphocytes 1.5   Absolute Monocytes 0.7   Absolute Eosinophils 0.3   Absolute Basophils 0.0   Abs Immature Granulocytes 0.0   Absolute Nucleated RBC 0.0         Assessment & Plan:     1. Right temporal GBM: S/p gross total resection on 10/3/17. He initiated chemoradiation on 11/13/17. Will complete treatment on 12/22/17. Lab work still looks great without myelosuppression. Tolerated treatment well apart from " fatigue. He has follow-up brain MRI mid January and follow-up with Dr. Wiggins. Then we would start adjuvant Temodar about a month after chemoradiation. This may or may not been given with [employer] Device. Adjuvant Temodar is given monthly for typically 1 year of treatment. He will call with any interval concerns.     2. URI: Will test for influenza given immunosuppression and symptoms. Will call to update him with a result. If negative, then manage like typical viral URI with supportive cares, decongestants, rest, fluids.     Helena Landin PA-C    [facility]  [address] [place] [id number]  [phone]

## 2017-12-20 NOTE — PROGRESS NOTES
"Hematology-Oncology Visit  Dec 20, 2017    Reason for Visit: follow-up glioblastoma     HPI: Seth Iglesias is a 57 year old gentleman with past medical history of psoriasis, HLD with glioblastoma. He presented with \"dizzy spells\" 9/2017 and went to [facility] ED 9/28 with lethargy, confusion, and dizziness. CT was done showing R temporal lobe mass with edema. He was given IV steroids and had MRI showing large tumor in anterior right temporal lobe with associated intratumoral hemorrhage and cerebellar mass effect. Tumor was 4.4 cm. There was a potential additional lesion measuring 5 mm in paramedian right frontal lobe. He was admitted to [facility] and had right temporal craniotomy on 10/3 by Dr. Wiggins. He performed gross total resection of the tumor, confirming WHO grade 4 glioblastoma. IDH testing was negative. MGMT promoter methylation was absent. He started concurrent chemoradiation with Temodar on 11/12/17. Will complete treatment on 12/22/17.     Interval History: Mr. Iglesias presents in routine follow-up today while on chemoradiation. He will complete treatment in 2 days. Overall it has been going well still other than fatigue. This has been worse the past several days due to sinus congestion, pressure, and intermittent cough. Denies any fevers/chills. His wife is also sick and has body aches and extreme fatigue. He did get flu shot this year. Denies any n/v/d, no mucositis. Constipation is managed with Senna-S 1 tab BID.  Denies any hearing changes, changes with smell, headaches, vision changes, weakness, paraesthesias, seizures, or other neurologic changes. ROS otherwise negative.      Current Outpatient Prescriptions   Medication     ENBREL SURECLICK 50 MG/ML autoinjector     levETIRAcetam (KEPPRA) 1000 MG TABS     temozolomide (TEMODAR) 20 MG capsule CHEMO     senna-docusate (SENOKOT-S;PERICOLACE) 8.6-50 MG per tablet     temozolomide (TEMODAR) 20 MG capsule CHEMO     prochlorperazine (COMPAZINE) 10 MG tablet " "    pantoprazole (PROTONIX) 40 MG EC tablet     Multiple Vitamins-Minerals (MULTIVITAMIN & MINERAL PO)     No current facility-administered medications for this visit.        PHYSICAL EXAM:  /81  Pulse 88  Temp 97  F (36.1  C) (Oral)  Resp 16  Ht 1.753 m (5' 9.02\")  Wt 95.2 kg (209 lb 12.8 oz)  SpO2 97%  BMI 30.97 kg/m2  General: Alert, oriented, pleasant, NAD  Skin: No rashes on exposed skin   HEENT: Normocephalic, atraumatic, PERRLA, EOMI. Moist mucus membranes, no lesions or thrush. No pharyngeal erythema or exudate   Lungs: CTA bilaterally, normal work of breathing  Cardiac: RRR, S1, S2, no murmurs  Abdomen: Soft, nontender, nondistended. Normoactive bowel sounds.   Neuro: Deferred today, CN II-XII grossly intact   Extremities: No pedal edema    Labs:    12/20/2017 14:52   Sodium 139   Potassium 4.4   Chloride 104   Carbon Dioxide 30   Urea Nitrogen 18   Creatinine 1.10   GFR Estimate 68   GFR Estimate If Black 83   Calcium 8.4 (L)   Anion Gap 5   Albumin 3.6   Protein Total 7.2   Bilirubin Total 0.3   Alkaline Phosphatase 79   ALT 29   AST 20   Glucose 106 (H)   WBC 5.5   Hemoglobin 13.8   Hematocrit 42.2   Platelet Count 219   RBC Count 4.38 (L)   MCV 96   MCH 31.5   MCHC 32.7   RDW 13.1   Diff Method Automated Method   % Neutrophils 55.1   % Lymphocytes 26.8   % Monocytes 12.3   % Eosinophils 5.0   % Basophils 0.6   % Immature Granulocytes 0.2   Nucleated RBCs 0   Absolute Neutrophil 3.0   Absolute Lymphocytes 1.5   Absolute Monocytes 0.7   Absolute Eosinophils 0.3   Absolute Basophils 0.0   Abs Immature Granulocytes 0.0   Absolute Nucleated RBC 0.0         Assessment & Plan:     1. Right temporal GBM: S/p gross total resection on 10/3/17. He initiated chemoradiation on 11/13/17. Will complete treatment on 12/22/17. Lab work still looks great without myelosuppression. Tolerated treatment well apart from fatigue. He has follow-up brain MRI mid January and follow-up with Dr. Wiggins. Then we would " start adjuvant Temodar about a month after chemoradiation. This may or may not been given with [employer] Device. Adjuvant Temodar is given monthly for typically 1 year of treatment. He will call with any interval concerns.     2. URI: Will test for influenza given immunosuppression and symptoms. Will call to update him with a result. If negative, then manage like typical viral URI with supportive cares, decongestants, rest, fluids.     Helena Landin PA-C    [facility] [address] [id number] [phone]    ADDENDUM: RSV returned positive. Left a voicemail for Candelario discussing result and treatment of supportive cares. He had no wheezing on exam and clear lungs so bronchodilators and corticosteroids are not indicated. -FRANSICO

## 2018-01-01 ENCOUNTER — APPOINTMENT (OUTPATIENT)
Dept: LAB | Facility: CLINIC | Age: 58
End: 2018-01-01
Attending: INTERNAL MEDICINE
Payer: COMMERCIAL

## 2018-01-01 ENCOUNTER — TELEPHONE (OUTPATIENT)
Dept: ONCOLOGY | Facility: CLINIC | Age: 58
End: 2018-01-01

## 2018-01-01 ENCOUNTER — ONCOLOGY VISIT (OUTPATIENT)
Dept: ONCOLOGY | Facility: CLINIC | Age: 58
End: 2018-01-01
Attending: INTERNAL MEDICINE
Payer: COMMERCIAL

## 2018-01-01 ENCOUNTER — CARE COORDINATION (OUTPATIENT)
Dept: ONCOLOGY | Facility: CLINIC | Age: 58
End: 2018-01-01

## 2018-01-01 ENCOUNTER — APPOINTMENT (OUTPATIENT)
Dept: PHYSICAL THERAPY | Facility: CLINIC | Age: 58
DRG: 054 | End: 2018-01-01
Payer: COMMERCIAL

## 2018-01-01 ENCOUNTER — HOSPITAL ENCOUNTER (OUTPATIENT)
Dept: PHYSICAL THERAPY | Facility: CLINIC | Age: 58
Setting detail: THERAPIES SERIES
End: 2018-07-13
Attending: NURSE PRACTITIONER
Payer: COMMERCIAL

## 2018-01-01 ENCOUNTER — RADIANT APPOINTMENT (OUTPATIENT)
Dept: MRI IMAGING | Facility: CLINIC | Age: 58
End: 2018-01-01
Attending: INTERNAL MEDICINE
Payer: COMMERCIAL

## 2018-01-01 ENCOUNTER — HOSPITAL ENCOUNTER (INPATIENT)
Facility: CLINIC | Age: 58
LOS: 2 days | Discharge: HOME | DRG: 054 | End: 2018-07-12
Attending: EMERGENCY MEDICINE | Admitting: INTERNAL MEDICINE
Payer: COMMERCIAL

## 2018-01-01 ENCOUNTER — OFFICE VISIT (OUTPATIENT)
Dept: NEUROSURGERY | Facility: CLINIC | Age: 58
End: 2018-01-01
Attending: NEUROLOGICAL SURGERY
Payer: COMMERCIAL

## 2018-01-01 ENCOUNTER — APPOINTMENT (OUTPATIENT)
Dept: GENERAL RADIOLOGY | Facility: CLINIC | Age: 58
DRG: 602 | End: 2018-01-01
Attending: EMERGENCY MEDICINE
Payer: COMMERCIAL

## 2018-01-01 ENCOUNTER — ANESTHESIA EVENT (OUTPATIENT)
Dept: SURGERY | Facility: CLINIC | Age: 58
DRG: 027 | End: 2018-01-01
Payer: COMMERCIAL

## 2018-01-01 ENCOUNTER — APPOINTMENT (OUTPATIENT)
Dept: CT IMAGING | Facility: CLINIC | Age: 58
DRG: 602 | End: 2018-01-01
Attending: EMERGENCY MEDICINE
Payer: COMMERCIAL

## 2018-01-01 ENCOUNTER — MYC MEDICAL ADVICE (OUTPATIENT)
Dept: ONCOLOGY | Facility: CLINIC | Age: 58
End: 2018-01-01

## 2018-01-01 ENCOUNTER — RESEARCH ENCOUNTER (OUTPATIENT)
Dept: ONCOLOGY | Facility: CLINIC | Age: 58
End: 2018-01-01

## 2018-01-01 ENCOUNTER — DOCUMENTATION ONLY (OUTPATIENT)
Dept: CARE COORDINATION | Facility: CLINIC | Age: 58
End: 2018-01-01

## 2018-01-01 ENCOUNTER — DOCUMENTATION ONLY (OUTPATIENT)
Dept: ONCOLOGY | Facility: CLINIC | Age: 58
End: 2018-01-01

## 2018-01-01 ENCOUNTER — OFFICE VISIT (OUTPATIENT)
Dept: NEUROLOGY | Facility: CLINIC | Age: 58
End: 2018-01-01
Payer: COMMERCIAL

## 2018-01-01 ENCOUNTER — SURGERY (OUTPATIENT)
Age: 58
End: 2018-01-01

## 2018-01-01 ENCOUNTER — TRANSFERRED RECORDS (OUTPATIENT)
Dept: HEALTH INFORMATION MANAGEMENT | Facility: CLINIC | Age: 58
End: 2018-01-01

## 2018-01-01 ENCOUNTER — ANESTHESIA EVENT (OUTPATIENT)
Dept: SURGERY | Facility: CLINIC | Age: 58
End: 2018-01-01
Payer: COMMERCIAL

## 2018-01-01 ENCOUNTER — HOSPITAL ENCOUNTER (OUTPATIENT)
Facility: CLINIC | Age: 58
Discharge: HOME | End: 2018-08-01
Attending: NEUROLOGICAL SURGERY | Admitting: NEUROLOGICAL SURGERY
Payer: COMMERCIAL

## 2018-01-01 ENCOUNTER — ANESTHESIA (OUTPATIENT)
Dept: SURGERY | Facility: CLINIC | Age: 58
DRG: 027 | End: 2018-01-01
Payer: COMMERCIAL

## 2018-01-01 ENCOUNTER — HOSPITAL ENCOUNTER (INPATIENT)
Facility: CLINIC | Age: 58
LOS: 2 days | Discharge: HOME | DRG: 602 | End: 2018-09-29
Attending: EMERGENCY MEDICINE | Admitting: INTERNAL MEDICINE
Payer: COMMERCIAL

## 2018-01-01 ENCOUNTER — RADIANT APPOINTMENT (OUTPATIENT)
Dept: MRI IMAGING | Facility: CLINIC | Age: 58
End: 2018-01-01
Attending: PHYSICIAN ASSISTANT
Payer: COMMERCIAL

## 2018-01-01 ENCOUNTER — NURSE TRIAGE (OUTPATIENT)
Dept: NURSING | Facility: CLINIC | Age: 58
End: 2018-01-01

## 2018-01-01 ENCOUNTER — ALLIED HEALTH/NURSE VISIT (OUTPATIENT)
Dept: ONCOLOGY | Facility: CLINIC | Age: 58
End: 2018-01-01
Attending: NEUROLOGICAL SURGERY
Payer: COMMERCIAL

## 2018-01-01 ENCOUNTER — OFFICE VISIT (OUTPATIENT)
Dept: RADIATION ONCOLOGY | Facility: CLINIC | Age: 58
End: 2018-01-01
Attending: RADIOLOGY
Payer: COMMERCIAL

## 2018-01-01 ENCOUNTER — APPOINTMENT (OUTPATIENT)
Dept: LAB | Facility: CLINIC | Age: 58
End: 2018-01-01
Attending: PHYSICIAN ASSISTANT
Payer: COMMERCIAL

## 2018-01-01 ENCOUNTER — DOCUMENTATION ONLY (OUTPATIENT)
Dept: NEUROSURGERY | Facility: CLINIC | Age: 58
End: 2018-01-01

## 2018-01-01 ENCOUNTER — HOSPITAL ENCOUNTER (OUTPATIENT)
Dept: MRI IMAGING | Facility: CLINIC | Age: 58
Discharge: HOME | End: 2018-09-24
Attending: INTERNAL MEDICINE | Admitting: INTERNAL MEDICINE
Payer: COMMERCIAL

## 2018-01-01 ENCOUNTER — APPOINTMENT (OUTPATIENT)
Dept: MRI IMAGING | Facility: CLINIC | Age: 58
DRG: 054 | End: 2018-01-01
Attending: EMERGENCY MEDICINE
Payer: COMMERCIAL

## 2018-01-01 ENCOUNTER — HOSPITAL ENCOUNTER (OUTPATIENT)
Dept: MRI IMAGING | Facility: CLINIC | Age: 58
End: 2018-08-01
Attending: NEUROLOGICAL SURGERY | Admitting: NEUROLOGICAL SURGERY
Payer: COMMERCIAL

## 2018-01-01 ENCOUNTER — ANESTHESIA (OUTPATIENT)
Dept: SURGERY | Facility: CLINIC | Age: 58
End: 2018-01-01
Payer: COMMERCIAL

## 2018-01-01 ENCOUNTER — APPOINTMENT (OUTPATIENT)
Dept: MRI IMAGING | Facility: CLINIC | Age: 58
DRG: 027 | End: 2018-01-01
Attending: STUDENT IN AN ORGANIZED HEALTH CARE EDUCATION/TRAINING PROGRAM
Payer: COMMERCIAL

## 2018-01-01 ENCOUNTER — APPOINTMENT (OUTPATIENT)
Dept: PHYSICAL THERAPY | Facility: CLINIC | Age: 58
DRG: 054 | End: 2018-01-01
Attending: NURSE PRACTITIONER
Payer: COMMERCIAL

## 2018-01-01 ENCOUNTER — APPOINTMENT (OUTPATIENT)
Dept: CT IMAGING | Facility: CLINIC | Age: 58
DRG: 054 | End: 2018-01-01
Attending: EMERGENCY MEDICINE
Payer: COMMERCIAL

## 2018-01-01 ENCOUNTER — HOSPITAL ENCOUNTER (OUTPATIENT)
Dept: MRI IMAGING | Facility: CLINIC | Age: 58
Discharge: HOME | End: 2018-06-19
Attending: INTERNAL MEDICINE | Admitting: INTERNAL MEDICINE
Payer: COMMERCIAL

## 2018-01-01 ENCOUNTER — ONCOLOGY VISIT (OUTPATIENT)
Dept: ONCOLOGY | Facility: CLINIC | Age: 58
End: 2018-01-01
Attending: PHYSICIAN ASSISTANT
Payer: COMMERCIAL

## 2018-01-01 ENCOUNTER — HOSPITAL ENCOUNTER (INPATIENT)
Facility: CLINIC | Age: 58
LOS: 1 days | Discharge: HOME | DRG: 027 | End: 2018-05-18
Attending: NEUROLOGICAL SURGERY | Admitting: NEUROLOGICAL SURGERY
Payer: COMMERCIAL

## 2018-01-01 ENCOUNTER — HOSPITAL ENCOUNTER (OUTPATIENT)
Dept: MRI IMAGING | Facility: CLINIC | Age: 58
Discharge: HOME | End: 2018-04-19
Attending: INTERNAL MEDICINE | Admitting: INTERNAL MEDICINE
Payer: COMMERCIAL

## 2018-01-01 ENCOUNTER — APPOINTMENT (OUTPATIENT)
Dept: PHYSICAL THERAPY | Facility: CLINIC | Age: 58
DRG: 602 | End: 2018-01-01
Attending: PHYSICIAN ASSISTANT
Payer: COMMERCIAL

## 2018-01-01 ENCOUNTER — OFFICE VISIT (OUTPATIENT)
Dept: FAMILY MEDICINE | Facility: CLINIC | Age: 58
End: 2018-01-01
Payer: COMMERCIAL

## 2018-01-01 ENCOUNTER — APPOINTMENT (OUTPATIENT)
Dept: PHYSICAL THERAPY | Facility: CLINIC | Age: 58
DRG: 602 | End: 2018-01-01
Payer: COMMERCIAL

## 2018-01-01 ENCOUNTER — MEDICAL CORRESPONDENCE (OUTPATIENT)
Dept: HEALTH INFORMATION MANAGEMENT | Facility: CLINIC | Age: 58
End: 2018-01-01

## 2018-01-01 VITALS
DIASTOLIC BLOOD PRESSURE: 82 MMHG | WEIGHT: 213.7 LBS | SYSTOLIC BLOOD PRESSURE: 124 MMHG | HEIGHT: 69 IN | BODY MASS INDEX: 31.65 KG/M2 | RESPIRATION RATE: 20 BRPM | TEMPERATURE: 96.4 F | OXYGEN SATURATION: 95 % | HEART RATE: 73 BPM

## 2018-01-01 VITALS
SYSTOLIC BLOOD PRESSURE: 140 MMHG | HEIGHT: 69 IN | OXYGEN SATURATION: 96 % | WEIGHT: 214.29 LBS | DIASTOLIC BLOOD PRESSURE: 103 MMHG | RESPIRATION RATE: 16 BRPM | HEART RATE: 79 BPM | TEMPERATURE: 97.8 F | BODY MASS INDEX: 31.74 KG/M2

## 2018-01-01 VITALS
TEMPERATURE: 98.5 F | WEIGHT: 208 LBS | RESPIRATION RATE: 16 BRPM | SYSTOLIC BLOOD PRESSURE: 122 MMHG | HEART RATE: 74 BPM | BODY MASS INDEX: 30.7 KG/M2 | OXYGEN SATURATION: 100 % | DIASTOLIC BLOOD PRESSURE: 87 MMHG

## 2018-01-01 VITALS
HEIGHT: 69 IN | DIASTOLIC BLOOD PRESSURE: 93 MMHG | TEMPERATURE: 97.6 F | RESPIRATION RATE: 18 BRPM | SYSTOLIC BLOOD PRESSURE: 130 MMHG | WEIGHT: 213.9 LBS | BODY MASS INDEX: 31.68 KG/M2 | OXYGEN SATURATION: 96 % | HEART RATE: 84 BPM

## 2018-01-01 VITALS
BODY MASS INDEX: 30.6 KG/M2 | RESPIRATION RATE: 18 BRPM | OXYGEN SATURATION: 95 % | DIASTOLIC BLOOD PRESSURE: 69 MMHG | HEART RATE: 84 BPM | SYSTOLIC BLOOD PRESSURE: 104 MMHG | TEMPERATURE: 98 F | WEIGHT: 206.57 LBS | HEIGHT: 69 IN

## 2018-01-01 VITALS
HEART RATE: 86 BPM | WEIGHT: 217.4 LBS | OXYGEN SATURATION: 97 % | HEIGHT: 69 IN | DIASTOLIC BLOOD PRESSURE: 89 MMHG | TEMPERATURE: 98 F | BODY MASS INDEX: 32.2 KG/M2 | RESPIRATION RATE: 16 BRPM | SYSTOLIC BLOOD PRESSURE: 131 MMHG

## 2018-01-01 VITALS
HEIGHT: 69 IN | TEMPERATURE: 98.4 F | BODY MASS INDEX: 31.35 KG/M2 | OXYGEN SATURATION: 98 % | SYSTOLIC BLOOD PRESSURE: 129 MMHG | WEIGHT: 211.64 LBS | DIASTOLIC BLOOD PRESSURE: 80 MMHG | RESPIRATION RATE: 18 BRPM | HEART RATE: 67 BPM

## 2018-01-01 VITALS
HEART RATE: 71 BPM | WEIGHT: 214.3 LBS | SYSTOLIC BLOOD PRESSURE: 130 MMHG | OXYGEN SATURATION: 96 % | TEMPERATURE: 96.9 F | RESPIRATION RATE: 16 BRPM | BODY MASS INDEX: 31.74 KG/M2 | DIASTOLIC BLOOD PRESSURE: 86 MMHG | HEIGHT: 69 IN

## 2018-01-01 VITALS
TEMPERATURE: 96 F | DIASTOLIC BLOOD PRESSURE: 85 MMHG | WEIGHT: 214.6 LBS | BODY MASS INDEX: 31.69 KG/M2 | OXYGEN SATURATION: 96 % | HEART RATE: 80 BPM | SYSTOLIC BLOOD PRESSURE: 131 MMHG

## 2018-01-01 VITALS
BODY MASS INDEX: 30.69 KG/M2 | WEIGHT: 207.8 LBS | HEART RATE: 86 BPM | SYSTOLIC BLOOD PRESSURE: 107 MMHG | DIASTOLIC BLOOD PRESSURE: 50 MMHG | OXYGEN SATURATION: 95 % | RESPIRATION RATE: 16 BRPM | TEMPERATURE: 97.3 F

## 2018-01-01 VITALS
WEIGHT: 206.6 LBS | BODY MASS INDEX: 30.6 KG/M2 | HEIGHT: 69 IN | SYSTOLIC BLOOD PRESSURE: 107 MMHG | HEART RATE: 80 BPM | RESPIRATION RATE: 20 BRPM | DIASTOLIC BLOOD PRESSURE: 70 MMHG | TEMPERATURE: 97.7 F

## 2018-01-01 VITALS
HEART RATE: 76 BPM | HEIGHT: 69 IN | DIASTOLIC BLOOD PRESSURE: 76 MMHG | SYSTOLIC BLOOD PRESSURE: 106 MMHG | WEIGHT: 208 LBS | BODY MASS INDEX: 30.81 KG/M2

## 2018-01-01 VITALS
DIASTOLIC BLOOD PRESSURE: 93 MMHG | RESPIRATION RATE: 16 BRPM | TEMPERATURE: 97.6 F | SYSTOLIC BLOOD PRESSURE: 130 MMHG | BODY MASS INDEX: 31.58 KG/M2 | OXYGEN SATURATION: 96 % | HEART RATE: 84 BPM | WEIGHT: 213.85 LBS

## 2018-01-01 VITALS
TEMPERATURE: 98.5 F | DIASTOLIC BLOOD PRESSURE: 87 MMHG | SYSTOLIC BLOOD PRESSURE: 106 MMHG | BODY MASS INDEX: 30.72 KG/M2 | DIASTOLIC BLOOD PRESSURE: 76 MMHG | WEIGHT: 208 LBS | BODY MASS INDEX: 30.81 KG/M2 | HEART RATE: 74 BPM | RESPIRATION RATE: 16 BRPM | WEIGHT: 208 LBS | HEART RATE: 76 BPM | OXYGEN SATURATION: 100 % | SYSTOLIC BLOOD PRESSURE: 122 MMHG | HEIGHT: 69 IN

## 2018-01-01 VITALS
SYSTOLIC BLOOD PRESSURE: 139 MMHG | TEMPERATURE: 98.2 F | OXYGEN SATURATION: 98 % | BODY MASS INDEX: 31.1 KG/M2 | HEIGHT: 69 IN | RESPIRATION RATE: 16 BRPM | HEART RATE: 83 BPM | WEIGHT: 210 LBS | DIASTOLIC BLOOD PRESSURE: 74 MMHG

## 2018-01-01 VITALS
RESPIRATION RATE: 20 BRPM | OXYGEN SATURATION: 95 % | TEMPERATURE: 99.7 F | BODY MASS INDEX: 31.76 KG/M2 | SYSTOLIC BLOOD PRESSURE: 124 MMHG | HEART RATE: 112 BPM | WEIGHT: 215.1 LBS | DIASTOLIC BLOOD PRESSURE: 88 MMHG

## 2018-01-01 VITALS
TEMPERATURE: 97.2 F | OXYGEN SATURATION: 96 % | SYSTOLIC BLOOD PRESSURE: 123 MMHG | DIASTOLIC BLOOD PRESSURE: 80 MMHG | RESPIRATION RATE: 16 BRPM | HEIGHT: 69 IN | HEART RATE: 91 BPM | BODY MASS INDEX: 31.92 KG/M2 | WEIGHT: 215.5 LBS

## 2018-01-01 VITALS
TEMPERATURE: 97.6 F | RESPIRATION RATE: 18 BRPM | WEIGHT: 212.7 LBS | SYSTOLIC BLOOD PRESSURE: 130 MMHG | HEIGHT: 69 IN | DIASTOLIC BLOOD PRESSURE: 96 MMHG | BODY MASS INDEX: 31.5 KG/M2 | HEART RATE: 111 BPM | OXYGEN SATURATION: 96 %

## 2018-01-01 VITALS — DIASTOLIC BLOOD PRESSURE: 84 MMHG | BODY MASS INDEX: 30.72 KG/M2 | WEIGHT: 208 LBS | SYSTOLIC BLOOD PRESSURE: 124 MMHG

## 2018-01-01 VITALS
TEMPERATURE: 97.2 F | RESPIRATION RATE: 16 BRPM | WEIGHT: 210.8 LBS | HEIGHT: 69 IN | SYSTOLIC BLOOD PRESSURE: 127 MMHG | OXYGEN SATURATION: 97 % | HEART RATE: 102 BPM | DIASTOLIC BLOOD PRESSURE: 85 MMHG | BODY MASS INDEX: 31.22 KG/M2

## 2018-01-01 VITALS
BODY MASS INDEX: 30.68 KG/M2 | HEART RATE: 71 BPM | DIASTOLIC BLOOD PRESSURE: 86 MMHG | WEIGHT: 214.3 LBS | RESPIRATION RATE: 16 BRPM | HEIGHT: 70 IN | SYSTOLIC BLOOD PRESSURE: 130 MMHG | OXYGEN SATURATION: 96 % | TEMPERATURE: 96.9 F

## 2018-01-01 VITALS
HEART RATE: 65 BPM | DIASTOLIC BLOOD PRESSURE: 62 MMHG | TEMPERATURE: 97.9 F | OXYGEN SATURATION: 96 % | HEIGHT: 69 IN | SYSTOLIC BLOOD PRESSURE: 121 MMHG | WEIGHT: 215.2 LBS | RESPIRATION RATE: 16 BRPM | BODY MASS INDEX: 31.87 KG/M2

## 2018-01-01 VITALS
TEMPERATURE: 98 F | WEIGHT: 215.9 LBS | SYSTOLIC BLOOD PRESSURE: 117 MMHG | OXYGEN SATURATION: 97 % | DIASTOLIC BLOOD PRESSURE: 76 MMHG | BODY MASS INDEX: 31.88 KG/M2 | HEART RATE: 106 BPM

## 2018-01-01 DIAGNOSIS — C71.2 GLIOBLASTOMA MULTIFORME OF TEMPORAL LOBE (H): ICD-10-CM

## 2018-01-01 DIAGNOSIS — Z00.6 EXAMINATION OF PARTICIPANT IN CLINICAL TRIAL: ICD-10-CM

## 2018-01-01 DIAGNOSIS — Z79.899 ENCOUNTER FOR LONG-TERM (CURRENT) USE OF MEDICATIONS: ICD-10-CM

## 2018-01-01 DIAGNOSIS — C71.2 GLIOBLASTOMA MULTIFORME OF TEMPORAL LOBE (H): Primary | ICD-10-CM

## 2018-01-01 DIAGNOSIS — C71.2 MALIGNANT NEOPLASM OF TEMPORAL LOBE OF BRAIN (H): ICD-10-CM

## 2018-01-01 DIAGNOSIS — C71.2 MALIGNANT NEOPLASM OF TEMPORAL LOBE OF BRAIN (H): Primary | ICD-10-CM

## 2018-01-01 DIAGNOSIS — C71.9 GLIOBLASTOMA (H): ICD-10-CM

## 2018-01-01 DIAGNOSIS — Z00.6 EXAMINATION OF PARTICIPANT IN CLINICAL TRIAL: Primary | ICD-10-CM

## 2018-01-01 DIAGNOSIS — C71.9 GBM (GLIOBLASTOMA MULTIFORME) (H): ICD-10-CM

## 2018-01-01 DIAGNOSIS — R35.0 BENIGN PROSTATIC HYPERPLASIA WITH URINARY FREQUENCY: Primary | ICD-10-CM

## 2018-01-01 DIAGNOSIS — Z98.890 S/P CRANIOTOMY: ICD-10-CM

## 2018-01-01 DIAGNOSIS — Z98.890 S/P CRANIOTOMY: Primary | ICD-10-CM

## 2018-01-01 DIAGNOSIS — Z98.890 POST-OPERATIVE STATE: ICD-10-CM

## 2018-01-01 DIAGNOSIS — R42 DIZZINESS: ICD-10-CM

## 2018-01-01 DIAGNOSIS — L03.211 FACIAL CELLULITIS: ICD-10-CM

## 2018-01-01 DIAGNOSIS — G93.6 CEREBRAL EDEMA (H): ICD-10-CM

## 2018-01-01 DIAGNOSIS — C71.9 GLIOBLASTOMA (H): Primary | ICD-10-CM

## 2018-01-01 DIAGNOSIS — G40.209 PARTIAL SYMPTOMATIC EPILEPSY WITH COMPLEX PARTIAL SEIZURES, NOT INTRACTABLE, WITHOUT STATUS EPILEPTICUS (H): Primary | ICD-10-CM

## 2018-01-01 DIAGNOSIS — R29.898 LUE WEAKNESS: ICD-10-CM

## 2018-01-01 DIAGNOSIS — R35.0 URINARY FREQUENCY: ICD-10-CM

## 2018-01-01 DIAGNOSIS — N40.1 BENIGN PROSTATIC HYPERPLASIA WITH URINARY FREQUENCY: Primary | ICD-10-CM

## 2018-01-01 DIAGNOSIS — M62.81 GENERALIZED MUSCLE WEAKNESS: Primary | ICD-10-CM

## 2018-01-01 DIAGNOSIS — D69.6 THROMBOCYTOPENIA (H): Primary | ICD-10-CM

## 2018-01-01 DIAGNOSIS — B37.0 ORAL THRUSH: Primary | ICD-10-CM

## 2018-01-01 DIAGNOSIS — C71.1 MALIGNANT NEOPLASM OF FRONTAL LOBE OF BRAIN (H): Primary | ICD-10-CM

## 2018-01-01 DIAGNOSIS — C71.9 BRAIN TUMOR, GLIOMA (H): Primary | ICD-10-CM

## 2018-01-01 LAB
ABO + RH BLD: NORMAL
ACANTHOCYTES BLD QL SMEAR: ABNORMAL
ALBUMIN SERPL-MCNC: 2.6 G/DL (ref 3.4–5)
ALBUMIN SERPL-MCNC: 2.6 G/DL (ref 3.4–5)
ALBUMIN SERPL-MCNC: 2.9 G/DL (ref 3.4–5)
ALBUMIN SERPL-MCNC: 2.9 G/DL (ref 3.4–5)
ALBUMIN SERPL-MCNC: 3 G/DL (ref 3.4–5)
ALBUMIN SERPL-MCNC: 3.1 G/DL (ref 3.4–5)
ALBUMIN SERPL-MCNC: 3.2 G/DL (ref 3.4–5)
ALBUMIN SERPL-MCNC: 3.3 G/DL (ref 3.4–5)
ALBUMIN SERPL-MCNC: 3.7 G/DL (ref 3.4–5)
ALBUMIN SERPL-MCNC: 3.7 G/DL (ref 3.4–5)
ALBUMIN SERPL-MCNC: 3.8 G/DL (ref 3.4–5)
ALBUMIN SERPL-MCNC: 3.8 G/DL (ref 3.4–5)
ALBUMIN UR-MCNC: 10 MG/DL
ALBUMIN UR-MCNC: NEGATIVE MG/DL
ALP SERPL-CCNC: 100 U/L (ref 40–150)
ALP SERPL-CCNC: 118 U/L (ref 40–150)
ALP SERPL-CCNC: 123 U/L (ref 40–150)
ALP SERPL-CCNC: 51 U/L (ref 40–150)
ALP SERPL-CCNC: 52 U/L (ref 40–150)
ALP SERPL-CCNC: 53 U/L (ref 40–150)
ALP SERPL-CCNC: 61 U/L (ref 40–150)
ALP SERPL-CCNC: 69 U/L (ref 40–150)
ALP SERPL-CCNC: 72 U/L (ref 40–150)
ALP SERPL-CCNC: 75 U/L (ref 40–150)
ALP SERPL-CCNC: 81 U/L (ref 40–150)
ALP SERPL-CCNC: 86 U/L (ref 40–150)
ALP SERPL-CCNC: 87 U/L (ref 40–150)
ALP SERPL-CCNC: 93 U/L (ref 40–150)
ALT SERPL W P-5'-P-CCNC: 16 U/L (ref 0–70)
ALT SERPL W P-5'-P-CCNC: 19 U/L (ref 0–70)
ALT SERPL W P-5'-P-CCNC: 22 U/L (ref 0–70)
ALT SERPL W P-5'-P-CCNC: 30 U/L (ref 0–70)
ALT SERPL W P-5'-P-CCNC: 34 U/L (ref 0–70)
ALT SERPL W P-5'-P-CCNC: 37 U/L (ref 0–70)
ALT SERPL W P-5'-P-CCNC: 45 U/L (ref 0–70)
ALT SERPL W P-5'-P-CCNC: 45 U/L (ref 0–70)
ALT SERPL W P-5'-P-CCNC: 53 U/L (ref 0–70)
ALT SERPL W P-5'-P-CCNC: 54 U/L (ref 0–70)
ALT SERPL W P-5'-P-CCNC: 56 U/L (ref 0–70)
ALT SERPL W P-5'-P-CCNC: 59 U/L (ref 0–70)
ANION GAP SERPL CALCULATED.3IONS-SCNC: 10 MMOL/L (ref 3–14)
ANION GAP SERPL CALCULATED.3IONS-SCNC: 11 MMOL/L (ref 3–14)
ANION GAP SERPL CALCULATED.3IONS-SCNC: 11 MMOL/L (ref 3–14)
ANION GAP SERPL CALCULATED.3IONS-SCNC: 12 MMOL/L (ref 3–14)
ANION GAP SERPL CALCULATED.3IONS-SCNC: 4 MMOL/L (ref 3–14)
ANION GAP SERPL CALCULATED.3IONS-SCNC: 5 MMOL/L (ref 3–14)
ANION GAP SERPL CALCULATED.3IONS-SCNC: 5 MMOL/L (ref 3–14)
ANION GAP SERPL CALCULATED.3IONS-SCNC: 6 MMOL/L (ref 3–14)
ANION GAP SERPL CALCULATED.3IONS-SCNC: 6 MMOL/L (ref 3–14)
ANION GAP SERPL CALCULATED.3IONS-SCNC: 7 MMOL/L (ref 3–14)
ANION GAP SERPL CALCULATED.3IONS-SCNC: 8 MMOL/L (ref 3–14)
ANION GAP SERPL CALCULATED.3IONS-SCNC: 8 MMOL/L (ref 3–14)
ANION GAP SERPL CALCULATED.3IONS-SCNC: 9 MMOL/L (ref 3–14)
ANISOCYTOSIS BLD QL SMEAR: ABNORMAL
ANISOCYTOSIS BLD QL SMEAR: SLIGHT
APPEARANCE UR: CLEAR
APTT PPP: 23 SEC (ref 22–37)
APTT PPP: 23 SEC (ref 22–37)
AST SERPL W P-5'-P-CCNC: 12 U/L (ref 0–45)
AST SERPL W P-5'-P-CCNC: 13 U/L (ref 0–45)
AST SERPL W P-5'-P-CCNC: 14 U/L (ref 0–45)
AST SERPL W P-5'-P-CCNC: 15 U/L (ref 0–45)
AST SERPL W P-5'-P-CCNC: 15 U/L (ref 0–45)
AST SERPL W P-5'-P-CCNC: 17 U/L (ref 0–45)
AST SERPL W P-5'-P-CCNC: 19 U/L (ref 0–45)
AST SERPL W P-5'-P-CCNC: 20 U/L (ref 0–45)
AST SERPL W P-5'-P-CCNC: 22 U/L (ref 0–45)
AST SERPL W P-5'-P-CCNC: 25 U/L (ref 0–45)
AST SERPL W P-5'-P-CCNC: 27 U/L (ref 0–45)
AST SERPL W P-5'-P-CCNC: 34 U/L (ref 0–45)
AST SERPL W P-5'-P-CCNC: 34 U/L (ref 0–45)
AST SERPL W P-5'-P-CCNC: 47 U/L (ref 0–45)
BACTERIA SPEC CULT: NO GROWTH
BACTERIA SPEC CULT: NORMAL
BASOPHILS # BLD AUTO: 0 10E9/L (ref 0–0.2)
BASOPHILS # BLD AUTO: 0.1 10E9/L (ref 0–0.2)
BASOPHILS NFR BLD AUTO: 0 %
BASOPHILS NFR BLD AUTO: 0.1 %
BASOPHILS NFR BLD AUTO: 0.2 %
BASOPHILS NFR BLD AUTO: 0.3 %
BASOPHILS NFR BLD AUTO: 0.3 %
BASOPHILS NFR BLD AUTO: 0.4 %
BASOPHILS NFR BLD AUTO: 3.5 %
BILIRUB SERPL-MCNC: 0.2 MG/DL (ref 0.2–1.3)
BILIRUB SERPL-MCNC: 0.3 MG/DL (ref 0.2–1.3)
BILIRUB SERPL-MCNC: 0.4 MG/DL (ref 0.2–1.3)
BILIRUB SERPL-MCNC: 0.4 MG/DL (ref 0.2–1.3)
BILIRUB SERPL-MCNC: 0.5 MG/DL (ref 0.2–1.3)
BILIRUB SERPL-MCNC: 0.6 MG/DL (ref 0.2–1.3)
BILIRUB SERPL-MCNC: 0.6 MG/DL (ref 0.2–1.3)
BILIRUB SERPL-MCNC: 0.7 MG/DL (ref 0.2–1.3)
BILIRUB SERPL-MCNC: 0.8 MG/DL (ref 0.2–1.3)
BILIRUB SERPL-MCNC: 1.5 MG/DL (ref 0.2–1.3)
BILIRUB UR QL STRIP: NEGATIVE
BLD GP AB SCN SERPL QL: NORMAL
BLD GP AB SCN SERPL QL: NORMAL
BLD PROD TYP BPU: NORMAL
BLD PROD TYP BPU: NORMAL
BLD UNIT ID BPU: 0
BLOOD BANK CMNT PATIENT-IMP: NORMAL
BLOOD BANK CMNT PATIENT-IMP: NORMAL
BLOOD PRODUCT CODE: NORMAL
BPU ID: NORMAL
BUN SERPL-MCNC: 10 MG/DL (ref 7–30)
BUN SERPL-MCNC: 10 MG/DL (ref 7–30)
BUN SERPL-MCNC: 12 MG/DL (ref 7–30)
BUN SERPL-MCNC: 13 MG/DL (ref 7–30)
BUN SERPL-MCNC: 13 MG/DL (ref 7–30)
BUN SERPL-MCNC: 15 MG/DL (ref 7–30)
BUN SERPL-MCNC: 15 MG/DL (ref 7–30)
BUN SERPL-MCNC: 19 MG/DL (ref 7–30)
BUN SERPL-MCNC: 19 MG/DL (ref 7–30)
BUN SERPL-MCNC: 20 MG/DL (ref 7–30)
BUN SERPL-MCNC: 20 MG/DL (ref 7–30)
BUN SERPL-MCNC: 21 MG/DL (ref 7–30)
BUN SERPL-MCNC: 21 MG/DL (ref 7–30)
BUN SERPL-MCNC: 22 MG/DL (ref 7–30)
BUN SERPL-MCNC: 22 MG/DL (ref 7–30)
BUN SERPL-MCNC: 24 MG/DL (ref 7–30)
BUN SERPL-MCNC: 24 MG/DL (ref 7–30)
BUN SERPL-MCNC: 26 MG/DL (ref 7–30)
BUN SERPL-MCNC: 26 MG/DL (ref 7–30)
BUN SERPL-MCNC: 27 MG/DL (ref 7–30)
BURR CELLS BLD QL SMEAR: ABNORMAL
BURR CELLS BLD QL SMEAR: ABNORMAL
BURR CELLS BLD QL SMEAR: SLIGHT
CALCIUM SERPL-MCNC: 7.8 MG/DL (ref 8.5–10.1)
CALCIUM SERPL-MCNC: 8 MG/DL (ref 8.5–10.1)
CALCIUM SERPL-MCNC: 8.1 MG/DL (ref 8.5–10.1)
CALCIUM SERPL-MCNC: 8.2 MG/DL (ref 8.5–10.1)
CALCIUM SERPL-MCNC: 8.3 MG/DL (ref 8.5–10.1)
CALCIUM SERPL-MCNC: 8.5 MG/DL (ref 8.5–10.1)
CALCIUM SERPL-MCNC: 8.5 MG/DL (ref 8.5–10.1)
CALCIUM SERPL-MCNC: 8.6 MG/DL (ref 8.5–10.1)
CALCIUM SERPL-MCNC: 8.6 MG/DL (ref 8.5–10.1)
CALCIUM SERPL-MCNC: 8.7 MG/DL (ref 8.5–10.1)
CALCIUM SERPL-MCNC: 8.9 MG/DL (ref 8.5–10.1)
CALCIUM SERPL-MCNC: 8.9 MG/DL (ref 8.5–10.1)
CAOX CRY #/AREA URNS HPF: ABNORMAL /HPF
CHLORIDE SERPL-SCNC: 100 MMOL/L (ref 94–109)
CHLORIDE SERPL-SCNC: 101 MMOL/L (ref 94–109)
CHLORIDE SERPL-SCNC: 101 MMOL/L (ref 94–109)
CHLORIDE SERPL-SCNC: 102 MMOL/L (ref 94–109)
CHLORIDE SERPL-SCNC: 102 MMOL/L (ref 94–109)
CHLORIDE SERPL-SCNC: 103 MMOL/L (ref 94–109)
CHLORIDE SERPL-SCNC: 103 MMOL/L (ref 94–109)
CHLORIDE SERPL-SCNC: 104 MMOL/L (ref 94–109)
CHLORIDE SERPL-SCNC: 105 MMOL/L (ref 94–109)
CHLORIDE SERPL-SCNC: 105 MMOL/L (ref 94–109)
CHLORIDE SERPL-SCNC: 106 MMOL/L (ref 94–109)
CHLORIDE SERPL-SCNC: 107 MMOL/L (ref 94–109)
CHLORIDE SERPL-SCNC: 108 MMOL/L (ref 94–109)
CHLORIDE SERPL-SCNC: 112 MMOL/L (ref 94–109)
CHLORIDE SERPL-SCNC: 114 MMOL/L (ref 94–109)
CO2 BLDCOV-SCNC: 22 MMOL/L (ref 21–28)
CO2 SERPL-SCNC: 20 MMOL/L (ref 20–32)
CO2 SERPL-SCNC: 21 MMOL/L (ref 20–32)
CO2 SERPL-SCNC: 21 MMOL/L (ref 20–32)
CO2 SERPL-SCNC: 22 MMOL/L (ref 20–32)
CO2 SERPL-SCNC: 23 MMOL/L (ref 20–32)
CO2 SERPL-SCNC: 24 MMOL/L (ref 20–32)
CO2 SERPL-SCNC: 25 MMOL/L (ref 20–32)
CO2 SERPL-SCNC: 26 MMOL/L (ref 20–32)
CO2 SERPL-SCNC: 27 MMOL/L (ref 20–32)
CO2 SERPL-SCNC: 29 MMOL/L (ref 20–32)
CO2 SERPL-SCNC: 29 MMOL/L (ref 20–32)
COLOR UR AUTO: YELLOW
COPATH REPORT: NORMAL
COPATH REPORT: NORMAL
CORTIS SERPL-MCNC: 2.2 UG/DL (ref 4–22)
CREAT SERPL-MCNC: 0.75 MG/DL (ref 0.66–1.25)
CREAT SERPL-MCNC: 0.76 MG/DL (ref 0.66–1.25)
CREAT SERPL-MCNC: 0.8 MG/DL (ref 0.66–1.25)
CREAT SERPL-MCNC: 0.82 MG/DL (ref 0.66–1.25)
CREAT SERPL-MCNC: 0.83 MG/DL (ref 0.66–1.25)
CREAT SERPL-MCNC: 0.85 MG/DL (ref 0.66–1.25)
CREAT SERPL-MCNC: 0.87 MG/DL (ref 0.66–1.25)
CREAT SERPL-MCNC: 0.9 MG/DL (ref 0.66–1.25)
CREAT SERPL-MCNC: 0.92 MG/DL (ref 0.66–1.25)
CREAT SERPL-MCNC: 0.92 MG/DL (ref 0.66–1.25)
CREAT SERPL-MCNC: 0.93 MG/DL (ref 0.66–1.25)
CREAT SERPL-MCNC: 0.94 MG/DL (ref 0.66–1.25)
CREAT SERPL-MCNC: 0.96 MG/DL (ref 0.66–1.25)
CREAT SERPL-MCNC: 0.99 MG/DL (ref 0.66–1.25)
CREAT SERPL-MCNC: 1 MG/DL (ref 0.66–1.25)
CREAT SERPL-MCNC: 1.03 MG/DL (ref 0.66–1.25)
CREAT SERPL-MCNC: 1.06 MG/DL (ref 0.66–1.25)
CREAT SERPL-MCNC: 1.06 MG/DL (ref 0.66–1.25)
CREAT SERPL-MCNC: 1.09 MG/DL (ref 0.66–1.25)
CREAT SERPL-MCNC: 1.09 MG/DL (ref 0.66–1.25)
CRP SERPL-MCNC: 130 MG/L (ref 0–8)
CRP SERPL-MCNC: 130 MG/L (ref 0–8)
CRP SERPL-MCNC: 85 MG/L (ref 0–8)
DACRYOCYTES BLD QL SMEAR: ABNORMAL
DACRYOCYTES BLD QL SMEAR: SLIGHT
DACRYOCYTES BLD QL SMEAR: SLIGHT
DIFFERENTIAL METHOD BLD: ABNORMAL
DIFFERENTIAL METHOD BLD: NORMAL
DIFFERENTIAL METHOD BLD: NORMAL
EBV EA-D IGG SER-ACNC: <0.2 AI (ref 0–0.8)
EBV NA IGG SER QL IA: 7.8 AI (ref 0–0.8)
EOSINOPHIL # BLD AUTO: 0 10E9/L (ref 0–0.7)
EOSINOPHIL # BLD AUTO: 0.1 10E9/L (ref 0–0.7)
EOSINOPHIL # BLD AUTO: 0.2 10E9/L (ref 0–0.7)
EOSINOPHIL NFR BLD AUTO: 0 %
EOSINOPHIL NFR BLD AUTO: 0.3 %
EOSINOPHIL NFR BLD AUTO: 1.7 %
EOSINOPHIL NFR BLD AUTO: 1.8 %
EOSINOPHIL NFR BLD AUTO: 2.3 %
EOSINOPHIL NFR BLD AUTO: 2.7 %
ERYTHROCYTE [DISTWIDTH] IN BLOOD BY AUTOMATED COUNT: 11.9 % (ref 10–15)
ERYTHROCYTE [DISTWIDTH] IN BLOOD BY AUTOMATED COUNT: 12.1 % (ref 10–15)
ERYTHROCYTE [DISTWIDTH] IN BLOOD BY AUTOMATED COUNT: 12.1 % (ref 10–15)
ERYTHROCYTE [DISTWIDTH] IN BLOOD BY AUTOMATED COUNT: 12.9 % (ref 10–15)
ERYTHROCYTE [DISTWIDTH] IN BLOOD BY AUTOMATED COUNT: 14.1 % (ref 10–15)
ERYTHROCYTE [DISTWIDTH] IN BLOOD BY AUTOMATED COUNT: 14.2 % (ref 10–15)
ERYTHROCYTE [DISTWIDTH] IN BLOOD BY AUTOMATED COUNT: 14.4 % (ref 10–15)
ERYTHROCYTE [DISTWIDTH] IN BLOOD BY AUTOMATED COUNT: 14.9 % (ref 10–15)
ERYTHROCYTE [DISTWIDTH] IN BLOOD BY AUTOMATED COUNT: 14.9 % (ref 10–15)
ERYTHROCYTE [DISTWIDTH] IN BLOOD BY AUTOMATED COUNT: 15 % (ref 10–15)
ERYTHROCYTE [DISTWIDTH] IN BLOOD BY AUTOMATED COUNT: 15.1 % (ref 10–15)
ERYTHROCYTE [DISTWIDTH] IN BLOOD BY AUTOMATED COUNT: 15.1 % (ref 10–15)
ERYTHROCYTE [DISTWIDTH] IN BLOOD BY AUTOMATED COUNT: 15.7 % (ref 10–15)
ERYTHROCYTE [DISTWIDTH] IN BLOOD BY AUTOMATED COUNT: 15.8 % (ref 10–15)
ERYTHROCYTE [DISTWIDTH] IN BLOOD BY AUTOMATED COUNT: 15.9 % (ref 10–15)
ERYTHROCYTE [DISTWIDTH] IN BLOOD BY AUTOMATED COUNT: 16.3 % (ref 10–15)
ERYTHROCYTE [DISTWIDTH] IN BLOOD BY AUTOMATED COUNT: 16.6 % (ref 10–15)
ERYTHROCYTE [DISTWIDTH] IN BLOOD BY AUTOMATED COUNT: 18 % (ref 10–15)
ERYTHROCYTE [DISTWIDTH] IN BLOOD BY AUTOMATED COUNT: 18.4 % (ref 10–15)
ERYTHROCYTE [DISTWIDTH] IN BLOOD BY AUTOMATED COUNT: 18.9 % (ref 10–15)
ERYTHROCYTE [DISTWIDTH] IN BLOOD BY AUTOMATED COUNT: 18.9 % (ref 10–15)
ERYTHROCYTE [DISTWIDTH] IN BLOOD BY AUTOMATED COUNT: 19.3 % (ref 10–15)
ERYTHROCYTE [SEDIMENTATION RATE] IN BLOOD BY WESTERGREN METHOD: 46 MM/H (ref 0–20)
FUNGUS SPEC CULT: NORMAL
GFR SERPL CREATININE-BSD FRML MDRD: 69 ML/MIN/1.7M2
GFR SERPL CREATININE-BSD FRML MDRD: 69 ML/MIN/1.7M2
GFR SERPL CREATININE-BSD FRML MDRD: 71 ML/MIN/1.7M2
GFR SERPL CREATININE-BSD FRML MDRD: 71 ML/MIN/1.7M2
GFR SERPL CREATININE-BSD FRML MDRD: 74 ML/MIN/1.7M2
GFR SERPL CREATININE-BSD FRML MDRD: 76 ML/MIN/1.7M2
GFR SERPL CREATININE-BSD FRML MDRD: 77 ML/MIN/1.7M2
GFR SERPL CREATININE-BSD FRML MDRD: 79 ML/MIN/1.7M2
GFR SERPL CREATININE-BSD FRML MDRD: 82 ML/MIN/1.7M2
GFR SERPL CREATININE-BSD FRML MDRD: 83 ML/MIN/1.7M2
GFR SERPL CREATININE-BSD FRML MDRD: 83 ML/MIN/1.7M2
GFR SERPL CREATININE-BSD FRML MDRD: 84 ML/MIN/1.7M2
GFR SERPL CREATININE-BSD FRML MDRD: 86 ML/MIN/1.7M2
GFR SERPL CREATININE-BSD FRML MDRD: 90 ML/MIN/1.7M2
GFR SERPL CREATININE-BSD FRML MDRD: >90 ML/MIN/1.7M2
GLUCOSE BLDC GLUCOMTR-MCNC: 88 MG/DL (ref 70–99)
GLUCOSE BLDC GLUCOMTR-MCNC: 94 MG/DL (ref 70–99)
GLUCOSE SERPL-MCNC: 100 MG/DL (ref 70–99)
GLUCOSE SERPL-MCNC: 100 MG/DL (ref 70–99)
GLUCOSE SERPL-MCNC: 101 MG/DL (ref 70–99)
GLUCOSE SERPL-MCNC: 108 MG/DL (ref 70–99)
GLUCOSE SERPL-MCNC: 110 MG/DL (ref 70–99)
GLUCOSE SERPL-MCNC: 111 MG/DL (ref 70–99)
GLUCOSE SERPL-MCNC: 112 MG/DL (ref 70–99)
GLUCOSE SERPL-MCNC: 129 MG/DL (ref 70–99)
GLUCOSE SERPL-MCNC: 133 MG/DL (ref 70–99)
GLUCOSE SERPL-MCNC: 141 MG/DL (ref 70–99)
GLUCOSE SERPL-MCNC: 84 MG/DL (ref 70–99)
GLUCOSE SERPL-MCNC: 85 MG/DL (ref 70–99)
GLUCOSE SERPL-MCNC: 87 MG/DL (ref 70–99)
GLUCOSE SERPL-MCNC: 88 MG/DL (ref 70–99)
GLUCOSE SERPL-MCNC: 89 MG/DL (ref 70–99)
GLUCOSE SERPL-MCNC: 90 MG/DL (ref 70–99)
GLUCOSE SERPL-MCNC: 93 MG/DL (ref 70–99)
GLUCOSE SERPL-MCNC: 94 MG/DL (ref 70–99)
GLUCOSE SERPL-MCNC: 94 MG/DL (ref 70–99)
GLUCOSE SERPL-MCNC: 95 MG/DL (ref 70–99)
GLUCOSE UR STRIP-MCNC: NEGATIVE MG/DL
GRAM STN SPEC: NORMAL
HBV CORE AB SERPL QL IA: NONREACTIVE
HBV SURFACE AB SERPL IA-ACNC: 0.26 M[IU]/ML
HBV SURFACE AG SERPL QL IA: NONREACTIVE
HCT VFR BLD AUTO: 26 % (ref 40–53)
HCT VFR BLD AUTO: 27.2 % (ref 40–53)
HCT VFR BLD AUTO: 27.5 % (ref 40–53)
HCT VFR BLD AUTO: 30.3 % (ref 40–53)
HCT VFR BLD AUTO: 31 % (ref 40–53)
HCT VFR BLD AUTO: 33.3 % (ref 40–53)
HCT VFR BLD AUTO: 33.6 % (ref 40–53)
HCT VFR BLD AUTO: 34.3 % (ref 40–53)
HCT VFR BLD AUTO: 36 % (ref 40–53)
HCT VFR BLD AUTO: 36.7 % (ref 40–53)
HCT VFR BLD AUTO: 36.9 % (ref 40–53)
HCT VFR BLD AUTO: 37.8 % (ref 40–53)
HCT VFR BLD AUTO: 38.1 % (ref 40–53)
HCT VFR BLD AUTO: 38.2 % (ref 40–53)
HCT VFR BLD AUTO: 39.3 % (ref 40–53)
HCT VFR BLD AUTO: 39.5 % (ref 40–53)
HCT VFR BLD AUTO: 39.6 % (ref 40–53)
HCT VFR BLD AUTO: 39.9 % (ref 40–53)
HCT VFR BLD AUTO: 40.9 % (ref 40–53)
HCT VFR BLD AUTO: 41 % (ref 40–53)
HCT VFR BLD AUTO: 41.7 % (ref 40–53)
HCT VFR BLD AUTO: 41.7 % (ref 40–53)
HCT VFR BLD AUTO: 41.8 % (ref 40–53)
HCT VFR BLD AUTO: 42.2 % (ref 40–53)
HCV AB SERPL QL IA: NONREACTIVE
HGB BLD-MCNC: 10.2 G/DL (ref 13.3–17.7)
HGB BLD-MCNC: 11 G/DL (ref 13.3–17.7)
HGB BLD-MCNC: 11.4 G/DL (ref 13.3–17.7)
HGB BLD-MCNC: 11.9 G/DL (ref 13.3–17.7)
HGB BLD-MCNC: 12 G/DL (ref 13.3–17.7)
HGB BLD-MCNC: 12.4 G/DL (ref 13.3–17.7)
HGB BLD-MCNC: 12.9 G/DL (ref 13.3–17.7)
HGB BLD-MCNC: 13 G/DL (ref 13.3–17.7)
HGB BLD-MCNC: 13.1 G/DL (ref 13.3–17.7)
HGB BLD-MCNC: 13.2 G/DL (ref 13.3–17.7)
HGB BLD-MCNC: 13.3 G/DL (ref 13.3–17.7)
HGB BLD-MCNC: 13.6 G/DL (ref 13.3–17.7)
HGB BLD-MCNC: 13.6 G/DL (ref 13.3–17.7)
HGB BLD-MCNC: 13.7 G/DL (ref 13.3–17.7)
HGB BLD-MCNC: 13.7 G/DL (ref 13.3–17.7)
HGB BLD-MCNC: 13.8 G/DL (ref 13.3–17.7)
HGB BLD-MCNC: 14 G/DL (ref 13.3–17.7)
HGB BLD-MCNC: 14.1 G/DL (ref 13.3–17.7)
HGB BLD-MCNC: 14.4 G/DL (ref 13.3–17.7)
HGB BLD-MCNC: 8.1 G/DL (ref 13.3–17.7)
HGB BLD-MCNC: 8.8 G/DL (ref 13.3–17.7)
HGB BLD-MCNC: 8.8 G/DL (ref 13.3–17.7)
HGB BLD-MCNC: 9.9 G/DL (ref 13.3–17.7)
HGB UR QL STRIP: ABNORMAL
HGB UR QL STRIP: NEGATIVE
HIV 1+2 AB+HIV1 P24 AG SERPL QL IA: NONREACTIVE
IMM GRANULOCYTES # BLD: 0 10E9/L (ref 0–0.4)
IMM GRANULOCYTES # BLD: 0.1 10E9/L (ref 0–0.4)
IMM GRANULOCYTES # BLD: 0.2 10E9/L (ref 0–0.4)
IMM GRANULOCYTES # BLD: 0.4 10E9/L (ref 0–0.4)
IMM GRANULOCYTES # BLD: 0.4 10E9/L (ref 0–0.4)
IMM GRANULOCYTES NFR BLD: 0.2 %
IMM GRANULOCYTES NFR BLD: 0.3 %
IMM GRANULOCYTES NFR BLD: 0.6 %
IMM GRANULOCYTES NFR BLD: 1.1 %
IMM GRANULOCYTES NFR BLD: 1.7 %
IMM GRANULOCYTES NFR BLD: 2.1 %
IMM GRANULOCYTES NFR BLD: 3.7 %
IMM GRANULOCYTES NFR BLD: 4.8 %
INR PPP: 1.11 (ref 0.86–1.14)
INR PPP: 1.2 (ref 0.86–1.14)
INTERPRETATION ECG - MUSE: NORMAL
INTERPRETATION ECG - MUSE: NORMAL
KETONES UR STRIP-MCNC: NEGATIVE MG/DL
LAB SCANNED RESULT: NORMAL
LAB SCANNED RESULT: NORMAL
LACTATE BLD-SCNC: 2.4 MMOL/L (ref 0.7–2.1)
LACTATE SERPL-SCNC: 1.3 MMOL/L (ref 0.4–2)
LDH SERPL L TO P-CCNC: 193 U/L (ref 85–227)
LDH SERPL L TO P-CCNC: 310 U/L (ref 85–227)
LDH SERPL L TO P-CCNC: 362 U/L (ref 85–227)
LDH SERPL L TO P-CCNC: 508 U/L (ref 85–227)
LDH SERPL L TO P-CCNC: 650 U/L (ref 85–227)
LDH SERPL L TO P-CCNC: NORMAL U/L (ref 85–227)
LEUKOCYTE ESTERASE UR QL STRIP: NEGATIVE
LEVETIRACETAM SERPL-MCNC: 25 UG/ML (ref 12–46)
LYMPHOCYTES # BLD AUTO: 0.4 10E9/L (ref 0.8–5.3)
LYMPHOCYTES # BLD AUTO: 0.5 10E9/L (ref 0.8–5.3)
LYMPHOCYTES # BLD AUTO: 0.6 10E9/L (ref 0.8–5.3)
LYMPHOCYTES # BLD AUTO: 0.7 10E9/L (ref 0.8–5.3)
LYMPHOCYTES # BLD AUTO: 0.7 10E9/L (ref 0.8–5.3)
LYMPHOCYTES # BLD AUTO: 0.8 10E9/L (ref 0.8–5.3)
LYMPHOCYTES # BLD AUTO: 0.9 10E9/L (ref 0.8–5.3)
LYMPHOCYTES # BLD AUTO: 0.9 10E9/L (ref 0.8–5.3)
LYMPHOCYTES # BLD AUTO: 1 10E9/L (ref 0.8–5.3)
LYMPHOCYTES # BLD AUTO: 1 10E9/L (ref 0.8–5.3)
LYMPHOCYTES # BLD AUTO: 1.2 10E9/L (ref 0.8–5.3)
LYMPHOCYTES # BLD AUTO: 1.3 10E9/L (ref 0.8–5.3)
LYMPHOCYTES # BLD AUTO: 1.4 10E9/L (ref 0.8–5.3)
LYMPHOCYTES # BLD AUTO: 1.7 10E9/L (ref 0.8–5.3)
LYMPHOCYTES # BLD AUTO: 1.8 10E9/L (ref 0.8–5.3)
LYMPHOCYTES # BLD AUTO: 2 10E9/L (ref 0.8–5.3)
LYMPHOCYTES # BLD AUTO: 2.5 10E9/L (ref 0.8–5.3)
LYMPHOCYTES NFR BLD AUTO: 11.3 %
LYMPHOCYTES NFR BLD AUTO: 12.1 %
LYMPHOCYTES NFR BLD AUTO: 14.2 %
LYMPHOCYTES NFR BLD AUTO: 18.7 %
LYMPHOCYTES NFR BLD AUTO: 19 %
LYMPHOCYTES NFR BLD AUTO: 20.3 %
LYMPHOCYTES NFR BLD AUTO: 20.3 %
LYMPHOCYTES NFR BLD AUTO: 21 %
LYMPHOCYTES NFR BLD AUTO: 24.9 %
LYMPHOCYTES NFR BLD AUTO: 29.8 %
LYMPHOCYTES NFR BLD AUTO: 30.2 %
LYMPHOCYTES NFR BLD AUTO: 32 %
LYMPHOCYTES NFR BLD AUTO: 32.4 %
LYMPHOCYTES NFR BLD AUTO: 32.6 %
LYMPHOCYTES NFR BLD AUTO: 41.5 %
LYMPHOCYTES NFR BLD AUTO: 43 %
LYMPHOCYTES NFR BLD AUTO: 5 %
LYMPHOCYTES NFR BLD AUTO: 5.4 %
LYMPHOCYTES NFR BLD AUTO: 7 %
Lab: NORMAL
MACROCYTES BLD QL SMEAR: PRESENT
MAGNESIUM SERPL-MCNC: 2 MG/DL (ref 1.6–2.3)
MCH RBC QN AUTO: 30.8 PG (ref 26.5–33)
MCH RBC QN AUTO: 30.9 PG (ref 26.5–33)
MCH RBC QN AUTO: 31.1 PG (ref 26.5–33)
MCH RBC QN AUTO: 31.6 PG (ref 26.5–33)
MCH RBC QN AUTO: 31.8 PG (ref 26.5–33)
MCH RBC QN AUTO: 32.2 PG (ref 26.5–33)
MCH RBC QN AUTO: 32.3 PG (ref 26.5–33)
MCH RBC QN AUTO: 32.5 PG (ref 26.5–33)
MCH RBC QN AUTO: 32.6 PG (ref 26.5–33)
MCH RBC QN AUTO: 32.7 PG (ref 26.5–33)
MCH RBC QN AUTO: 32.8 PG (ref 26.5–33)
MCH RBC QN AUTO: 32.9 PG (ref 26.5–33)
MCH RBC QN AUTO: 33.2 PG (ref 26.5–33)
MCH RBC QN AUTO: 33.2 PG (ref 26.5–33)
MCH RBC QN AUTO: 33.4 PG (ref 26.5–33)
MCH RBC QN AUTO: 33.7 PG (ref 26.5–33)
MCH RBC QN AUTO: 33.9 PG (ref 26.5–33)
MCH RBC QN AUTO: 34 PG (ref 26.5–33)
MCH RBC QN AUTO: 34 PG (ref 26.5–33)
MCH RBC QN AUTO: 34.2 PG (ref 26.5–33)
MCH RBC QN AUTO: 34.2 PG (ref 26.5–33)
MCH RBC QN AUTO: 34.3 PG (ref 26.5–33)
MCH RBC QN AUTO: 34.6 PG (ref 26.5–33)
MCH RBC QN AUTO: 34.7 PG (ref 26.5–33)
MCHC RBC AUTO-ENTMCNC: 31.2 G/DL (ref 31.5–36.5)
MCHC RBC AUTO-ENTMCNC: 31.9 G/DL (ref 31.5–36.5)
MCHC RBC AUTO-ENTMCNC: 32 G/DL (ref 31.5–36.5)
MCHC RBC AUTO-ENTMCNC: 32.4 G/DL (ref 31.5–36.5)
MCHC RBC AUTO-ENTMCNC: 32.7 G/DL (ref 31.5–36.5)
MCHC RBC AUTO-ENTMCNC: 32.9 G/DL (ref 31.5–36.5)
MCHC RBC AUTO-ENTMCNC: 33.2 G/DL (ref 31.5–36.5)
MCHC RBC AUTO-ENTMCNC: 33.3 G/DL (ref 31.5–36.5)
MCHC RBC AUTO-ENTMCNC: 33.4 G/DL (ref 31.5–36.5)
MCHC RBC AUTO-ENTMCNC: 33.5 G/DL (ref 31.5–36.5)
MCHC RBC AUTO-ENTMCNC: 33.6 G/DL (ref 31.5–36.5)
MCHC RBC AUTO-ENTMCNC: 33.7 G/DL (ref 31.5–36.5)
MCHC RBC AUTO-ENTMCNC: 33.8 G/DL (ref 31.5–36.5)
MCHC RBC AUTO-ENTMCNC: 34.1 G/DL (ref 31.5–36.5)
MCHC RBC AUTO-ENTMCNC: 34.1 G/DL (ref 31.5–36.5)
MCHC RBC AUTO-ENTMCNC: 34.2 G/DL (ref 31.5–36.5)
MCHC RBC AUTO-ENTMCNC: 34.3 G/DL (ref 31.5–36.5)
MCHC RBC AUTO-ENTMCNC: 34.4 G/DL (ref 31.5–36.5)
MCHC RBC AUTO-ENTMCNC: 34.6 G/DL (ref 31.5–36.5)
MCHC RBC AUTO-ENTMCNC: 34.7 G/DL (ref 31.5–36.5)
MCHC RBC AUTO-ENTMCNC: 34.9 G/DL (ref 31.5–36.5)
MCHC RBC AUTO-ENTMCNC: 35.4 G/DL (ref 31.5–36.5)
MCV RBC AUTO: 100 FL (ref 78–100)
MCV RBC AUTO: 101 FL (ref 78–100)
MCV RBC AUTO: 103 FL (ref 78–100)
MCV RBC AUTO: 104 FL (ref 78–100)
MCV RBC AUTO: 104 FL (ref 78–100)
MCV RBC AUTO: 105 FL (ref 78–100)
MCV RBC AUTO: 106 FL (ref 78–100)
MCV RBC AUTO: 107 FL (ref 78–100)
MCV RBC AUTO: 92 FL (ref 78–100)
MCV RBC AUTO: 93 FL (ref 78–100)
MCV RBC AUTO: 94 FL (ref 78–100)
MCV RBC AUTO: 95 FL (ref 78–100)
MCV RBC AUTO: 96 FL (ref 78–100)
MCV RBC AUTO: 97 FL (ref 78–100)
MCV RBC AUTO: 98 FL (ref 78–100)
MCV RBC AUTO: 98 FL (ref 78–100)
METAMYELOCYTES # BLD: 0 10E9/L
METAMYELOCYTES # BLD: 0.1 10E9/L
METAMYELOCYTES # BLD: 0.1 10E9/L
METAMYELOCYTES # BLD: 0.3 10E9/L
METAMYELOCYTES # BLD: 0.4 10E9/L
METAMYELOCYTES NFR BLD MANUAL: 0.9 %
METAMYELOCYTES NFR BLD MANUAL: 0.9 %
METAMYELOCYTES NFR BLD MANUAL: 1 %
METAMYELOCYTES NFR BLD MANUAL: 1 %
METAMYELOCYTES NFR BLD MANUAL: 2.7 %
METAMYELOCYTES NFR BLD MANUAL: 2.7 %
METAMYELOCYTES NFR BLD MANUAL: 3.5 %
MONOCYTES # BLD AUTO: 0 10E9/L (ref 0–1.3)
MONOCYTES # BLD AUTO: 0.1 10E9/L (ref 0–1.3)
MONOCYTES # BLD AUTO: 0.3 10E9/L (ref 0–1.3)
MONOCYTES # BLD AUTO: 0.4 10E9/L (ref 0–1.3)
MONOCYTES # BLD AUTO: 0.5 10E9/L (ref 0–1.3)
MONOCYTES # BLD AUTO: 0.6 10E9/L (ref 0–1.3)
MONOCYTES # BLD AUTO: 1 10E9/L (ref 0–1.3)
MONOCYTES NFR BLD AUTO: 0.9 %
MONOCYTES NFR BLD AUTO: 10 %
MONOCYTES NFR BLD AUTO: 13 %
MONOCYTES NFR BLD AUTO: 2 %
MONOCYTES NFR BLD AUTO: 2.4 %
MONOCYTES NFR BLD AUTO: 2.6 %
MONOCYTES NFR BLD AUTO: 3 %
MONOCYTES NFR BLD AUTO: 3.3 %
MONOCYTES NFR BLD AUTO: 3.4 %
MONOCYTES NFR BLD AUTO: 3.5 %
MONOCYTES NFR BLD AUTO: 3.7 %
MONOCYTES NFR BLD AUTO: 5.2 %
MONOCYTES NFR BLD AUTO: 6.1 %
MONOCYTES NFR BLD AUTO: 7.2 %
MONOCYTES NFR BLD AUTO: 7.8 %
MONOCYTES NFR BLD AUTO: 8.1 %
MONOCYTES NFR BLD AUTO: 8.7 %
MONOCYTES NFR BLD AUTO: 8.7 %
MONOCYTES NFR BLD AUTO: 9.8 %
MRSA DNA SPEC QL NAA+PROBE: NEGATIVE
MUCOUS THREADS #/AREA URNS LPF: PRESENT /LPF
MYCOBACTERIUM SPEC CULT: NORMAL
MYELOCYTES # BLD: 0 10E9/L
MYELOCYTES # BLD: 0 10E9/L
MYELOCYTES # BLD: 0.2 10E9/L
MYELOCYTES # BLD: 0.2 10E9/L
MYELOCYTES # BLD: 0.4 10E9/L
MYELOCYTES # BLD: 0.6 10E9/L
MYELOCYTES NFR BLD MANUAL: 0.9 %
MYELOCYTES NFR BLD MANUAL: 1.8 %
MYELOCYTES NFR BLD MANUAL: 2.6 %
MYELOCYTES NFR BLD MANUAL: 4.3 %
MYELOCYTES NFR BLD MANUAL: 4.5 %
MYELOCYTES NFR BLD MANUAL: 6 %
NEUTROPHILS # BLD AUTO: 1 10E9/L (ref 1.6–8.3)
NEUTROPHILS # BLD AUTO: 1.5 10E9/L (ref 1.6–8.3)
NEUTROPHILS # BLD AUTO: 1.5 10E9/L (ref 1.6–8.3)
NEUTROPHILS # BLD AUTO: 1.8 10E9/L (ref 1.6–8.3)
NEUTROPHILS # BLD AUTO: 1.8 10E9/L (ref 1.6–8.3)
NEUTROPHILS # BLD AUTO: 1.9 10E9/L (ref 1.6–8.3)
NEUTROPHILS # BLD AUTO: 10.2 10E9/L (ref 1.6–8.3)
NEUTROPHILS # BLD AUTO: 2.9 10E9/L (ref 1.6–8.3)
NEUTROPHILS # BLD AUTO: 3 10E9/L (ref 1.6–8.3)
NEUTROPHILS # BLD AUTO: 3.4 10E9/L (ref 1.6–8.3)
NEUTROPHILS # BLD AUTO: 3.4 10E9/L (ref 1.6–8.3)
NEUTROPHILS # BLD AUTO: 3.5 10E9/L (ref 1.6–8.3)
NEUTROPHILS # BLD AUTO: 3.6 10E9/L (ref 1.6–8.3)
NEUTROPHILS # BLD AUTO: 4.2 10E9/L (ref 1.6–8.3)
NEUTROPHILS # BLD AUTO: 4.7 10E9/L (ref 1.6–8.3)
NEUTROPHILS # BLD AUTO: 6.3 10E9/L (ref 1.6–8.3)
NEUTROPHILS # BLD AUTO: 6.4 10E9/L (ref 1.6–8.3)
NEUTROPHILS # BLD AUTO: 8.1 10E9/L (ref 1.6–8.3)
NEUTROPHILS # BLD AUTO: 9.9 10E9/L (ref 1.6–8.3)
NEUTROPHILS NFR BLD AUTO: 43 %
NEUTROPHILS NFR BLD AUTO: 50 %
NEUTROPHILS NFR BLD AUTO: 55.5 %
NEUTROPHILS NFR BLD AUTO: 58.3 %
NEUTROPHILS NFR BLD AUTO: 59.4 %
NEUTROPHILS NFR BLD AUTO: 59.8 %
NEUTROPHILS NFR BLD AUTO: 61.7 %
NEUTROPHILS NFR BLD AUTO: 63.5 %
NEUTROPHILS NFR BLD AUTO: 67.7 %
NEUTROPHILS NFR BLD AUTO: 73 %
NEUTROPHILS NFR BLD AUTO: 73.4 %
NEUTROPHILS NFR BLD AUTO: 75.2 %
NEUTROPHILS NFR BLD AUTO: 75.9 %
NEUTROPHILS NFR BLD AUTO: 77.9 %
NEUTROPHILS NFR BLD AUTO: 79.1 %
NEUTROPHILS NFR BLD AUTO: 79.3 %
NEUTROPHILS NFR BLD AUTO: 79.3 %
NEUTROPHILS NFR BLD AUTO: 80 %
NEUTROPHILS NFR BLD AUTO: 87.6 %
NITRATE UR QL: NEGATIVE
NRBC # BLD AUTO: 0 10*3/UL
NRBC # BLD AUTO: 0.1 10*3/UL
NRBC # BLD AUTO: 0.1 10*3/UL
NRBC # BLD AUTO: 0.2 10*3/UL
NRBC # BLD AUTO: 0.3 10*3/UL
NRBC # BLD AUTO: 0.3 10*3/UL
NRBC BLD AUTO-RTO: 0 /100
NRBC BLD AUTO-RTO: 1 /100
NRBC BLD AUTO-RTO: 14 /100
NRBC BLD AUTO-RTO: 2 /100
NRBC BLD AUTO-RTO: 2 /100
NRBC BLD AUTO-RTO: 3 /100
NRBC BLD AUTO-RTO: 3 /100
NRBC BLD AUTO-RTO: 6 /100
NRBC BLD AUTO-RTO: 6 /100
NUM BPU REQUESTED: 1
OTHER CELLS # BLD MANUAL: 0.1 10E9/L
OTHER CELLS NFR BLD MANUAL: 2 %
OVALOCYTES BLD QL SMEAR: SLIGHT
PCO2 BLDV: 31 MM HG (ref 40–50)
PH BLDV: 7.47 PH (ref 7.32–7.43)
PH UR STRIP: 5.5 PH (ref 5–7)
PH UR STRIP: 6 PH (ref 5–7)
PHOSPHATE SERPL-MCNC: 4.1 MG/DL (ref 2.5–4.5)
PLATELET # BLD AUTO: 107 10E9/L (ref 150–450)
PLATELET # BLD AUTO: 123 10E9/L (ref 150–450)
PLATELET # BLD AUTO: 125 10E9/L (ref 150–450)
PLATELET # BLD AUTO: 140 10E9/L (ref 150–450)
PLATELET # BLD AUTO: 141 10E9/L (ref 150–450)
PLATELET # BLD AUTO: 144 10E9/L (ref 150–450)
PLATELET # BLD AUTO: 162 10E9/L (ref 150–450)
PLATELET # BLD AUTO: 164 10E9/L (ref 150–450)
PLATELET # BLD AUTO: 168 10E9/L (ref 150–450)
PLATELET # BLD AUTO: 168 10E9/L (ref 150–450)
PLATELET # BLD AUTO: 192 10E9/L (ref 150–450)
PLATELET # BLD AUTO: 194 10E9/L (ref 150–450)
PLATELET # BLD AUTO: 198 10E9/L (ref 150–450)
PLATELET # BLD AUTO: 20 10E9/L (ref 150–450)
PLATELET # BLD AUTO: 203 10E9/L (ref 150–450)
PLATELET # BLD AUTO: 209 10E9/L (ref 150–450)
PLATELET # BLD AUTO: 211 10E9/L (ref 150–450)
PLATELET # BLD AUTO: 227 10E9/L (ref 150–450)
PLATELET # BLD AUTO: 228 10E9/L (ref 150–450)
PLATELET # BLD AUTO: 28 10E9/L (ref 150–450)
PLATELET # BLD AUTO: 36 10E9/L (ref 150–450)
PLATELET # BLD AUTO: 38 10E9/L (ref 150–450)
PLATELET # BLD AUTO: 56 10E9/L (ref 150–450)
PLATELET # BLD AUTO: 90 10E9/L (ref 150–450)
PLATELET # BLD AUTO: 91 10E9/L (ref 150–450)
PLATELET # BLD EST: ABNORMAL 10*3/UL
PO2 BLDV: 27 MM HG (ref 25–47)
POIKILOCYTOSIS BLD QL SMEAR: ABNORMAL
POIKILOCYTOSIS BLD QL SMEAR: ABNORMAL
POIKILOCYTOSIS BLD QL SMEAR: SLIGHT
POLYCHROMASIA BLD QL SMEAR: SLIGHT
POTASSIUM SERPL-SCNC: 3.4 MMOL/L (ref 3.4–5.3)
POTASSIUM SERPL-SCNC: 3.5 MMOL/L (ref 3.4–5.3)
POTASSIUM SERPL-SCNC: 3.6 MMOL/L (ref 3.4–5.3)
POTASSIUM SERPL-SCNC: 3.8 MMOL/L (ref 3.4–5.3)
POTASSIUM SERPL-SCNC: 3.8 MMOL/L (ref 3.4–5.3)
POTASSIUM SERPL-SCNC: 3.9 MMOL/L (ref 3.4–5.3)
POTASSIUM SERPL-SCNC: 4 MMOL/L (ref 3.4–5.3)
POTASSIUM SERPL-SCNC: 4.1 MMOL/L (ref 3.4–5.3)
POTASSIUM SERPL-SCNC: 4.2 MMOL/L (ref 3.4–5.3)
POTASSIUM SERPL-SCNC: 4.3 MMOL/L (ref 3.4–5.3)
POTASSIUM SERPL-SCNC: 4.3 MMOL/L (ref 3.4–5.3)
POTASSIUM SERPL-SCNC: 4.4 MMOL/L (ref 3.4–5.3)
POTASSIUM SERPL-SCNC: 4.6 MMOL/L (ref 3.4–5.3)
PROMYELOCYTES # BLD MANUAL: 0 10E9/L
PROMYELOCYTES # BLD MANUAL: 0.1 10E9/L
PROMYELOCYTES NFR BLD MANUAL: 0.9 %
PROMYELOCYTES NFR BLD MANUAL: 1 %
PROMYELOCYTES NFR BLD MANUAL: 1 %
PROMYELOCYTES NFR BLD MANUAL: 2.7 %
PROT SERPL-MCNC: 5.6 G/DL (ref 6.8–8.8)
PROT SERPL-MCNC: 6.3 G/DL (ref 6.8–8.8)
PROT SERPL-MCNC: 6.4 G/DL (ref 6.8–8.8)
PROT SERPL-MCNC: 6.5 G/DL (ref 6.8–8.8)
PROT SERPL-MCNC: 6.6 G/DL (ref 6.8–8.8)
PROT SERPL-MCNC: 6.6 G/DL (ref 6.8–8.8)
PROT SERPL-MCNC: 6.7 G/DL (ref 6.8–8.8)
PROT SERPL-MCNC: 7.2 G/DL (ref 6.8–8.8)
PROT SERPL-MCNC: 7.3 G/DL (ref 6.8–8.8)
PROT SERPL-MCNC: 7.4 G/DL (ref 6.8–8.8)
PROT SERPL-MCNC: 7.5 G/DL (ref 6.8–8.8)
PROT SERPL-MCNC: 7.6 G/DL (ref 6.8–8.8)
PROT SERPL-MCNC: 7.6 G/DL (ref 6.8–8.8)
PROT SERPL-MCNC: 7.7 G/DL (ref 6.8–8.8)
PSA SERPL-ACNC: 4.6 UG/L (ref 0–4)
RBC # BLD AUTO: 2.44 10E12/L (ref 4.4–5.9)
RBC # BLD AUTO: 2.57 10E12/L (ref 4.4–5.9)
RBC # BLD AUTO: 2.65 10E12/L (ref 4.4–5.9)
RBC # BLD AUTO: 2.94 10E12/L (ref 4.4–5.9)
RBC # BLD AUTO: 2.94 10E12/L (ref 4.4–5.9)
RBC # BLD AUTO: 3.24 10E12/L (ref 4.4–5.9)
RBC # BLD AUTO: 3.46 10E12/L (ref 4.4–5.9)
RBC # BLD AUTO: 3.64 10E12/L (ref 4.4–5.9)
RBC # BLD AUTO: 3.77 10E12/L (ref 4.4–5.9)
RBC # BLD AUTO: 3.78 10E12/L (ref 4.4–5.9)
RBC # BLD AUTO: 3.8 10E12/L (ref 4.4–5.9)
RBC # BLD AUTO: 3.8 10E12/L (ref 4.4–5.9)
RBC # BLD AUTO: 3.82 10E12/L (ref 4.4–5.9)
RBC # BLD AUTO: 3.82 10E12/L (ref 4.4–5.9)
RBC # BLD AUTO: 3.91 10E12/L (ref 4.4–5.9)
RBC # BLD AUTO: 4.13 10E12/L (ref 4.4–5.9)
RBC # BLD AUTO: 4.18 10E12/L (ref 4.4–5.9)
RBC # BLD AUTO: 4.25 10E12/L (ref 4.4–5.9)
RBC # BLD AUTO: 4.28 10E12/L (ref 4.4–5.9)
RBC # BLD AUTO: 4.31 10E12/L (ref 4.4–5.9)
RBC # BLD AUTO: 4.32 10E12/L (ref 4.4–5.9)
RBC # BLD AUTO: 4.43 10E12/L (ref 4.4–5.9)
RBC # BLD AUTO: 4.46 10E12/L (ref 4.4–5.9)
RBC # BLD AUTO: 4.55 10E12/L (ref 4.4–5.9)
RBC #/AREA URNS AUTO: 1 /HPF (ref 0–2)
RBC #/AREA URNS AUTO: 1 /HPF (ref 0–2)
RBC #/AREA URNS AUTO: 3 /HPF (ref 0–2)
RBC #/AREA URNS AUTO: <1 /HPF (ref 0–2)
RBC MORPH BLD: ABNORMAL
RBC MORPH BLD: ABNORMAL
RETICS # AUTO: 69.9 10E9/L (ref 25–95)
RETICS/RBC NFR AUTO: 1.9 % (ref 0.5–2)
SAO2 % BLDV FROM PO2: 57 %
SODIUM SERPL-SCNC: 134 MMOL/L (ref 133–144)
SODIUM SERPL-SCNC: 135 MMOL/L (ref 133–144)
SODIUM SERPL-SCNC: 136 MMOL/L (ref 133–144)
SODIUM SERPL-SCNC: 136 MMOL/L (ref 133–144)
SODIUM SERPL-SCNC: 137 MMOL/L (ref 133–144)
SODIUM SERPL-SCNC: 138 MMOL/L (ref 133–144)
SODIUM SERPL-SCNC: 138 MMOL/L (ref 133–144)
SODIUM SERPL-SCNC: 139 MMOL/L (ref 133–144)
SODIUM SERPL-SCNC: 140 MMOL/L (ref 133–144)
SODIUM SERPL-SCNC: 141 MMOL/L (ref 133–144)
SODIUM SERPL-SCNC: 141 MMOL/L (ref 133–144)
SODIUM SERPL-SCNC: 142 MMOL/L (ref 133–144)
SOURCE: ABNORMAL
SP GR UR STRIP: 1.01 (ref 1–1.03)
SP GR UR STRIP: 1.02 (ref 1–1.03)
SP GR UR STRIP: 1.02 (ref 1–1.03)
SP GR UR STRIP: 1.04 (ref 1–1.03)
SPECIMEN EXP DATE BLD: NORMAL
SPECIMEN EXP DATE BLD: NORMAL
SPECIMEN SOURCE: NORMAL
SQUAMOUS #/AREA URNS AUTO: <1 /HPF (ref 0–1)
TRANSFUSION STATUS PATIENT QL: NORMAL
TRANSFUSION STATUS PATIENT QL: NORMAL
TROPONIN I SERPL-MCNC: 0.02 UG/L (ref 0–0.04)
URATE SERPL-MCNC: 4.6 MG/DL (ref 3.5–7.2)
URATE SERPL-MCNC: 4.7 MG/DL (ref 3.5–7.2)
URATE SERPL-MCNC: 5.4 MG/DL (ref 3.5–7.2)
URATE SERPL-MCNC: 6.1 MG/DL (ref 3.5–7.2)
URATE SERPL-MCNC: 6.1 MG/DL (ref 3.5–7.2)
UROBILINOGEN UR STRIP-MCNC: 0 MG/DL (ref 0–2)
UROBILINOGEN UR STRIP-MCNC: 2 MG/DL (ref 0–2)
VARIANT LYMPHS BLD QL SMEAR: PRESENT
WBC # BLD AUTO: 10.1 10E9/L (ref 4–11)
WBC # BLD AUTO: 11.3 10E9/L (ref 4–11)
WBC # BLD AUTO: 11.5 10E9/L (ref 4–11)
WBC # BLD AUTO: 12.9 10E9/L (ref 4–11)
WBC # BLD AUTO: 14.7 10E9/L (ref 4–11)
WBC # BLD AUTO: 2 10E9/L (ref 4–11)
WBC # BLD AUTO: 2.3 10E9/L (ref 4–11)
WBC # BLD AUTO: 2.4 10E9/L (ref 4–11)
WBC # BLD AUTO: 2.5 10E9/L (ref 4–11)
WBC # BLD AUTO: 2.6 10E9/L (ref 4–11)
WBC # BLD AUTO: 2.9 10E9/L (ref 4–11)
WBC # BLD AUTO: 3.4 10E9/L (ref 4–11)
WBC # BLD AUTO: 3.8 10E9/L (ref 4–11)
WBC # BLD AUTO: 4.4 10E9/L (ref 4–11)
WBC # BLD AUTO: 4.7 10E9/L (ref 4–11)
WBC # BLD AUTO: 5 10E9/L (ref 4–11)
WBC # BLD AUTO: 5.8 10E9/L (ref 4–11)
WBC # BLD AUTO: 5.9 10E9/L (ref 4–11)
WBC # BLD AUTO: 6.1 10E9/L (ref 4–11)
WBC # BLD AUTO: 6.3 10E9/L (ref 4–11)
WBC # BLD AUTO: 7.6 10E9/L (ref 4–11)
WBC # BLD AUTO: 8 10E9/L (ref 4–11)
WBC # BLD AUTO: 8.1 10E9/L (ref 4–11)
WBC # BLD AUTO: 8.7 10E9/L (ref 4–11)
WBC #/AREA URNS AUTO: 1 /HPF (ref 0–5)
WBC #/AREA URNS AUTO: <1 /HPF (ref 0–5)

## 2018-01-01 PROCEDURE — 70553 MRI BRAIN STEM W/O & W/DYE: CPT

## 2018-01-01 PROCEDURE — 97116 GAIT TRAINING THERAPY: CPT | Mod: GP

## 2018-01-01 PROCEDURE — 86140 C-REACTIVE PROTEIN: CPT | Performed by: INTERNAL MEDICINE

## 2018-01-01 PROCEDURE — 25000128 H RX IP 250 OP 636: Performed by: STUDENT IN AN ORGANIZED HEALTH CARE EDUCATION/TRAINING PROGRAM

## 2018-01-01 PROCEDURE — A9585 GADOBUTROL INJECTION: HCPCS | Performed by: NEUROLOGICAL SURGERY

## 2018-01-01 PROCEDURE — 25500064 ZZH RX 255 OP 636: Performed by: INTERNAL MEDICINE

## 2018-01-01 PROCEDURE — 70450 CT HEAD/BRAIN W/O DYE: CPT

## 2018-01-01 PROCEDURE — G0463 HOSPITAL OUTPT CLINIC VISIT: HCPCS | Mod: 27

## 2018-01-01 PROCEDURE — 84550 ASSAY OF BLOOD/URIC ACID: CPT | Performed by: INTERNAL MEDICINE

## 2018-01-01 PROCEDURE — 86901 BLOOD TYPING SEROLOGIC RH(D): CPT | Performed by: ANESTHESIOLOGY

## 2018-01-01 PROCEDURE — 88307 TISSUE EXAM BY PATHOLOGIST: CPT | Performed by: NEUROLOGICAL SURGERY

## 2018-01-01 PROCEDURE — 93010 ELECTROCARDIOGRAM REPORT: CPT | Performed by: INTERNAL MEDICINE

## 2018-01-01 PROCEDURE — 85025 COMPLETE CBC W/AUTO DIFF WBC: CPT | Performed by: EMERGENCY MEDICINE

## 2018-01-01 PROCEDURE — 85730 THROMBOPLASTIN TIME PARTIAL: CPT | Performed by: ANESTHESIOLOGY

## 2018-01-01 PROCEDURE — 93005 ELECTROCARDIOGRAM TRACING: CPT

## 2018-01-01 PROCEDURE — 86900 BLOOD TYPING SEROLOGIC ABO: CPT | Performed by: STUDENT IN AN ORGANIZED HEALTH CARE EDUCATION/TRAINING PROGRAM

## 2018-01-01 PROCEDURE — 82565 ASSAY OF CREATININE: CPT | Performed by: INTERNAL MEDICINE

## 2018-01-01 PROCEDURE — 87641 MR-STAPH DNA AMP PROBE: CPT | Performed by: STUDENT IN AN ORGANIZED HEALTH CARE EDUCATION/TRAINING PROGRAM

## 2018-01-01 PROCEDURE — 25000128 H RX IP 250 OP 636: Performed by: INTERNAL MEDICINE

## 2018-01-01 PROCEDURE — G0463 HOSPITAL OUTPT CLINIC VISIT: HCPCS | Performed by: RADIOLOGY

## 2018-01-01 PROCEDURE — G0463 HOSPITAL OUTPT CLINIC VISIT: HCPCS

## 2018-01-01 PROCEDURE — 00000146 ZZHCL STATISTIC GLUCOSE BY METER IP

## 2018-01-01 PROCEDURE — 93005 ELECTROCARDIOGRAM TRACING: CPT | Performed by: EMERGENCY MEDICINE

## 2018-01-01 PROCEDURE — 85610 PROTHROMBIN TIME: CPT | Performed by: STUDENT IN AN ORGANIZED HEALTH CARE EDUCATION/TRAINING PROGRAM

## 2018-01-01 PROCEDURE — 99239 HOSP IP/OBS DSCHRG MGMT >30: CPT | Performed by: INTERNAL MEDICINE

## 2018-01-01 PROCEDURE — 83615 LACTATE (LD) (LDH) ENZYME: CPT | Performed by: INTERNAL MEDICINE

## 2018-01-01 PROCEDURE — 96367 TX/PROPH/DG ADDL SEQ IV INF: CPT | Performed by: EMERGENCY MEDICINE

## 2018-01-01 PROCEDURE — 99215 OFFICE O/P EST HI 40 MIN: CPT | Mod: ZP | Performed by: INTERNAL MEDICINE

## 2018-01-01 PROCEDURE — 25000128 H RX IP 250 OP 636: Performed by: RADIOLOGY

## 2018-01-01 PROCEDURE — 25000125 ZZHC RX 250: Performed by: NEUROLOGICAL SURGERY

## 2018-01-01 PROCEDURE — 70552 MRI BRAIN STEM W/DYE: CPT

## 2018-01-01 PROCEDURE — 36415 COLL VENOUS BLD VENIPUNCTURE: CPT

## 2018-01-01 PROCEDURE — 37000009 ZZH ANESTHESIA TECHNICAL FEE, EACH ADDTL 15 MIN: Performed by: NEUROLOGICAL SURGERY

## 2018-01-01 PROCEDURE — 99214 OFFICE O/P EST MOD 30 MIN: CPT | Mod: ZP | Performed by: INTERNAL MEDICINE

## 2018-01-01 PROCEDURE — 83605 ASSAY OF LACTIC ACID: CPT | Performed by: PHYSICIAN ASSISTANT

## 2018-01-01 PROCEDURE — 86140 C-REACTIVE PROTEIN: CPT | Performed by: EMERGENCY MEDICINE

## 2018-01-01 PROCEDURE — 97161 PT EVAL LOW COMPLEX 20 MIN: CPT | Mod: GP

## 2018-01-01 PROCEDURE — 85025 COMPLETE CBC W/AUTO DIFF WBC: CPT | Performed by: FAMILY MEDICINE

## 2018-01-01 PROCEDURE — 37000008 ZZH ANESTHESIA TECHNICAL FEE, 1ST 30 MIN: Performed by: NEUROLOGICAL SURGERY

## 2018-01-01 PROCEDURE — 36415 COLL VENOUS BLD VENIPUNCTURE: CPT | Performed by: FAMILY MEDICINE

## 2018-01-01 PROCEDURE — 25000131 ZZH RX MED GY IP 250 OP 636 PS 637: Performed by: STUDENT IN AN ORGANIZED HEALTH CARE EDUCATION/TRAINING PROGRAM

## 2018-01-01 PROCEDURE — 80053 COMPREHEN METABOLIC PANEL: CPT | Performed by: INTERNAL MEDICINE

## 2018-01-01 PROCEDURE — 25000128 H RX IP 250 OP 636: Performed by: NEUROLOGICAL SURGERY

## 2018-01-01 PROCEDURE — 25000132 ZZH RX MED GY IP 250 OP 250 PS 637: Performed by: INTERNAL MEDICINE

## 2018-01-01 PROCEDURE — G0463 HOSPITAL OUTPT CLINIC VISIT: HCPCS | Mod: ZF

## 2018-01-01 PROCEDURE — 80048 BASIC METABOLIC PNL TOTAL CA: CPT | Performed by: INTERNAL MEDICINE

## 2018-01-01 PROCEDURE — 40000141 ZZH STATISTIC PERIPHERAL IV START W/O US GUIDANCE

## 2018-01-01 PROCEDURE — 80048 BASIC METABOLIC PNL TOTAL CA: CPT | Performed by: STUDENT IN AN ORGANIZED HEALTH CARE EDUCATION/TRAINING PROGRAM

## 2018-01-01 PROCEDURE — 99285 EMERGENCY DEPT VISIT HI MDM: CPT | Mod: 25 | Performed by: EMERGENCY MEDICINE

## 2018-01-01 PROCEDURE — 99285 EMERGENCY DEPT VISIT HI MDM: CPT | Mod: Z6 | Performed by: EMERGENCY MEDICINE

## 2018-01-01 PROCEDURE — 85025 COMPLETE CBC W/AUTO DIFF WBC: CPT | Performed by: PHYSICIAN ASSISTANT

## 2018-01-01 PROCEDURE — 80053 COMPREHEN METABOLIC PANEL: CPT | Performed by: PHYSICIAN ASSISTANT

## 2018-01-01 PROCEDURE — 93010 ELECTROCARDIOGRAM REPORT: CPT | Mod: Z6 | Performed by: EMERGENCY MEDICINE

## 2018-01-01 PROCEDURE — 85025 COMPLETE CBC W/AUTO DIFF WBC: CPT | Performed by: INTERNAL MEDICINE

## 2018-01-01 PROCEDURE — 82803 BLOOD GASES ANY COMBINATION: CPT

## 2018-01-01 PROCEDURE — 80048 BASIC METABOLIC PNL TOTAL CA: CPT | Performed by: PHYSICIAN ASSISTANT

## 2018-01-01 PROCEDURE — 25000131 ZZH RX MED GY IP 250 OP 636 PS 637: Performed by: INTERNAL MEDICINE

## 2018-01-01 PROCEDURE — 81001 URINALYSIS AUTO W/SCOPE: CPT | Performed by: EMERGENCY MEDICINE

## 2018-01-01 PROCEDURE — A9585 GADOBUTROL INJECTION: HCPCS | Performed by: RADIOLOGY

## 2018-01-01 PROCEDURE — 25000132 ZZH RX MED GY IP 250 OP 250 PS 637: Performed by: ANESTHESIOLOGY

## 2018-01-01 PROCEDURE — 84484 ASSAY OF TROPONIN QUANT: CPT | Performed by: EMERGENCY MEDICINE

## 2018-01-01 PROCEDURE — 25000128 H RX IP 250 OP 636: Performed by: EMERGENCY MEDICINE

## 2018-01-01 PROCEDURE — 83735 ASSAY OF MAGNESIUM: CPT | Performed by: INTERNAL MEDICINE

## 2018-01-01 PROCEDURE — 25000128 H RX IP 250 OP 636: Performed by: NURSE ANESTHETIST, CERTIFIED REGISTERED

## 2018-01-01 PROCEDURE — 71046 X-RAY EXAM CHEST 2 VIEWS: CPT

## 2018-01-01 PROCEDURE — 36415 COLL VENOUS BLD VENIPUNCTURE: CPT | Performed by: INTERNAL MEDICINE

## 2018-01-01 PROCEDURE — 12000008 ZZH R&B INTERMEDIATE UMMC

## 2018-01-01 PROCEDURE — 88331 PATH CONSLTJ SURG 1 BLK 1SPC: CPT | Performed by: NEUROLOGICAL SURGERY

## 2018-01-01 PROCEDURE — 40000193 ZZH STATISTIC PT WARD VISIT

## 2018-01-01 PROCEDURE — 97110 THERAPEUTIC EXERCISES: CPT | Mod: GP

## 2018-01-01 PROCEDURE — 95999 UNLISTED NEUROLOGICAL DX PX: CPT | Mod: ZP | Performed by: INTERNAL MEDICINE

## 2018-01-01 PROCEDURE — 88342 IMHCHEM/IMCYTCHM 1ST ANTB: CPT | Performed by: NEUROLOGICAL SURGERY

## 2018-01-01 PROCEDURE — 85652 RBC SED RATE AUTOMATED: CPT | Performed by: EMERGENCY MEDICINE

## 2018-01-01 PROCEDURE — 25000125 ZZHC RX 250: Performed by: NURSE PRACTITIONER

## 2018-01-01 PROCEDURE — 97530 THERAPEUTIC ACTIVITIES: CPT | Mod: GP

## 2018-01-01 PROCEDURE — 85027 COMPLETE CBC AUTOMATED: CPT | Performed by: PHYSICIAN ASSISTANT

## 2018-01-01 PROCEDURE — G0103 PSA SCREENING: HCPCS | Performed by: FAMILY MEDICINE

## 2018-01-01 PROCEDURE — 8E09XBH COMPUTER ASSISTED PROCEDURE OF HEAD AND NECK REGION, WITH MAGNETIC RESONANCE IMAGING: ICD-10-PCS | Performed by: NEUROLOGICAL SURGERY

## 2018-01-01 PROCEDURE — C1713 ANCHOR/SCREW BN/BN,TIS/BN: HCPCS | Performed by: NEUROLOGICAL SURGERY

## 2018-01-01 PROCEDURE — 80053 COMPREHEN METABOLIC PANEL: CPT | Performed by: EMERGENCY MEDICINE

## 2018-01-01 PROCEDURE — 83735 ASSAY OF MAGNESIUM: CPT | Performed by: PHYSICIAN ASSISTANT

## 2018-01-01 PROCEDURE — 70487 CT MAXILLOFACIAL W/DYE: CPT

## 2018-01-01 PROCEDURE — 97162 PT EVAL MOD COMPLEX 30 MIN: CPT | Mod: GP

## 2018-01-01 PROCEDURE — 96366 THER/PROPH/DIAG IV INF ADDON: CPT | Performed by: EMERGENCY MEDICINE

## 2018-01-01 PROCEDURE — 85730 THROMBOPLASTIN TIME PARTIAL: CPT | Performed by: STUDENT IN AN ORGANIZED HEALTH CARE EDUCATION/TRAINING PROGRAM

## 2018-01-01 PROCEDURE — 85049 AUTOMATED PLATELET COUNT: CPT | Performed by: STUDENT IN AN ORGANIZED HEALTH CARE EDUCATION/TRAINING PROGRAM

## 2018-01-01 PROCEDURE — 81001 URINALYSIS AUTO W/SCOPE: CPT | Performed by: INTERNAL MEDICINE

## 2018-01-01 PROCEDURE — 83605 ASSAY OF LACTIC ACID: CPT

## 2018-01-01 PROCEDURE — 36415 COLL VENOUS BLD VENIPUNCTURE: CPT | Performed by: PHYSICIAN ASSISTANT

## 2018-01-01 PROCEDURE — 25000128 H RX IP 250 OP 636: Performed by: PHYSICIAN ASSISTANT

## 2018-01-01 PROCEDURE — 86900 BLOOD TYPING SEROLOGIC ABO: CPT | Performed by: ANESTHESIOLOGY

## 2018-01-01 PROCEDURE — 25000132 ZZH RX MED GY IP 250 OP 250 PS 637: Performed by: NURSE PRACTITIONER

## 2018-01-01 PROCEDURE — 85049 AUTOMATED PLATELET COUNT: CPT | Performed by: NURSE PRACTITIONER

## 2018-01-01 PROCEDURE — 36415 COLL VENOUS BLD VENIPUNCTURE: CPT | Performed by: STUDENT IN AN ORGANIZED HEALTH CARE EDUCATION/TRAINING PROGRAM

## 2018-01-01 PROCEDURE — 86704 HEP B CORE ANTIBODY TOTAL: CPT | Performed by: INTERNAL MEDICINE

## 2018-01-01 PROCEDURE — 94642 AEROSOL INHALATION TREATMENT: CPT

## 2018-01-01 PROCEDURE — 94640 AIRWAY INHALATION TREATMENT: CPT

## 2018-01-01 PROCEDURE — 86664 EPSTEIN-BARR NUCLEAR ANTIGEN: CPT | Performed by: INTERNAL MEDICINE

## 2018-01-01 PROCEDURE — 97112 NEUROMUSCULAR REEDUCATION: CPT | Mod: GP

## 2018-01-01 PROCEDURE — 83735 ASSAY OF MAGNESIUM: CPT | Performed by: EMERGENCY MEDICINE

## 2018-01-01 PROCEDURE — 99238 HOSP IP/OBS DSCHRG MGMT 30/<: CPT | Performed by: INTERNAL MEDICINE

## 2018-01-01 PROCEDURE — 99233 SBSQ HOSP IP/OBS HIGH 50: CPT | Performed by: INTERNAL MEDICINE

## 2018-01-01 PROCEDURE — 25000132 ZZH RX MED GY IP 250 OP 250 PS 637: Performed by: STUDENT IN AN ORGANIZED HEALTH CARE EDUCATION/TRAINING PROGRAM

## 2018-01-01 PROCEDURE — 85060 BLOOD SMEAR INTERPRETATION: CPT | Performed by: INTERNAL MEDICINE

## 2018-01-01 PROCEDURE — G0463 HOSPITAL OUTPT CLINIC VISIT: HCPCS | Mod: 25

## 2018-01-01 PROCEDURE — 87340 HEPATITIS B SURFACE AG IA: CPT | Performed by: INTERNAL MEDICINE

## 2018-01-01 PROCEDURE — 87040 BLOOD CULTURE FOR BACTERIA: CPT | Performed by: EMERGENCY MEDICINE

## 2018-01-01 PROCEDURE — 97162 PT EVAL MOD COMPLEX 30 MIN: CPT | Mod: GP | Performed by: PHYSICAL THERAPIST

## 2018-01-01 PROCEDURE — 86901 BLOOD TYPING SEROLOGIC RH(D): CPT | Performed by: STUDENT IN AN ORGANIZED HEALTH CARE EDUCATION/TRAINING PROGRAM

## 2018-01-01 PROCEDURE — 86850 RBC ANTIBODY SCREEN: CPT | Performed by: ANESTHESIOLOGY

## 2018-01-01 PROCEDURE — A9585 GADOBUTROL INJECTION: HCPCS | Performed by: INTERNAL MEDICINE

## 2018-01-01 PROCEDURE — 85045 AUTOMATED RETICULOCYTE COUNT: CPT | Performed by: INTERNAL MEDICINE

## 2018-01-01 PROCEDURE — 25000132 ZZH RX MED GY IP 250 OP 250 PS 637: Performed by: PHYSICIAN ASSISTANT

## 2018-01-01 PROCEDURE — 27210794 ZZH OR GENERAL SUPPLY STERILE: Performed by: NEUROLOGICAL SURGERY

## 2018-01-01 PROCEDURE — 85027 COMPLETE CBC AUTOMATED: CPT | Performed by: STUDENT IN AN ORGANIZED HEALTH CARE EDUCATION/TRAINING PROGRAM

## 2018-01-01 PROCEDURE — 99214 OFFICE O/P EST MOD 30 MIN: CPT | Performed by: FAMILY MEDICINE

## 2018-01-01 PROCEDURE — 85610 PROTHROMBIN TIME: CPT | Performed by: ANESTHESIOLOGY

## 2018-01-01 PROCEDURE — 99214 OFFICE O/P EST MOD 30 MIN: CPT | Mod: GC | Performed by: INTERNAL MEDICINE

## 2018-01-01 PROCEDURE — 99223 1ST HOSP IP/OBS HIGH 75: CPT | Mod: AI | Performed by: INTERNAL MEDICINE

## 2018-01-01 PROCEDURE — C1763 CONN TISS, NON-HUMAN: HCPCS | Performed by: NEUROLOGICAL SURGERY

## 2018-01-01 PROCEDURE — 96374 THER/PROPH/DIAG INJ IV PUSH: CPT | Performed by: EMERGENCY MEDICINE

## 2018-01-01 PROCEDURE — 99215 OFFICE O/P EST HI 40 MIN: CPT | Mod: ZP | Performed by: PHYSICIAN ASSISTANT

## 2018-01-01 PROCEDURE — 00B70ZZ EXCISION OF CEREBRAL HEMISPHERE, OPEN APPROACH: ICD-10-PCS | Performed by: NEUROLOGICAL SURGERY

## 2018-01-01 PROCEDURE — 40000718 ZZHC STATISTIC PT DEPARTMENT ORTHO VISIT: Performed by: PHYSICAL THERAPIST

## 2018-01-01 PROCEDURE — 97110 THERAPEUTIC EXERCISES: CPT | Mod: GP | Performed by: PHYSICAL THERAPIST

## 2018-01-01 PROCEDURE — P9037 PLATE PHERES LEUKOREDU IRRAD: HCPCS | Performed by: STUDENT IN AN ORGANIZED HEALTH CARE EDUCATION/TRAINING PROGRAM

## 2018-01-01 PROCEDURE — 87389 HIV-1 AG W/HIV-1&-2 AB AG IA: CPT | Performed by: INTERNAL MEDICINE

## 2018-01-01 PROCEDURE — 36000076 ZZH SURGERY LEVEL 6 EA 15 ADDTL MIN - UMMC: Performed by: NEUROLOGICAL SURGERY

## 2018-01-01 PROCEDURE — 90682 RIV4 VACC RECOMBINANT DNA IM: CPT | Performed by: INTERNAL MEDICINE

## 2018-01-01 PROCEDURE — 99213 OFFICE O/P EST LOW 20 MIN: CPT | Mod: ZP | Performed by: PHYSICIAN ASSISTANT

## 2018-01-01 PROCEDURE — 25000566 ZZH SEVOFLURANE, EA 15 MIN: Performed by: NEUROLOGICAL SURGERY

## 2018-01-01 PROCEDURE — 86663 EPSTEIN-BARR ANTIBODY: CPT | Performed by: INTERNAL MEDICINE

## 2018-01-01 PROCEDURE — 85018 HEMOGLOBIN: CPT | Performed by: ANESTHESIOLOGY

## 2018-01-01 PROCEDURE — 87070 CULTURE OTHR SPECIMN AEROBIC: CPT | Performed by: NEUROLOGICAL SURGERY

## 2018-01-01 PROCEDURE — 40000929 ZZHCL STATISTIC FOUNDATION ONE GENE PANEL: Performed by: INTERNAL MEDICINE

## 2018-01-01 PROCEDURE — 25000125 ZZHC RX 250: Performed by: STUDENT IN AN ORGANIZED HEALTH CARE EDUCATION/TRAINING PROGRAM

## 2018-01-01 PROCEDURE — 86850 RBC ANTIBODY SCREEN: CPT | Performed by: STUDENT IN AN ORGANIZED HEALTH CARE EDUCATION/TRAINING PROGRAM

## 2018-01-01 PROCEDURE — 86706 HEP B SURFACE ANTIBODY: CPT | Performed by: INTERNAL MEDICINE

## 2018-01-01 PROCEDURE — 86803 HEPATITIS C AB TEST: CPT | Performed by: INTERNAL MEDICINE

## 2018-01-01 PROCEDURE — 40000171 ZZH STATISTIC PRE-PROCEDURE ASSESSMENT III: Performed by: NEUROLOGICAL SURGERY

## 2018-01-01 PROCEDURE — A9585 GADOBUTROL INJECTION: HCPCS | Performed by: EMERGENCY MEDICINE

## 2018-01-01 PROCEDURE — C9399 UNCLASSIFIED DRUGS OR BIOLOG: HCPCS | Performed by: STUDENT IN AN ORGANIZED HEALTH CARE EDUCATION/TRAINING PROGRAM

## 2018-01-01 PROCEDURE — 96365 THER/PROPH/DIAG IV INF INIT: CPT | Mod: 59 | Performed by: EMERGENCY MEDICINE

## 2018-01-01 PROCEDURE — 84100 ASSAY OF PHOSPHORUS: CPT | Performed by: INTERNAL MEDICINE

## 2018-01-01 PROCEDURE — 87102 FUNGUS ISOLATION CULTURE: CPT | Performed by: NEUROLOGICAL SURGERY

## 2018-01-01 PROCEDURE — 87075 CULTR BACTERIA EXCEPT BLOOD: CPT | Performed by: NEUROLOGICAL SURGERY

## 2018-01-01 PROCEDURE — 27810169 ZZH OR IMPLANT GENERAL: Performed by: NEUROLOGICAL SURGERY

## 2018-01-01 PROCEDURE — 40000883 ZZH CANCELLED SURGERY UP TO 61-90 MINS: Performed by: NEUROLOGICAL SURGERY

## 2018-01-01 PROCEDURE — 20000004 ZZH R&B ICU UMMC

## 2018-01-01 PROCEDURE — 36000074 ZZH SURGERY LEVEL 6 1ST 30 MIN - UMMC: Performed by: NEUROLOGICAL SURGERY

## 2018-01-01 PROCEDURE — 82962 GLUCOSE BLOOD TEST: CPT

## 2018-01-01 PROCEDURE — 82533 TOTAL CORTISOL: CPT | Performed by: INTERNAL MEDICINE

## 2018-01-01 PROCEDURE — 71000016 ZZH RECOVERY PHASE 1 LEVEL 3 FIRST HR: Performed by: NEUROLOGICAL SURGERY

## 2018-01-01 PROCEDURE — 27210995 ZZH RX 272: Performed by: NEUROLOGICAL SURGERY

## 2018-01-01 PROCEDURE — 87205 SMEAR GRAM STAIN: CPT | Performed by: NEUROLOGICAL SURGERY

## 2018-01-01 PROCEDURE — 84132 ASSAY OF SERUM POTASSIUM: CPT | Performed by: ANESTHESIOLOGY

## 2018-01-01 PROCEDURE — 87640 STAPH A DNA AMP PROBE: CPT | Performed by: STUDENT IN AN ORGANIZED HEALTH CARE EDUCATION/TRAINING PROGRAM

## 2018-01-01 PROCEDURE — 99214 OFFICE O/P EST MOD 30 MIN: CPT | Mod: ZP | Performed by: PHYSICIAN ASSISTANT

## 2018-01-01 PROCEDURE — 36415 COLL VENOUS BLD VENIPUNCTURE: CPT | Performed by: ANESTHESIOLOGY

## 2018-01-01 DEVICE — GRAFT DURAGEN 2X2" ID220: Type: IMPLANTABLE DEVICE | Site: BRAIN | Status: FUNCTIONAL

## 2018-01-01 DEVICE — IMP SCR SYN MATRIX LOW PRO 1.5X03MM SELF DRILL 04.503.103.01: Type: IMPLANTABLE DEVICE | Site: SKULL | Status: FUNCTIONAL

## 2018-01-01 DEVICE — IMP BUR HOLE COVER 17MM LOW PROFILE TI 421.527: Type: IMPLANTABLE DEVICE | Site: SKULL | Status: FUNCTIONAL

## 2018-01-01 RX ORDER — TEMOZOLOMIDE 250 MG/1
250 CAPSULE ORAL DAILY
Qty: 5 CAPSULE | Refills: 0 | Status: SHIPPED | OUTPATIENT
Start: 2018-01-01 | End: 2018-01-01

## 2018-01-01 RX ORDER — DEXAMETHASONE 4 MG/1
4 TABLET ORAL EVERY 6 HOURS
Qty: 85 TABLET | Refills: 0 | Status: SHIPPED | OUTPATIENT
Start: 2018-01-01 | End: 2018-01-01

## 2018-01-01 RX ORDER — GADOBUTROL 604.72 MG/ML
10 INJECTION INTRAVENOUS ONCE
Status: DISCONTINUED | OUTPATIENT
Start: 2018-01-01 | End: 2018-01-01 | Stop reason: CLARIF

## 2018-01-01 RX ORDER — DEXAMETHASONE 1 MG
TABLET ORAL
Qty: 60 TABLET | Refills: 0 | Status: SHIPPED | OUTPATIENT
Start: 2018-01-01 | End: 2018-01-01

## 2018-01-01 RX ORDER — GADOBUTROL 604.72 MG/ML
10 INJECTION INTRAVENOUS ONCE
Status: COMPLETED | OUTPATIENT
Start: 2018-01-01 | End: 2018-01-01

## 2018-01-01 RX ORDER — ONDANSETRON 2 MG/ML
4 INJECTION INTRAMUSCULAR; INTRAVENOUS EVERY 6 HOURS PRN
Status: DISCONTINUED | OUTPATIENT
Start: 2018-01-01 | End: 2018-01-01 | Stop reason: HOSPADM

## 2018-01-01 RX ORDER — TEMOZOLOMIDE 180 MG/1
180 CAPSULE ORAL DAILY
Qty: 5 CAPSULE | Refills: 0 | Status: CANCELLED | OUTPATIENT
Start: 2018-01-01

## 2018-01-01 RX ORDER — PROPOFOL 10 MG/ML
INJECTION, EMULSION INTRAVENOUS PRN
Status: DISCONTINUED | OUTPATIENT
Start: 2018-01-01 | End: 2018-01-01

## 2018-01-01 RX ORDER — ESMOLOL HYDROCHLORIDE 10 MG/ML
INJECTION INTRAVENOUS PRN
Status: DISCONTINUED | OUTPATIENT
Start: 2018-01-01 | End: 2018-01-01

## 2018-01-01 RX ORDER — DEXAMETHASONE SODIUM PHOSPHATE 4 MG/ML
INJECTION, SOLUTION INTRA-ARTICULAR; INTRALESIONAL; INTRAMUSCULAR; INTRAVENOUS; SOFT TISSUE PRN
Status: DISCONTINUED | OUTPATIENT
Start: 2018-01-01 | End: 2018-01-01

## 2018-01-01 RX ORDER — PANTOPRAZOLE SODIUM 40 MG/1
40 TABLET, DELAYED RELEASE ORAL
Status: DISCONTINUED | OUTPATIENT
Start: 2018-01-01 | End: 2018-01-01 | Stop reason: HOSPADM

## 2018-01-01 RX ORDER — OXYCODONE HYDROCHLORIDE 5 MG/1
5-10 TABLET ORAL
Qty: 45 TABLET | Refills: 0 | Status: SHIPPED | OUTPATIENT
Start: 2018-01-01

## 2018-01-01 RX ORDER — LIDOCAINE 40 MG/G
CREAM TOPICAL
Status: DISCONTINUED | OUTPATIENT
Start: 2018-01-01 | End: 2018-01-01 | Stop reason: HOSPADM

## 2018-01-01 RX ORDER — DEXAMETHASONE SODIUM PHOSPHATE 4 MG/ML
4 INJECTION, SOLUTION INTRA-ARTICULAR; INTRALESIONAL; INTRAMUSCULAR; INTRAVENOUS; SOFT TISSUE EVERY 6 HOURS
Status: DISCONTINUED | OUTPATIENT
Start: 2018-01-01 | End: 2018-01-01 | Stop reason: HOSPADM

## 2018-01-01 RX ORDER — DEXAMETHASONE 2 MG/1
TABLET ORAL
Qty: 100 TABLET | Refills: 0 | Status: ON HOLD | OUTPATIENT
Start: 2018-01-01 | End: 2018-01-01

## 2018-01-01 RX ORDER — IOPAMIDOL 755 MG/ML
75 INJECTION, SOLUTION INTRAVASCULAR ONCE
Status: COMPLETED | OUTPATIENT
Start: 2018-01-01 | End: 2018-01-01

## 2018-01-01 RX ORDER — TAMSULOSIN HYDROCHLORIDE 0.4 MG/1
CAPSULE ORAL
Qty: 30 CAPSULE | Refills: 11 | Status: SHIPPED | OUTPATIENT
Start: 2018-01-01

## 2018-01-01 RX ORDER — DEXAMETHASONE 4 MG/1
4 TABLET ORAL EVERY 8 HOURS SCHEDULED
Status: DISCONTINUED | OUTPATIENT
Start: 2018-01-01 | End: 2018-01-01 | Stop reason: HOSPADM

## 2018-01-01 RX ORDER — DEXAMETHASONE SODIUM PHOSPHATE 10 MG/ML
10 INJECTION, SOLUTION INTRAMUSCULAR; INTRAVENOUS ONCE
Status: COMPLETED | OUTPATIENT
Start: 2018-01-01 | End: 2018-01-01

## 2018-01-01 RX ORDER — POLYETHYLENE GLYCOL 3350 17 G/17G
17 POWDER, FOR SOLUTION ORAL DAILY PRN
Status: DISCONTINUED | OUTPATIENT
Start: 2018-01-01 | End: 2018-01-01 | Stop reason: HOSPADM

## 2018-01-01 RX ORDER — PANTOPRAZOLE SODIUM 40 MG/1
40 TABLET, DELAYED RELEASE ORAL
Qty: 30 TABLET | Refills: 0 | Status: SHIPPED | OUTPATIENT
Start: 2018-01-01 | End: 2018-01-01

## 2018-01-01 RX ORDER — POLYETHYLENE GLYCOL 3350 17 G/17G
17 POWDER, FOR SOLUTION ORAL DAILY
Status: DISCONTINUED | OUTPATIENT
Start: 2018-01-01 | End: 2018-01-01 | Stop reason: HOSPADM

## 2018-01-01 RX ORDER — ONDANSETRON 8 MG/1
8 TABLET, ORALLY DISINTEGRATING ORAL EVERY 8 HOURS PRN
Qty: 60 TABLET | Refills: 0 | Status: SHIPPED | OUTPATIENT
Start: 2018-01-01

## 2018-01-01 RX ORDER — ONDANSETRON 8 MG/1
8 TABLET, FILM COATED ORAL EVERY 8 HOURS PRN
Qty: 30 TABLET | Refills: 2 | Status: CANCELLED | OUTPATIENT
Start: 2018-01-01

## 2018-01-01 RX ORDER — TEMOZOLOMIDE 180 MG/1
180 CAPSULE ORAL DAILY
Qty: 5 CAPSULE | Refills: 0 | Status: SHIPPED | OUTPATIENT
Start: 2018-01-01 | End: 2018-01-01

## 2018-01-01 RX ORDER — NYSTATIN 100000/ML
500000 SUSPENSION, ORAL (FINAL DOSE FORM) ORAL 4 TIMES DAILY
Status: DISCONTINUED | OUTPATIENT
Start: 2018-01-01 | End: 2018-01-01 | Stop reason: HOSPADM

## 2018-01-01 RX ORDER — SODIUM CHLORIDE, SODIUM LACTATE, POTASSIUM CHLORIDE, CALCIUM CHLORIDE 600; 310; 30; 20 MG/100ML; MG/100ML; MG/100ML; MG/100ML
INJECTION, SOLUTION INTRAVENOUS CONTINUOUS PRN
Status: DISCONTINUED | OUTPATIENT
Start: 2018-01-01 | End: 2018-01-01

## 2018-01-01 RX ORDER — AMOXICILLIN 250 MG
1-2 CAPSULE ORAL 2 TIMES DAILY
Status: DISCONTINUED | OUTPATIENT
Start: 2018-01-01 | End: 2018-01-01 | Stop reason: HOSPADM

## 2018-01-01 RX ORDER — TAMSULOSIN HYDROCHLORIDE 0.4 MG/1
0.4 CAPSULE ORAL DAILY
COMMUNITY
End: 2018-01-01

## 2018-01-01 RX ORDER — ACETAMINOPHEN 325 MG/1
650 TABLET ORAL EVERY 4 HOURS PRN
Status: DISCONTINUED | OUTPATIENT
Start: 2018-01-01 | End: 2018-01-01 | Stop reason: HOSPADM

## 2018-01-01 RX ORDER — TAMSULOSIN HYDROCHLORIDE 0.4 MG/1
0.4 CAPSULE ORAL AT BEDTIME
Status: DISCONTINUED | OUTPATIENT
Start: 2018-01-01 | End: 2018-01-01 | Stop reason: HOSPADM

## 2018-01-01 RX ORDER — FENTANYL CITRATE 50 UG/ML
INJECTION, SOLUTION INTRAMUSCULAR; INTRAVENOUS PRN
Status: DISCONTINUED | OUTPATIENT
Start: 2018-01-01 | End: 2018-01-01

## 2018-01-01 RX ORDER — DEXAMETHASONE 4 MG/1
4 TABLET ORAL EVERY 8 HOURS SCHEDULED
Status: CANCELLED | OUTPATIENT
Start: 2018-01-01

## 2018-01-01 RX ORDER — ACETAMINOPHEN 325 MG/1
975 TABLET ORAL EVERY 8 HOURS
Status: DISCONTINUED | OUTPATIENT
Start: 2018-01-01 | End: 2018-01-01

## 2018-01-01 RX ORDER — OXYCODONE HYDROCHLORIDE 5 MG/1
5-10 TABLET ORAL
Status: DISCONTINUED | OUTPATIENT
Start: 2018-01-01 | End: 2018-01-01 | Stop reason: HOSPADM

## 2018-01-01 RX ORDER — PROCHLORPERAZINE MALEATE 10 MG
10 TABLET ORAL EVERY 6 HOURS PRN
Qty: 30 TABLET | Refills: 2 | Status: SHIPPED | OUTPATIENT
Start: 2018-01-01 | End: 2018-01-01 | Stop reason: ALTCHOICE

## 2018-01-01 RX ORDER — CEFAZOLIN SODIUM 1 G/3ML
1 INJECTION, POWDER, FOR SOLUTION INTRAMUSCULAR; INTRAVENOUS SEE ADMIN INSTRUCTIONS
Status: DISCONTINUED | OUTPATIENT
Start: 2018-01-01 | End: 2018-01-01 | Stop reason: HOSPADM

## 2018-01-01 RX ORDER — TEMOZOLOMIDE 100 MG/1
300 CAPSULE ORAL DAILY
Qty: 15 CAPSULE | Refills: 0 | Status: CANCELLED | OUTPATIENT
Start: 2018-01-01 | End: 2018-01-01

## 2018-01-01 RX ORDER — NYSTATIN 100000/ML
500000 SUSPENSION, ORAL (FINAL DOSE FORM) ORAL 4 TIMES DAILY
Qty: 140 ML | Refills: 0 | Status: SHIPPED | OUTPATIENT
Start: 2018-01-01 | End: 2018-10-06

## 2018-01-01 RX ORDER — CEFAZOLIN SODIUM 2 G/100ML
2 INJECTION, SOLUTION INTRAVENOUS
Status: COMPLETED | OUTPATIENT
Start: 2018-01-01 | End: 2018-01-01

## 2018-01-01 RX ORDER — PENTAMIDINE ISETHIONATE 300 MG/300MG
300 INHALANT RESPIRATORY (INHALATION) ONCE
Status: COMPLETED | OUTPATIENT
Start: 2018-01-01 | End: 2018-01-01

## 2018-01-01 RX ORDER — PANTOPRAZOLE SODIUM 40 MG/1
40 TABLET, DELAYED RELEASE ORAL DAILY
Qty: 30 TABLET | Refills: 3 | Status: SHIPPED | OUTPATIENT
Start: 2018-01-01 | End: 2018-01-01

## 2018-01-01 RX ORDER — SODIUM CHLORIDE 9 MG/ML
INJECTION, SOLUTION INTRAVENOUS CONTINUOUS
Status: DISCONTINUED | OUTPATIENT
Start: 2018-01-01 | End: 2018-01-01 | Stop reason: HOSPADM

## 2018-01-01 RX ORDER — DEXAMETHASONE 4 MG/1
4 TABLET ORAL EVERY 8 HOURS SCHEDULED
Status: DISCONTINUED | OUTPATIENT
Start: 2018-01-01 | End: 2018-01-01

## 2018-01-01 RX ORDER — AMOXICILLIN 250 MG
2 CAPSULE ORAL 2 TIMES DAILY PRN
Status: DISCONTINUED | OUTPATIENT
Start: 2018-01-01 | End: 2018-01-01 | Stop reason: HOSPADM

## 2018-01-01 RX ORDER — LABETALOL HYDROCHLORIDE 5 MG/ML
10-40 INJECTION, SOLUTION INTRAVENOUS EVERY 10 MIN PRN
Status: DISCONTINUED | OUTPATIENT
Start: 2018-01-01 | End: 2018-01-01 | Stop reason: HOSPADM

## 2018-01-01 RX ORDER — LEVETIRACETAM 500 MG/1
1000 TABLET ORAL EVERY 12 HOURS
Status: DISCONTINUED | OUTPATIENT
Start: 2018-01-01 | End: 2018-01-01 | Stop reason: HOSPADM

## 2018-01-01 RX ORDER — ONDANSETRON 8 MG/1
8 TABLET, FILM COATED ORAL DAILY
Qty: 5 TABLET | Refills: 5 | Status: SHIPPED | OUTPATIENT
Start: 2018-01-01 | End: 2018-01-01

## 2018-01-01 RX ORDER — DEXAMETHASONE 2 MG/1
2 TABLET ORAL EVERY 12 HOURS SCHEDULED
Status: DISCONTINUED | OUTPATIENT
Start: 2018-01-01 | End: 2018-01-01 | Stop reason: HOSPADM

## 2018-01-01 RX ORDER — NALOXONE HYDROCHLORIDE 0.4 MG/ML
.1-.4 INJECTION, SOLUTION INTRAMUSCULAR; INTRAVENOUS; SUBCUTANEOUS
Status: DISCONTINUED | OUTPATIENT
Start: 2018-01-01 | End: 2018-01-01 | Stop reason: HOSPADM

## 2018-01-01 RX ORDER — ONDANSETRON 4 MG/1
4 TABLET, ORALLY DISINTEGRATING ORAL EVERY 30 MIN PRN
Status: DISCONTINUED | OUTPATIENT
Start: 2018-01-01 | End: 2018-01-01 | Stop reason: HOSPADM

## 2018-01-01 RX ORDER — TEMOZOLOMIDE 250 MG/1
250 CAPSULE ORAL DAILY
Qty: 5 CAPSULE | Refills: 0 | Status: CANCELLED | OUTPATIENT
Start: 2018-01-01 | End: 2018-01-01

## 2018-01-01 RX ORDER — GABAPENTIN 100 MG/1
100 CAPSULE ORAL ONCE
Status: COMPLETED | OUTPATIENT
Start: 2018-01-01 | End: 2018-01-01

## 2018-01-01 RX ORDER — POLYETHYLENE GLYCOL 3350 17 G/17G
17 POWDER, FOR SOLUTION ORAL DAILY
Qty: 7 PACKET | Refills: 1 | Status: SHIPPED | OUTPATIENT
Start: 2018-01-01

## 2018-01-01 RX ORDER — ONDANSETRON 4 MG/1
4 TABLET, ORALLY DISINTEGRATING ORAL EVERY 6 HOURS PRN
Status: DISCONTINUED | OUTPATIENT
Start: 2018-01-01 | End: 2018-01-01 | Stop reason: HOSPADM

## 2018-01-01 RX ORDER — ACETAMINOPHEN 325 MG/1
975 TABLET ORAL EVERY 8 HOURS
Status: DISCONTINUED | OUTPATIENT
Start: 2018-01-01 | End: 2018-01-01 | Stop reason: HOSPADM

## 2018-01-01 RX ORDER — TEMOZOLOMIDE 180 MG/1
180 CAPSULE ORAL DAILY
Qty: 5 CAPSULE | Refills: 0 | Status: SHIPPED | OUTPATIENT
Start: 2018-01-01 | End: 2018-01-01 | Stop reason: ALTCHOICE

## 2018-01-01 RX ORDER — LORAZEPAM 0.5 MG/1
.5-1 TABLET ORAL EVERY 6 HOURS PRN
Status: DISCONTINUED | OUTPATIENT
Start: 2018-01-01 | End: 2018-01-01 | Stop reason: HOSPADM

## 2018-01-01 RX ORDER — SODIUM CHLORIDE, SODIUM LACTATE, POTASSIUM CHLORIDE, CALCIUM CHLORIDE 600; 310; 30; 20 MG/100ML; MG/100ML; MG/100ML; MG/100ML
INJECTION, SOLUTION INTRAVENOUS CONTINUOUS
Status: DISCONTINUED | OUTPATIENT
Start: 2018-01-01 | End: 2018-01-01 | Stop reason: HOSPADM

## 2018-01-01 RX ORDER — CEPHALEXIN 500 MG/1
500 CAPSULE ORAL EVERY 6 HOURS SCHEDULED
Status: DISCONTINUED | OUTPATIENT
Start: 2018-01-01 | End: 2018-01-01 | Stop reason: HOSPADM

## 2018-01-01 RX ORDER — ONDANSETRON 8 MG/1
8 TABLET, FILM COATED ORAL EVERY 8 HOURS PRN
Status: DISCONTINUED | OUTPATIENT
Start: 2018-01-01 | End: 2018-01-01 | Stop reason: HOSPADM

## 2018-01-01 RX ORDER — SODIUM CHLORIDE 9 MG/ML
INJECTION, SOLUTION INTRAVENOUS CONTINUOUS
Status: DISCONTINUED | OUTPATIENT
Start: 2018-01-01 | End: 2018-01-01

## 2018-01-01 RX ORDER — AMPICILLIN AND SULBACTAM 2; 1 G/1; G/1
3 INJECTION, POWDER, FOR SOLUTION INTRAMUSCULAR; INTRAVENOUS EVERY 6 HOURS
Status: DISCONTINUED | OUTPATIENT
Start: 2018-01-01 | End: 2018-01-01

## 2018-01-01 RX ORDER — TEMOZOLOMIDE 100 MG/1
300 CAPSULE ORAL DAILY
Qty: 15 CAPSULE | Refills: 0 | Status: SHIPPED | OUTPATIENT
Start: 2018-01-01 | End: 2018-01-01

## 2018-01-01 RX ORDER — LEVETIRACETAM 500 MG/1
1000 TABLET ORAL EVERY 12 HOURS SCHEDULED
Status: DISCONTINUED | OUTPATIENT
Start: 2018-01-01 | End: 2018-01-01 | Stop reason: HOSPADM

## 2018-01-01 RX ORDER — HYDRALAZINE HYDROCHLORIDE 20 MG/ML
10-20 INJECTION INTRAMUSCULAR; INTRAVENOUS EVERY 30 MIN PRN
Status: DISCONTINUED | OUTPATIENT
Start: 2018-01-01 | End: 2018-01-01 | Stop reason: HOSPADM

## 2018-01-01 RX ORDER — ALBUTEROL SULFATE 0.83 MG/ML
2.5 SOLUTION RESPIRATORY (INHALATION) ONCE
Status: COMPLETED | OUTPATIENT
Start: 2018-01-01 | End: 2018-01-01

## 2018-01-01 RX ORDER — MANNITOL 20 G/100ML
INJECTION, SOLUTION INTRAVENOUS PRN
Status: DISCONTINUED | OUTPATIENT
Start: 2018-01-01 | End: 2018-01-01

## 2018-01-01 RX ORDER — GADOBUTROL 604.72 MG/ML
10 INJECTION INTRAVENOUS ONCE
Status: DISCONTINUED | OUTPATIENT
Start: 2018-01-01 | End: 2018-01-01 | Stop reason: HOSPADM

## 2018-01-01 RX ORDER — ONDANSETRON 2 MG/ML
8 INJECTION INTRAMUSCULAR; INTRAVENOUS EVERY 8 HOURS PRN
Status: DISCONTINUED | OUTPATIENT
Start: 2018-01-01 | End: 2018-01-01 | Stop reason: HOSPADM

## 2018-01-01 RX ORDER — LABETALOL HYDROCHLORIDE 5 MG/ML
10 INJECTION, SOLUTION INTRAVENOUS
Status: COMPLETED | OUTPATIENT
Start: 2018-01-01 | End: 2018-01-01

## 2018-01-01 RX ORDER — TAMSULOSIN HYDROCHLORIDE 0.4 MG/1
0.4 CAPSULE ORAL AT BEDTIME
Qty: 30 CAPSULE | Refills: 1 | Status: SHIPPED | OUTPATIENT
Start: 2018-01-01 | End: 2018-01-01

## 2018-01-01 RX ORDER — LEVETIRACETAM 10 MG/ML
1000 INJECTION INTRAVASCULAR EVERY 12 HOURS
Status: DISCONTINUED | OUTPATIENT
Start: 2018-01-01 | End: 2018-01-01 | Stop reason: HOSPADM

## 2018-01-01 RX ORDER — SODIUM CHLORIDE 9 MG/ML
INJECTION, SOLUTION INTRAVENOUS CONTINUOUS PRN
Status: DISCONTINUED | OUTPATIENT
Start: 2018-01-01 | End: 2018-01-01

## 2018-01-01 RX ORDER — LOMUSTINE 40 MG/1
CAPSULE, GELATIN COATED ORAL
COMMUNITY
Start: 2018-01-01 | End: 2018-01-01

## 2018-01-01 RX ORDER — TEMOZOLOMIDE 20 MG/1
20 CAPSULE ORAL DAILY
Qty: 5 CAPSULE | Refills: 0 | Status: SHIPPED | OUTPATIENT
Start: 2018-01-01 | End: 2018-01-01

## 2018-01-01 RX ORDER — HYDROMORPHONE HYDROCHLORIDE 1 MG/ML
.3-.5 INJECTION, SOLUTION INTRAMUSCULAR; INTRAVENOUS; SUBCUTANEOUS
Status: DISCONTINUED | OUTPATIENT
Start: 2018-01-01 | End: 2018-01-01 | Stop reason: HOSPADM

## 2018-01-01 RX ORDER — TAMSULOSIN HYDROCHLORIDE 0.4 MG/1
0.4 CAPSULE ORAL EVERY EVENING
Status: DISCONTINUED | OUTPATIENT
Start: 2018-01-01 | End: 2018-01-01 | Stop reason: HOSPADM

## 2018-01-01 RX ORDER — CEFAZOLIN SODIUM 2 G/100ML
2 INJECTION, SOLUTION INTRAVENOUS
Status: DISCONTINUED | OUTPATIENT
Start: 2018-01-01 | End: 2018-01-01 | Stop reason: HOSPADM

## 2018-01-01 RX ORDER — HYDROMORPHONE HYDROCHLORIDE 1 MG/ML
.3-.5 INJECTION, SOLUTION INTRAMUSCULAR; INTRAVENOUS; SUBCUTANEOUS EVERY 5 MIN PRN
Status: DISCONTINUED | OUTPATIENT
Start: 2018-01-01 | End: 2018-01-01 | Stop reason: HOSPADM

## 2018-01-01 RX ORDER — PANTOPRAZOLE SODIUM 40 MG/1
40 TABLET, DELAYED RELEASE ORAL
Qty: 90 TABLET | Refills: 0 | Status: SHIPPED | OUTPATIENT
Start: 2018-01-01

## 2018-01-01 RX ORDER — CEPHALEXIN 500 MG/1
500 CAPSULE ORAL EVERY 6 HOURS
Qty: 33 CAPSULE | Refills: 0 | Status: SHIPPED | OUTPATIENT
Start: 2018-01-01

## 2018-01-01 RX ORDER — PROCHLORPERAZINE MALEATE 5 MG
10 TABLET ORAL EVERY 6 HOURS PRN
Status: DISCONTINUED | OUTPATIENT
Start: 2018-01-01 | End: 2018-01-01 | Stop reason: HOSPADM

## 2018-01-01 RX ORDER — FENTANYL CITRATE 50 UG/ML
25-50 INJECTION, SOLUTION INTRAMUSCULAR; INTRAVENOUS
Status: DISCONTINUED | OUTPATIENT
Start: 2018-01-01 | End: 2018-01-01 | Stop reason: HOSPADM

## 2018-01-01 RX ORDER — LORAZEPAM 2 MG/ML
.5-1 INJECTION INTRAMUSCULAR EVERY 6 HOURS PRN
Status: DISCONTINUED | OUTPATIENT
Start: 2018-01-01 | End: 2018-01-01 | Stop reason: HOSPADM

## 2018-01-01 RX ORDER — SODIUM CHLORIDE 9 MG/ML
1000 INJECTION, SOLUTION INTRAVENOUS CONTINUOUS
Status: DISCONTINUED | OUTPATIENT
Start: 2018-01-01 | End: 2018-01-01 | Stop reason: CLARIF

## 2018-01-01 RX ORDER — LOMUSTINE 100 MG/1
CAPSULE, GELATIN COATED ORAL
COMMUNITY
Start: 2018-01-01

## 2018-01-01 RX ORDER — PROCHLORPERAZINE MALEATE 5 MG
5-10 TABLET ORAL EVERY 6 HOURS PRN
Status: DISCONTINUED | OUTPATIENT
Start: 2018-01-01 | End: 2018-01-01 | Stop reason: HOSPADM

## 2018-01-01 RX ORDER — LABETALOL HYDROCHLORIDE 5 MG/ML
INJECTION, SOLUTION INTRAVENOUS PRN
Status: DISCONTINUED | OUTPATIENT
Start: 2018-01-01 | End: 2018-01-01

## 2018-01-01 RX ORDER — ONDANSETRON 2 MG/ML
INJECTION INTRAMUSCULAR; INTRAVENOUS PRN
Status: DISCONTINUED | OUTPATIENT
Start: 2018-01-01 | End: 2018-01-01

## 2018-01-01 RX ORDER — GADOBUTROL 604.72 MG/ML
7.5 INJECTION INTRAVENOUS ONCE
Status: DISCONTINUED | OUTPATIENT
Start: 2018-01-01 | End: 2018-01-01 | Stop reason: CLARIF

## 2018-01-01 RX ORDER — ONDANSETRON 8 MG/1
8 TABLET, ORALLY DISINTEGRATING ORAL EVERY 8 HOURS PRN
Status: DISCONTINUED | OUTPATIENT
Start: 2018-01-01 | End: 2018-01-01 | Stop reason: HOSPADM

## 2018-01-01 RX ORDER — TAMSULOSIN HYDROCHLORIDE 0.4 MG/1
0.4 CAPSULE ORAL AT BEDTIME
Qty: 30 CAPSULE | Refills: 11 | Status: SHIPPED | OUTPATIENT
Start: 2018-01-01 | End: 2018-01-01

## 2018-01-01 RX ORDER — DEXAMETHASONE 1 MG
TABLET ORAL
COMMUNITY

## 2018-01-01 RX ORDER — GLYCOPYRROLATE 0.2 MG/ML
INJECTION, SOLUTION INTRAMUSCULAR; INTRAVENOUS PRN
Status: DISCONTINUED | OUTPATIENT
Start: 2018-01-01 | End: 2018-01-01

## 2018-01-01 RX ORDER — TEMOZOLOMIDE 20 MG/1
20 CAPSULE ORAL DAILY
Qty: 5 CAPSULE | Refills: 0 | Status: CANCELLED | OUTPATIENT
Start: 2018-01-01 | End: 2018-01-01

## 2018-01-01 RX ORDER — ONDANSETRON 2 MG/ML
4 INJECTION INTRAMUSCULAR; INTRAVENOUS EVERY 30 MIN PRN
Status: DISCONTINUED | OUTPATIENT
Start: 2018-01-01 | End: 2018-01-01 | Stop reason: HOSPADM

## 2018-01-01 RX ORDER — AMOXICILLIN 250 MG
1 CAPSULE ORAL 2 TIMES DAILY PRN
Status: DISCONTINUED | OUTPATIENT
Start: 2018-01-01 | End: 2018-01-01 | Stop reason: HOSPADM

## 2018-01-01 RX ORDER — DEXAMETHASONE 4 MG/1
TABLET ORAL
Qty: 60 TABLET | Refills: 0 | Status: ON HOLD | OUTPATIENT
Start: 2018-01-01 | End: 2018-01-01

## 2018-01-01 RX ORDER — LIDOCAINE HYDROCHLORIDE 20 MG/ML
INJECTION, SOLUTION INFILTRATION; PERINEURAL PRN
Status: DISCONTINUED | OUTPATIENT
Start: 2018-01-01 | End: 2018-01-01

## 2018-01-01 RX ORDER — ACETAMINOPHEN 325 MG/1
975 TABLET ORAL ONCE
Status: COMPLETED | OUTPATIENT
Start: 2018-01-01 | End: 2018-01-01

## 2018-01-01 RX ADMIN — SODIUM CHLORIDE: 9 INJECTION, SOLUTION INTRAVENOUS at 12:35

## 2018-01-01 RX ADMIN — ACETAMINOPHEN 975 MG: 325 TABLET, FILM COATED ORAL at 01:39

## 2018-01-01 RX ADMIN — GADOBUTROL 10 ML: 604.72 INJECTION INTRAVENOUS at 10:06

## 2018-01-01 RX ADMIN — GENTAMICIN SULFATE 80 MG: 40 INJECTION, SOLUTION INTRAMUSCULAR; INTRAVENOUS at 13:19

## 2018-01-01 RX ADMIN — LEVETIRACETAM 1000 MG: 500 TABLET ORAL at 01:42

## 2018-01-01 RX ADMIN — SODIUM CHLORIDE: 9 INJECTION, SOLUTION INTRAVENOUS at 22:00

## 2018-01-01 RX ADMIN — DEXAMETHASONE 2 MG: 2 TABLET ORAL at 23:04

## 2018-01-01 RX ADMIN — SODIUM CHLORIDE: 9 INJECTION, SOLUTION INTRAVENOUS at 18:30

## 2018-01-01 RX ADMIN — LEVETIRACETAM 1000 MG: 500 TABLET ORAL at 23:04

## 2018-01-01 RX ADMIN — GADOBUTROL 10 ML: 604.72 INJECTION INTRAVENOUS at 13:05

## 2018-01-01 RX ADMIN — ESMOLOL HYDROCHLORIDE 20 MG: 10 INJECTION, SOLUTION INTRAVENOUS at 14:03

## 2018-01-01 RX ADMIN — PANTOPRAZOLE SODIUM 40 MG: 40 TABLET, DELAYED RELEASE ORAL at 08:27

## 2018-01-01 RX ADMIN — PENTAMIDINE ISETHIONATE 300 MG: 300 INHALANT RESPIRATORY (INHALATION) at 12:26

## 2018-01-01 RX ADMIN — PANTOPRAZOLE SODIUM 40 MG: 40 TABLET, DELAYED RELEASE ORAL at 08:05

## 2018-01-01 RX ADMIN — AMPICILLIN SODIUM AND SULBACTAM SODIUM 3 G: 2; 1 INJECTION, POWDER, FOR SOLUTION INTRAMUSCULAR; INTRAVENOUS at 00:12

## 2018-01-01 RX ADMIN — ENOXAPARIN SODIUM 40 MG: 40 INJECTION SUBCUTANEOUS at 23:05

## 2018-01-01 RX ADMIN — ROCURONIUM BROMIDE 20 MG: 10 INJECTION INTRAVENOUS at 14:26

## 2018-01-01 RX ADMIN — DEXAMETHASONE SODIUM PHOSPHATE 4 MG: 4 INJECTION, SOLUTION INTRAMUSCULAR; INTRAVENOUS at 03:45

## 2018-01-01 RX ADMIN — VANCOMYCIN HYDROCHLORIDE 1750 MG: 10 INJECTION, POWDER, LYOPHILIZED, FOR SOLUTION INTRAVENOUS at 20:06

## 2018-01-01 RX ADMIN — GADOBUTROL 10 ML: 604.72 INJECTION INTRAVENOUS at 13:37

## 2018-01-01 RX ADMIN — CEFAZOLIN SODIUM 2 G: 2 INJECTION, SOLUTION INTRAVENOUS at 13:39

## 2018-01-01 RX ADMIN — DEXAMETHASONE 4 MG: 4 TABLET ORAL at 06:05

## 2018-01-01 RX ADMIN — PROPOFOL 30 MG: 10 INJECTION, EMULSION INTRAVENOUS at 14:08

## 2018-01-01 RX ADMIN — Medication 25 MG: at 19:38

## 2018-01-01 RX ADMIN — VANCOMYCIN HYDROCHLORIDE 1500 MG: 10 INJECTION, POWDER, LYOPHILIZED, FOR SOLUTION INTRAVENOUS at 21:59

## 2018-01-01 RX ADMIN — GADOBUTROL 10 ML: 604.72 INJECTION INTRAVENOUS at 11:50

## 2018-01-01 RX ADMIN — PANTOPRAZOLE SODIUM 40 MG: 40 TABLET, DELAYED RELEASE ORAL at 09:34

## 2018-01-01 RX ADMIN — DEXAMETHASONE SODIUM PHOSPHATE 10 MG: 4 INJECTION, SOLUTION INTRA-ARTICULAR; INTRALESIONAL; INTRAMUSCULAR; INTRAVENOUS; SOFT TISSUE at 13:52

## 2018-01-01 RX ADMIN — NYSTATIN 500000 UNITS: 100000 SUSPENSION ORAL at 08:37

## 2018-01-01 RX ADMIN — DEXAMETHASONE 4 MG: 4 TABLET ORAL at 14:44

## 2018-01-01 RX ADMIN — OXYCODONE HYDROCHLORIDE 5 MG: 5 TABLET ORAL at 01:40

## 2018-01-01 RX ADMIN — GADOBUTROL 10 ML: 604.72 INJECTION INTRAVENOUS at 16:29

## 2018-01-01 RX ADMIN — DEXAMETHASONE 4 MG: 4 TABLET ORAL at 23:32

## 2018-01-01 RX ADMIN — ESMOLOL HYDROCHLORIDE 50 MG: 10 INJECTION, SOLUTION INTRAVENOUS at 13:32

## 2018-01-01 RX ADMIN — DEXAMETHASONE 2 MG: 2 TABLET ORAL at 08:27

## 2018-01-01 RX ADMIN — GADOBUTROL 10 ML: 604.72 INJECTION INTRAVENOUS at 15:34

## 2018-01-01 RX ADMIN — ALBUTEROL SULFATE 2.5 MG: 2.5 SOLUTION RESPIRATORY (INHALATION) at 12:25

## 2018-01-01 RX ADMIN — DEXAMETHASONE SODIUM PHOSPHATE 4 MG: 4 INJECTION, SOLUTION INTRAMUSCULAR; INTRAVENOUS at 09:13

## 2018-01-01 RX ADMIN — GADOBUTROL 10 ML: 604.72 INJECTION INTRAVENOUS at 10:36

## 2018-01-01 RX ADMIN — AMPICILLIN SODIUM AND SULBACTAM SODIUM 3 G: 2; 1 INJECTION, POWDER, FOR SOLUTION INTRAMUSCULAR; INTRAVENOUS at 05:33

## 2018-01-01 RX ADMIN — MANNITOL 100 G: 20 INJECTION, SOLUTION INTRAVENOUS at 13:27

## 2018-01-01 RX ADMIN — DEXAMETHASONE SODIUM PHOSPHATE 4 MG: 4 INJECTION, SOLUTION INTRAMUSCULAR; INTRAVENOUS at 20:46

## 2018-01-01 RX ADMIN — LEVETIRACETAM 1000 MG: 10 INJECTION INTRAVENOUS at 13:43

## 2018-01-01 RX ADMIN — Medication 0.2 MG: at 19:15

## 2018-01-01 RX ADMIN — ESMOLOL HYDROCHLORIDE 30 MG: 10 INJECTION, SOLUTION INTRAVENOUS at 14:06

## 2018-01-01 RX ADMIN — LEVETIRACETAM 1000 MG: 500 TABLET ORAL at 11:10

## 2018-01-01 RX ADMIN — IOPAMIDOL 75 ML: 755 INJECTION, SOLUTION INTRAVENOUS at 19:51

## 2018-01-01 RX ADMIN — SUGAMMADEX 200 MG: 100 INJECTION, SOLUTION INTRAVENOUS at 17:45

## 2018-01-01 RX ADMIN — ROCURONIUM BROMIDE 25 MG: 10 INJECTION INTRAVENOUS at 14:02

## 2018-01-01 RX ADMIN — GADOBUTROL 10 ML: 604.72 INJECTION INTRAVENOUS at 09:51

## 2018-01-01 RX ADMIN — CEPHALEXIN 500 MG: 500 CAPSULE ORAL at 11:10

## 2018-01-01 RX ADMIN — CEFAZOLIN SODIUM 1 G: 2 INJECTION, SOLUTION INTRAVENOUS at 15:40

## 2018-01-01 RX ADMIN — SODIUM CHLORIDE: 9 INJECTION, SOLUTION INTRAVENOUS at 12:58

## 2018-01-01 RX ADMIN — TAMSULOSIN HYDROCHLORIDE 0.4 MG: 0.4 CAPSULE ORAL at 23:04

## 2018-01-01 RX ADMIN — PANTOPRAZOLE SODIUM 40 MG: 40 TABLET, DELAYED RELEASE ORAL at 09:13

## 2018-01-01 RX ADMIN — HYDROMORPHONE HYDROCHLORIDE 0.5 MG: 1 INJECTION, SOLUTION INTRAMUSCULAR; INTRAVENOUS; SUBCUTANEOUS at 16:56

## 2018-01-01 RX ADMIN — DEXAMETHASONE SODIUM PHOSPHATE 4 MG: 4 INJECTION, SOLUTION INTRAMUSCULAR; INTRAVENOUS at 02:26

## 2018-01-01 RX ADMIN — LABETALOL HYDROCHLORIDE 10 MG: 5 INJECTION INTRAVENOUS at 14:10

## 2018-01-01 RX ADMIN — DEXAMETHASONE SODIUM PHOSPHATE 10 MG: 10 INJECTION, SOLUTION INTRAMUSCULAR; INTRAVENOUS at 14:45

## 2018-01-01 RX ADMIN — GLYCOPYRROLATE 0.2 MG: 0.2 INJECTION, SOLUTION INTRAMUSCULAR; INTRAVENOUS at 13:10

## 2018-01-01 RX ADMIN — FENTANYL CITRATE 50 MCG: 50 INJECTION, SOLUTION INTRAMUSCULAR; INTRAVENOUS at 17:08

## 2018-01-01 RX ADMIN — SODIUM CHLORIDE: 9 INJECTION, SOLUTION INTRAVENOUS at 06:12

## 2018-01-01 RX ADMIN — SODIUM CHLORIDE: 9 INJECTION, SOLUTION INTRAVENOUS at 23:02

## 2018-01-01 RX ADMIN — LEVETIRACETAM 1000 MG: 500 TABLET, FILM COATED ORAL at 06:04

## 2018-01-01 RX ADMIN — SODIUM CHLORIDE 1000 ML: 9 INJECTION, SOLUTION INTRAVENOUS at 18:10

## 2018-01-01 RX ADMIN — SODIUM CHLORIDE, POTASSIUM CHLORIDE, SODIUM LACTATE AND CALCIUM CHLORIDE: 600; 310; 30; 20 INJECTION, SOLUTION INTRAVENOUS at 17:50

## 2018-01-01 RX ADMIN — PROPOFOL 50 MG: 10 INJECTION, EMULSION INTRAVENOUS at 13:10

## 2018-01-01 RX ADMIN — NYSTATIN 500000 UNITS: 100000 SUSPENSION ORAL at 11:10

## 2018-01-01 RX ADMIN — Medication 20 MG: at 18:19

## 2018-01-01 RX ADMIN — DEXAMETHASONE SODIUM PHOSPHATE 4 MG: 4 INJECTION, SOLUTION INTRAMUSCULAR; INTRAVENOUS at 09:09

## 2018-01-01 RX ADMIN — LEVETIRACETAM 1000 MG: 500 TABLET ORAL at 00:23

## 2018-01-01 RX ADMIN — POLYETHYLENE GLYCOL 3350 17 G: 17 POWDER, FOR SOLUTION ORAL at 09:35

## 2018-01-01 RX ADMIN — ONDANSETRON 4 MG: 2 INJECTION INTRAMUSCULAR; INTRAVENOUS at 16:47

## 2018-01-01 RX ADMIN — FENTANYL CITRATE 100 MCG: 50 INJECTION, SOLUTION INTRAMUSCULAR; INTRAVENOUS at 13:08

## 2018-01-01 RX ADMIN — GADOBUTROL 10 ML: 604.72 INJECTION INTRAVENOUS at 12:03

## 2018-01-01 RX ADMIN — VANCOMYCIN HYDROCHLORIDE 1500 MG: 10 INJECTION, POWDER, LYOPHILIZED, FOR SOLUTION INTRAVENOUS at 10:47

## 2018-01-01 RX ADMIN — TAMSULOSIN HYDROCHLORIDE 0.4 MG: 0.4 CAPSULE ORAL at 19:33

## 2018-01-01 RX ADMIN — Medication 10 MG: at 18:10

## 2018-01-01 RX ADMIN — SODIUM CHLORIDE, POTASSIUM CHLORIDE, SODIUM LACTATE AND CALCIUM CHLORIDE: 600; 310; 30; 20 INJECTION, SOLUTION INTRAVENOUS at 13:19

## 2018-01-01 RX ADMIN — Medication 10 MG: at 19:03

## 2018-01-01 RX ADMIN — Medication 0.4 MG: at 23:36

## 2018-01-01 RX ADMIN — DEXAMETHASONE SODIUM PHOSPHATE 4 MG: 4 INJECTION, SOLUTION INTRAMUSCULAR; INTRAVENOUS at 14:23

## 2018-01-01 RX ADMIN — PROPOFOL 50 MG: 10 INJECTION, EMULSION INTRAVENOUS at 13:32

## 2018-01-01 RX ADMIN — GADOBUTROL 10 ML: 604.72 INJECTION INTRAVENOUS at 09:14

## 2018-01-01 RX ADMIN — TAMSULOSIN HYDROCHLORIDE 0.4 MG: 0.4 CAPSULE ORAL at 20:46

## 2018-01-01 RX ADMIN — LEVETIRACETAM 1000 MG: 500 TABLET, FILM COATED ORAL at 19:33

## 2018-01-01 RX ADMIN — FENTANYL CITRATE 50 MCG: 50 INJECTION, SOLUTION INTRAMUSCULAR; INTRAVENOUS at 14:52

## 2018-01-01 RX ADMIN — LEVETIRACETAM 1000 MG: 500 TABLET ORAL at 08:05

## 2018-01-01 RX ADMIN — DEXAMETHASONE 2 MG: 2 TABLET ORAL at 09:34

## 2018-01-01 RX ADMIN — AMPICILLIN SODIUM AND SULBACTAM SODIUM 3 G: 2; 1 INJECTION, POWDER, FOR SOLUTION INTRAMUSCULAR; INTRAVENOUS at 18:35

## 2018-01-01 RX ADMIN — Medication 10 MG: at 18:50

## 2018-01-01 RX ADMIN — ROCURONIUM BROMIDE 75 MG: 10 INJECTION INTRAVENOUS at 13:10

## 2018-01-01 RX ADMIN — AMPICILLIN SODIUM AND SULBACTAM SODIUM 3 G: 2; 1 INJECTION, POWDER, FOR SOLUTION INTRAMUSCULAR; INTRAVENOUS at 00:23

## 2018-01-01 RX ADMIN — LIDOCAINE HYDROCHLORIDE 40 MG: 20 INJECTION, SOLUTION INFILTRATION; PERINEURAL at 13:08

## 2018-01-01 RX ADMIN — POLYETHYLENE GLYCOL 3350 17 G: 17 POWDER, FOR SOLUTION ORAL at 08:27

## 2018-01-01 RX ADMIN — ROCURONIUM BROMIDE 30 MG: 10 INJECTION INTRAVENOUS at 15:22

## 2018-01-01 RX ADMIN — DEXAMETHASONE 2 MG: 2 TABLET ORAL at 21:02

## 2018-01-01 RX ADMIN — DEXAMETHASONE SODIUM PHOSPHATE 4 MG: 4 INJECTION, SOLUTION INTRAMUSCULAR; INTRAVENOUS at 21:05

## 2018-01-01 RX ADMIN — AMPICILLIN SODIUM AND SULBACTAM SODIUM: 2; 1 INJECTION, POWDER, FOR SOLUTION INTRAMUSCULAR; INTRAVENOUS at 18:08

## 2018-01-01 RX ADMIN — SODIUM CHLORIDE, POTASSIUM CHLORIDE, SODIUM LACTATE AND CALCIUM CHLORIDE: 600; 310; 30; 20 INJECTION, SOLUTION INTRAVENOUS at 14:18

## 2018-01-01 RX ADMIN — LEVETIRACETAM 1000 MG: 500 TABLET, FILM COATED ORAL at 18:29

## 2018-01-01 RX ADMIN — HYDROMORPHONE HYDROCHLORIDE 0.5 MG: 1 INJECTION, SOLUTION INTRAMUSCULAR; INTRAVENOUS; SUBCUTANEOUS at 14:03

## 2018-01-01 RX ADMIN — AMPICILLIN SODIUM AND SULBACTAM SODIUM 3 G: 2; 1 INJECTION, POWDER, FOR SOLUTION INTRAMUSCULAR; INTRAVENOUS at 06:09

## 2018-01-01 RX ADMIN — LEVETIRACETAM 1000 MG: 500 TABLET, FILM COATED ORAL at 05:49

## 2018-01-01 RX ADMIN — ROCURONIUM BROMIDE 20 MG: 10 INJECTION INTRAVENOUS at 16:30

## 2018-01-01 RX ADMIN — AMPICILLIN SODIUM AND SULBACTAM SODIUM 3 G: 2; 1 INJECTION, POWDER, FOR SOLUTION INTRAMUSCULAR; INTRAVENOUS at 12:35

## 2018-01-01 RX ADMIN — ACETAMINOPHEN 975 MG: 325 TABLET, FILM COATED ORAL at 11:18

## 2018-01-01 RX ADMIN — ENOXAPARIN SODIUM 40 MG: 40 INJECTION SUBCUTANEOUS at 00:23

## 2018-01-01 RX ADMIN — INFLUENZA A VIRUS A/MICHIGAN/45/2015 (H1N1) RECOMBINANT HEMAGGLUTININ ANTIGEN, INFLUENZA A VIRUS A/SINGAPORE/INFIMH-16-0019/2016 (H3N2) RECOMBINANT HEMAGGLUTININ ANTIGEN, INFLUENZA B VIRUS B/MARYLAND/15/2016 RECOMBINANT HEMAGGLUTININ ANTIGEN, AND INFLUENZA B VIRUS B/PHUKET/3073/2013 RECOMBINANT HEMAGGLUTININ ANTIGEN 0.5 ML: 45; 45; 45; 45 INJECTION INTRAMUSCULAR at 11:21

## 2018-01-01 RX ADMIN — PROPOFOL 100 MG: 10 INJECTION, EMULSION INTRAVENOUS at 13:08

## 2018-01-01 RX ADMIN — GADOBUTROL 10 ML: 604.72 INJECTION INTRAVENOUS at 10:10

## 2018-01-01 RX ADMIN — GABAPENTIN 100 MG: 100 CAPSULE ORAL at 11:19

## 2018-01-01 RX ADMIN — LEVETIRACETAM 1000 MG: 500 TABLET ORAL at 12:35

## 2018-01-01 RX ADMIN — TAMSULOSIN HYDROCHLORIDE 0.4 MG: 0.4 CAPSULE ORAL at 21:02

## 2018-01-01 ASSESSMENT — ENCOUNTER SYMPTOMS
COUGH: 0
RHINORRHEA: 1
SEIZURES: 1
SHORTNESS OF BREATH: 1
TROUBLE SWALLOWING: 0
VOMITING: 0
FACIAL SWELLING: 1
WEAKNESS: 1
EYE DISCHARGE: 0
MUSCULOSKELETAL NEGATIVE: 1
FREQUENCY: 1
SORE THROAT: 0
NAUSEA: 0
SEIZURES: 0
COLOR CHANGE: 1
HEADACHES: 1
FEVER: 0
FATIGUE: 1
ABDOMINAL PAIN: 0
FEVER: 0
GASTROINTESTINAL NEGATIVE: 1
RESPIRATORY NEGATIVE: 1

## 2018-01-01 ASSESSMENT — PAIN SCALES - GENERAL
PAINLEVEL: NO PAIN (0)
PAINLEVEL: MODERATE PAIN (4)

## 2018-01-01 ASSESSMENT — VISUAL ACUITY
OU: NORMAL ACUITY
OU: NORMAL ACUITY

## 2018-01-01 ASSESSMENT — PAIN DESCRIPTION - DESCRIPTORS
DESCRIPTORS: SORE

## 2018-01-01 ASSESSMENT — ACTIVITIES OF DAILY LIVING (ADL)
ADLS_ACUITY_SCORE: 20
ADLS_ACUITY_SCORE: 22
ADLS_ACUITY_SCORE: 20
ADLS_ACUITY_SCORE: 26
ADLS_ACUITY_SCORE: 20
ADLS_ACUITY_SCORE: 26
ADLS_ACUITY_SCORE: 24

## 2018-01-04 NOTE — PROCEDURES
Radiotherapy Treatment Summary          Date of Report: 2017     PATIENT: MALINA SMITH  MEDICAL RECORD NO: [id number]  : 1960     DIAGNOSIS: C71.2 Malignant neoplasm of temporal lobe  INTENT OF RADIOTHERAPY: Local Control  PATHOLOGY: WHO Grade IV Glioblastoma, IDH wild-type, MGMT unmethylated                               CONCURRENT SYSTEMIC THERAPY: Temodar                  Details of the treatments summarized below are found in records kept in the Department of Radiation Oncology at [facility].     Treatment Summary:  Treatment Site Dose  Modality From  To  Elapsed Days  Fx.  Rt temporal lobe 6,000 cGy 06 X  11/13/2017 2017 39   30          Dose per Fraction: 200 cGy       Total Dose: 6,000 cGy          COMMENTS:                      Mr. Smith is a 57 year old male who was diagnosed with an IDH wild-type, MGMT unmethylated WHO grade IV glioblastoma of the right temporal lobe in after presenting in late 2017 with decreased consciousness, dizziness and posterior headaches. He underwent a right temporal craniotomy by Dr. Wiggins on 10/3/2017 with post-operative imaging confirming a gross total resection.     Post-operatively, Mr. Smith received adjuvant chemoradiotherapy with concurrent temozolomide as described above. He received a total dose of 60 Gy delivered in 30 daily fractions to the tumor bed with the surrounding high-risk brain parenchyma receiving a dose of 50 Gy via a simultaneous integrated boost technique. He tolerated radiotherapy very well with relatively minor treatment-induced fatigue and dermatitis. He did not require any unplanned breaks in therapy.     (Acute Toxicity Profile by CTC v4.0)  Fatigue: Grade 2  Dermatitis: Grade 1     PAIN MANAGEMENT: Patient not requiring narcotic pain medication                          FOLLOW UP PLAN: Return to clinic 1 month after treatment completion                      Resident Physician: Tristian Augustine M.D.   Staff  Physician: Dominic Louis M.D.  Physicist: Diego Nj, PhD     CC:   MD Harry Maravilla MD Emil Lou, MD                                    Radiation Oncology:  [facility] [address] [place] [address]

## 2018-01-19 NOTE — DISCHARGE INSTRUCTIONS
MRI Contrast Discharge Instructions    The IV contrast you received today will pass out of your body in your  urine. This will happen in the next 24 hours. You will not feel this process.  Your urine will not change color.    Drink at least 4 extra glasses of water or juice today (unless your doctor  has restricted your fluids). This reduces the stress on your kidneys.  You may take your regular medicines.    If you are on dialysis: It is best to have dialysis today.    If you have a reaction: Most reactions happen right away. If you have  any new symptoms after leaving the hospital (such as hives or swelling),  call your hospital at the correct number below. Or call your family doctor.  If you have breathing distress or wheezing, call 911.    Special instructions: ***    I have read and understand the above information.    Signature:______________________________________ Date:___________    Staff:__________________________________________ Date:___________     Time:__________    [facility] Departments:    ___[facility]: [phone]  ___[facility]: [phone]  ___[facility]: [phone] ___[facility]: [phone]  ___[facility]: [phone]  ___[facility]: [phone]  ___[phone]  ___[facility]: [phone]  ___[phone]

## 2018-01-20 NOTE — PROGRESS NOTES
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 MGMT promoter methylation was absent.  Treatment to date: status post gross total resection 10/3/17; initiated concurrent chemoradiation with temozolomide on 11/13/17 and completed 12/22/17.     Referring physician:  Dr. Harry Wiggins, Neurosurgical Service, [facility]  Dr. Dominic Louis, radiation oncology, [facility]    Oncology History:   Seth presented in September 2017 with dizzy spells, headache, and increasing somnolence. He was admitted on 9/28/17 after imaging in ED found a right temporal lobe mass. He went on to have gross total resection on 10/3/17.   10/27/17 - - neuro-oncology/medical oncology consultation, Dr. Armstrong.  11/12/17 through December 22, 2017 adjuvant treatment with concurrent chemoradiation with temozolomide. 6000 cGy, and 30 fractions, standard 200 cGy per fraction  1/19/18 - - brain MRI: Impression: 1. 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 Recommend attention on follow-up. Postsurgical changes of prior resection of right temporal glioblastoma. Resolution of right frontotemporal subdural hematoma and T2 hyperintensity along the right internal capsule and commisural white matter.  1/22/18 - - oncology/medical oncology follow-up, Dr. Armstrong.    Interim History:  Seth Iglesias is a 57 year old year old man here with his wife for follow-up of GBM of right temporal lobe. He is recovering from an upper respiratory infection a few weeks ago for which he tested positive for RSV. No fevers or chills. Only bothersome symptom is \"dysequilibrium\" that started towards the end of chemoradiation. He is not falling or needing assistance " "to maintain his balance. Just feels a little off when turning his head at certain times. No seizures or weakness.     Did not have any nausea with Temodar during radiation. No headaches. Hearing and vision are normal.     Review Of Systems:  Full 10-point ROS reviewed. Pertinent symptoms reviewed above per HPI.    PAST MEDICAL HISTORY:   1.  Glioblastoma, status post gross total resection on 10/03/2017 of the right temporal lobe preceded and indicated by some seizure-like activity without loss of consciousness.   2.  Chronic back pain.   3.  Ganglionic cysts.   4.  Hyperlipidemia.   5.  He has psoriasis and was taking what sounds like a TNF and an alpha inhibitor up until the summer of .       PAST SURGICAL HISTORY:     1.  The aforementioned right temporal craniotomy for gross total resection of right temporal glioblastoma done 10/03/2017 with Dr. Harry Wiggins at the [facility].   2.  Colonoscopy performed 2011, unremarkable.       FAMILY HISTORY:  He had a Father and 2 paternal uncles who had prostate cancer.  His mother had some sort of \"tumor of the rib,\" and  at age 54 of this unknown cancer.       SOCIAL HISTORY:  The patient lives in [place], [place].  He is accompanied by his wife, who is extremely supportive.  They have 3 sons ages 18, 23 and 25.  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  He has since then been working for [employer], about 27 miles from his home.    Tobacco:  Never smoker.    Allergies:none    Current Medications: reviewed    Physical Exam:  /87  Pulse 74  Temp 98.5  F (36.9  C) (Tympanic)  Resp 16  Wt 94.3 kg (208 lb)  SpO2 100%  BMI 30.7 kg/m2  General: NAD, alert and interactive  HEENT: PERRL, MMM, no oral lesions, TMs clear   Lungs: CTA bilaterally  Cardiovascular: RRR, no M/R/G, no edema   MSK: normal muscle tone  Skin: no rashes or " ley  Neuro: A&O, CNs II-XII intact, 5/5 strength all four extremities, normal cerebellar testing       Laboratory/Imaging Studies  The radiology report and labs were reviewed in detail with the patient, and hard copies were provided for the patient.     CBC RESULTS:   Recent Labs   Lab Test  01/22/18   1001   WBC  4.7   RBC  4.31*   HGB  13.6   HCT  41.0   MCV  95   MCH  31.6   MCHC  33.2   RDW  12.1   PLT  162     Recent Labs   Lab Test  01/22/18   1001  12/20/17   1452   NA  139  139   POTASSIUM  4.3  4.4   CHLORIDE  107  104   CO2  27  30   ANIONGAP  5  5   GLC  93  106*   BUN  12  18   CR  1.06  1.10   ARA  8.3*  8.4*       RADIOLOGY:  MRI brain 1/19/18  1. Increased enhancement along the posterior margin of the resection  cavity with new areas of nodular and ringlike enhancement which more  likely represents radiation necrosis than recurrence based on  perfusion. Recommend attention on follow-up.  2. Postsurgical changes of prior resection of right temporal  glioblastoma.  3. Resolution of right frontotemporal subdural hematoma and T2  hyperintensity along the right internal capsule and commisural white  matter.    ASSESSMENT/PLAN:  Seth Iglesias is a 58 y/o man with right temporal glioblastoma (WHO grade IV). He had gross total resection on 10/3/17 and completed adjuvant chemorads on 12/22/17. His 4 week post-treatment brain MRI shows areas of enhancement around the resection cavity that likely represent radiation necrosis rather than recurrence.     We discussed starting temozolomide maintenance on days 1-5 of a 28-day cycle. Dose for 1st cycle is 150 mg/m2 and if maintains ANC >1500 and platelets >100k, then can increase to 200 mg/m2 for subsequent cycles. Plan to complete at least 6 cycles and up to 12 cycles, depending on tolerance and response. Potential side effects include cytopenias, infection, nausea, fatigue, and alopecia. He agreed to start this today.     We also discussed the Optune  device which improves median PFS and OS when combined with maintenance Temodar, compared to Temodar alone (Perlita et al, AHSAN Dec 2017, PMID: [id number]). Recommended that he wear this 24 hrs/day, but at a minimum 18 hrs/day. About 50% of patients will have a mild skin reaction to the electrodes but it does not add additional systemic side effects. He agreed to proceed with OptQianmi and a prescription form was completed.     He is going to [place] on vacation at the end of February. He will f/u with Helena Landin in 4 weeks, then with Dr. Armstrong about 4 weeks after that depending on his vacation dates. Obtain repeat MRI brain the week prior to appointment with Dr. Armstrong. This will be repeated about every 2 months. This was not ordered today since the scheduling dates are to be determined based on his vacation.        Patient seen and discussed with staff Dr. Patti Chavez MD  Heme/Onc Fellow  Pager: [phone]            MEDICAL ONCOLOGY ATTENDING PHYSICIAN ADDENDUM:  I have seen and evaluated this patient with the medical oncology fellow. I have reviewed and edited this fellow's note, and agree with the assessment and plan stated above.      Mr. Iglesias returns today in the company of his wife.  I first met him in kind evaluation on 10/27, after which he proceeded to concurrent chemoradiation under the care of Dr. Helio Louis from our Radiation Oncology Team.  He completed concurrent chemoradiation with temozolomide from 11/12 through 12/22/2017 per standard-of-care.  He states that the radiation went generally pretty well; he had a mild scalp dermatitis.  A followup brain MRI was done just last week, 01/19, showing mild nodularity at the posteromedial aspect of the resection cavity, likely represented radiation necrosis rather than any new or progressive tumor.  He has no evidence of clinical progression.  He is doing well.  He has had a mild amount of dizziness, but no vertigo.  He denies any falls.        I  performed a very detailed neurologic exam today.  Cranial nerves II-XII are completely unremarkable.  There is no evidence of vertical or horizontal nystagmus.  His gait is unremarkable.  His balance is good.  He has no evidence of dysdiadochokinesia.  He has an extremely minimal tremor on the right side greater than left on intention on finger-to-nose testing.  His visual fields are completely intact.  For the remainder of the full exam and background interval history, please see Dr. Chavez's note.      We reviewed in detail the standard of care from the Perlita et.al., trial comprising at minimum 6 months of adjuvant temozolomide on a 5-days-on and 23-days-off schedule.  The first month dose as per standard is 150 mg/m2 days 1 through 5 of a 28-day cycle, and if he tolerates that pretty well, then he would graduate to 200 mg/m2 for days 1 through 5 of cycle 2 onward.  Our Pharmacy Team will provide further chemotherapy teaching.  That is the standard of care, but in the past decade many neuro-oncologists have extrapolated that up to 12 months barring any progression of disease, and that is what I would recommend, so I stated that a minimum of 6 months would be performed of temozolomide, and then up to 12 months and then reassessment.  He stated consent to moving forward with the plan.  In addition, we discussed the Optune device based on the trials performed by [employer], and the recent updates in the last few months showing a 5-month improvement in overall survival when using the Optune device in the adjuvant setting compared to not using the device.  The above citation is listed in Dr. Chavez's note from AHSAN 12/2017.  The patient provided verbal consent for moving forward.  We will have him complete the Optune prescriptions form, and we will send that in to [employer], and the regional representative will reach out to them to set this up.  We reviewed in detail that the device is intended to be worn 24 hours a day  for 7 days in a row each week; in other words constantly; but it is permissible to do at minimum 18 hours in an every-24-hour period.  The patient asks several questions about this, and he was unclear, but I clarified this to his stated understanding.  His wife completely understood.  We will move forward with the above plan.  He will return to the clinic in 4 weeks' time to meet with Helena Landin from our physician assistant team prior to consideration of cycle 2/day 1.  They have a planned trip to [place] immediately following that, and as long as he is doing okay and does not have any significant blood count issues or clinical issues, it should be safe for him to travel.       My documentation of Optune mapping ([employer]):  Seth Iglesias  [id number]  : 1960    Primary Encounter Diagnosis:  Glioblastoma (H) [C71.9]    CLINICAL HISTORY: glioblastoma    PROCEDURE: Treatment planning with Tumor Treating Fields (Optune)    DEVICE: Optune    FREQUENCY: 200kHz    ENERGY: 0.7V/cm    PHYSICIAN(s): Augustus Armstrong MD    INDICATIONS: Optune is intended as a treatment for adult patients (22 years of age or  older) with histologically-confirmed glioblastoma multiforme (GBM).  Optune with temozolomide is indicated for the treatment of adult patients with newly  diagnosed, supratentorial glioblastoma following maximal debulking surgery and  completion of radiation therapy together with concomitant standard of care  chemotherapy.    FIELDS: TTFields were applied through 2 sets of opposing insulated transducer arrays  and oriented through the tumor using the most recent MRI of the brain.    PROCEDURE: The procedure was performed in the [facility] and the detailed results are scanned into EPIC notes.     Signed by: Augustus Armstrong MD                     Date: 18        I spent 35 minutes in consultation, including H&P and Discussion. >50% of time was spent in counseling and in coordination of care.    Augsutus Armstrong MD, PhD

## 2018-01-22 NOTE — MR AVS SNAPSHOT
After Visit Summary   1/22/2018    Seth Iglesias    MRN: [id number]           Patient Information     Date Of Birth          1960        Visit Information        Provider Department      1/22/2018 10:00 AM Augustus Armstrong MD [facility]        Care Instructions    Seth, the prescriptions for nausea medications (ondansetron and prochlorperazine) were sent to the [facility] in [place].    Your dose of Temodar is 320mg at bedtime for 5 days.    The Temodar will be given to you as 3 of the 100mg caps plus one 20mg cap.    This is how you should take the Temodar:    30-60min before bedtime: take ondansetron 8mg.  Right before bedtime: take the Temodar 320mg with a glass of water.  Then go to bed.    Anyi Hicks, PharmD, BCOP, BCPS  Clinical Pharmacy Specialist/  Oncology Medication Therapy Management Pharmacist  [facility]  Pager [phone]  Phone [phone]                Follow-ups after your visit        Follow-up notes from your care team     Return in about 4 weeks (around 2/19/2018).      Your next 10 appointments already scheduled     Feb 20, 2018  2:45 PM CST   [facility]   [facility] Lab Draw ([facility])    [address] [place] [id number] [phone]            Feb 20, 2018  3:30 PM CST   (Arrive by 3:15 PM)   Return Visit with Helena Landin PA-C   [facility] ([facility])    [address] [place] [id number]   [phone]            Apr 06, 2018  1:30 PM CDT   (Arrive by 1:15 PM)   Return Visit with Rafael Boyer MD   [facility] Neurology ([facility])    [address] [place] [address]  3rd [facility] [id number] [phone]              Who to contact     If you have questions or need follow up information about today's clinic visit or your schedule please  contact [facility] directly at [phone].  Normal or non-critical lab and imaging results will be communicated to you by MyChart, letter or phone within 4 business days after the clinic has received the results. If you do not hear from us within 7 days, please contact the clinic through [employer] or phone. If you have a critical or abnormal lab result, we will notify you by phone as soon as possible.  Submit refill requests through [employer] or call your pharmacy and they will forward the refill request to us. Please allow 3 business days for your refill to be completed.          Additional Information About Your Visit        [employer] Information     [employer] gives you secure access to your electronic health record. If you see a primary care provider, you can also send messages to your care team and make appointments. If you have questions, please call your primary care clinic.  If you do not have a primary care provider, please call [phone] and they will assist you.        Care EveryWhere ID     This is your Care EveryWhere ID. This could be used by other organizations to access your [facility] medical records  [id number]        Your Vitals Were     Pulse Temperature Respirations Pulse Oximetry BMI (Body Mass Index)       74 98.5  F (36.9  C) (Tympanic) 16 100% 30.7 kg/m2        Blood Pressure from Last 3 Encounters:   01/22/18 122/87   01/22/18 124/84   01/22/18 122/87    Weight from Last 3 Encounters:   01/22/18 94.3 kg (208 lb)   01/22/18 94.3 kg (208 lb)   01/22/18 94.3 kg (208 lb)              Today, you had the following     No orders found for display         Today's Medication Changes          These changes are accurate as of: 1/22/18  4:04 PM.  If you have any questions, ask your nurse or doctor.               Start taking these medicines.        Dose/Directions    ondansetron 8 MG tablet   Commonly known as:  ZOFRAN   Used for:  Glioblastoma multiforme of temporal lobe (H)   Started  by:  Anyi Hicks RPH        Dose:  8 mg   Take 1 tablet (8 mg) by mouth daily for 5 days Take before each dose of temozolomide.   Quantity:  5 tablet   Refills:  5       prochlorperazine 10 MG tablet   Commonly known as:  COMPAZINE   Used for:  Glioblastoma multiforme of temporal lobe (H)   Started by:  Anyi Hicks RPH        Dose:  10 mg   Take 1 tablet (10 mg) by mouth every 6 hours as needed (Nausea/Vomiting)   Quantity:  30 tablet   Refills:  2         These medicines have changed or have updated prescriptions.        Dose/Directions    * temozolomide 100 MG capsule CHEMO   Commonly known as:  TEMODAR   This may have changed:    - medication strength  - how much to take  - when to take this  - additional instructions   Used for:  Glioblastoma multiforme of temporal lobe (H)   Changed by:  Anyi Hicks RPH        Dose:  300 mg   Take 3 capsules (300 mg) by mouth daily for 5 days Along with 20mg daily.  Total daily dose = 320mg   Quantity:  15 capsule   Refills:  0       * temozolomide 20 MG capsule CHEMO   Commonly known as:  TEMODAR   This may have changed:    - how much to take  - when to take this  - additional instructions   Used for:  Glioblastoma multiforme of temporal lobe (H)   Changed by:  Anyi Hicks RPH        Dose:  20 mg   Take 1 capsule (20 mg) by mouth daily for 5 days Along with 300mg daily.  Total daily dose = 320mg   Quantity:  5 capsule   Refills:  0       * Notice:  This list has 2 medication(s) that are the same as other medications prescribed for you. Read the directions carefully, and ask your doctor or other care provider to review them with you.         Where to get your medicines      These medications were sent to [facility] [place], [place] - [address] [facility] [address] [address]    Hours:  TRANSPLANT PHONE NUMBER [phone] Phone:  [phone]     temozolomide 100 MG capsule CHEMO    temozolomide 20 MG  capsule CHEMO         These medications were sent to [id number] - [place], [place] - [address] [facility]  [address] [facility] [address], [facility] [address]     Phone:  [phone]     ondansetron 8 MG tablet    prochlorperazine 10 MG tablet                Primary Care Provider Office Phone # Fax #    R Nigel Salmon [phone] [address] [facility] [address]        Equal Access to Services     PERICO ESPINOZA : Hadii aad ku hadasho Soomaali, waaxda luqadaha, qaybta kaalmada adeegyada, waxay idiin hayaan adeeg kharash la'eagle . [facility] [phone].    ATENCIÓN: Si habla español, tiene a granados disposición servicios gratuitos de asistencia lingüística. [facility] [phone].    We comply with applicable federal civil rights laws and [place] laws. We do not discriminate on the basis of race, color, national origin, age, disability, sex, sexual orientation, or gender identity.            Thank you!     Thank you for choosing [facility]  for your care. Our goal is always to provide you with excellent care. Hearing back from our patients is one way we can continue to improve our services. Please take a few minutes to complete the written survey that you may receive in the mail after your visit with us. Thank you!             Your Updated Medication List - Protect others around you: Learn how to safely use, store and throw away your medicines at www.disposemymeds.org.          This list is accurate as of: 1/22/18  4:04 PM.  Always use your most recent med list.                   Brand Name Dispense Instructions for use Diagnosis    ENBREL SURECLICK 50 MG/ML autoinjector   Generic drug:  Etanercept           levETIRAcetam 1000 MG Tabs    KEPPRA    180 tablet    Take 1,000 mg by mouth every 12 hours    Brain mass       MULTIVITAMIN & MINERAL PO      Take 1 tablet by mouth daily        ondansetron 8 MG tablet    ZOFRAN    5 tablet    Take 1 tablet (8 mg) by  mouth daily for 5 days Take before each dose of temozolomide.    Glioblastoma multiforme of temporal lobe (H)       pantoprazole 40 MG EC tablet    PROTONIX    30 tablet    Take 1 tablet (40 mg) by mouth every morning (before breakfast)    Brain mass       prochlorperazine 10 MG tablet    COMPAZINE    30 tablet    Take 1 tablet (10 mg) by mouth every 6 hours as needed (Nausea/Vomiting)    Glioblastoma multiforme of temporal lobe (H)       senna-docusate 8.6-50 MG per tablet    SENOKOT-S;PERICOLACE    100 tablet    Take 1-2 tablets by mouth 2 times daily    Brain mass       * temozolomide 100 MG capsule CHEMO    TEMODAR    15 capsule    Take 3 capsules (300 mg) by mouth daily for 5 days Along with 20mg daily.  Total daily dose = 320mg    Glioblastoma multiforme of temporal lobe (H)       * temozolomide 20 MG capsule CHEMO    TEMODAR    5 capsule    Take 1 capsule (20 mg) by mouth daily for 5 days Along with 300mg daily.  Total daily dose = 320mg    Glioblastoma multiforme of temporal lobe (H)       * Notice:  This list has 2 medication(s) that are the same as other medications prescribed for you. Read the directions carefully, and ask your doctor or other care provider to review them with you.

## 2018-01-22 NOTE — PROGRESS NOTES
Radiation Oncology Follow-up Visit  2018    Seth Iglesias  MRN: [id number]   : 1960     DISEASE TREATED:   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 He underwent a gross total resection followed by adjuvant chemoradiotherapy with concurrent Temodar as described above. He completed adjuvant therapy without significant treatment-related toxicities with relatively mild grade 2 fatigue and grade 1 dermatitis. He returns to radiation oncology clinic today for a routine post-treatment follow-up visit.    On exam today, Mr. Iglesias describes continued mild fatigue as well as relatively mild vertigo which initially began at the completion of radiotherapy concurrent with an upper respiratory tract infection. He otherwise denies any headaches, vision changes, focal motor or sensory deficits, nausea/vomiting or gait instability. His remaining ROS are unremarkable.    PHYSICAL EXAM:  /84  Wt 94.3 kg (208 lb)  BMI 30.72 kg/m2  Gen: Alert, in NAD  Eyes: PERRL, EOMI, sclera anicteric, no nystagmus   HENT     Head: Moderate alopecia over the right lateral temporal region with resolving radiation-induced dermatitis     Ears: No external auricular lesions     Nose/sinus: No rhinorrhea or epistaxis     Oral Cavity/Oropharynx: MMM, palate elevates symmetrically, tongue protrudes in the midline  Pulm: No wheezing, stridor or respiratory distress  CV: Well-perfused, no cyanosis, no pedal edema  Musculoskeletal: Normal  bulk and tone   Skin: Normal color and turgor  Neurologic: A/Ox3, CN II-XII intact, 5/5 motor strength in bilateral upper/lower extremities, normal finger-nose-finger, normal gait    LABS AND IMAGIN2018 MRI brain:  Increased enhancement along the posterior margin of the resection cavity with minimal enhancement of relative cerebral blood flow in this region likely corresponding to treatment effect/radiation necrosis versus tumor recurrence.    IMPRESSION:   Mr. Iglesias is a 57 year old male with a right frontotemporal IDH wild-type, non-MGMT methylated WHO grade IV glioblastoma status post gross total resection and adjuvant chemoradiotherapy. He is currently 1 month status post completion of radiotherapy with his restaging MRI demonstrating likely treatment effect.    PLAN:   1. Follow-up with Dr. Armstrong as scheduled for initiation of maintenance Temodar therapy and consideration of initiation of tumor treatment fields (Optune device)  2. Follow-up with neurosurgery (Dr. Wiggins) as scheduled  3. Follow-up  in radiation oncology clinic in 6 months in coordination with other appointments    Dominic Louis MD/PhD    Department of Radiation Oncology  [facility]

## 2018-01-22 NOTE — PROGRESS NOTES
.  FOLLOW-UP VISIT    Patient Name: Seth Iglesias      : 1960     Age: 57 year old        ______________________________________________________________________________     Chief Complaint   Patient presents with     Cancer     Pt is her for a follow up:Left temporal lobe 6000 cGy completed on 17     /84  Wt 94.3 kg (208 lb)  BMI 30.72 kg/m2       Pain  Denies    Labs  Other Labs: Yes: today    Imaging  MRI: 18      Other Appointments:     MD Name: Dr Wiggins Appointment Date: today   MD Name: Appointment Date:   MD Name: Appointment Date:   Other Appointment Notes:     Residual Radiation side effect: Fatigue has improved     Additional Instructions:     Nurse face-to-face time: Level 3:  10 min face to face time

## 2018-01-22 NOTE — LETTER
2018     RE: Seth Iglesias  [address] [place] [id number]     Dear Colleague,    Thank you for referring your patient, Seth Iglesias, to the RADIATION ONCOLOGY CLINIC. Please see a copy of my visit note below.    .  FOLLOW-UP VISIT    Patient Name: Seth Iglesias      : 1960     Age: 57 year old        ______________________________________________________________________________     Chief Complaint   Patient presents with     Cancer     Pt is her for a follow up:Left temporal lobe 6000 cGy completed on 17     /84  Wt 94.3 kg (208 lb)  BMI 30.72 kg/m2       Pain  Denies    Labs  Other Labs: Yes: today    Imaging  MRI: 18      Other Appointments:     MD Name: Dr Wiggins Appointment Date: today   MD Name: Appointment Date:   MD Name: Appointment Date:   Other Appointment Notes:     Residual Radiation side effect: Fatigue has improved     Additional Instructions:     Nurse face-to-face time: Level 3:  10 min face to face time          Radiation Oncology Follow-up Visit  2018    Seth Iglesias  MRN: [id number]   : 1960     DISEASE TREATED:   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 He underwent a gross total resection followed by adjuvant chemoradiotherapy with concurrent Temodar as described above. He completed adjuvant therapy without significant treatment-related toxicities with relatively mild grade 2 fatigue and grade 1 dermatitis. He returns to  radiation oncology clinic today for a routine post-treatment follow-up visit.    On exam today, Mr. Iglesias describes continued mild fatigue as well as relatively mild vertigo which initially began at the completion of radiotherapy concurrent with an upper respiratory tract infection. He otherwise denies any headaches, vision changes, focal motor or sensory deficits, nausea/vomiting or gait instability. His remaining ROS are unremarkable.    PHYSICAL EXAM:  /84  Wt 94.3 kg (208 lb)  BMI 30.72 kg/m2  Gen: Alert, in NAD  Eyes: PERRL, EOMI, sclera anicteric, no nystagmus   HENT     Head: Moderate alopecia over the right lateral temporal region with resolving radiation-induced dermatitis     Ears: No external auricular lesions     Nose/sinus: No rhinorrhea or epistaxis     Oral Cavity/Oropharynx: MMM, palate elevates symmetrically, tongue protrudes in the midline  Pulm: No wheezing, stridor or respiratory distress  CV: Well-perfused, no cyanosis, no pedal edema  Musculoskeletal: Normal bulk and tone   Skin: Normal color and turgor  Neurologic: A/Ox3, CN II-XII intact, 5/5 motor strength in bilateral upper/lower extremities, normal finger-nose-finger, normal gait    LABS AND IMAGIN2018 MRI brain:  Increased enhancement along the posterior margin of the resection cavity with minimal enhancement of relative cerebral blood flow in this region likely corresponding to treatment effect/radiation necrosis versus tumor recurrence.    IMPRESSION:   Mr. Iglesias is a 57 year old male with a right frontotemporal IDH wild-type, non-MGMT methylated WHO grade IV glioblastoma status post gross total resection and adjuvant chemoradiotherapy. He is currently 1 month status post completion of radiotherapy with his restaging MRI demonstrating likely treatment effect.    PLAN:   1. Follow-up with Dr. Armstrong as scheduled for initiation of maintenance Temodar therapy and consideration of initiation of tumor treatment fields  (Optune device)  2. Follow-up with neurosurgery (Dr. Wiggins) as scheduled  3. Follow-up  in radiation oncology clinic in 6 months in coordination with other appointments    Dominic Louis MD/PhD    Department of Radiation Oncology  [facility]

## 2018-01-22 NOTE — PATIENT INSTRUCTIONS
Seth, the prescriptions for nausea medications (ondansetron and prochlorperazine) were sent to the [facility] in [place].    Your dose of Temodar is 320mg at bedtime for 5 days.    The Temodar will be given to you as 3 of the 100mg caps plus one 20mg cap.    This is how you should take the Temodar:    30-60min before bedtime: take ondansetron 8mg.  Right before bedtime: take the Temodar 320mg with a glass of water.  Then go to bed.    Anyi Hicks, PharmD, BCOP, BCPS  Clinical Pharmacy Specialist/  Oncology Medication Therapy Management Pharmacist  [facility]  Pager [phone]  Phone [phone]

## 2018-01-22 NOTE — NURSING NOTE
"Oncology Rooming Note    January 22, 2018 11:14 AM   Seth Iglesias is a 57 year old male who presents for:    Chief Complaint   Patient presents with     Oncology Clinic Visit     Return: Malignant Neoplasm of Temporal lobe of brain     Initial Vitals: /87  Pulse 74  Temp 98.5  F (36.9  C) (Oral)  Resp 16  Ht 1.753 m (5' 9\")  Wt 94.3 kg (208 lb)  SpO2 100%  BMI 30.72 kg/m2 Estimated body mass index is 30.72 kg/(m^2) as calculated from the following:    Height as of this encounter: 1.753 m (5' 9\").    Weight as of this encounter: 94.3 kg (208 lb). Body surface area is 2.14 meters squared.  No Pain (0) Comment: Data Unavailable   No LMP for male patient.  Allergies reviewed: Yes  Medications reviewed: Yes    Medications: Medication refills not needed today.  Pharmacy name entered into [facility]:    [id number] - [facility] [address] [address] [id number] - [facility] [address] [facility]    Clinical concerns: no new concerns today Dr. Wiggins was NOT notified.    10 minutes for nursing intake (face to face time)     Corinne Michael CMA              "

## 2018-01-22 NOTE — LETTER
"1/22/2018       RE: Seth Iglesias  [address]  [facility] [address]     Dear Colleague,    Thank you for referring your patient, Seth Iglesias, to the [facility]CER [facility]. Please see a copy of my visit note below.    It was a pleasure to see Seth Iglesias today in Neurosurgery Clinic. he is a 57 year old male who underwent:    DATE OF SERVICE:  10/03/2017       PREOPERATIVE DIAGNOSIS:  Left temporal tumor.       POSTOPERATIVE DIAGNOSIS:  Right temporal glioblastoma.       PROCEDURE PERFORMED:    1.  Right temporal craniotomy and tumor resection.   2.  Use of intraoperative microscope.   3.  Use of intraoperative neuro navigation.       ATTENDING SURGEON:  Harry Wiggins MD       RESIDENT SURGEON:    1.  Gale Belle MD   2.  Blanca Swanson MD  3  Angeles Wise MD     FINAL DIAGNOSIS: A) Brain, right temporal tumor, resection:   -Glioblastoma, WHO grade IV (IDH wild-type) -IDH1 (R132H) negative on   immunohistochemistry   -No detected alterations of amino acid sequence of IDH1, IDH2 and TP53   genes per report (please see concurrent report [id number] for additional   details).   -No evidence of MGMT promoter methylation was detected per repot (please   see concurrent report G- for additional details) B) Designated   \"margin\" on requisition form: No tissue was received in pathology for   part B. C) Designated \"core\" on requisition form: No tissue was received   in pathology for part C. D) Brain, right temporal tumor, CUSA contents:   -Glioblastoma, WHO grade IV (IDH wild-type) -IDH1 (R132H) negative on   immunohistochemistry   -No detected alterations of amino acid sequence of IDH1, IDH2 and TP53   genes per report (please see concurrent report [id number] for additional   details).   -No evidence of MGMT promoter methylation was detected per repot (please   see concurrent report G- for additional details)  E) Brain, right   temporal tumor, resection: -Glioblastoma, WHO " "grade IV (IDH wild-type)   -IDH1 (R132H) negative on immunohistochemistry   -No detected alterations of amino acid sequence of IDH1, IDH2 and TP53   genes per report (please see concurrent report [id number] for additional   details).   -No evidence of MGMT promoter methylation was detected per repot (please   see concurrent report D- for additional details)     He is doing well. No major issues. Has completed radiochemotherapy. Will be starting TMZ with optune.    Vitals:    01/22/18 1113   BP: 122/87   Pulse: 74   Resp: 16   Temp: 98.5  F (36.9  C)   TempSrc: Oral   SpO2: 100%   Weight: 94.3 kg (208 lb)   Height: 1.753 m (5' 9\")     Body mass index is 30.72 kg/(m^2).  No Pain (0)     Awake, alert.     Incision well healed.    Neurologically intact.    MRI shows resection cavity with some linear and nodular enhancement around the posterior inferior cavity. rCBV seems low. The imaging was shown to the patient and reviewed in clinic.     A: Glioblastoma. Stable.    P: Beginning TMZ with optune. Could have further surgery if needed. Otherwise can follow up PRN at this point.    Again, thank you for allowing me to participate in the care of your patient.      Sincerely,    Harry Wiggins MD      "

## 2018-01-22 NOTE — LETTER
1/22/2018       RE: Seth Iglesias  [address] [facility] [address]     Dear Colleague,    Thank you for referring your patient, Seth Iglesias, to the [facility]. Please see a copy of my visit note below.    Neuro-oncology/Medical Oncology Follow-up Visit:  Jan 22, 2018    Cancer diagnosis: Right temporal glioblastoma (WHO grade IV)  Tumor genomics: IDH testing was negative. MGMT promoter methylation was absent.  Treatment to date: status post gross total resection 10/3/17; initiated concurrent chemoradiation with temozolomide on 11/13/17 and completed 12/22/17.     Referring physician:  Dr. Harry Wiggins, Neurosurgical Service, [facility]  Dr. Dominic Louis, radiation oncology, [facility]    Oncology History:   Seth presented in September 2017 with dizzy spells, headache, and increasing somnolence. He was admitted on 9/28/17 after imaging in ED found a right temporal lobe mass. He went on to have gross total resection on 10/3/17.   10/27/17 - - neuro-oncology/medical oncology consultation, Dr. Armstrong.  11/12/17 through December 22, 2017 adjuvant treatment with concurrent chemoradiation with temozolomide. 6000 cGy, and 30 fractions, standard 200 cGy per fraction  1/19/18 - - brain MRI: Impression: 1. 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 Recommend attention on follow-up. Postsurgical changes of prior resection of right temporal glioblastoma. Resolution of right frontotemporal subdural hematoma and T2 hyperintensity along the right internal capsule and commisural white matter.  1/22/18 - - oncology/medical oncology follow-up, Dr. Armstrong.    Interim History:  Seth Iglesias is a 57 year old year old man here with his wife for follow-up of GBM of right temporal lobe. He is  "recovering from an upper respiratory infection a few weeks ago for which he tested positive for RSV. No fevers or chills. Only bothersome symptom is \"dysequilibrium\" that started towards the end of chemoradiation. He is not falling or needing assistance to maintain his balance. Just feels a little off when turning his head at certain times. No seizures or weakness.     Did not have any nausea with Temodar during radiation. No headaches. Hearing and vision are normal.     Review Of Systems:  Full 10-point ROS reviewed. Pertinent symptoms reviewed above per HPI.    PAST MEDICAL HISTORY:   1.  Glioblastoma, status post gross total resection on 10/03/2017 of the right temporal lobe preceded and indicated by some seizure-like activity without loss of consciousness.   2.  Chronic back pain.   3.  Ganglionic cysts.   4.  Hyperlipidemia.   5.  He has psoriasis and was taking what sounds like a TNF and an alpha inhibitor up until the summer of .       PAST SURGICAL HISTORY:     1.  The aforementioned right temporal craniotomy for gross total resection of right temporal glioblastoma done 10/03/2017 with Dr. Harry Wiggins at the [facility].   2.  Colonoscopy performed 2011, unremarkable.       FAMILY HISTORY:  He had a Father and 2 paternal uncles who had prostate cancer.  His mother had some sort of \"tumor of the rib,\" and  at age 54 of this unknown cancer.       SOCIAL HISTORY:  The patient lives in [place], [place].  He is accompanied by his wife, who is extremely supportive.  They have 3 sons ages 18, 23 and 25.  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  He has since then been working for [employer], about 27 miles from his home.    Tobacco:  Never smoker.    Allergies:none    Current Medications: reviewed    Physical Exam:  /87  Pulse 74  Temp 98.5  F (36.9  C) (Tympanic)  Resp 16  Wt " 94.3 kg (208 lb)  SpO2 100%  BMI 30.7 kg/m2  General: NAD, alert and interactive  HEENT: PERRL, MMM, no oral lesions, TMs clear   Lungs: CTA bilaterally  Cardiovascular: RRR, no M/R/G, no edema   MSK: normal muscle tone  Skin: no rashes or petechaie  Neuro: A&O, CNs II-XII intact, 5/5 strength all four extremities, normal cerebellar testing       Laboratory/Imaging Studies  The radiology report and labs were reviewed in detail with the patient, and hard copies were provided for the patient.     CBC RESULTS:   Recent Labs   Lab Test  01/22/18   1001   WBC  4.7   RBC  4.31*   HGB  13.6   HCT  41.0   MCV  95   MCH  31.6   MCHC  33.2   RDW  12.1   PLT  162     Recent Labs   Lab Test  01/22/18   1001  12/20/17   1452   NA  139  139   POTASSIUM  4.3  4.4   CHLORIDE  107  104   CO2  27  30   ANIONGAP  5  5   GLC  93  106*   BUN  12  18   CR  1.06  1.10   ARA  8.3*  8.4*       RADIOLOGY:  MRI brain 1/19/18  1. Increased enhancement along the posterior margin of the resection  cavity with new areas of nodular and ringlike enhancement which more  likely represents radiation necrosis than recurrence based on  perfusion. Recommend attention on follow-up.  2. Postsurgical changes of prior resection of right temporal  glioblastoma.  3. Resolution of right frontotemporal subdural hematoma and T2  hyperintensity along the right internal capsule and commisural white  matter.    ASSESSMENT/PLAN:  Seth Iglesias is a 58 y/o man with right temporal glioblastoma (WHO grade IV). He had gross total resection on 10/3/17 and completed adjuvant chemorads on 12/22/17. His 4 week post-treatment brain MRI shows areas of enhancement around the resection cavity that likely represent radiation necrosis rather than recurrence.     We discussed starting temozolomide maintenance on days 1-5 of a 28-day cycle. Dose for 1st cycle is 150 mg/m2 and if maintains ANC >1500 and platelets >100k, then can increase to 200 mg/m2 for subsequent cycles. Plan  to complete at least 6 cycles and up to 12 cycles, depending on tolerance and response. Potential side effects include cytopenias, infection, nausea, fatigue, and alopecia. He agreed to start this today.     We also discussed the Optune device which improves median PFS and OS when combined with maintenance Temodar, compared to Temodar alone (Perlita et al, AHSAN Dec 2017, PMID: [id number]). Recommended that he wear this 24 hrs/day, but at a minimum 18 hrs/day. About 50% of patients will have a mild skin reaction to the electrodes but it does not add additional systemic side effects. He agreed to proceed with Optune and a prescription form was completed.     He is going to [place] on vacation at the end of February. He will f/u with Helena Landin in 4 weeks, then with Dr. Armstrong about 4 weeks after that depending on his vacation dates. Obtain repeat MRI brain the week prior to appointment with Dr. Armstrong. This will be repeated about every 2 months. This was not ordered today since the scheduling dates are to be determined based on his vacation.        Patient seen and discussed with staff Dr. Patti Chavez MD  Heme/Onc Fellow  Pager: [phone]            MEDICAL ONCOLOGY ATTENDING PHYSICIAN ADDENDUM:  I have seen and evaluated this patient with the medical oncology fellow. I have reviewed and edited this fellow's note, and agree with the assessment and plan stated above.      Mr. Iglesias returns today in the company of his wife.  I first met him in kind evaluation on 10/27, after which he proceeded to concurrent chemoradiation under the care of Dr. Helio Louis from our Radiation Oncology Team.  He completed concurrent chemoradiation with temozolomide from 11/12 through 12/22/2017 per standard-of-care.  He states that the radiation went generally pretty well; he had a mild scalp dermatitis.  A followup brain MRI was done just last week, 01/19, showing mild nodularity at the posteromedial aspect of the resection  cavity, likely represented radiation necrosis rather than any new or progressive tumor.  He has no evidence of clinical progression.  He is doing well.  He has had a mild amount of dizziness, but no vertigo.  He denies any falls.        I performed a very detailed neurologic exam today.  Cranial nerves II-XII are completely unremarkable.  There is no evidence of vertical or horizontal nystagmus.  His gait is unremarkable.  His balance is good.  He has no evidence of dysdiadochokinesia.  He has an extremely minimal tremor on the right side greater than left on intention on finger-to-nose testing.  His visual fields are completely intact.  For the remainder of the full exam and background interval history, please see Dr. Chavez's note.      We reviewed in detail the standard of care from the Perlita, et.al., trial comprising at minimum 6 months of adjuvant temozolomide on a 5-days-on and 23-days-off schedule.  The first month dose as per standard is 150 mg/m2 days 1 through 5 of a 28-day cycle, and if he tolerates that pretty well, then he would graduate to 200 mg/m2 for days 1 through 5 of cycle 2 onward.  Our Pharmacy Team will provide further chemotherapy teaching.  That is the standard of care, but in the past decade many neuro-oncologists have extrapolated that up to 12 months barring any progression of disease, and that is what I would recommend, so I stated that a minimum of 6 months would be performed of temozolomide, and then up to 12 months and then reassessment.  He stated consent to moving forward with the plan.  In addition, we discussed the Optune device based on the trials performed by [employer], and the recent updates in the last few months showing a 5-month improvement in overall survival when using the Optune device in the adjuvant setting compared to not using the device.  The above citation is listed in Dr. Chavez's note from AHSAN 12/2017.  The patient provided verbal consent for moving forward.  We  will have him complete the Optune prescriptions form, and we will send that in to [employer], and the regional representative will reach out to them to set this up.  We reviewed in detail that the device is intended to be worn 24 hours a day for 7 days in a row each week; in other words constantly; but it is permissible to do at minimum 18 hours in an every-24-hour period.  The patient asks several questions about this, and he was unclear, but I clarified this to his stated understanding.  His wife completely understood.  We will move forward with the above plan.  He will return to the clinic in 4 weeks' time to meet with Helena Landin from our physician assistant team prior to consideration of cycle 2/day 1.  They have a planned trip to [place] immediately following that, and as long as he is doing okay and does not have any significant blood count issues or clinical issues, it should be safe for him to travel.           I spent 30 minutes in consultation, including H&P and Discussion. >50% of time was spent in counseling and in coordination of care.    Augustus Armstrong MD, PhD        Oral Chemotherapy Monitoring Program.    I met with patient and wife and reviewed new Temodar dosing schedule and administration techniques.  (see AVS)  We also reviewed the need for monthly labs shortly before next cycle to start.    Anyi Hicks, PharmD, BCOP, BCPS  Clinical Pharmacy Specialist/  Oncology Medication Therapy Management Pharmacist  [facility]  Pager [phone]  Phone [phone]

## 2018-01-22 NOTE — MR AVS SNAPSHOT
After Visit Summary   1/22/2018    Seth Iglesias    MRN: [id number]           Patient Information     Date Of Birth          1960        Visit Information        Provider Department      1/22/2018 1:00 PM Dominic Louis MD Radiation Oncology Clinic        Today's Diagnoses     Glioblastoma multiforme of temporal lobe (H)    -  1       Follow-ups after your visit        Follow-up notes from your care team     Return in about 6 months (around 7/22/2018).      Your next 10 appointments already scheduled     Feb 20, 2018  2:45 PM CST   [facility] Lab Draw with  [facility] LAB DRAW   [facility] Mason[facility] Lab Draw ([facility])    [address]  [address]  [facility] [address]   [phone]            Feb 20, 2018  3:30 PM CST   (Arrive by 3:15 PM)   Return Visit with Helena Landin PA-C   [facility] ([facility])    [address]  [address]  [facility] [id number] [phone]            Apr 06, 2018 11:00 AM CDT   Return Visit with Dominic Louis MD   Radiation Oncology Clinic ([facility])    [facility]  1st Floor  [address] [id number] [phone]            Apr 06, 2018  1:30 PM CDT   (Arrive by 1:15 PM)   Return Visit with Rafael Boyer MD   [facility] Neurology ([facility])    [address]  3rd Floor  [facility] [id number] [phone]              Who to contact     Please call your clinic at [phone] to:    Ask questions about your health    Make or cancel appointments    Discuss your medicines    Learn about your test results    Speak to your doctor   If you have compliments or concerns about an experience at your clinic, or if you wish to file a complaint, please contact [facility] Patient Relations at [phone] or email us at  [email or web address]         Additional Information About Your Visit        [employer] Information     [employer] gives you secure access to your electronic health record. If you see a primary care provider, you can also send messages to your care team and make appointments. If you have questions, please call your primary care clinic.  If you do not have a primary care provider, please call [phone] and they will assist you.      [employer] is an electronic gateway that provides easy, online access to your medical records. With [employer], you can request a clinic appointment, read your test results, renew a prescription or communicate with your care team.     To access your existing account, please contact your [facility] or call [phone] for assistance.        Care EveryWhere ID     This is your Care EveryWhere ID. This could be used by other organizations to access your [place] medical records  [id number]        Your Vitals Were     BMI (Body Mass Index)                   30.72 kg/m2            Blood Pressure from Last 3 Encounters:   01/22/18 122/87   01/22/18 124/84   01/22/18 122/87    Weight from Last 3 Encounters:   01/22/18 94.3 kg (208 lb)   01/22/18 94.3 kg (208 lb)   01/22/18 94.3 kg (208 lb)              Today, you had the following     No orders found for display         Today's Medication Changes          These changes are accurate as of: 1/22/18 11:59 PM.  If you have any questions, ask your nurse or doctor.               Start taking these medicines.        Dose/Directions    ondansetron 8 MG tablet   Commonly known as:  ZOFRAN   Used for:  Glioblastoma multiforme of temporal lobe (H)   Started by:  Anyi Hicks [facility]        Dose:  8 mg   Take 1 tablet (8 mg) by mouth daily for 5 days Take before each dose of temozolomide.   Quantity:  5 tablet   Refills:  5       prochlorperazine 10 MG tablet   Commonly known as:  COMPAZINE   Used for:  Glioblastoma  multiforme of temporal lobe (H)   Started by:  Anyi Hicks RPH        Dose:  10 mg   Take 1 tablet (10 mg) by mouth every 6 hours as needed (Nausea/Vomiting)   Quantity:  30 tablet   Refills:  2         These medicines have changed or have updated prescriptions.        Dose/Directions    * temozolomide 100 MG capsule CHEMO   Commonly known as:  TEMODAR   This may have changed:    - medication strength  - how much to take  - when to take this  - additional instructions   Used for:  Glioblastoma multiforme of temporal lobe (H)   Changed by:  Anyi Hicks RPH        Dose:  300 mg   Take 3 capsules (300 mg) by mouth daily for 5 days Along with 20mg daily.  Total daily dose = 320mg   Quantity:  15 capsule   Refills:  0       * temozolomide 20 MG capsule CHEMO   Commonly known as:  TEMODAR   This may have changed:    - how much to take  - when to take this  - additional instructions   Used for:  Glioblastoma multiforme of temporal lobe (H)   Changed by:  Anyi Hicks RPH        Dose:  20 mg   Take 1 capsule (20 mg) by mouth daily for 5 days Along with 300mg daily.  Total daily dose = 320mg   Quantity:  5 capsule   Refills:  0       * Notice:  This list has 2 medication(s) that are the same as other medications prescribed for you. Read the directions carefully, and ask your doctor or other care provider to review them with you.         Where to get your medicines      These medications were sent to [place], [place] - [address]  [address] [place] [address]    Hours:  TRANSPLANT PHONE NUMBER [phone] Phone:  [phone]     temozolomide 100 MG capsule CHEMO    temozolomide 20 MG capsule CHEMO         These medications were sent to [id number] - [place], [place] - [address] [facility]  [address], [facility] [address]     Phone:  [phone]     ondansetron 8 MG tablet    prochlorperazine 10 MG tablet                 Primary Care Provider Office Phone # Fax #    R Nigel Salmon [phone] [address] [facility] [address]        Equal Access to Services     SEHJANEY OLGA : Beto vero qureshi jingo Soneha, waaxda luqadaha, qaybta kaalmada singh, héctor ramiresmariluz saumya. So [facility] [phone].    ATENCIÓN: Si habla español, tiene a granados disposición servicios gratuitos de asistencia lingüística. Llame al [phone].    We comply with applicable federal civil rights laws and [place] laws. We do not discriminate on the basis of race, color, national origin, age, disability, sex, sexual orientation, or gender identity.            Thank you!     Thank you for choosing RADIATION ONCOLOGY CLINIC  for your care. Our goal is always to provide you with excellent care. Hearing back from our patients is one way we can continue to improve our services. Please take a few minutes to complete the written survey that you may receive in the mail after your visit with us. Thank you!             Your Updated Medication List - Protect others around you: Learn how to safely use, store and throw away your medicines at www.disposemymeds.org.          This list is accurate as of: 1/22/18 11:59 PM.  Always use your most recent med list.                   Brand Name Dispense Instructions for use Diagnosis    ENBREL SURECLICK 50 MG/ML autoinjector   Generic drug:  Etanercept           levETIRAcetam 1000 MG Tabs    KEPPRA    180 tablet    Take 1,000 mg by mouth every 12 hours    Brain mass       MULTIVITAMIN & MINERAL PO      Take 1 tablet by mouth daily        ondansetron 8 MG tablet    ZOFRAN    5 tablet    Take 1 tablet (8 mg) by mouth daily for 5 days Take before each dose of temozolomide.    Glioblastoma multiforme of temporal lobe (H)       pantoprazole 40 MG EC tablet    PROTONIX    30 tablet    Take 1 tablet (40 mg) by mouth every morning (before breakfast)    Brain mass        prochlorperazine 10 MG tablet    COMPAZINE    30 tablet    Take 1 tablet (10 mg) by mouth every 6 hours as needed (Nausea/Vomiting)    Glioblastoma multiforme of temporal lobe (H)       senna-docusate 8.6-50 MG per tablet    SENOKOT-S;PERICOLACE    100 tablet    Take 1-2 tablets by mouth 2 times daily    Brain mass       * temozolomide 100 MG capsule CHEMO    TEMODAR    15 capsule    Take 3 capsules (300 mg) by mouth daily for 5 days Along with 20mg daily.  Total daily dose = 320mg    Glioblastoma multiforme of temporal lobe (H)       * temozolomide 20 MG capsule CHEMO    TEMODAR    5 capsule    Take 1 capsule (20 mg) by mouth daily for 5 days Along with 300mg daily.  Total daily dose = 320mg    Glioblastoma multiforme of temporal lobe (H)       * Notice:  This list has 2 medication(s) that are the same as other medications prescribed for you. Read the directions carefully, and ask your doctor or other care provider to review them with you.

## 2018-01-22 NOTE — NURSING NOTE
"Oncology Rooming Note    January 22, 2018 10:26 AM   Seth Iglesias is a 57 year old male who presents for:    Chief Complaint   Patient presents with     Blood Draw     Oncology Clinic Visit     Return: Glioblastoma     Initial Vitals: /87  Pulse 74  Temp 98.5  F (36.9  C) (Tympanic)  Resp 16  Wt 94.3 kg (208 lb)  SpO2 100%  BMI 30.7 kg/m2 Estimated body mass index is 30.7 kg/(m^2) as calculated from the following:    Height as of 12/20/17: 1.753 m (5' 9.02\").    Weight as of this encounter: 94.3 kg (208 lb). Body surface area is 2.14 meters squared.  No Pain (0) Comment: Data Unavailable   No LMP for male patient.  Allergies reviewed: Yes  Medications reviewed: Yes    Medications: Medication refills not needed today.   Pharmacy name entered into [employer]:    [id number] - [place], [place] - [address]  [id number] - [facility] [address]    Clinical concerns: new concerns are his equilibrium is off and is wondering if it is from the RSV or if it is from his treatments?  There are times that it is better and there are times that he feels pretty wobbly.  He feels good otherwise, just a little fatigue, but otherwise good. Dr. Armstrong was NOT notified.    10 minutes for nursing intake (face to face time)     Corinne Michael CMA              "

## 2018-01-22 NOTE — MR AVS SNAPSHOT
After Visit Summary   1/22/2018    Seth Iglesias    MRN: [id number]           Patient Information     Date Of Birth          1960        Visit Information        Provider Department      1/22/2018 2:45 PM Harry Wiggins MD [facility]        Today's Diagnoses     Glioblastoma multiforme of temporal lobe (H)    -  1       Follow-ups after your visit        Your next 10 appointments already scheduled     Feb 20, 2018  2:45 PM CST   [facility]   [facility] Lab Draw ([facility])    [address]  [address]  [facility] [address] [phone]            Feb 20, 2018  3:30 PM CST   (Arrive by 3:15 PM)   Return Visit with Helena Landin PA-C   [facility] ([facility])    [address] [place] [address]  [address]  [facility] [id number] [phone]            Apr 06, 2018 11:00 AM CDT   Return Visit with Dominic Louis MD   Radiation Oncology Clinic ([facility])    [facility]  1st Floor  [address] [address] [phone]            Apr 06, 2018  1:30 PM CDT   (Arrive by 1:15 PM)   Return Visit with Rafael Boyer MD   [facility] Neurology ([facility])    [address] [place] [address]  3rd Floor  [facility] [id number] [phone]              Who to contact     If you have questions or need follow up information about today's clinic visit or your schedule please contact [facility] directly at [phone].  Normal or non-critical lab and imaging results will be communicated to you by MyChart, letter or phone within 4 business days after the clinic has received the results. If you do not hear from us within 7 days, please contact the clinic through MyChart or phone. If you have a critical or abnormal lab result, we will notify you by phone as soon  "as possible.  Submit refill requests through [employer] or call your pharmacy and they will forward the refill request to us. Please allow 3 business days for your refill to be completed.          Additional Information About Your Visit        [employer] Information     [employer] gives you secure access to your electronic health record. If you see a primary care provider, you can also send messages to your care team and make appointments. If you have questions, please call your primary care clinic.  If you do not have a primary care provider, please call [phone] and they will assist you.        Care EveryWhere ID     This is your Care EveryWhere ID. This could be used by other organizations to access your [place] medical records  [id number]        Your Vitals Were     Pulse Temperature Respirations Height Pulse Oximetry BMI (Body Mass Index)    74 98.5  F (36.9  C) (Oral) 16 1.753 m (5' 9\") 100% 30.72 kg/m2       Blood Pressure from Last 3 Encounters:   01/22/18 122/87   01/22/18 124/84   01/22/18 122/87    Weight from Last 3 Encounters:   01/22/18 94.3 kg (208 lb)   01/22/18 94.3 kg (208 lb)   01/22/18 94.3 kg (208 lb)              Today, you had the following     No orders found for display         Today's Medication Changes          These changes are accurate as of 1/22/18 11:59 PM.  If you have any questions, ask your nurse or doctor.               Start taking these medicines.        Dose/Directions    ondansetron 8 MG tablet   Commonly known as:  ZOFRAN   Used for:  Glioblastoma multiforme of temporal lobe (H)   Started by:  Anyi Hicks RPH        Dose:  8 mg   Take 1 tablet (8 mg) by mouth daily for 5 days Take before each dose of temozolomide.   Quantity:  5 tablet   Refills:  5       prochlorperazine 10 MG tablet   Commonly known as:  COMPAZINE   Used for:  Glioblastoma multiforme of temporal lobe (H)   Started by:  Anyi Hicks RPH        Dose:  10 mg   Take 1 tablet (10 mg) by mouth every 6 hours " as needed (Nausea/Vomiting)   Quantity:  30 tablet   Refills:  2         These medicines have changed or have updated prescriptions.        Dose/Directions    * temozolomide 100 MG capsule CHEMO   Commonly known as:  TEMODAR   This may have changed:    - medication strength  - how much to take  - when to take this  - additional instructions   Used for:  Glioblastoma multiforme of temporal lobe (H)   Changed by:  Anyi Hicks RPH        Dose:  300 mg   Take 3 capsules (300 mg) by mouth daily for 5 days Along with 20mg daily.  Total daily dose = 320mg   Quantity:  15 capsule   Refills:  0       * temozolomide 20 MG capsule CHEMO   Commonly known as:  TEMODAR   This may have changed:    - how much to take  - when to take this  - additional instructions   Used for:  Glioblastoma multiforme of temporal lobe (H)   Changed by:  Anyi Hicks RPH        Dose:  20 mg   Take 1 capsule (20 mg) by mouth daily for 5 days Along with 300mg daily.  Total daily dose = 320mg   Quantity:  5 capsule   Refills:  0       * Notice:  This list has 2 medication(s) that are the same as other medications prescribed for you. Read the directions carefully, and ask your doctor or other care provider to review them with you.         Where to get your medicines      These medications were sent to [address] [address]    Hours:  TRANSPLANT PHONE NUMBER [phone] Phone:  [phone]     temozolomide 100 MG capsule CHEMO    temozolomide 20 MG capsule CHEMO         These medications were sent to [facility] [address] [address]     Phone:  [phone]     ondansetron 8 MG tablet    prochlorperazine 10 MG tablet                Primary Care Provider Office Phone # Fax #    JUSTYN Salmon [phone] [address]  [place] [address]        Equal Access to Services     [facility]: Hadii vero ku hadabdi Soneha, waaxda luqadaha, qaybta kaalmada grantlinhaddis, waxkylah idiin haydavidemariluz soriatomasajimmy kerns. So [facility] [phone].    ATENCIÓN: Si habla español, tiene a granados disposición servicios gratuitos de asistencia lingüística. Rodney al [phone].    We comply with applicable federal civil rights laws and [place] laws. We do not discriminate on the basis of race, color, national origin, age, disability, sex, sexual orientation, or gender identity.            Thank you!     Thank you for choosing [facility]  for your care. Our goal is always to provide you with excellent care. Hearing back from our patients is one way we can continue to improve our services. Please take a few minutes to complete the written survey that you may receive in the mail after your visit with us. Thank you!             Your Updated Medication List - Protect others around you: Learn how to safely use, store and throw away your medicines at www.disposemymeds.org.          This list is accurate as of 1/22/18 11:59 PM.  Always use your most recent med list.                   Brand Name Dispense Instructions for use Diagnosis    ENBREL SURECLICK 50 MG/ML autoinjector   Generic drug:  Etanercept           levETIRAcetam 1000 MG Tabs    KEPPRA    180 tablet    Take 1,000 mg by mouth every 12 hours    Brain mass       MULTIVITAMIN & MINERAL PO      Take 1 tablet by mouth daily        ondansetron 8 MG tablet    ZOFRAN    5 tablet    Take 1 tablet (8 mg) by mouth daily for 5 days Take before each dose of temozolomide.    Glioblastoma multiforme of temporal lobe (H)       pantoprazole 40 MG EC tablet    PROTONIX    30 tablet    Take 1 tablet (40 mg) by mouth every morning (before breakfast)    Brain mass       prochlorperazine 10 MG tablet    COMPAZINE    30 tablet    Take 1 tablet (10 mg) by mouth every 6 hours as needed (Nausea/Vomiting)     Glioblastoma multiforme of temporal lobe (H)       senna-docusate 8.6-50 MG per tablet    SENOKOT-S;PERICOLACE    100 tablet    Take 1-2 tablets by mouth 2 times daily    Brain mass       * temozolomide 100 MG capsule CHEMO    TEMODAR    15 capsule    Take 3 capsules (300 mg) by mouth daily for 5 days Along with 20mg daily.  Total daily dose = 320mg    Glioblastoma multiforme of temporal lobe (H)       * temozolomide 20 MG capsule CHEMO    TEMODAR    5 capsule    Take 1 capsule (20 mg) by mouth daily for 5 days Along with 300mg daily.  Total daily dose = 320mg    Glioblastoma multiforme of temporal lobe (H)       * Notice:  This list has 2 medication(s) that are the same as other medications prescribed for you. Read the directions carefully, and ask your doctor or other care provider to review them with you.

## 2018-01-23 NOTE — PROGRESS NOTES
Oral Chemotherapy Monitoring Program.    I met with patient and wife and reviewed new Temodar dosing schedule and administration techniques.  (see AVS)  We also reviewed the need for monthly labs shortly before next cycle to start.    Anyi Hicks, PharmD, BCOP, BCPS  Clinical Pharmacy Specialist/  Oncology Medication Therapy Management Pharmacist  [facility]  Pager [phone]  Phone [phone]

## 2018-01-30 NOTE — TELEPHONE ENCOUNTER
"Oral Chemotherapy Monitoring Program    Primary Oncologist: Dr. Armstrong  Primary Oncology Clinic: [facility]  Cancer Diagnosis: Glioblastoma      Drug: Temodar 320 mg (5l960fi + 1x20mg) for 5 days of every 28 day cycle  Start Date: 1/22/18  *Previously was on Temodar with XRT x 42 days starting on 11/12/17    Drug Interaction Assessment: No drug interactions identified.     Lab Monitoring Plan  C1D1+   CMP, CBC C2D1+ Call, CMP, CBC C3D1+ Call, CMP, CBC C4D1+ Call, CMP, CBC C5D1+ Call, CMP, CBC C6D1+ Call, CMP, CBC   C1D8+  C2D8+  C3D8+  C4D8+  C5D8+  C6D8+    C1D15+ Call C2D15+  C3D15+  C4D15+  C5D15+  C6D15+    C1D22+  C2D22+  C3D22+  C4D22+  C5D22+  C6D22+      Subjective/Objective:  Seth Iglesias is a 57 year old male contacted by phone for a follow-up visit for oral chemotherapy.  Today I spoke with Maryanne, Rich wife. Maryanne reports things went \"great\" last week with the new Temodar dosing/schedule. She reports he took Temodar at night with Zofran about 30 minutes before. He did not experience any nausea or vomiting. She also reports that Candelario has not had diarrhea, constipation, or mouth sores. He had increased fatigue last week while taking Temodar that worsened as the week went on. Maryanne says that Candelario napped for an hour or two each day last week and this resolved the fatigue. His energy levels are back up to near normal this week. Candelario has been staying active by walking on the treadmill, going shopping, going to a concert, and other activities.    ORAL CHEMOTHERAPY 10/27/2017 12/12/2017 1/30/2018   Drug Name Temodar (Temozolomide) Temodar (Temozolomide) Temodar (Temozolomide)   Current Dosage 160mg 160mg (No Data)   Current Schedule Daily Daily Daily   Cycle Details Continuous for 42 days during XRT Continuous for 42 days during XRT Other   Start Date of Last Cycle - 11/12/2017 1/22/2018   Planned next cycle start date 11/12/2017 - 2/20/2018   Doses missed in last 2 weeks - 0 0   Adherence " "Assessment - Adherent Adherent   Adverse Effects - Fatigue Fatigue   Fatigue - Grade 1 Grade 1   Pharmacist Intervention(fatigue) - No No   Home BPs - not needed not needed   Any new drug interactions? No No No   Is the dose as ordered appropriate for the patient? Yes - Yes     Vitals:  BP:   BP Readings from Last 1 Encounters:   01/22/18 122/87     Wt Readings from Last 1 Encounters:   01/22/18 94.3 kg (208 lb)     Estimated body surface area is 2.14 meters squared as calculated from the following:    Height as of 1/22/18: 1.753 m (5' 9\").    Weight as of 1/22/18: 94.3 kg (208 lb).    Labs:  Results in Past 30 Days  Result Component Current Result Ref Range   Sodium 139 (1/22/2018) 133 - 144 mmol/L     Results in Past 30 Days  Result Component Current Result Ref Range   Potassium 4.3 (1/22/2018) 3.4 - 5.3 mmol/L     Results in Past 30 Days  Result Component Current Result Ref Range   Calcium 8.3 (L) (1/22/2018) 8.5 - 10.1 mg/dL     No results found for Mag within last 30 days.     No results found for Phos within last 30 days.     Results in Past 30 Days  Result Component Current Result Ref Range   Albumin 3.8 (1/22/2018) 3.4 - 5.0 g/dL     Results in Past 30 Days  Result Component Current Result Ref Range   Urea Nitrogen 12 (1/22/2018) 7 - 30 mg/dL     Results in Past 30 Days  Result Component Current Result Ref Range   Creatinine 1.06 (1/22/2018) 0.66 - 1.25 mg/dL       Results in Past 30 Days  Result Component Current Result Ref Range   AST 15 (1/22/2018) 0 - 45 U/L     Results in Past 30 Days  Result Component Current Result Ref Range   ALT 16 (1/22/2018) 0 - 70 U/L     Results in Past 30 Days  Result Component Current Result Ref Range   Bilirubin Total 0.5 (1/22/2018) 0.2 - 1.3 mg/dL       Results in Past 30 Days  Result Component Current Result Ref Range   WBC 4.7 (1/22/2018) 4.0 - 11.0 10e9/L     Results in Past 30 Days  Result Component Current Result Ref Range   Hemoglobin 13.6 (1/22/2018) 13.3 - 17.7 g/dL "     Results in Past 30 Days  Result Component Current Result Ref Range   Platelet Count 162 (1/22/2018) 150 - 450 10e9/L     Results in Past 30 Days  Result Component Current Result Ref Range   Absolute Neutrophil 3.0 (1/22/2018) 1.6 - 8.3 10e9/L     Assessment:  Candelario is on day 9 of his first cyclical Temodar cycle. He had mild fatigue during week 1 relieved by rest. He experienced no other side effects.     Plan:  -Next Temodar cycle to start following 2/20 appointment and labs with Helena Landin. Recommended Candelario continue to stay hydrated and active during week 1 next cycle to help with fatigue.   -Candelario will be on vacation for 1.5 weeks starting around 2/25.     Follow-Up:  -Review 2/20 appointment then call 1 week into next cycle ~2/26.    Refill Due:  -Prior to next cycle.    Rachel Keller, Pharmacy Intern  Oral Chemotherapy Monitoring Program  [facility]  [phone]

## 2018-01-31 NOTE — PROGRESS NOTES
Form Request Documentation    Date Received in Clinic:  1/22/18  Name/Type of Form: [facility]  Questions that need to be addressed:   Current Employment Status: not currently working,    Amount of Leave Requested: Continuous Leave, no return to work date   Other: None  Date Completed: 1/31/2018  Copy Mailed to patient: Yes on 1/31/2018  Disposition of Form: Fax to [facility] at [phone]

## 2018-02-20 NOTE — NURSING NOTE
Labs completed via  and vitals obtained without complication.    See flowsheets.    Patient was checked into next appt.    Eulalia Kirkpatrick CMA (AAMA)

## 2018-02-20 NOTE — PROGRESS NOTES
"Hematology-Oncology Visit  Feb 20, 2018    Reason for Visit: follow-up glioblastoma     HPI: Seth Iglesias is a 57 year old gentleman with past medical history of psoriasis, HLD with glioblastoma. He presented with \"dizzy spells\" 9/2017 and went to [facility] ED 9/28 with lethargy, confusion, and dizziness. CT was done showing R temporal lobe mass with edema. He was given IV steroids and had MRI showing large tumor in anterior right temporal lobe with associated intratumoral hemorrhage and cerebellar mass effect. Tumor was 4.4 cm. There was a potential additional lesion measuring 5 mm in paramedian right frontal lobe. He was admitted to [facility] and had right temporal craniotomy on 10/3 by Dr. Wiggins. He performed gross total resection of the tumor, confirming WHO grade 4 glioblastoma. IDH testing was negative. MGMT promoter methylation was absent. He started concurrent chemoradiation with Temodar on 11/12/17 and completed on 12/22/17. He started adjuvant Temodar + Optune device on 1/22/18. He presents today prior to cycle 2.     Interval History: Mr. Iglesias is here with his wife today. He is feeling well. He noted no side effects from treatment apart from mild increase in fatigue while he was on Temodar pills and a few days following. He took zofran prior to Temodar and had no nausea or vomiting. He notes looser stools, 1-2 daily since starting treatment. Eating and drinking okay. No rash or fevers/chills. He has been wearing Optune close to 24 hours per day and tolerating this well. No scalp irritation. No fevers/chills, cough, SOB. His vertigo is improving as URI has resolved. Denies any hearing changes, changes with smell, headaches, vision changes, weakness, paraesthesias, seizures, or other neurologic changes. ROS otherwise negative.      Current Outpatient Prescriptions   Medication     prochlorperazine (COMPAZINE) 10 MG tablet     ENBREL SURECLICK 50 MG/ML autoinjector     levETIRAcetam (KEPPRA) 1000 MG TABS "     pantoprazole (PROTONIX) 40 MG EC tablet     Multiple Vitamins-Minerals (MULTIVITAMIN & MINERAL PO)     temozolomide (TEMODAR) 250 MG capsule CHEMO     Temozolomide (TEMODAR) 180 MG capsule     senna-docusate (SENOKOT-S;PERICOLACE) 8.6-50 MG per tablet     No current facility-administered medications for this visit.        PHYSICAL EXAM:  /50  Pulse 86  Temp 97.3  F (36.3  C) (Oral)  Resp 16  Wt 94.3 kg (207 lb 12.8 oz)  SpO2 95%  BMI 30.69 kg/m2  General: Alert, oriented, pleasant, NAD  Skin: No rashes on exposed skin   HEENT: Normocephalic, atraumatic, PERRLA, EOMI. Moist mucus membranes, no lesions or thrush. No pharyngeal erythema or exudate   Lungs: CTA bilaterally, normal work of breathing  Cardiac: RRR, S1, S2, no murmurs  Abdomen: Soft, nontender, nondistended. Normoactive bowel sounds.   Neuro: CN II-XII intact, strength 5/5 UE and LE b/l, 2+ patellar and biceps reflexes b/l, stable to finger to nose with some mild tremor on R >L when touching fingertip, negative Romberg  Extremities: No pedal edema    Labs:    2/20/2018 15:01   Sodium 138   Potassium 4.2   Chloride 105   Carbon Dioxide 27   Urea Nitrogen 15   Creatinine 0.99   GFR Estimate 77   GFR Estimate If Black >90   Calcium 8.7   Anion Gap 5   Albumin 3.7   Protein Total 7.4   Bilirubin Total 0.3   Alkaline Phosphatase 75   ALT 19   AST 15   Glucose 101 (H)   WBC 5.8   Hemoglobin 14.0   Hematocrit 41.7   Platelet Count 194   RBC Count 4.55   MCV 92   MCH 30.8   MCHC 33.6   RDW 11.9   Diff Method Automated Method   % Neutrophils 59.8   % Lymphocytes 30.2   % Monocytes 7.8   % Eosinophils 1.7   % Basophils 0.3   % Immature Granulocytes 0.2   Nucleated RBCs 0   Absolute Neutrophil 3.5   Absolute Lymphocytes 1.7   Absolute Monocytes 0.5   Absolute Eosinophils 0.1   Absolute Basophils 0.0   Abs Immature Granulocytes 0.0   Absolute Nucleated RBC 0.0         Assessment & Plan:     1. Right temporal GBM: S/p gross total resection on 10/3/17  and chemoradiation 11/13-12/22/17. He started adjuvant Temodar and Optune device on 1/22/18. He has tolerated the first cycle well with mild fatigue and minimal bowel changes. His CBC is WNL to increase Temodar to 200 mg/m2 with cycle 2 and beyond. He picked up new Rx for this today. Plan for brain MRI next month prior to Dr. Patti cool. Plan for adjuvant Temodar monthly for likely a year, at least 6 months. He will call with any interval concerns.     2. Looser stools: Mild. This could be secondary to Temodar. Start fiber supplement daily. Continue to hydrate well.     Helena Landin PA-C    [facility]  [address] [place] [address] [id number] [phone]

## 2018-02-20 NOTE — LETTER
"2/20/2018      RE: Seth Iglesias  [address]  [facility] [address]       Hematology-Oncology Visit  Feb 20, 2018    Reason for Visit: follow-up glioblastoma     HPI: Seth Iglesias is a 57 year old gentleman with past medical history of psoriasis, HLD with glioblastoma. He presented with \"dizzy spells\" 9/2017 and went to [facility] ED 9/28 with lethargy, confusion, and dizziness. CT was done showing R temporal lobe mass with edema. He was given IV steroids and had MRI showing large tumor in anterior right temporal lobe with associated intratumoral hemorrhage and cerebellar mass effect. Tumor was 4.4 cm. There was a potential additional lesion measuring 5 mm in paramedian right frontal lobe. He was admitted to [facility] and had right temporal craniotomy on 10/3 by Dr. Wiggins. He performed gross total resection of the tumor, confirming WHO grade 4 glioblastoma. IDH testing was negative. MGMT promoter methylation was absent. He started concurrent chemoradiation with Temodar on 11/12/17 and completed on 12/22/17. He started adjuvant Temodar + Optune device on 1/22/18. He presents today prior to cycle 2.     Interval History: Mr. Iglesias is here with his wife today. He is feeling well. He noted no side effects from treatment apart from mild increase in fatigue while he was on Temodar pills and a few days following. He took zofran prior to Temodar and had no nausea or vomiting. He notes looser stools, 1-2 daily since starting treatment. Eating and drinking okay. No rash or fevers/chills. He has been wearing Optune close to 24 hours per day and tolerating this well. No scalp irritation. No fevers/chills, cough, SOB. His vertigo is improving as URI has resolved. Denies any hearing changes, changes with smell, headaches, vision changes, weakness, paraesthesias, seizures, or other neurologic changes. ROS otherwise negative.      Current Outpatient Prescriptions   Medication     prochlorperazine (COMPAZINE) 10 MG " tablet     ENBREL SURECLICK 50 MG/ML autoinjector     levETIRAcetam (KEPPRA) 1000 MG TABS     pantoprazole (PROTONIX) 40 MG EC tablet     Multiple Vitamins-Minerals (MULTIVITAMIN & MINERAL PO)     temozolomide (TEMODAR) 250 MG capsule CHEMO     Temozolomide (TEMODAR) 180 MG capsule     senna-docusate (SENOKOT-S;PERICOLACE) 8.6-50 MG per tablet     No current facility-administered medications for this visit.        PHYSICAL EXAM:  /50  Pulse 86  Temp 97.3  F (36.3  C) (Oral)  Resp 16  Wt 94.3 kg (207 lb 12.8 oz)  SpO2 95%  BMI 30.69 kg/m2  General: Alert, oriented, pleasant, NAD  Skin: No rashes on exposed skin   HEENT: Normocephalic, atraumatic, PERRLA, EOMI. Moist mucus membranes, no lesions or thrush. No pharyngeal erythema or exudate   Lungs: CTA bilaterally, normal work of breathing  Cardiac: RRR, S1, S2, no murmurs  Abdomen: Soft, nontender, nondistended. Normoactive bowel sounds.   Neuro: CN II-XII intact, strength 5/5 UE and LE b/l, 2+ patellar and biceps reflexes b/l, stable to finger to nose with some mild tremor on R >L when touching fingertip, negative Romberg  Extremities: No pedal edema    Labs:    2/20/2018 15:01   Sodium 138   Potassium 4.2   Chloride 105   Carbon Dioxide 27   Urea Nitrogen 15   Creatinine 0.99   GFR Estimate 77   GFR Estimate If Black >90   Calcium 8.7   Anion Gap 5   Albumin 3.7   Protein Total 7.4   Bilirubin Total 0.3   Alkaline Phosphatase 75   ALT 19   AST 15   Glucose 101 (H)   WBC 5.8   Hemoglobin 14.0   Hematocrit 41.7   Platelet Count 194   RBC Count 4.55   MCV 92   MCH 30.8   MCHC 33.6   RDW 11.9   Diff Method Automated Method   % Neutrophils 59.8   % Lymphocytes 30.2   % Monocytes 7.8   % Eosinophils 1.7   % Basophils 0.3   % Immature Granulocytes 0.2   Nucleated RBCs 0   Absolute Neutrophil 3.5   Absolute Lymphocytes 1.7   Absolute Monocytes 0.5   Absolute Eosinophils 0.1   Absolute Basophils 0.0   Abs Immature Granulocytes 0.0   Absolute Nucleated RBC 0.0          Assessment & Plan:     1. Right temporal GBM: S/p gross total resection on 10/3/17 and chemoradiation 11/13-12/22/17. He started adjuvant Temodar and Optune device on 1/22/18. He has tolerated the first cycle well with mild fatigue and minimal bowel changes. His CBC is WNL to increase Temodar to 200 mg/m2 with cycle 2 and beyond. He picked up new Rx for this today. Plan for brain MRI next month prior to Dr. Patti cool. Plan for adjuvant Temodar monthly for likely a year, at least 6 months. He will call with any interval concerns.     2. Looser stools: Mild. This could be secondary to Temodar. Start fiber supplement daily. Continue to hydrate well.     Helena Landin PA-C    [facility]  [address] [place] [id number]  [phone]

## 2018-02-20 NOTE — MR AVS SNAPSHOT
After Visit Summary   2/20/2018    Seth Iglesias    MRN: [id number]           Patient Information     Date Of Birth          1960        Visit Information        Provider Department      2/20/2018 3:30 PM Helena Landin PA-C [facility]        Today's Diagnoses     Glioblastoma multiforme of temporal lobe (H)    -  1       Follow-ups after your visit        Your next 10 appointments already scheduled     Mar 15, 2018  3:30 PM CDT   LAB with [facility] ([facility])    [address] [place] [id number] [phone]           Please do not eat 10-12 hours before your appointment if you are coming in fasting for labs on lipids, cholesterol, or glucose (sugar). This does not apply to pregnant women. Water, hot tea and black coffee (with nothing added) are okay. Do not drink other fluids, diet soda or chew gum.            Mar 15, 2018  4:00 PM CDT   (Arrive by 3:45 PM)   MR BRAIN W/O & W CONTRAST with [facility] [address] ([facility])    [address] [place] [address] [id number] [phone]           Take your medicines as usual, unless your doctor tells you not to. Bring a list of your current medicines to your exam (including vitamins, minerals and over-the-counter drugs).  You may or may not receive intravenous (IV) contrast for this exam pending the discretion of the Radiologist.  You do not need to do anything special to prepare.  The MRI machine uses a strong magnet. Please wear clothes without metal (snaps, zippers). A sweatsuit works well, or we may give you a hospital gown.  Please remove any body piercings and hair extensions before you arrive. You will also remove watches, jewelry, hairpins, wallets, dentures, partial dental plates and hearing aids. You may wear contact lenses, and you may be able to wear your rings. We have a safe place to  keep your personal items, but it is safer to leave them at home.  **IMPORTANT** THE INSTRUCTIONS BELOW ARE ONLY FOR THOSE PATIENTS WHO HAVE BEEN PRESCRIBED SEDATION OR GENERAL ANESTHESIA DURING THEIR MRI PROCEDURE:  IF YOUR DOCTOR PRESCRIBED ORAL SEDATION (take medicine to help you relax during your exam):   You must get the medicine from your doctor (oral medication) before you arrive. Bring the medicine to the exam. Do not take it at home. You ll be told when to take it upon arriving for your exam.   Arrive one hour early. Bring someone who can take you home after the test. Your medicine will make you sleepy. After the exam, you may not drive, take a bus or take a taxi by yourself.  IF YOUR DOCTOR PRESCRIBED IV SEDATION:   Arrive one hour early. Bring someone who can take you home after the test. Your medicine will make you sleepy. After the exam, you may not drive, take a bus or take a taxi by yourself.   No eating 6 hours before your exam. You may have clear liquids up until 4 hours before your exam. (Clear liquids include water, clear tea, black coffee and fruit juice without pulp.)  IF YOUR DOCTOR PRESCRIBED ANESTHESIA (be asleep for your exam):   Arrive 1 1/2 hours early. Bring someone who can take you home after the test. You may not drive, take a bus or take a taxi by yourself.   No eating 8 hours before your exam. You may have clear liquids up until 4 hours before your exam. (Clear liquids include water, clear tea, black coffee and fruit juice without pulp.)   You will spend four to five hours in the recovery room.  Please call the Imaging Department at your exam site with any questions.            Mar 19, 2018  9:15 AM CDT   (Arrive by 9:00 AM)   Return Visit with Augustus Armstrong MD   [facility] ([facility])    [address]  [address]  [facility] [address] [phone]            Apr 06, 2018 11:00 AM CDT   Return Visit with Dominic Louis  MD   Radiation Oncology Clinic ([facility])    [facility]  1st Floor  [address] [address] [phone]            Apr 06, 2018  1:30 PM CDT   (Arrive by 1:15 PM)   Return Visit with Rafael Boyer MD   [facility] ([facility])    [address]  3rd Floor  [facility] [address] [phone]              Who to contact     If you have questions or need follow up information about today's clinic visit or your schedule please contact [facility] directly at [phone].  Normal or non-critical lab and imaging results will be communicated to you by MyChart, letter or phone within 4 business days after the clinic has received the results. If you do not hear from us within 7 days, please contact the clinic through [employer] or phone. If you have a critical or abnormal lab result, we will notify you by phone as soon as possible.  Submit refill requests through [employer] or call your pharmacy and they will forward the refill request to us. Please allow 3 business days for your refill to be completed.          Additional Information About Your Visit        [employer] Information     [employer] gives you secure access to your electronic health record. If you see a primary care provider, you can also send messages to your care team and make appointments. If you have questions, please call your primary care clinic.  If you do not have a primary care provider, please call [phone] and they will assist you.        Care EveryWhere ID     This is your Care EveryWhere ID. This could be used by other organizations to access your [place] medical records  [id number]        Your Vitals Were     Pulse Temperature Respirations Pulse Oximetry BMI (Body Mass Index)       86 97.3  F (36.3  C) (Oral) 16 95% 30.69 kg/m2        Blood Pressure from Last 3 Encounters:   02/20/18 107/50   01/22/18 122/87   01/22/18 124/84     Weight from Last 3 Encounters:   02/20/18 94.3 kg (207 lb 12.8 oz)   01/22/18 94.3 kg (208 lb)   01/22/18 94.3 kg (208 lb)                 Today's Medication Changes          These changes are accurate as of 2/20/18 11:59 PM.  If you have any questions, ask your nurse or doctor.               These medicines have changed or have updated prescriptions.        Dose/Directions    * temozolomide 20 MG capsule CHEMO   Commonly known as:  TEMODAR   This may have changed:  Another medication with the same name was added. Make sure you understand how and when to take each.   Used for:  Glioblastoma multiforme of temporal lobe (H)   Changed by:  Helena Landin PA-C        Dose:  20 mg   Take 1 capsule (20 mg) by mouth daily for 5 days Along with 300mg daily.  Total daily dose = 320mg   Quantity:  5 capsule   Refills:  0       * temozolomide 250 MG capsule CHEMO   Commonly known as:  TEMODAR   This may have changed:  You were already taking a medication with the same name, and this prescription was added. Make sure you understand how and when to take each.   Used for:  Glioblastoma multiforme of temporal lobe (H)   Changed by:  Helena Landin PA-C        Dose:  250 mg   Take 1 capsule (250 mg) by mouth daily for 5 days In addition to 180 mg capsule daily for 5 days   Quantity:  5 capsule   Refills:  0       * Temozolomide 180 MG capsule   Commonly known as:  TEMODAR   This may have changed:  You were already taking a medication with the same name, and this prescription was added. Make sure you understand how and when to take each.   Used for:  Glioblastoma multiforme of temporal lobe (H)   Changed by:  Helena Landin PA-C        Dose:  180 mg   Take 1 capsule (180 mg) by mouth daily in addition to 250 mg capsule for 5 days   Quantity:  5 capsule   Refills:  0       * Notice:  This list has 3 medication(s) that are the same as other medications prescribed for you. Read the directions carefully, and ask  your doctor or other care provider to review them with you.         Where to get your medicines      These medications were sent to [place], [place] - [address]  [address], [facility] [address]    Hours:  TRANSPLANT PHONE NUMBER [phone] Phone:  [phone]     Temozolomide 180 MG capsule    temozolomide 250 MG capsule CHEMO                Primary Care Provider Office Phone # Fax #    R Nigel Salmon -[phone] [address] [address] [facility] [address]        Equal Access to Services     [facility]: Hadii aad ku hadasho Soomaali, waaxda luqadaha, qaybta kaalmada adeegyada, héctor rand . So [facility] [phone].    ATENCIÓN: Si habla español, tiene a granados disposición servicios gratuitos de asistencia lingüística. [facility] [phone].    We comply with applicable federal civil rights laws and [place] laws. We do not discriminate on the basis of race, color, national origin, age, disability, sex, sexual orientation, or gender identity.            Thank you!     Thank you for choosing [facility]  for your care. Our goal is always to provide you with excellent care. Hearing back from our patients is one way we can continue to improve our services. Please take a few minutes to complete the written survey that you may receive in the mail after your visit with us. Thank you!             Your Updated Medication List - Protect others around you: Learn how to safely use, store and throw away your medicines at www.disposemymeds.org.          This list is accurate as of 2/20/18 11:59 PM.  Always use your most recent med list.                   Brand Name Dispense Instructions for use Diagnosis    ENBREL SURECLICK 50 MG/ML autoinjector   Generic drug:  Etanercept           levETIRAcetam 1000 MG Tabs    KEPPRA    180 tablet    Take 1,000 mg by mouth every 12 hours    Brain mass        MULTIVITAMIN & MINERAL PO      Take 1 tablet by mouth daily        pantoprazole 40 MG EC tablet    PROTONIX    30 tablet    Take 1 tablet (40 mg) by mouth every morning (before breakfast)    Brain mass       prochlorperazine 10 MG tablet    COMPAZINE    30 tablet    Take 1 tablet (10 mg) by mouth every 6 hours as needed (Nausea/Vomiting)    Glioblastoma multiforme of temporal lobe (H)       senna-docusate 8.6-50 MG per tablet    SENOKOT-S;PERICOLACE    100 tablet    Take 1-2 tablets by mouth 2 times daily    Brain mass       * temozolomide 20 MG capsule CHEMO    TEMODAR    5 capsule    Take 1 capsule (20 mg) by mouth daily for 5 days Along with 300mg daily.  Total daily dose = 320mg    Glioblastoma multiforme of temporal lobe (H)       * temozolomide 250 MG capsule CHEMO    TEMODAR    5 capsule    Take 1 capsule (250 mg) by mouth daily for 5 days In addition to 180 mg capsule daily for 5 days    Glioblastoma multiforme of temporal lobe (H)       * Temozolomide 180 MG capsule    TEMODAR    5 capsule    Take 1 capsule (180 mg) by mouth daily in addition to 250 mg capsule for 5 days    Glioblastoma multiforme of temporal lobe (H)       * Notice:  This list has 3 medication(s) that are the same as other medications prescribed for you. Read the directions carefully, and ask your doctor or other care provider to review them with you.

## 2018-02-20 NOTE — NURSING NOTE
"Oncology Rooming Note    February 20, 2018 3:11 PM   Seth Iglesias is a 57 year old male who presents for:    Chief Complaint   Patient presents with     Blood Draw     Oncology Clinic Visit     Return for Glioblastoma      Initial Vitals: /50  Pulse 86  Temp 97.3  F (36.3  C) (Oral)  Resp 16  Wt 94.3 kg (207 lb 12.8 oz)  SpO2 95%  BMI 30.69 kg/m2 Estimated body mass index is 30.69 kg/(m^2) as calculated from the following:    Height as of 1/22/18: 1.753 m (5' 9\").    Weight as of this encounter: 94.3 kg (207 lb 12.8 oz). Body surface area is 2.14 meters squared.  No Pain (0) Comment: Data Unavailable   No LMP for male patient.  Allergies reviewed: Yes  Medications reviewed: Yes    Medications: Medication refills not needed today.  Pharmacy name entered into [facility]:    [id number] [address]  [facility] [address]    Clinical concerns: Results  [facility]  was notified.    6 minutes for nursing intake (face to face time)     Evelyn Alvarez MA              "

## 2018-02-23 NOTE — PROGRESS NOTES
Form Request Documentation    Date Received in Clinic:  2/23/18  Name/Type of Form: 2/23/18  Questions that need to be addressed:   Current Employment Status: not currently working,    Amount of Leave Requested: Continuous Leave, no return to work date   Other: None  Date Completed: 2/23/2018  Copy Mailed to patient: Yes on 2/23/2018  Disposition of Form: Fax to [facility] at [phone]

## 2018-02-26 NOTE — TELEPHONE ENCOUNTER
"Oral Chemotherapy Monitoring Program    Primary Oncologist: Dr Augustus Armstrong  Primary Oncology Clinic: [facility]  Cancer Diagnosis: Glioblastoma    Therapy History:  C1D1 = 1/22/18 = 320mg PO daily x 5 days (150mg/m2/dose)  C2D1=2/20/18 = 430mg PO daily x 5 days ( 200mg/m2/dose)    Drug Interaction Assessment: There were no significant drug interactions identified upon review of medication list.    Lab Monitoring Plan: CBC/CMP monthly    Subjective/Objective:  Seth Iglesias is a 57 year old male contacted by phone for a follow-up visit for oral chemotherapy.  Patient states he's doing quite well with the higher dose of Temodar, except some fatigue after the end of the 5 days of treatment.  He denies nausea/vomiting.  He did have some constipation, relived by laxative.     ORAL CHEMOTHERAPY 10/27/2017 12/12/2017 1/30/2018 2/26/2018   Drug Name Temodar (Temozolomide) Temodar (Temozolomide) Temodar (Temozolomide) Temodar (Temozolomide)   Current Dosage 160mg 160mg (No Data) (No Data)   Current Schedule Daily Daily Daily Daily   Cycle Details Continuous for 42 days during XRT Continuous for 42 days during XRT Other (No Data)   Start Date of Last Cycle - 11/12/2017 1/22/2018 2/20/2018   Planned next cycle start date 11/12/2017 - 2/20/2018 3/20/2018   Doses missed in last 2 weeks - 0 0 0   Adherence Assessment - Adherent Adherent Adherent   Adverse Effects - Fatigue Fatigue Fatigue   Fatigue - Grade 1 Grade 1 Grade 1   Pharmacist Intervention(fatigue) - No No No   Home BPs - not needed not needed not needed   Any new drug interactions? No No No No   Is the dose as ordered appropriate for the patient? Yes - Yes Yes     Vitals:  BP:   BP Readings from Last 1 Encounters:   02/20/18 107/50     Wt Readings from Last 1 Encounters:   02/20/18 207 lb 12.8 oz (94.3 kg)     Estimated body surface area is 2.14 meters squared as calculated from the following:    Height as of 1/22/18: 5' 9\" (1.753 m).    Weight as of " 2/20/18: 207 lb 12.8 oz (94.3 kg).    Labs:  _  Result Component Current Result Ref Range   Sodium 138 (2/20/2018) 133 - 144 mmol/L     _  Result Component Current Result Ref Range   Potassium 4.2 (2/20/2018) 3.4 - 5.3 mmol/L     _  Result Component Current Result Ref Range   Calcium 8.7 (2/20/2018) 8.5 - 10.1 mg/dL     No results found for Mag within last 30 days.     No results found for Phos within last 30 days.     _  Result Component Current Result Ref Range   Albumin 3.7 (2/20/2018) 3.4 - 5.0 g/dL     _  Result Component Current Result Ref Range   Urea Nitrogen 15 (2/20/2018) 7 - 30 mg/dL     _  Result Component Current Result Ref Range   Creatinine 0.99 (2/20/2018) 0.66 - 1.25 mg/dL       _  Result Component Current Result Ref Range   AST 15 (2/20/2018) 0 - 45 U/L     _  Result Component Current Result Ref Range   ALT 19 (2/20/2018) 0 - 70 U/L     _  Result Component Current Result Ref Range   Bilirubin Total 0.3 (2/20/2018) 0.2 - 1.3 mg/dL       _  Result Component Current Result Ref Range   WBC 5.8 (2/20/2018) 4.0 - 11.0 10e9/L     _  Result Component Current Result Ref Range   Hemoglobin 14.0 (2/20/2018) 13.3 - 17.7 g/dL     _  Result Component Current Result Ref Range   Platelet Count 194 (2/20/2018) 150 - 450 10e9/L     _  Result Component Current Result Ref Range   Absolute Neutrophil 3.5 (2/20/2018) 1.6 - 8.3 10e9/L           Assessment:  Patient is tolerating Temodar well.    Plan:  No changes at this time.    Follow-Up:  Next cycle due 3/20/18.  He will see Dr Armstrong on 3/19.    Refill Due:  Before cycle due 3/26.    Anyi Hicks, PharmD, BCOP, BCPS  Clinical Pharmacy Specialist/  Oncology Medication Therapy Management Pharmacist  [facility]  Pager [phone]  Phone [phone]

## 2018-03-15 NOTE — DISCHARGE INSTRUCTIONS
MRI Contrast Discharge Instructions    The IV contrast you received today will pass out of your body in your  urine. This will happen in the next 24 hours. You will not feel this process.  Your urine will not change color.    Drink at least 4 extra glasses of water or juice today (unless your doctor  has restricted your fluids). This reduces the stress on your kidneys.  You may take your regular medicines.    If you are on dialysis: It is best to have dialysis today.    If you have a reaction: Most reactions happen right away. If you have  any new symptoms after leaving the hospital (such as hives or swelling),  call your hospital at the correct number below. Or call your family doctor.  If you have breathing distress or wheezing, call 911.    Special instructions: ***    I have read and understand the above information.    Signature:______________________________________ Date:___________    Staff:__________________________________________ Date:___________     Time:__________    [place] [facility] Departments:    ___[place]: [phone]  ___[facility]: [phone]  ___[place]: [phone] ___[facility]: [phone]  ___[facility]: [phone]  ___[facility]: [phone]  ___[phone]  ___[facility]: [phone]  ___[phone]

## 2018-03-19 NOTE — NURSING NOTE
"Oncology Rooming Note    March 19, 2018 9:12 AM   Seth Iglesias is a 57 year old male who presents for:    Chief Complaint   Patient presents with     Oncology Clinic Visit     Return vsiit related to Glioblastoma     Initial Vitals: There were no vitals taken for this visit. Estimated body mass index is 30.69 kg/(m^2) as calculated from the following:    Height as of 1/22/18: 1.753 m (5' 9\").    Weight as of 2/20/18: 94.3 kg (207 lb 12.8 oz). There is no height or weight on file to calculate BSA.  Data Unavailable Comment: Data Unavailable   No LMP for male patient.  Allergies reviewed: Yes  Medications reviewed: Yes    Medications: MEDICATION REFILLS NEEDED TODAY. Provider was notified.  Pharmacy name entered into [facility]:    [id number] - Critical access hospital [address] [facility]  [id number] - Critical access hospital [address] [facility]    Clinical concerns: Refills needed. Provider was notified.    10 minutes for nursing intake (face to face time)     Ana Edmondson LPN            "

## 2018-03-19 NOTE — LETTER
3/19/2018       RE: Seth Iglesias  [address]  [facility] [address]     Dear Colleague,    Thank you for referring your patient, Seth Iglesias, to the [facility]. Please see a copy of my visit note below.    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 MGMT promoter methylation was absent.    Treatment to date: status post gross total resection 10/3/17; initiated concurrent chemoradiation with temozolomide on 11/13/17 and completed 12/22/17. Adjuvant temozolomide and Optune device started on 1/22/18.     Referring physician:  Dr. Harry Wiggins, Neurosurgical Service, [facility]  Dr. Dominic Louis, radiation oncology, [facility]    Oncology History:   Seth presented in September 2017 with dizzy spells, headache, and increasing somnolence. He was admitted on 9/28/17 after imaging in ED found a right temporal lobe mass. He went on to have gross total resection on 10/3/17.   10/27/17 - - neuro-oncology/medical oncology consultation, Dr. Armstrong.  11/12/17 through December 22, 2017 adjuvant treatment with concurrent chemoradiation with temozolomide. 6000 cGy, and 30 fractions, standard 200 cGy per fraction  1/19/18 - - brain MRI: Impression: 1. 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 Recommend attention on follow-up. Postsurgical changes of prior resection of right temporal glioblastoma. Resolution of right frontotemporal subdural hematoma and T2 hyperintensity along the right internal capsule and commisural white matter.    1/22/18 - - oncology/medical oncology follow-up, Dr. Armstrong. Plan: initiate adjuvant five day temozolomide and OPTune device. Cycle 1/day one  of temozolomide, 150 mg/m  on days one through five of each 28 day cycle.    2/20/18 - - oncology follow-up, SCARLETT Hillman. Cycle 2/day one of five day temozolomide. Dose increased to 200 mg/m .    3/15/18 - - brain MRI: Impression: In this patient with glioblastoma multiforme status post gross total resection, chemoradiation and adjuvant Temodar and Optune, there is increased nodular contrast enhancement and T2 hyperintensity at the margins of the right temporal resection cavity. Perfusion characteristics suggestive of tumor recurrence, particularly medially.    3/19/18 - - neuro-oncology/medical oncology follow-up, Dr. Armstrong.    Interim History:  Seth Iglesias is a 57 year old year old man here with his wife for follow-up of GBM of right temporal lobe. He is doing well with no acute complaints. Tolerating temodar with only mild nausea. No fevers or infectious symptoms.   He had a nice trip to [place]. Since then he is wearing the Optune more often. No scalp problems from the leads.     Denies headaches, seizures, weakness, numbness, or balance problems. No vision or hearing issues.       Review Of Systems:  Full 10-point ROS reviewed. Pertinent symptoms reviewed above per HPI.    PAST MEDICAL HISTORY:   1.  Glioblastoma, status post gross total resection on 10/03/2017 of the right temporal lobe preceded and indicated by some seizure-like activity without loss of consciousness.   2.  Chronic back pain.   3.  Ganglionic cysts.   4.  Hyperlipidemia.   5.  He has psoriasis and was taking what sounds like a TNF and an alpha inhibitor up until the summer of 2017.       PAST SURGICAL HISTORY:     1.  The aforementioned right temporal craniotomy for gross total resection of right temporal glioblastoma done 10/03/2017 with Dr. Harry Wiggins at the [facility].   2.  Colonoscopy performed 04/04/2011, unremarkable.       FAMILY HISTORY:  He had a Father and 2 paternal uncles who had prostate cancer.  His  "mother had some sort of \"tumor of the rib,\" and  at age 54 of this unknown cancer.       SOCIAL HISTORY:  The patient lives in [place], [place].  He is accompanied by his wife, who is extremely supportive.  They have 3 sons ages 18, 23 and 25.  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  He has since then been working for [employer], about 27 miles from his home.    Tobacco:  Never smoker.    Allergies:none    Current Medications: reviewed    Physical Exam:  /69 (BP Location: Right arm, Patient Position: Sitting, Cuff Size: Adult Regular)  Pulse 84  Temp 98  F (36.7  C) (Tympanic)  Resp 18  Ht 1.753 m (5' 9.02\")  Wt 93.7 kg (206 lb 9.1 oz)  SpO2 95%  BMI 30.49 kg/m2  General: NAD, alert and interactive  HEENT: PERRL, MMM, no oral lesions  Lungs: CTA bilaterally  Cardiovascular: RRR, no M/R/G, no edema   MSK: normal muscle tone  Skin: no rashes or petechaie  Neuro: A&O, CNs II-XII intact, 5/5 strength all four extremities, normal cerebellar testing       Laboratory/Imaging Studies  CBC RESULTS:   Recent Labs   Lab Test  03/15/18   1522   WBC  5.9   RBC  4.43   HGB  13.7   HCT  41.7   MCV  94   MCH  30.9   MCHC  32.9   RDW  12.1   PLT  211     Recent Labs   Lab Test  03/15/18   1522  18   1501   NA  140  138   POTASSIUM  4.2  4.2   CHLORIDE  107  105   CO2  27  27   ANIONGAP  6  5   GLC  85  101*   BUN  24  15   CR  1.06  0.99   ARA  8.9  8.7     Liver Function Studies -   Recent Labs   Lab Test  03/15/18   1522   PROTTOTAL  7.5   ALBUMIN  3.8   BILITOTAL  0.2   ALKPHOS  81   AST  13   ALT  19       The radiology report and labs were reviewed in detail with the patient, and hard copies were provided for the patient.     RADIOLOGY:  MRI brain 3/15/18  Impression: In this patient with glioblastoma multiforme status post  gross total resection, chemoradiation and adjuvant Temodar and Optune,  there is " increased nodular contrast enhancement and T2 hyperintensity  at the margins of the right temporal resection cavity. Perfusion  characteristics suggestive of tumor recurrence, particularly medially.    ASSESSMENT/PLAN:  Seth Iglesias is a 58 y/o man with right temporal glioblastoma (WHO grade IV). He had gross total resection on 10/3/17 and completed adjuvant chemorads on 12/22/17. His 4 week post-treatment brain MRI showed areas of enhancement around the resection cavity thought to represent radiation necrosis rather than recurrence.     He then started temozolomide maintenance in January 2018, at a dose of 150 mg/m2 for cycle 1. This was increased to 200 mg/m2 for cycle 2. He also started the Optune device in January 2018. The report from [employer] shows a 79% compliance rate. We encouraged him to continue this excellent compliance.     MRI brain now after 2 cycles of adjuvant Temodar shows increased nodular enhancement at the margins of the resection cavity. This could represent pseudo-progression or actual disease progression. Clinically he has no neurologic symptoms to suggest progression. We recommend short term follow-up brain MRI in 3 weeks, after completing cycle 3. He will follow-up with Dr. Armstrong one week after the MRI to review results.       Patient seen and discussed with staff Dr. Patti Chavez MD  Heme/Onc Fellow  Pager: [phone]      MEDICAL ONCOLOGY ATTENDING PHYSICIAN ADDENDUM:  I have seen and evaluated this patient with the medical oncology fellow. I have reviewed and edited this fellow's note, and agree with the assessment and plan stated above. A complete review of systems was assessed, and pertinent positives/negatives are discussed in detail above and as follows.    HISTORY OF PRESENT ILLNESS:  Mr. Iglesias returns today in the company of his wife.  He has completed 2 cycles of 5-day temozolomide.  In addition, he has been doing the Optune device since the initiation of temozolomide.   Overall, he has been doing well.  He took an extended trip to [place] and to the [place].  He was able to swim during those times.  Certainly notes while going through airport security, he removed the Optune device.  The recent report from [employer] is that his compliance rate device was 79% in the interim, which meets the requirement of the minimum of 75% use, with the goal of using it 24/7.  He generally is feeling well.  He has no neurologic sequelae, no new symptoms.  He also specifically denies any seizures, headaches, any new neuropathy-type symptoms or other concerning issues.        PHYSICAL EXAMINATION:  His exam is rather unremarkable today.  I performed a focal cranial nerve exam.  His lung and cardiac exam is also unremarkable.        ASSESSMENT AND PLAN:  I personally reviewed his brain MRI images and printed out a copy of the report to review with the patient and his wife in great detail today.  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  I explained that patients with absence of promoter methylation of MGMT, approximately 40%-45% may still have pseudoprogression.  I cited the study from Nikki, et. al., published in the Journal of Clinical Oncology in 2008.  I think he has no evidence of clinical progression, and in fact he is doing very well.  His performance status is quite excellent.  I would be more inclined to do a short-term followup, as would be standard of care.  For that reason, we will get an MRI just under 4 weeks from now.  I will see him within 4 weeks within a few days of the MRI to review the results with him and reassess at that time.  If it seems that there is further extension  or no resolution of potential pseudoprogression, then I may discuss with our Neurosurgery colleagues as to whether or not a biopsy would be warranted.  The patient is very much amenable to this plan.  I reviewed the plan with Dr. Chavez and reviewed the MRI images with the patient personally and I reviewed the report in depth.  I answered all of their questions to their stated understanding and satisfaction.  They will  the temozolomide today, and he will continue on the Optune as scheduled.     I spent 30 minutes in consultation, including H&P and Discussion. >50% of time was spent in counseling and in coordination of care.    Augustus Armstrong MD, PhD

## 2018-03-19 NOTE — MR AVS SNAPSHOT
After Visit Summary   3/19/2018    Seth Iglesias    MRN: [id number]           Patient Information     Date Of Birth          1960        Visit Information        Provider Department      3/19/2018 9:15 AM Augustus Armstrong MD [facility]        Today's Diagnoses     Glioblastoma multiforme of temporal lobe (H)    -  1       Follow-ups after your visit        Follow-up notes from your care team     Return in about 4 weeks (around 4/16/2018).      Your next 10 appointments already scheduled     Apr 06, 2018 11:00 AM CDT   Return Visit with Dominic Louis MD   Radiation Oncology Clinic ([facility])    [facility]  1st Floor  [address] [address] [phone]            Apr 06, 2018  1:30 PM CDT   (Arrive by 1:15 PM)   Return Visit with Rafael Boyer MD   [facility] ([facility])    [address]  3rd Floor  [facility] [id number] [phone]            Apr 16, 2018  9:30 AM CDT   (Arrive by 9:15 AM)   MR BRAIN W/O & W CONTRAST with [address] MRI ([facility])    [address] [address] [phone]           Take your medicines as usual, unless your doctor tells you not to. Bring a list of your current medicines to your exam (including vitamins, minerals and over-the-counter drugs).  You may or may not receive intravenous (IV) contrast for this exam pending the discretion of the Radiologist.  You do not need to do anything special to prepare.  The MRI machine uses a strong magnet. Please wear clothes without metal (snaps, zippers). A sweatsuit works well, or we may give you a hospital gown.  Please remove any body piercings and hair extensions before you arrive. You will also remove watches, jewelry, hairpins, wallets, dentures, partial dental plates and hearing aids. You may wear contact lenses, and you may be able to  wear your rings. We have a safe place to keep your personal items, but it is safer to leave them at home.  **IMPORTANT** THE INSTRUCTIONS BELOW ARE ONLY FOR THOSE PATIENTS WHO HAVE BEEN PRESCRIBED SEDATION OR GENERAL ANESTHESIA DURING THEIR MRI PROCEDURE:  IF YOUR DOCTOR PRESCRIBED ORAL SEDATION (take medicine to help you relax during your exam):   You must get the medicine from your doctor (oral medication) before you arrive. Bring the medicine to the exam. Do not take it at home. You ll be told when to take it upon arriving for your exam.   Arrive one hour early. Bring someone who can take you home after the test. Your medicine will make you sleepy. After the exam, you may not drive, take a bus or take a taxi by yourself.  IF YOUR DOCTOR PRESCRIBED IV SEDATION:   Arrive one hour early. Bring someone who can take you home after the test. Your medicine will make you sleepy. After the exam, you may not drive, take a bus or take a taxi by yourself.   No eating 6 hours before your exam. You may have clear liquids up until 4 hours before your exam. (Clear liquids include water, clear tea, black coffee and fruit juice without pulp.)  IF YOUR DOCTOR PRESCRIBED ANESTHESIA (be asleep for your exam):   Arrive 1 1/2 hours early. Bring someone who can take you home after the test. You may not drive, take a bus or take a taxi by yourself.   No eating 8 hours before your exam. You may have clear liquids up until 4 hours before your exam. (Clear liquids include water, clear tea, black coffee and fruit juice without pulp.)   You will spend four to five hours in the recovery room.  Please call the Imaging Department at your exam site with any questions.            Apr 23, 2018  9:45 AM CDT   (Arrive by 9:30 AM)   Return Visit with Augustus Armstrong MD   [facility] ([facility] [place])    [address]  [address] [place] [address] [phone]              Future tests that were ordered  "for you today     Open Future Orders        Priority Expected Expires Ordered    *CBC with platelets differential Routine 4/16/2018 3/19/2019 3/19/2018    Comprehensive metabolic panel Routine 4/16/2018 3/19/2019 3/19/2018    MRI Brain w & w/o contrast Routine 4/12/2018 6/17/2018 3/19/2018            Who to contact     If you have questions or need follow up information about today's clinic visit or your schedule please contact [facility] directly at [phone].  Normal or non-critical lab and imaging results will be communicated to you by [employer], letter or phone within 4 business days after the clinic has received the results. If you do not hear from us within 7 days, please contact the clinic through [facility] or phone. If you have a critical or abnormal lab result, we will notify you by phone as soon as possible.  Submit refill requests through [facility] or call your pharmacy and they will forward the refill request to us. Please allow 3 business days for your refill to be completed.          Additional Information About Your Visit        [facility] Information     [facility] gives you secure access to your electronic health record. If you see a primary care provider, you can also send messages to your care team and make appointments. If you have questions, please call your primary care clinic.  If you do not have a primary care provider, please call [phone] and they will assist you.        Care EveryWhere ID     This is your Care EveryWhere ID. This could be used by other organizations to access your [place] medical records  [id number]        Your Vitals Were     Pulse Temperature Respirations Height Pulse Oximetry BMI (Body Mass Index)    84 98  F (36.7  C) (Tympanic) 18 1.753 m (5' 9.02\") 95% 30.49 kg/m2       Blood Pressure from Last 3 Encounters:   03/19/18 104/69   02/20/18 107/50   01/22/18 122/87    Weight from Last 3 Encounters:   03/19/18 93.7 kg (206 lb 9.1 oz)   02/20/18 94.3 kg " (207 lb 12.8 oz)   01/22/18 94.3 kg (208 lb)               Primary Care Provider Office Phone # Fax #    R Nigel Salmon [phone] [address] [facility] [address]        Equal Access to Services     SHEJANEY OLGA : Hadii aad ku hadalinao Soomaali, waaxda luqadaha, qaybta kaalmada adeegyada, waxkylah fayin behzadn adecamelia dwyer laabdielmariluz kerns. So [facility] [phone].    ATENCIÓN: Si habla español, tiene a granados disposición servicios gratuitos de asistencia lingüística. Llame al [phone].    We comply with applicable federal civil rights laws and [place] laws. We do not discriminate on the basis of race, color, national origin, age, disability, sex, sexual orientation, or gender identity.            Thank you!     Thank you for choosing [facility]  for your care. Our goal is always to provide you with excellent care. Hearing back from our patients is one way we can continue to improve our services. Please take a few minutes to complete the written survey that you may receive in the mail after your visit with us. Thank you!             Your Updated Medication List - Protect others around you: Learn how to safely use, store and throw away your medicines at www.disposemymeds.org.          This list is accurate as of 3/19/18 10:45 AM.  Always use your most recent med list.                   Brand Name Dispense Instructions for use Diagnosis    ENBREL SURECLICK 50 MG/ML autoinjector   Generic drug:  Etanercept           levETIRAcetam 1000 MG Tabs    KEPPRA    180 tablet    Take 1,000 mg by mouth every 12 hours    Brain mass       MULTIVITAMIN & MINERAL PO      Take 1 tablet by mouth daily        pantoprazole 40 MG EC tablet    PROTONIX    30 tablet    Take 1 tablet (40 mg) by mouth every morning (before breakfast)    Brain mass       prochlorperazine 10 MG tablet    COMPAZINE    30 tablet    Take 1 tablet (10 mg) by mouth every 6 hours as needed (Nausea/Vomiting)    Glioblastoma  multiforme of temporal lobe (H)       senna-docusate 8.6-50 MG per tablet    SENOKOT-S;PERICOLACE    100 tablet    Take 1-2 tablets by mouth 2 times daily    Brain mass       * temozolomide 250 MG capsule CHEMO    TEMODAR    5 capsule    Take 1 capsule (250 mg) by mouth daily for 5 days In addition to 180 mg capsule daily for 5 days    Glioblastoma multiforme of temporal lobe (H)       * Temozolomide 180 MG capsule    TEMODAR    5 capsule    Take 1 capsule (180 mg) by mouth daily in addition to 250 mg capsule for 5 days    Glioblastoma multiforme of temporal lobe (H)       * Notice:  This list has 2 medication(s) that are the same as other medications prescribed for you. Read the directions carefully, and ask your doctor or other care provider to review them with you.

## 2018-03-19 NOTE — PROGRESS NOTES
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 MGMT promoter methylation was absent.    Treatment to date: status post gross total resection 10/3/17; initiated concurrent chemoradiation with temozolomide on 11/13/17 and completed 12/22/17. Adjuvant temozolomide and Optune device started on 1/22/18.     Referring physician:  Dr. Harry Wiggins, Neurosurgical Service, [facility]  Dr. Dominic Louis, radiation oncology, [facility]    Oncology History:   Seth presented in September 2017 with dizzy spells, headache, and increasing somnolence. He was admitted on 9/28/17 after imaging in ED found a right temporal lobe mass. He went on to have gross total resection on 10/3/17.   10/27/17 - - neuro-oncology/medical oncology consultation, Dr. Armstrong.  11/12/17 through December 22, 2017 adjuvant treatment with concurrent chemoradiation with temozolomide. 6000 cGy, and 30 fractions, standard 200 cGy per fraction  1/19/18 - - brain MRI: Impression: 1. 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 Recommend attention on follow-up. Postsurgical changes of prior resection of right temporal glioblastoma. Resolution of right frontotemporal subdural hematoma and T2 hyperintensity along the right internal capsule and commisural white matter.    1/22/18 - - oncology/medical oncology follow-up, Dr. Armstrong. Plan: initiate adjuvant five day temozolomide and OPTune device. Cycle 1/day one of temozolomide, 150 mg/m  on days one through five of each 28 day cycle.    2/20/18 - - oncology follow-up, SCARLETT Hillman. Cycle 2/day one of five day temozolomide. Dose increased to 200 mg/m .    3/15/18 - - brain MRI: Impression: In this  "patient with glioblastoma multiforme status post gross total resection, chemoradiation and adjuvant Temodar and Optune, there is increased nodular contrast enhancement and T2 hyperintensity at the margins of the right temporal resection cavity. Perfusion characteristics suggestive of tumor recurrence, particularly medially.    3/19/18 - - neuro-oncology/medical oncology follow-up, Dr. Armstrong.    Interim History:  Seth Iglesias is a 57 year old year old man here with his wife for follow-up of GBM of right temporal lobe. He is doing well with no acute complaints. Tolerating temodar with only mild nausea. No fevers or infectious symptoms.   He had a nice trip to [place]. Since then he is wearing the Optune more often. No scalp problems from the leads.     Denies headaches, seizures, weakness, numbness, or balance problems. No vision or hearing issues.       Review Of Systems:  Full 10-point ROS reviewed. Pertinent symptoms reviewed above per HPI.    PAST MEDICAL HISTORY:   1.  Glioblastoma, status post gross total resection on 10/03/2017 of the right temporal lobe preceded and indicated by some seizure-like activity without loss of consciousness.   2.  Chronic back pain.   3.  Ganglionic cysts.   4.  Hyperlipidemia.   5.  He has psoriasis and was taking what sounds like a TNF and an alpha inhibitor up until the summer of .       PAST SURGICAL HISTORY:     1.  The aforementioned right temporal craniotomy for gross total resection of right temporal glioblastoma done 10/03/2017 with Dr. Harry Wiggins at the [facility].   2.  Colonoscopy performed 2011, unremarkable.       FAMILY HISTORY:  He had a Father and 2 paternal uncles who had prostate cancer.  His mother had some sort of \"tumor of the rib,\" and  at age 54 of this unknown cancer.       SOCIAL HISTORY:  The patient lives in [place], [place].  He is accompanied by his wife, who is extremely supportive.  They have 3 sons ages 18, 23 " "and 25.  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  He has since then been working for [employer], about 27 miles from his home.    Tobacco:  Never smoker.    Allergies:none    Current Medications: reviewed    Physical Exam:  /69 (BP Location: Right arm, Patient Position: Sitting, Cuff Size: Adult Regular)  Pulse 84  Temp 98  F (36.7  C) (Tympanic)  Resp 18  Ht 1.753 m (5' 9.02\")  Wt 93.7 kg (206 lb 9.1 oz)  SpO2 95%  BMI 30.49 kg/m2  General: NAD, alert and interactive  HEENT: PERRL, MMM, no oral lesions  Lungs: CTA bilaterally  Cardiovascular: RRR, no M/R/G, no edema   MSK: normal muscle tone  Skin: no rashes or petechaie  Neuro: A&O, CNs II-XII intact, 5/5 strength all four extremities, normal cerebellar testing       Laboratory/Imaging Studies  CBC RESULTS:   Recent Labs   Lab Test  03/15/18   1522   WBC  5.9   RBC  4.43   HGB  13.7   HCT  41.7   MCV  94   MCH  30.9   MCHC  32.9   RDW  12.1   PLT  211     Recent Labs   Lab Test  03/15/18   1522  02/20/18   1501   NA  140  138   POTASSIUM  4.2  4.2   CHLORIDE  107  105   CO2  27  27   ANIONGAP  6  5   GLC  85  101*   BUN  24  15   CR  1.06  0.99   ARA  8.9  8.7     Liver Function Studies -   Recent Labs   Lab Test  03/15/18   1522   PROTTOTAL  7.5   ALBUMIN  3.8   BILITOTAL  0.2   ALKPHOS  81   AST  13   ALT  19       The radiology report and labs were reviewed in detail with the patient, and hard copies were provided for the patient.     RADIOLOGY:  MRI brain 3/15/18  Impression: In this patient with glioblastoma multiforme status post  gross total resection, chemoradiation and adjuvant Temodar and Optune,  there is increased nodular contrast enhancement and T2 hyperintensity  at the margins of the right temporal resection cavity. Perfusion  characteristics suggestive of tumor recurrence, particularly medially.    ASSESSMENT/PLAN:  Seth Iglesias is a 57 " y/o man with right temporal glioblastoma (WHO grade IV). He had gross total resection on 10/3/17 and completed adjuvant chemorads on 12/22/17. His 4 week post-treatment brain MRI showed areas of enhancement around the resection cavity thought to represent radiation necrosis rather than recurrence.     He then started temozolomide maintenance in January 2018, at a dose of 150 mg/m2 for cycle 1. This was increased to 200 mg/m2 for cycle 2. He also started the Optune device in January 2018. The report from [facility] shows a 79% compliance rate. We encouraged him to continue this excellent compliance.     MRI brain now after 2 cycles of adjuvant Temodar shows increased nodular enhancement at the margins of the resection cavity. This could represent pseudo-progression or actual disease progression. Clinically he has no neurologic symptoms to suggest progression. We recommend short term follow-up brain MRI in 3 weeks, after completing cycle 3. He will follow-up with Dr. Armstrong one week after the MRI to review results.       Patient seen and discussed with staff Dr. Patti Chavez MD  Heme/Onc Fellow  Pager: [phone]      MEDICAL ONCOLOGY ATTENDING PHYSICIAN ADDENDUM:  I have seen and evaluated this patient with the medical oncology fellow. I have reviewed and edited this fellow's note, and agree with the assessment and plan stated above. A complete review of systems was assessed, and pertinent positives/negatives are discussed in detail above and as follows.    HISTORY OF PRESENT ILLNESS:  Mr. Iglesias returns today in the company of his wife.  He has completed 2 cycles of 5-day temozolomide.  In addition, he has been doing the Optune device since the initiation of temozolomide.  Overall, he has been doing well.  He took an extended trip to [place] and to the [place].  He was able to swim during those times.  Certainly notes while going through [employer], he removed the Optune device.  The recent report  from [employer] is that his compliance rate device was 79% in the interim, which meets the requirement of the minimum of 75% use, with the goal of using it 24/7.  He generally is feeling well.  He has no neurologic sequelae, no new symptoms.  He also specifically denies any seizures, headaches, any new neuropathy-type symptoms or other concerning issues.        PHYSICAL EXAMINATION:  His exam is rather unremarkable today.  I performed a focal cranial nerve exam.  His lung and cardiac exam is also unremarkable.        ASSESSMENT AND PLAN:  I personally reviewed his brain MRI images and printed out a copy of the report to review with the patient and his wife in great detail today.  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  I explained that patients with absence of promoter methylation of MGMT, approximately 40%-45% may still have pseudoprogression.  I cited the study from Nikki et. al., published in the Journal of Clinical Oncology in 2008.  I think he has no evidence of clinical progression, and in fact he is doing very well.  His performance status is quite excellent.  I would be more inclined to do a short-term followup, as would be standard of care.  For that reason, we will get an MRI just under 4 weeks from now.  I will see him within 4 weeks within a few days of the MRI to review the results with him and reassess at that time.  If it seems that there is further extension or no resolution of potential pseudoprogression, then I may discuss with our Neurosurgery colleagues as to whether or not a biopsy would be warranted.  The patient is very much amenable to this plan.  I reviewed the plan with Dr. Chavez and  reviewed the MRI images with the patient personally and I reviewed the report in depth.  I answered all of their questions to their stated understanding and satisfaction.  They will  the temozolomide today, and he will continue on the Optune as scheduled.           I spent 30 minutes in consultation, including H&P and Discussion. >50% of time was spent in counseling and in coordination of care.    Augustus Armstrong MD, PhD

## 2018-04-04 NOTE — TELEPHONE ENCOUNTER
Oral Chemotherapy Monitoring Program    Placed a call to Candelario moise in follow-up of Temodar therapy - specifically for a general adherence and side effect assessment.   No answer. Left message asking patient to call back at [phone] to discuss how therapy is going.   No patient or medication names were mentioned in this message.    Amina Ko  Pharmacy Intern   [facility]  Oral Chemotherapy Monitoring Program  [phone]

## 2018-04-06 PROBLEM — G93.89 BRAIN MASS: Status: RESOLVED | Noted: 2017-09-28 | Resolved: 2018-01-01

## 2018-04-06 NOTE — NURSING NOTE
"Chief Complaint   Patient presents with     Patient Request     prostate check and lab work for PSA - up a lot at night going to the bathroom      Fatigue       Initial /70  Pulse 80  Temp 97.7  F (36.5  C) (Tympanic)  Resp 20  Ht 5' 9\" (1.753 m)  Wt 206 lb 9.6 oz (93.7 kg)  BMI 30.51 kg/m2 Estimated body mass index is 30.51 kg/(m^2) as calculated from the following:    Height as of this encounter: 5' 9\" (1.753 m).    Weight as of this encounter: 206 lb 9.6 oz (93.7 kg).  Medication Reconciliation: complete     Eulalia Reyes, CMA      "

## 2018-04-06 NOTE — MR AVS SNAPSHOT
After Visit Summary   4/6/2018    Seth Iglesias    MRN: [id number]           Patient Information     Date Of Birth          1960        Visit Information        Provider Department      4/6/2018 9:00 AM JUSTYN Salmon MD [facility]        Today's Diagnoses     Benign prostatic hyperplasia with urinary frequency    -  1    Urinary frequency        Glioblastoma multiforme of temporal lobe (H)           Follow-ups after your visit        Your next 10 appointments already scheduled     Apr 06, 2018  1:30 PM CDT   (Arrive by 1:15 PM)   Return Visit with Rafael Boyer MD   [facility] ([facility])    [address]  3rd Floor  [place] [id number] [phone]            Apr 23, 2018  9:45 AM CDT   (Arrive by 9:30 AM)   Return Visit with Augustus Armstrong MD   [facility] ([facility])    [address]  [address]  [place] [id number] [phone]              Who to contact     If you have questions or need follow up information about today's clinic visit or your schedule please contact [facility] directly at [phone].  Normal or non-critical lab and imaging results will be communicated to you by MyChart, letter or phone within 4 business days after the clinic has received the results. If you do not hear from us within 7 days, please contact the clinic through MyChart or phone. If you have a critical or abnormal lab result, we will notify you by phone as soon as possible.  Submit refill requests through [employer] or call your pharmacy and they will forward the refill request to us. Please allow 3 business days for your refill to be completed.          Additional Information About Your Visit        MyChart Information     [employer] gives you secure access to your electronic health record. If you see a primary care provider, you can also send messages to your care team  "and make appointments. If you have questions, please call your primary care clinic.  If you do not have a primary care provider, please call [phone] and they will assist you.        Care EveryWhere ID     This is your Care EveryWhere ID. This could be used by other organizations to access your [facility] medical records  [id number]        Your Vitals Were     Pulse Temperature Respirations Height BMI (Body Mass Index)       80 97.7  F (36.5  C) (Tympanic) 20 5' 9\" (1.753 m) 30.51 kg/m2        Blood Pressure from Last 3 Encounters:   04/06/18 106/76   04/06/18 107/70   03/19/18 104/69    Weight from Last 3 Encounters:   04/06/18 208 lb (94.3 kg)   04/06/18 206 lb 9.6 oz (93.7 kg)   03/19/18 206 lb 9.1 oz (93.7 kg)              We Performed the Following     PSA, screen          Today's Medication Changes          These changes are accurate as of 4/6/18  1:05 PM.  If you have any questions, ask your nurse or doctor.               Start taking these medicines.        Dose/Directions    tamsulosin 0.4 MG capsule   Commonly known as:  FLOMAX   Used for:  Urinary frequency   Started by:  JUSTYN Salmon MD        Dose:  0.4 mg   Take 1 capsule (0.4 mg) by mouth At Bedtime   Quantity:  30 capsule   Refills:  11            Where to get your medicines      Some of these will need a paper prescription and others can be bought over the counter.  Ask your nurse if you have questions.     Bring a paper prescription for each of these medications     tamsulosin 0.4 MG capsule                Primary Care Provider Office Phone # Fax #    JUSTYN Slamon [phone] [address] [address] [address]        Equal Access to Services     [facility] AH: Hadii vero Carrasco, waaxda luqadaha, qaybta kaalmada adeegyada, héctor kerns. So [facility] [phone].    ATENCIÓN: Si habla español, tiene a granados disposición servicios gratuitos de asistencia lingüística. Llame al " [phone].    We comply with applicable federal civil rights laws and [place] laws. We do not discriminate on the basis of race, color, national origin, age, disability, sex, sexual orientation, or gender identity.            Thank you!     Thank you for choosing [facility]  for your care. Our goal is always to provide you with excellent care. Hearing back from our patients is one way we can continue to improve our services. Please take a few minutes to complete the written survey that you may receive in the mail after your visit with us. Thank you!             Your Updated Medication List - Protect others around you: Learn how to safely use, store and throw away your medicines at www.disposemymeds.org.          This list is accurate as of 4/6/18  1:05 PM.  Always use your most recent med list.                   Brand Name Dispense Instructions for use Diagnosis    ENBREL SURECLICK 50 MG/ML autoinjector   Generic drug:  Etanercept           levETIRAcetam 1000 MG Tabs    KEPPRA    180 tablet    Take 1,000 mg by mouth every 12 hours    Brain mass       MULTIVITAMIN & MINERAL PO      Take 1 tablet by mouth daily        prochlorperazine 10 MG tablet    COMPAZINE    30 tablet    Take 1 tablet (10 mg) by mouth every 6 hours as needed (Nausea/Vomiting)    Glioblastoma multiforme of temporal lobe (H)       senna-docusate 8.6-50 MG per tablet    SENOKOT-S;PERICOLACE    100 tablet    Take 1-2 tablets by mouth 2 times daily    Brain mass       tamsulosin 0.4 MG capsule    FLOMAX    30 capsule    Take 1 capsule (0.4 mg) by mouth At Bedtime    Urinary frequency       Temozolomide 180 MG capsule    TEMODAR    5 capsule    Take 1 capsule (180 mg) by mouth daily in addition to 250 mg capsule for 5 days    Glioblastoma multiforme of temporal lobe (H)

## 2018-04-06 NOTE — Clinical Note
2018       RE: Seth Iglesias  [address]  [facility] [facility] [address]     Dear Colleague,    Thank you for referring your patient, Seth Iglesias, to the RADIATION ONCOLOGY CLINIC. Please see a copy of my visit note below.    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 He underwent a gross total resection followed by adjuvant chemoradiotherapy with concurrent Temodar as described above. He completed adjuvant therapy without significant treatment-related toxicities with relatively mild grade 2 fatigue and grade 1 dermatitis. He returns to radiation oncology clinic today for a routine post-treatment follow-up visit, 4 months after treatment completion        On exam today, Mr. Iglesias describes continued mild fatigue as well as relatively mild vertigo which initially began at the completion of radiotherapy concurrent with an upper respiratory tract infection. He otherwise denies any headaches, vision changes, focal motor or sensory deficits, nausea/vomiting or gait instability. His remaining ROS are unremarkable.    PHYSICAL EXAM:  /76  Pulse 76  Wt 94.3 kg (208 lb)  BMI 30.72 kg/m2  Gen: Alert, in NAD  Eyes: PERRL, EOMI, sclera anicteric, no nystagmus   HENT     Head: Moderate alopecia over the right lateral temporal region with resolving radiation-induced dermatitis     Ears:  No external auricular lesions     Nose/sinus: No rhinorrhea or epistaxis     Oral Cavity/Oropharynx: MMM, palate elevates symmetrically, tongue protrudes in the midline  Pulm: No wheezing, stridor or respiratory distress  CV: Well-perfused, no cyanosis, no pedal edema  Musculoskeletal: Normal bulk and tone   Skin: Normal color and turgor  Neurologic: A/Ox3, CN II-XII intact, 5/5 motor strength in bilateral upper/lower extremities, normal finger-nose-finger, normal gait    LABS AND IMAGIN2018 MRI brain:  Increased enhancement along the posterior margin of the resection cavity with minimal enhancement of relative cerebral blood flow in this region likely corresponding to treatment effect/radiation necrosis versus tumor recurrence.    IMPRESSION:   Mr. Iglesias is a 57 year old male with a right frontotemporal IDH wild-type, non-MGMT methylated WHO grade IV glioblastoma status post gross total resection and adjuvant chemoradiotherapy. He is currently 1 month status post completion of radiotherapy with his restaging MRI demonstrating likely treatment effect.    PLAN:   1. Follow-up with Dr. Armstrong as scheduled for initiation of maintenance Temodar therapy and consideration of initiation of tumor treatment fields (Optune device)  2. Follow-up with neurosurgery (Dr. Wiggins) as scheduled  3. Follow-up  in radiation oncology clinic in 6 months in coordination with other appointments    Dominic Louis MD/PhD    Department of Radiation Oncology  [facility]       Again, thank you for allowing me to participate in the care of your patient.      Sincerely,    Dominic Louis MD

## 2018-04-06 NOTE — PROGRESS NOTES
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 He underwent a gross total resection followed by adjuvant chemoradiotherapy with concurrent Temodar as described above. He completed adjuvant therapy without significant treatment-related toxicities with relatively mild grade 2 fatigue and grade 1 dermatitis. He returns to radiation oncology clinic today for a routine post-treatment follow-up visit, 4 months after treatment completion.    Since he was last seen for follow up, Mr. Iglesias had a surveillance MRI of the brain completed on 3/15/2018. This showed interval increase in nodular contrast enhancement and T2 hyperintensity at the margins of the right temporal resection cavity. This was felt to be concerning for disease progression, though pseudoprogression could not be ruled out.  His case was discussed at interdisciplinary neuro-oncology tumor conference and it was recommended that he have close follow up with a MRI in one month.     On exam today, Mr. Iglesias reports feeling very well. He continues to use the Optune device. He reports that his energy has improved. He is no longer having any issues with vertigo. He denies any headaches, nausea, vomiting, or focal neurologic deficits. He otherwise has no additional concerns or complaints and remainder of ROS is negative.      PHYSICAL EXAM:  /76   Pulse 76  Wt 94.3 kg (208 lb)  BMI 30.72 kg/m2  Gen: Alert, in NAD  Eyes: PERRL, EOMI, sclera anicteric, no nystagmus   HENT     Head: Optune device in place. No erythema or skin breakdown around the electrode sites.     Nose/sinus: No rhinorrhea or epistaxis     Oral Cavity/Oropharynx: MMM, palate elevates symmetrically, tongue protrudes in the midline  Pulm: No wheezing, stridor or respiratory distress  CV: Well-perfused, no cyanosis, no pedal edema  Musculoskeletal: Normal bulk and tone   Skin: Normal color and turgor  Neurologic: A/Ox3, CN II-XII intact, 5/5 motor strength in bilateral upper/lower extremities, normal finger-nose-finger, normal gait    LABS AND IMAGING:  3/15/2018 MRI brain:  Increased nodular contrast enhancement and T2 hyperintensity at the margins of the right temporal resection cavity. Perfusion characteristics suggestive of tumor recurrence, particularly medially.    IMPRESSION:   Mr. Iglesias is a 57 year old male with a right frontotemporal IDH wild-type, non-MGMT methylated WHO grade IV glioblastoma status post gross total resection and adjuvant chemoradiotherapy. He is currently 4 months out from completion of radiotherapy. A surveillance MRI demonstrates findings consistent with disease progression vs pseudoprogression    PLAN:   1. Repeat MRI to be completed on 4/19/2018. He will then follow up with Dr. Armstrong to determine next steps   2. Continue tumor treating fields (Optune) therapy  3. Follow up in radiation oncology clinic pending results from upcoming MRI study     Tristian Augustine MD  Resident, Radiation Oncology        Attending addendum:   I saw and examined the patient with the resident and agree with the documented plan of care.    Dominic Louis MD/PhD  Dept of Radiation Oncology  [facility]

## 2018-04-06 NOTE — MR AVS SNAPSHOT
After Visit Summary   4/6/2018    Seth Iglesias    MRN: [id number]           Patient Information     Date Of Birth          1960        Visit Information        Provider Department      4/6/2018 1:30 PM Rafael Boyer MD [facility] Neurology        Today's Diagnoses     Partial symptomatic epilepsy with complex partial seizures, not intractable, without status epilepticus (H)    -  1       Follow-ups after your visit        Follow-up notes from your care team     Return if symptoms worsen or fail to improve.      Your next 10 appointments already scheduled     Apr 19, 2018 11:00 AM CDT   (Arrive by 10:45 AM)   MR BRAIN W/O & W CONTRAST with UUMR2   [facility] [place] ([facility])    [address] [facility] [address] [phone]           Take your medicines as usual, unless your doctor tells you not to. Bring a list of your current medicines to your exam (including vitamins, minerals and over-the-counter drugs).  You may or may not receive intravenous (IV) contrast for this exam pending the discretion of the Radiologist.  You do not need to do anything special to prepare.  The MRI machine uses a strong magnet. Please wear clothes without metal (snaps, zippers). A sweatsuit works well, or we may give you a hospital gown.  Please remove any body piercings and hair extensions before you arrive. You will also remove watches, jewelry, hairpins, wallets, dentures, partial dental plates and hearing aids. You may wear contact lenses, and you may be able to wear your rings. We have a safe place to keep your personal items, but it is safer to leave them at home.  **IMPORTANT** THE INSTRUCTIONS BELOW ARE ONLY FOR THOSE PATIENTS WHO HAVE BEEN PRESCRIBED SEDATION OR GENERAL ANESTHESIA DURING THEIR MRI PROCEDURE:  IF YOUR DOCTOR PRESCRIBED ORAL SEDATION (take medicine to help you relax during your exam):   You must get the medicine from  your doctor (oral medication) before you arrive. Bring the medicine to the exam. Do not take it at home. You ll be told when to take it upon arriving for your exam.   Arrive one hour early. Bring someone who can take you home after the test. Your medicine will make you sleepy. After the exam, you may not drive, take a bus or take a taxi by yourself.  IF YOUR DOCTOR PRESCRIBED IV SEDATION:   Arrive one hour early. Bring someone who can take you home after the test. Your medicine will make you sleepy. After the exam, you may not drive, take a bus or take a taxi by yourself.   No eating 6 hours before your exam. You may have clear liquids up until 4 hours before your exam. (Clear liquids include water, clear tea, black coffee and fruit juice without pulp.)  IF YOUR DOCTOR PRESCRIBED ANESTHESIA (be asleep for your exam):   Arrive 1 1/2 hours early. Bring someone who can take you home after the test. You may not drive, take a bus or take a taxi by yourself.   No eating 8 hours before your exam. You may have clear liquids up until 4 hours before your exam. (Clear liquids include water, clear tea, black coffee and fruit juice without pulp.)   You will spend four to five hours in the recovery room.  Please call the Imaging Department at your exam site with any questions.            Apr 23, 2018  9:45 AM CDT   (Arrive by 9:30 AM)   Return Visit with Augustus Armstrong MD   [facility] ([facility])    [address] [place] [address]  [address]  [facility] [address] [phone]              Who to contact     Please call your clinic at [phone] to:    Ask questions about your health    Make or cancel appointments    Discuss your medicines    Learn about your test results    Speak to your doctor            Additional Information About Your Visit        [employer] Information     [employer] gives you secure access to your electronic health record. If you see a primary care provider, you can also  "send messages to your care team and make appointments. If you have questions, please call your primary care clinic.  If you do not have a primary care provider, please call [phone] and they will assist you.      [employer] is an electronic gateway that provides easy, online access to your medical records. With [employer], you can request a clinic appointment, read your test results, renew a prescription or communicate with your care team.     To access your existing account, please contact your [facility] or call [phone] for assistance.        Care EveryWhere ID     This is your Care EveryWhere ID. This could be used by other organizations to access your [facility] medical records  [id number]        Your Vitals Were     Pulse Height BMI (Body Mass Index)             76 1.753 m (5' 9\") 30.72 kg/m2          Blood Pressure from Last 3 Encounters:   04/06/18 106/76   04/06/18 106/76   04/06/18 107/70    Weight from Last 3 Encounters:   04/06/18 94.3 kg (208 lb)   04/06/18 94.3 kg (208 lb)   04/06/18 93.7 kg (206 lb 9.6 oz)              We Performed the Following     Keppra (Levetiracetam) Level          Today's Medication Changes          These changes are accurate as of 4/6/18 11:59 PM.  If you have any questions, ask your nurse or doctor.               Start taking these medicines.        Dose/Directions    tamsulosin 0.4 MG capsule   Commonly known as:  FLOMAX   Used for:  Urinary frequency   Started by:  JUSTYN Salmon MD        Dose:  0.4 mg   Take 1 capsule (0.4 mg) by mouth At Bedtime   Quantity:  30 capsule   Refills:  11            Where to get your medicines      Some of these will need a paper prescription and others can be bought over the counter.  Ask your nurse if you have questions.     Bring a paper prescription for each of these medications     tamsulosin 0.4 MG capsule                Primary Care Provider Office Phone # Fax #    JUSTYN Salmon [phone] " [phone]       [address] [facility] [address]        Equal Access to Services     SHEJANEY OLGA : Hadii vero qureshi hadabdi Soneha, waaxda luqadaha, qaybta kaalmada adedeysi, héctor fayin hayaamariluz burnscamelia dwyer laJessicaeagle kerns. So [facility] [phone].    ATENCIÓN: Si habla español, tiene a granados disposición servicios gratuitos de asistencia lingüística. Armandame al [phone].    We comply with applicable federal civil rights laws and [place] laws. We do not discriminate on the basis of race, color, national origin, age, disability, sex, sexual orientation, or gender identity.            Thank you!     Thank you for choosing [facility]  for your care. Our goal is always to provide you with excellent care. Hearing back from our patients is one way we can continue to improve our services. Please take a few minutes to complete the written survey that you may receive in the mail after your visit with us. Thank you!             Your Updated Medication List - Protect others around you: Learn how to safely use, store and throw away your medicines at www.disposemymeds.org.          This list is accurate as of 4/6/18 11:59 PM.  Always use your most recent med list.                   Brand Name Dispense Instructions for use Diagnosis    ENBREL SURECLICK 50 MG/ML autoinjector   Generic drug:  Etanercept           levETIRAcetam 1000 MG Tabs    KEPPRA    180 tablet    Take 1,000 mg by mouth every 12 hours    Brain mass       MULTIVITAMIN & MINERAL PO      Take 1 tablet by mouth daily        prochlorperazine 10 MG tablet    COMPAZINE    30 tablet    Take 1 tablet (10 mg) by mouth every 6 hours as needed (Nausea/Vomiting)    Glioblastoma multiforme of temporal lobe (H)       senna-docusate 8.6-50 MG per tablet    SENOKOT-S;PERICOLACE    100 tablet    Take 1-2 tablets by mouth 2 times daily    Brain mass       tamsulosin 0.4 MG capsule    FLOMAX    30 capsule    Take 1 capsule (0.4 mg) by mouth At Bedtime    Urinary  frequency       Temozolomide 180 MG capsule    TEMODAR    5 capsule    Take 1 capsule (180 mg) by mouth daily in addition to 250 mg capsule for 5 days    Glioblastoma multiforme of temporal lobe (H)

## 2018-04-06 NOTE — PROGRESS NOTES
Service Date: 2018      MEGAN Salmon MD   [facility]    [address] [place] [address]      RE: Seth Iglesias   MRN: [id number]   : 1960      Dear Dr. Salmon:      Mr. Iglesias is a 57-year-old and came to the Neurology Clinic to follow up on his anti-seizure medication.  I had seen him at the end of last year and he was maintained on levetiracetam and he had a history of seizure and he had a glioblastoma multiforme removed from the right temporal lobe.  He is on 1000 mg twice a day.  He is accompanied by his [place] wife.  They report no seizures or auras.  He has been taking medication regularly.  In his last visit the concentration of levetiracetam was good at 31 and this was in December.  He has been maintained on the same dose.      He did not have an EEG done.      He comes in to visit today and he has multiple electrodes on his shaved head called [employer] for delivering current magnetic to the tumor.  This cost $21,000 a month and it has been okay with the insurance for a year.  He does feel anything except that sometimes they heat up.  He has had them for about a month and he has them night and day.      PHYSICAL EXAMINATION:     GENERAL:   He is a very pleasant man.  His wife also.     VITAL SIGNS:   Blood pressure 106/76.  Pulse 76.     The electrodes look fine.  He is on Temodar as well as just started on Flomax.  They see no interaction with levetiracetam.      I encouraged him to continue on the Optune.  I have not seen any reports that it exacerbates seizures because of its magnetic nature.  We will double check on that and see what the risks of seizure with Optune are.  He has not had any problems with it.  I will see him as needed in the future and check his concentration today.      Sincerely,       Rafael Boyer MD       cc:   Martha Esposito PA-C   [facility]    [address] [place] [address]         RAFAEL BOYER MD              D: 2018   T: 2018   MT: AKA      Name:     MALINA SMITH   MRN:      [id number]        Account:      [id number]   :      1960           Service Date: 2018      Document: [id number]

## 2018-04-06 NOTE — NURSING NOTE
FOLLOW-UP VISIT    Patient Name: Seth Iglesias      : 1960     Age: 57 year old        ______________________________________________________________________________     No chief complaint on file.    /76  Pulse 76  Wt 94.3 kg (208 lb)  BMI 30.72 kg/m2   Left temporal lobe 6000 cGy completed on 17    Pain  Denies    Labs  Other Labs: No    Imaging  None      Other Appointments:     MD Name:  Appointment Date:    MD Name: Appointment Date:   MD Name: Appointment Date:   Other Appointment Notes:     Residual Radiation side effect: no headaches, balance good,     Additional Instructions:     Nurse face-to-face time: Level 2:  5 min face to face time

## 2018-04-06 NOTE — PROGRESS NOTES
Seth,  The PSA is just slightly elevated.  This is consistent with the benign enlargement that I detected on the exam.  If there was prostate cancer level is generally much higher.  We should check this again in a year however      Nigel Salmon MD

## 2018-04-06 NOTE — LETTER
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 He underwent a gross total resection followed by adjuvant chemoradiotherapy with concurrent Temodar as described above. He completed adjuvant therapy without significant treatment-related toxicities with relatively mild grade 2 fatigue and grade 1 dermatitis. He returns to radiation oncology clinic today for a routine post-treatment follow-up visit, 4 months after treatment completion.    Since he was last seen for follow up, Mr. Iglesias had a surveillance MRI of the brain completed on 3/15/2018. This showed interval increase in nodular contrast enhancement and T2 hyperintensity at the margins of the right temporal resection cavity. This was felt to be concerning for disease progression, though pseudoprogression could not be ruled out.  His case was discussed at interdisciplinary neuro-oncology tumor conference and it was recommended that he have close follow up with a MRI in one month.     On exam today, Mr. Iglesias reports feeling very well. He continues to use the Optune device. He reports that his energy has improved. He is no longer having any issues with vertigo. He denies any headaches, nausea, vomiting, or focal neurologic deficits. He otherwise has no additional  concerns or complaints and remainder of ROS is negative.      PHYSICAL EXAM:  /76  Pulse 76  Wt 94.3 kg (208 lb)  BMI 30.72 kg/m2  Gen: Alert, in NAD  Eyes: PERRL, EOMI, sclera anicteric, no nystagmus   HENT     Head: Optune device in place. No erythema or skin breakdown around the electrode sites.     Nose/sinus: No rhinorrhea or epistaxis     Oral Cavity/Oropharynx: MMM, palate elevates symmetrically, tongue protrudes in the midline  Pulm: No wheezing, stridor or respiratory distress  CV: Well-perfused, no cyanosis, no pedal edema  Musculoskeletal: Normal bulk and tone   Skin: Normal color and turgor  Neurologic: A/Ox3, CN II-XII intact, 5/5 motor strength in bilateral upper/lower extremities, normal finger-nose-finger, normal gait    LABS AND IMAGING:  3/15/2018 MRI brain:  Increased nodular contrast enhancement and T2 hyperintensity at the margins of the right temporal resection cavity. Perfusion characteristics suggestive of tumor recurrence, particularly medially.    IMPRESSION:   Mr. Iglesias is a 57 year old male with a right frontotemporal IDH wild-type, non-MGMT methylated WHO grade IV glioblastoma status post gross total resection and adjuvant chemoradiotherapy. He is currently 4 months out from completion of radiotherapy. A surveillance MRI demonstrates findings consistent with disease progression vs pseudoprogression    PLAN:   1. Repeat MRI to be completed on 4/19/2018. He will then follow up with Dr. Armstrong to determine next steps   2. Continue tumor treating fields (Optune) therapy  3. Follow up in radiation oncology clinic pending results from upcoming MRI study     Tristian Augustine MD  Resident, Radiation Oncology        Attending addendum:   I saw and examined the patient with the resident and agree with the documented plan of care.    Dominic Louis MD/PhD  Dept of Radiation Oncology  [facility]

## 2018-04-06 NOTE — MR AVS SNAPSHOT
After Visit Summary   4/6/2018    Seth Iglesias    MRN: [id number]           Patient Information     Date Of Birth          1960        Visit Information        Provider Department      4/6/2018 11:00 AM Dominic Louis MD [facility]        Today's Diagnoses     Glioblastoma multiforme of temporal lobe (H)    -  1       Follow-ups after your visit        Your next 10 appointments already scheduled     Apr 19, 2018 11:00 AM CDT   (Arrive by 10:45 AM)   MR BRAIN W/O & W CONTRAST with UUMR2   [facility] [place] ([facility], [facility])    [address] [address] [phone]           Take your medicines as usual, unless your doctor tells you not to. Bring a list of your current medicines to your exam (including vitamins, minerals and over-the-counter drugs).  You may or may not receive intravenous (IV) contrast for this exam pending the discretion of the Radiologist.  You do not need to do anything special to prepare.  The MRI machine uses a strong magnet. Please wear clothes without metal (snaps, zippers). A sweatsuit works well, or we may give you a hospital gown.  Please remove any body piercings and hair extensions before you arrive. You will also remove watches, jewelry, hairpins, wallets, dentures, partial dental plates and hearing aids. You may wear contact lenses, and you may be able to wear your rings. We have a safe place to keep your personal items, but it is safer to leave them at home.  **IMPORTANT** THE INSTRUCTIONS BELOW ARE ONLY FOR THOSE PATIENTS WHO HAVE BEEN PRESCRIBED SEDATION OR GENERAL ANESTHESIA DURING THEIR MRI PROCEDURE:  IF YOUR DOCTOR PRESCRIBED ORAL SEDATION (take medicine to help you relax during your exam):   You must get the medicine from your doctor (oral medication) before you arrive. Bring the medicine to the exam. Do not take it at home. You ll be told when to take it  upon arriving for your exam.   Arrive one hour early. Bring someone who can take you home after the test. Your medicine will make you sleepy. After the exam, you may not drive, take a bus or take a taxi by yourself.  IF YOUR DOCTOR PRESCRIBED IV SEDATION:   Arrive one hour early. Bring someone who can take you home after the test. Your medicine will make you sleepy. After the exam, you may not drive, take a bus or take a taxi by yourself.   No eating 6 hours before your exam. You may have clear liquids up until 4 hours before your exam. (Clear liquids include water, clear tea, black coffee and fruit juice without pulp.)  IF YOUR DOCTOR PRESCRIBED ANESTHESIA (be asleep for your exam):   Arrive 1 1/2 hours early. Bring someone who can take you home after the test. You may not drive, take a bus or take a taxi by yourself.   No eating 8 hours before your exam. You may have clear liquids up until 4 hours before your exam. (Clear liquids include water, clear tea, black coffee and fruit juice without pulp.)   You will spend four to five hours in the recovery room.  Please call the Imaging Department at your exam site with any questions.            Apr 23, 2018  9:45 AM CDT   (Arrive by 9:30 AM)   Return Visit with Augustus Armstrong MD   [facility] ([facility])    [address] [place] [address]  [address] [place] [address] [phone]              Who to contact     Please call your clinic at [phone] to:    Ask questions about your health    Make or cancel appointments    Discuss your medicines    Learn about your test results    Speak to your doctor            Additional Information About Your Visit        MyChart Information     viaCycle gives you secure access to your electronic health record. If you see a primary care provider, you can also send messages to your care team and make appointments. If you have questions, please call your primary care clinic.  If you do not have a  primary care provider, please call [phone] and they will assist you.      Powerset is an electronic gateway that provides easy, online access to your medical records. With Powerset, you can request a clinic appointment, read your test results, renew a prescription or communicate with your care team.     To access your existing account, please contact your [facility] or call [phone] for assistance.        Care EveryWhere ID     This is your Care EveryWhere ID. This could be used by other organizations to access your [place] medical records  [id number]        Your Vitals Were     Pulse BMI (Body Mass Index)                76 30.72 kg/m2           Blood Pressure from Last 3 Encounters:   04/06/18 106/76   04/06/18 106/76   04/06/18 107/70    Weight from Last 3 Encounters:   04/06/18 94.3 kg (208 lb)   04/06/18 94.3 kg (208 lb)   04/06/18 93.7 kg (206 lb 9.6 oz)              Today, you had the following     No orders found for display         Today's Medication Changes          These changes are accurate as of 4/6/18 11:59 PM.  If you have any questions, ask your nurse or doctor.               Start taking these medicines.        Dose/Directions    tamsulosin 0.4 MG capsule   Commonly known as:  FLOMAX   Used for:  Urinary frequency   Started by:  JUSTYN Salmon MD        Dose:  0.4 mg   Take 1 capsule (0.4 mg) by mouth At Bedtime   Quantity:  30 capsule   Refills:  11            Where to get your medicines      Some of these will need a paper prescription and others can be bought over the counter.  Ask your nurse if you have questions.     Bring a paper prescription for each of these medications     tamsulosin 0.4 MG capsule                Primary Care Provider Office Phone # Fax #    JUSTYN Salmon [phone] [address] [facility] [address]        Equal Access to Services     PERICO ESPINOZA AH: peggy Whyte, dashawn  héctor alberts behzadmariluz burnscamelia rand ah. Lizbeth [facility] [phone].    ATENCIÓN: Si lorie lyle, tiene a granados disposición servicios gratuitos de asistencia lingüística. Rodney al [phone].    We comply with applicable federal civil rights laws and [place] laws. We do not discriminate on the basis of race, color, national origin, age, disability, sex, sexual orientation, or gender identity.            Thank you!     Thank you for choosing [facility]  for your care. Our goal is always to provide you with excellent care. Hearing back from our patients is one way we can continue to improve our services. Please take a few minutes to complete the written survey that you may receive in the mail after your visit with us. Thank you!             Your Updated Medication List - Protect others around you: Learn how to safely use, store and throw away your medicines at www.disposemymeds.org.          This list is accurate as of 4/6/18 11:59 PM.  Always use your most recent med list.                   Brand Name Dispense Instructions for use Diagnosis    ENBREL SURECLICK 50 MG/ML autoinjector   Generic drug:  Etanercept           levETIRAcetam 1000 MG Tabs    KEPPRA    180 tablet    Take 1,000 mg by mouth every 12 hours    Brain mass       MULTIVITAMIN & MINERAL PO      Take 1 tablet by mouth daily        prochlorperazine 10 MG tablet    COMPAZINE    30 tablet    Take 1 tablet (10 mg) by mouth every 6 hours as needed (Nausea/Vomiting)    Glioblastoma multiforme of temporal lobe (H)       senna-docusate 8.6-50 MG per tablet    SENOKOT-S;PERICOLACE    100 tablet    Take 1-2 tablets by mouth 2 times daily    Brain mass       tamsulosin 0.4 MG capsule    FLOMAX    30 capsule    Take 1 capsule (0.4 mg) by mouth At Bedtime    Urinary frequency       Temozolomide 180 MG capsule    TEMODAR    5 capsule    Take 1 capsule (180 mg) by mouth daily in addition to 250 mg capsule for 5 days    Glioblastoma  multiforme of temporal lobe (H)

## 2018-04-06 NOTE — LETTER
2018       RE: Seth Iglesias  [address]  [place] [place] [address]     Dear Colleague,    Thank you for referring your patient, Seth Iglesias, to the [facility]UR[facility] at [facility]. Please see a copy of my visit note below.    Service Date: 2018       RE: Seth Iglesias   MRN: [id number]   : 1960      Dear  Post:      Mr. Iglesias is a 57-year-old and came to the Neurology Clinic to follow up on his anti-seizure medication.  I had seen him at the end of last year and he was maintained on levetiracetam and he had a history of seizure and he had a glioblastoma multiforme removed from the right temporal lobe.  He is on 1000 mg twice a day.  He is accompanied by his [place] wife.  They report no seizures or auras.  He has been taking medication regularly.  In his last visit the concentration of levetiracetam was good at 31 and this was in December.  He has been maintained on the same dose.      He did not have an EEG done.      He comes in to visit today and he has multiple electrodes on his shaved head called Optune for delivering current magnetic to the tumor.  This cost $21,000 a month and it has been okay with the insurance for a year.  He does feel anything except that sometimes they heat up.  He has had them for about a month and he has them night and day.      PHYSICAL EXAMINATION:     GENERAL:   He is a very pleasant man.  His wife also.     VITAL SIGNS:   Blood pressure 106/76.  Pulse 76.     The electrodes look fine.  He is on Temodar as well as just started on Flomax.  They see no interaction with levetiracetam.      I encouraged him to continue on the Optune.  I have not seen any reports that it exacerbates seizures because of its magnetic nature.  We will double check on that and see what the risks of seizure with Optune are.  He has not had any problems with it.  I will see him as needed in the future and check his concentration  today.        D: 2018   T: 2018   MT: AKA      Name:     MALINA SMITH   MRN:      [id number]        Account:      [id number]   :      1960           Service Date: 2018      Document: [id number]       Again, thank you for allowing me to participate in the care of your patient.      Sincerely,    Rafael Boyer MD    cc:   Martha Esposito PA-C   [facility]    [address] [place] [address]     [place] [address]

## 2018-04-06 NOTE — PROGRESS NOTES
Subjective:  Seth Iglesias is a 57 year old male   Chief Complaint   Patient presents with     Patient Request     prostate check and lab work for PSA - up a lot at night going to the bathroom      Fatigue     He is being treated for his glioblastoma and actually responding very well.  The urinary symptoms are relatively new over the last couple 3 months.  He describes nocturia 4-5 times per night as well as daytime frequency and the sensation of incomplete emptying.  He has not noticed a dramatic decrease in the stream.  He does feel fatigue but wonders if a lot of that is because of the disrupted sleep at night getting up to void.  He does not think that it is a problem of waking up frequently and then realizing he has to go to the bathroom: Many times he will wake up because of the strong sensation of needing to void        Encounter Diagnoses   Name Primary?     Benign prostatic hyperplasia with urinary frequency Yes     Urinary frequency        ROS:other than noted above, general, HEENT, respiratory, cardiac, gastrointestinal systems are negative    Medical, surgical, social, and family histories, medications and allergies reviewed and updated.    Objective:  Exam:    GENERAL APPEARANCE ADULT: Alert, no acute distress  EYES: PERRL, EOM normal, conjunctiva and lids normal  RESP: lungs clear to auscultation   CV: normal rate, regular rhythm, no murmur or gallop   (male): prostate abnormal with diffuse smooth enlargement but no nodules and no tenderness or bogginess  MS: extremities normal, no peripheral edema  NEURO: Alert, oriented, speech and mentation normal, Cranial nerves 2-12 are normal., Strength normal and symmetrical in upper and lower extremities., Reflexes 2+ and symmetrical at biceps, triceps, brachioradialis, knees and ankles  PSYCH: mentation appears normal., affect and mood normal      ASSESSMENT:  1. Benign prostatic hyperplasia with urinary frequency    2. Urinary frequency      Discussed  pathophysiology of this condition and implications.  Questions answered.   PLAN:  Orders Placed This Encounter     PSA, screen     tamsulosin (FLOMAX) 0.4 MG capsule   Discussed how to take the medication(s), expected outcomes, potential side effects.

## 2018-04-11 NOTE — TELEPHONE ENCOUNTER
Oral Chemotherapy Monitoring Program    Primary Oncologist: Dr. Armstrong  Primary Oncology Clinic: [facility]  Cancer Diagnosis: GBM    Medication: Temodar 430 mg x 5 days  Start Date of Cycle 3: 3/20  Therapy History:  C1D1: 1/22  C2D1: 2/20    Drug Interaction Assessment: No pertinent drug interactions found at this time.    Lab Monitoring Plan  Monitoring plan: (for the 5 days [id number] regimen)   C1D1+   CMP, CBC C2D1+ Call, CMP, CBC C3D1+ Call, CMP, CBC C4D1+ Call, CMP, CBC C5D1+ Call, CMP, CBC C6D1+ Call, CMP, CBC   C1D8+  C2D8+  C3D8+  C4D8+  C5D8+  C6D8+    C1D15+ Call C2D15+  C3D15+  C4D15+  C5D15+  C6D15+    C1D22+  C2D22+  C3D22+  C4D22+  C5D22+  C6D22+      Subjective/Objective:  Seth Iglesias is a 57 year old male contacted by phone for a follow-up visit for oral chemotherapy.  Spoke with his wife, Maryanne, and she reports that he tolerated this last cycle well. She states that he did not experience nausea, vomiting, constipation or diarrhea. She states that his biggest complaint has been fatigue but that he is still able to be active. He was currently painting and has not been resting very much since they are working on a house project. She states that when he can, he usually takes a nap in the afternoon to help with the fatigue.     ORAL CHEMOTHERAPY 10/27/2017 12/12/2017 1/30/2018 2/26/2018 4/11/2018   Drug Name Temodar (Temozolomide) Temodar (Temozolomide) Temodar (Temozolomide) Temodar (Temozolomide) Temodar (Temozolomide)   Current Dosage 160mg 160mg (No Data) (No Data) (No Data)   Current Schedule Daily Daily Daily Daily Daily   Cycle Details Continuous for 42 days during XRT Continuous for 42 days during XRT Other (No Data) 5 days on, then 23 days off   Start Date of Last Cycle - 11/12/2017 1/22/2018 2/20/2018 3/20/2018   Planned next cycle start date 11/12/2017 - 2/20/2018 3/20/2018 4/23/2018   Doses missed in last 2 weeks - 0 0 0 0   Adherence Assessment - Adherent Adherent Adherent  "Adherent   Adverse Effects - Fatigue Fatigue Fatigue Fatigue   Fatigue - Grade 1 Grade 1 Grade 1 Grade 1   Pharmacist Intervention(fatigue) - No No No No   Home BPs - not needed not needed not needed not needed   Any new drug interactions? No No No No No   Is the dose as ordered appropriate for the patient? Yes - Yes Yes Yes       Vitals:  BP:   BP Readings from Last 1 Encounters:   04/06/18 106/76     Wt Readings from Last 1 Encounters:   04/06/18 94.3 kg (208 lb)     Estimated body surface area is 2.14 meters squared as calculated from the following:    Height as of 4/6/18: 1.753 m (5' 9\").    Weight as of 4/6/18: 94.3 kg (208 lb).    Labs:  _  Result Component Current Result Ref Range   Sodium 140 (4/6/2018) 133 - 144 mmol/L     _  Result Component Current Result Ref Range   Potassium 4.2 (4/6/2018) 3.4 - 5.3 mmol/L     _  Result Component Current Result Ref Range   Calcium 8.9 (4/6/2018) 8.5 - 10.1 mg/dL     No results found for Mag within last 30 days.     No results found for Phos within last 30 days.     _  Result Component Current Result Ref Range   Albumin 3.7 (4/6/2018) 3.4 - 5.0 g/dL     _  Result Component Current Result Ref Range   Urea Nitrogen 21 (4/6/2018) 7 - 30 mg/dL     _  Result Component Current Result Ref Range   Creatinine 0.92 (4/6/2018) 0.66 - 1.25 mg/dL       _  Result Component Current Result Ref Range   AST 12 (4/6/2018) 0 - 45 U/L     _  Result Component Current Result Ref Range   ALT 19 (4/6/2018) 0 - 70 U/L     _  Result Component Current Result Ref Range   Bilirubin Total 0.4 (4/6/2018) 0.2 - 1.3 mg/dL       _  Result Component Current Result Ref Range   WBC 6.3 (4/6/2018) 4.0 - 11.0 10e9/L     _  Result Component Current Result Ref Range   Hemoglobin 13.3 (4/6/2018) 13.3 - 17.7 g/dL     _  Result Component Current Result Ref Range   Platelet Count 227 (4/6/2018) 150 - 450 10e9/L     _  Result Component Current Result Ref Range   Absolute Neutrophil 4.2 (4/6/2018) 1.6 - 8.3 10e9/L "       Assessment:  Seth is tolerating Temodar therapy well at this time with grade I fatigue. No pharmacological interventions needed at this time.    Plan:  Continue Temodar therapy.    Follow-Up:  Follow-up in clinic on 4/19 for an MRI then with Dr. Armstrong in clinic on 4/23. Follow-up in 1 month to assess side effects and adherence.    Kristen Weiler, Pharmacy Intern  Oral Chemotherapy Monitoring Program   [facility]  [phone]

## 2018-04-18 NOTE — ORAL ONC MGMT
Oral Chemotherapy Monitoring Program     Placed call to patient in follow up of voicemail left regarding Temodar therapy. Seth's wife Maryanne said he took his Temodar 250mg + 180mg last night and had nausea and vomited 3 times. She said he took prochlorperazine prior to the Temodar, but he did not take Ondansetron prior to Temodar as he usually does.     We discussed the importance of taking Ondansetron 30-60 minutes prior to EACH dose of Temodar. Prochlorperazine can be used as needed for any nausea not controlled by Zofran. Maryanne said they have four doses of Zofran in the cabinet and will contact her pharmacy for a refill. She also has two prescription bottles of prochlorperazine on hand. She will call us if they need a refill of Zofran in the future. She thanked me for the call and care.     Mac Silva PharmD  [facility]  [phone]  April 18, 2018

## 2018-04-21 NOTE — PROGRESS NOTES
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 MGMT promoter methylation was absent.    Treatment to date: status post gross total resection 10/3/17; initiated concurrent chemoradiation with temozolomide on 11/13/17 and completed 12/22/17. Adjuvant temozolomide and Optune device started on 1/22/18.     Referring physician:  Dr. Harry Wiggins, Neurosurgical Service, [facility]  Dr. Dominic Louis, radiation oncology, [facility]    Oncology History:   Seth presented in September 2017 with dizzy spells, headache, and increasing somnolence. He was admitted on 9/28/17 after imaging in ED found a right temporal lobe mass. He went on to have gross total resection on 10/3/17.   10/27/17 - - neuro-oncology/medical oncology consultation, Dr. Armstrong.  11/12/17 through December 22, 2017 adjuvant treatment with concurrent chemoradiation with temozolomide. 6000 cGy, and 30 fractions, standard 200 cGy per fraction  1/19/18 - - brain MRI: Impression: 1. 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 Recommend attention on follow-up. Postsurgical changes of prior resection of right temporal glioblastoma. Resolution of right frontotemporal subdural hematoma and T2 hyperintensity along the right internal capsule and commisural white matter.    1/22/18 - - oncology/medical oncology follow-up, Dr. Armstrong. Plan: initiate adjuvant five day temozolomide and OPTune device. Cycle 1/day one of temozolomide, 150 mg/m  on days one through five of each 28 day cycle.    2/20/18 - - oncology follow-up, SCARLETT Hillman. Cycle 2/day one of five day temozolomide. Dose increased to 200 mg/m .    3/15/18 - - brain MRI: Impression: In this  patient with glioblastoma multiforme status post gross total resection, chemoradiation and adjuvant Temodar and Optune, there is increased nodular contrast enhancement and T2 hyperintensity at the margins of the right temporal resection cavity. Perfusion characteristics suggestive of tumor recurrence, particularly medially.    3/19/18 - - neuro-oncology/medical oncology follow-up, Dr. Armstrong.    4/6/18 - - neurology follow-up, Dr. Boyer. plan to continue [facility]     4/19/18 MRI brain   1. Increased size of enhancing mass and surrounding vasogenic edema in  the right temporal lobe, which is suggestive of recurrence of  high-grade tumor, while the MR perfusion and diffusion imaging  characteristics are indeterminate for recurrent tumor (described in  detail above). Therefore, recommend shorter term follow-up study such  as one month.  2. Increasing mass effect has caused further compression of the right  lateral ventricle. No hydrocephalus at this time.    4/23/18 -- oncology follow-up, Dr. Armstrong    Interim History:  Seth Iglesias is a 57 year old year old man here with his wife for follow-up of GBM of right temporal lobe.   Over the past month, his wife is noticing that he is having more forgetfulness. She is having to repeat simple instructions multiple times.   He fell once about 4 weeks ago, but has not fallen since then.   He reports h/o neck pain from cervical stenosis but no headaches.   Denies seizures, weakness, numbness, or balance problems. No vision or hearing issues.  He had one episode of emesis last week after taking Temodar, but no other side effects. No fevers.     Review Of Systems:  complete ROS reviewed. Pertinent symptoms reviewed above per HPI.    PAST MEDICAL HISTORY:   1.  Glioblastoma, status post gross total resection on 10/03/2017 of the right temporal lobe preceded and indicated by some seizure-like activity without loss of consciousness.   2.  Chronic back pain.   3.  Ganglionic cysts.   4.   "Hyperlipidemia.   5.  He has psoriasis and was taking what sounds like a TNF and an alpha inhibitor up until the summer of .       PAST SURGICAL HISTORY:     1.  The aforementioned right temporal craniotomy for gross total resection of right temporal glioblastoma done 10/03/2017 with Dr. Harry Wiggins at the [facility].   2.  Colonoscopy performed 2011, unremarkable.       FAMILY HISTORY:  He had a Father and 2 paternal uncles who had prostate cancer.  His mother had some sort of \"tumor of the rib,\" and  at age 54 of this unknown cancer.       SOCIAL HISTORY:  The patient lives in [place], [place].  He is accompanied by his wife, who is extremely supportive.  They have 3 sons ages 18, 23 and 25.  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  He has since then been working for [employer], about 27 miles from his home.    Tobacco:  Never smoker.    Allergies:none    Current Medications: reviewed    Physical Exam:  /80  Pulse 91  Temp 97.2  F (36.2  C) (Oral)  Resp 16  Ht 1.753 m (5' 9\")  Wt 97.8 kg (215 lb 8 oz)  SpO2 96%  BMI 31.82 kg/m2  General: NAD. Affect is dulled compared to prior visits.   HEENT: PERRL, MMM, no oral lesions  Lungs: CTA bilaterally  Cardiovascular: RRR, no M/R/G, no edema   MSK: normal muscle tone  Skin: no rashes or petechaie  Neuro: A&O, CNs II-XII intact, 5/5 strength all four extremities, normal cerebellar testing. His responses are slightly delayed and he is more withdrawn compared to prior visits.        Laboratory/Imaging Studies    The MRI brain from 18 and labs were reviewed in detail with the patient, and hard copies were provided for the patient. The MRI is concerning for true progression and has increased edema.       ASSESSMENT/PLAN:  Seth Iglesias is a 56 y/o man with right temporal glioblastoma (WHO grade IV). He had gross total resection " on 10/3/17 and completed adjuvant chemorads on 12/22/17. His 4 week post-treatment brain MRI showed areas of enhancement around the resection cavity thought to represent radiation necrosis rather than recurrence.     He then started temozolomide maintenance in January 2018, at a dose of 150 mg/m2 for cycle 1. This was increased to 200 mg/m2 for cycle 2. He also started the Optune device in January 2018. The report from [facility] in January showed a 79% compliance rate.     MRI brain after 2 cycles of adjuvant Temodar showed increased nodular enhancement at the margins of the resection cavity which could indicate either pseudo-progression or actual disease progression. Clinically he had no neurologic symptoms to suggest progression. We did a short term follow-up MRI in one month and unfortunately this now shows continued increase in size of the enhancing mass adjacent to the resection cavity, with worsening vasogenic edema. This is very likely disease progression. In addition, he has clinical progression with new cognitive deficits as noted by his wife. We do not know how Optune may impact the radiographic appearance, so this being a more delayed pseudoprogression is not impossible, though unlikely.     He has completed 3 cycles of adjuvant Temodar which we will now discontinue.   His case will be discussed today at the neuro-oncology tumor board. Options going forward include re-resection or radiation, with chemotherapy following either of those options. The standard 2nd-line systemic therapy is bevacizumab, although we need to look at our available clinical trials and make sure he would not be excluded after receiving bevacizumab.     We will get back to him later this week with the plan. He will continue with Optune in the meantime. Per the protocol of the recently published article in [facility] (Perlita et al, Dec 2017), Optune was continued until 2nd radiologic progression or for a maximum of 24 months.      For the  symptomatic vasogenic edema, he will start dexamethasone 4 mg BID with pantoprozole.       Patient seen and discussed with staff Dr. Patti Chavez MD  Heme/Onc Fellow  Pager: [phone]          MEDICAL ONCOLOGY ATTENDING PHYSICIAN ADDENDUM:  I have seen and evaluated this patient with the medical oncology fellow. I have reviewed and edited this fellow's note, and agree with the assessment and plan stated above. A complete review of systems was assessed, and pertinent positives/negatives are discussed in detail above and as follows.    Mr. Iglesias is here with his wife.  Unfortunately, he has been having some symptomatic neurologic issues.  He has specifically been having some memory lapses, according to his wife.  He is not having significant unsteadiness, but he is having some mild expressive dysphasia.  He is certainly less talkative than he has been at certain visits with me.  We brought him back for a 1 month MRI followup due to concern for pseudoprogression versus early recurrence of disease, having had a gross total resection, chemoradiation and treatment in the adjuvant setting with temozolomide and the Optune device.  Unfortunately, this most recent MRI that was done last week on 04/09 shows a significant worsening of the enhancing mass with surrounding vasogenic edema.  In the right temporal lobe, there is slight impingement on the right lateral ventricle.  MR perfusion and diffusion imaging was done as well and is indeterminant, but there is definitely increase in size of the enhancement, rather than diminishment that would normally be seen by pseudoprogression.  Of note, the patient does not have any evidence of promoter methylation of MGMT enzyme.  Between clinical progression and significant radiographic progression, I have strong concern that he has recurrence of disease less than 6 months out from his gross total resection and chemoradiation.  For that reason, we will discuss at our  Neuro-Oncology multidisciplinary tumor board to consider resection.  Most specifically to debulk tumor that is causing compression of the right lateral ventricle and to decrease the mass effect thereof.  In the immediate short-term, prescribed dexamethasone 4 mg b.i.d. starting today.  He will take it at 8:00 a.m. and 4:00 p.m. after having eaten some food each time.  We will discuss with our Neurosurgery colleagues whether or not it would be beneficial to proceed with repeat surgical resection.  We will investigate potential clinical trial options.  If he does have a surgical resection, I will send his tumor to [facility] to test for tumor genomic profiling to see whether or not he would be eligible for the MATCH trial.  Otherwise, we will look at small target therapy, kinase inhibitors, which is at least 1 trial that he might be eligible for.  We have the Tocagen trial but that unfortunately would not be applicable until the patient has a second recurrence and would be able to go forward with surgery at that time.  He stated understanding, as did his wife.  We will go forward and have discussions with Dr. Dominic Louis from Radiation Oncology and the Neurosurgery Team and then we will get back to the patient later this week with a more definitive plan.           I spent 30 minutes in consultation, including H&P and Discussion. >50% of time was spent in counseling and in coordination of care.    Augustus Armstrong MD, PhD

## 2018-04-23 NOTE — MR AVS SNAPSHOT
After Visit Summary   4/23/2018    Seth Iglesias    MRN: [id number]           Patient Information     Date Of Birth          1960        Visit Information        Provider Department      4/23/2018 9:45 AM Augustus Armstrong MD formerly [facility]        Today's Diagnoses     Glioblastoma multiforme of temporal lobe (H)    -  1       Follow-ups after your visit        Your next 10 appointments already scheduled     May 04, 2018  1:00 PM CDT   RESEARCH WITH ROOM with  Oncology Research Coordinator,  2 [address] CONSULT [facility] ([facility])    [address] [place] [address]  [address] [place] [id number] [phone]            May 04, 2018  2:30 PM CDT   (Arrive by 2:15 PM)   New Patient Visit with Beto Gracia MD   formerly [facility] ([facility])    [address] [place] [address]  [address] [place] [id number] [phone]              Who to contact     If you have questions or need follow up information about today's clinic visit or your schedule please contact [facility] directly at [phone].  Normal or non-critical lab and imaging results will be communicated to you by MyChart, letter or phone within 4 business days after the clinic has received the results. If you do not hear from us within 7 days, please contact the clinic through [facility] or phone. If you have a critical or abnormal lab result, we will notify you by phone as soon as possible.  Submit refill requests through [facility] or call your pharmacy and they will forward the refill request to us. Please allow 3 business days for your refill to be completed.          Additional Information About Your Visit        MyChart Information     [facility] gives you secure access to your electronic health record. If you see a primary care provider, you can also send messages to your care team and make appointments. If you  "have questions, please call your primary care clinic.  If you do not have a primary care provider, please call [phone] and they will assist you.        Care EveryWhere ID     This is your Care EveryWhere ID. This could be used by other organizations to access your [facility] medical records  [id number]        Your Vitals Were     Pulse Temperature Respirations Height Pulse Oximetry BMI (Body Mass Index)    91 97.2  F (36.2  C) (Oral) 16 1.753 m (5' 9\") 96% 31.82 kg/m2       Blood Pressure from Last 3 Encounters:   04/23/18 123/80   04/06/18 106/76   04/06/18 106/76    Weight from Last 3 Encounters:   04/23/18 97.8 kg (215 lb 8 oz)   04/06/18 94.3 kg (208 lb)   04/06/18 94.3 kg (208 lb)              Today, you had the following     No orders found for display         Today's Medication Changes          These changes are accurate as of 4/23/18 11:59 PM.  If you have any questions, ask your nurse or doctor.               Start taking these medicines.        Dose/Directions    dexamethasone 4 MG tablet   Commonly known as:  DECADRON   Used for:  Glioblastoma multiforme of temporal lobe (H)   Started by:  Augustus Armstrong MD        Take 4 mg twice daily, at 8am and 4pm   Quantity:  60 tablet   Refills:  0       pantoprazole 40 MG EC tablet   Commonly known as:  PROTONIX   Used for:  Glioblastoma multiforme of temporal lobe (H)   Started by:  Augustus Armstrong MD        Dose:  40 mg   Take 1 tablet (40 mg) by mouth daily   Quantity:  30 tablet   Refills:  3            Where to get your medicines      These medications were sent to [facility] [place], [place] - [address]  [address], [facility] [address]    Hours:  TRANSPLANT PHONE NUMBER [phone] Phone:  [phone]     dexamethasone 4 MG tablet    pantoprazole 40 MG EC tablet         These medications were sent to [id number] - [place], [place] - [address]  [address]" [facility] [address]     Phone:  [phone]     tamsulosin 0.4 MG capsule                Primary Care Provider Office Phone # Fax #    R Nigel Salmon [phone] [address] [address]        Equal Access to Services     SHEJANEY OLGA : Hadii aad ku hadasho Soomaali, waaxda luqadaha, qaybta kaalmada adeegyada, waxay fayin haydaviden adecamelia dwyer la'davidemariluz kerns. So [facility] [phone].    ATENCIÓN: Si habla español, tiene a granados disposición servicios gratuitos de asistencia lingüística. Llame al [phone].    We comply with applicable federal civil rights laws and [place] laws. We do not discriminate on the basis of race, color, national origin, age, disability, sex, sexual orientation, or gender identity.            Thank you!     Thank you for choosing [facility]  for your care. Our goal is always to provide you with excellent care. Hearing back from our patients is one way we can continue to improve our services. Please take a few minutes to complete the written survey that you may receive in the mail after your visit with us. Thank you!             Your Updated Medication List - Protect others around you: Learn how to safely use, store and throw away your medicines at www.disposemymeds.org.          This list is accurate as of 4/23/18 11:59 PM.  Always use your most recent med list.                   Brand Name Dispense Instructions for use Diagnosis    dexamethasone 4 MG tablet    DECADRON    60 tablet    Take 4 mg twice daily, at 8am and 4pm    Glioblastoma multiforme of temporal lobe (H)       ENBREL SURECLICK 50 MG/ML autoinjector   Generic drug:  Etanercept           levETIRAcetam 1000 MG Tabs    KEPPRA    180 tablet    Take 1,000 mg by mouth every 12 hours    Brain mass       MULTIVITAMIN & MINERAL PO      Take 1 tablet by mouth daily        pantoprazole 40 MG EC tablet    PROTONIX    30 tablet    Take 1 tablet (40 mg) by mouth daily    Glioblastoma multiforme  of temporal lobe (H)       prochlorperazine 10 MG tablet    COMPAZINE    30 tablet    Take 1 tablet (10 mg) by mouth every 6 hours as needed (Nausea/Vomiting)    Glioblastoma multiforme of temporal lobe (H)       senna-docusate 8.6-50 MG per tablet    SENOKOT-S;PERICOLACE    100 tablet    Take 1-2 tablets by mouth 2 times daily    Brain mass       tamsulosin 0.4 MG capsule    FLOMAX    30 capsule    Take 1 capsule (0.4 mg) by mouth At Bedtime    Urinary frequency       * temozolomide 250 MG capsule CHEMO    TEMODAR    5 capsule    Take 1 capsule (250 mg) by mouth daily for 5 days In addition to 180 mg capsule daily for 5 days    Glioblastoma multiforme of temporal lobe (H)       * Temozolomide 180 MG capsule    TEMODAR    5 capsule    Take 1 capsule (180 mg) by mouth daily in addition to 250 mg capsule for 5 days    Glioblastoma multiforme of temporal lobe (H)       ZOFRAN ODT PO      Take 8 mg by mouth every 8 hours as needed for nausea        * Notice:  This list has 2 medication(s) that are the same as other medications prescribed for you. Read the directions carefully, and ask your doctor or other care provider to review them with you.

## 2018-04-23 NOTE — NURSING NOTE
"Oncology Rooming Note    April 23, 2018 9:38 AM   Seth Iglesias is a 57 year old male who presents for:    Chief Complaint   Patient presents with     Oncology Clinic Visit     Return: Glioblastoma     Initial Vitals: /80  Pulse 91  Temp 97.2  F (36.2  C) (Oral)  Resp 16  Ht 1.753 m (5' 9\")  Wt 97.8 kg (215 lb 8 oz)  SpO2 96%  BMI 31.82 kg/m2 Estimated body mass index is 31.82 kg/(m^2) as calculated from the following:    Height as of this encounter: 1.753 m (5' 9\").    Weight as of this encounter: 97.8 kg (215 lb 8 oz). Body surface area is 2.18 meters squared.  No Pain (0) Comment: Data Unavailable   No LMP for male patient.  Allergies reviewed: Yes  Medications reviewed: Yes    Medications: Medication refills not needed today.  Pharmacy name entered into [facility]:    [id number] - [place], [place] - [address]  [id number] - [place], [place] - [address]    Clinical concerns: patients memory seems to be a little worse this week. Dr. Armstrong was NOT notified.    10 minutes for nursing intake (face to face time)     Corinne Michael CMA              "

## 2018-04-23 NOTE — TELEPHONE ENCOUNTER
Pt was given paper script for Flomax on 4/6/18 and lost it.  Checked with [facility] and the script was not there either.  Advise.  Mallory

## 2018-04-23 NOTE — TELEPHONE ENCOUNTER
Reason for Call:  Prescription Problem    Detailed comments: Pt's spouse Maryanne calling.  She lost pt's hard copy of Rx for Flonase.  She is asking that we e-prescribe Rx for Flonase to [facility]    Phone Number Patient can be reached at: Other phone number:  [phone]    Best Time: any    Can we leave a detailed message on this number? YES    Call taken on 4/23/2018 at 2:26 PM by Kylee Fong

## 2018-04-23 NOTE — LETTER
4/23/2018       RE: Seth Iglesias  [address]  [facility] [address]     Dear Colleague,    Thank you for referring your patient, Seth Iglesias, to the [facility]. Please see a copy of my visit note below.    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 MGMT promoter methylation was absent.    Treatment to date: status post gross total resection 10/3/17; initiated concurrent chemoradiation with temozolomide on 11/13/17 and completed 12/22/17. Adjuvant temozolomide and Optune device started on 1/22/18.     Referring physician:  Dr. Harry Wiggins, Neurosurgical Service, [facility]  Dr. Dominic Louis, radiation oncology, [facility]    Oncology History:   Seth presented in September 2017 with dizzy spells, headache, and increasing somnolence. He was admitted on 9/28/17 after imaging in ED found a right temporal lobe mass. He went on to have gross total resection on 10/3/17.   10/27/17 - - neuro-oncology/medical oncology consultation, Dr. Armstrong.  11/12/17 through December 22, 2017 adjuvant treatment with concurrent chemoradiation with temozolomide. 6000 cGy, and 30 fractions, standard 200 cGy per fraction  1/19/18 - - brain MRI: Impression: 1. 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 Recommend attention on follow-up. Postsurgical changes of prior resection of right temporal glioblastoma. Resolution of right frontotemporal subdural hematoma and T2 hyperintensity along the right internal capsule and commisural white matter.    1/22/18 - - oncology/medical oncology follow-up, Dr. Armstrong. Plan: initiate adjuvant five day temozolomide and OPTune device. Cycle 1/day one  of temozolomide, 150 mg/m  on days one through five of each 28 day cycle.    2/20/18 - - oncology follow-up, SCARLETT Hillman. Cycle 2/day one of five day temozolomide. Dose increased to 200 mg/m .    3/15/18 - - brain MRI: Impression: In this patient with glioblastoma multiforme status post gross total resection, chemoradiation and adjuvant Temodar and Optune, there is increased nodular contrast enhancement and T2 hyperintensity at the margins of the right temporal resection cavity. Perfusion characteristics suggestive of tumor recurrence, particularly medially.    3/19/18 - - neuro-oncology/medical oncology follow-up, Dr. Armstrong.    4/6/18 - - neurology follow-up, Dr. Boyer. plan to continue Kera     4/19/18 MRI brain   1. Increased size of enhancing mass and surrounding vasogenic edema in  the right temporal lobe, which is suggestive of recurrence of  high-grade tumor, while the MR perfusion and diffusion imaging  characteristics are indeterminate for recurrent tumor (described in  detail above). Therefore, recommend shorter term follow-up study such  as one month.  2. Increasing mass effect has caused further compression of the right  lateral ventricle. No hydrocephalus at this time.    4/23/18 -- oncology follow-up, Dr. Armstrong    Interim History:  Seth Iglesias is a 57 year old year old man here with his wife for follow-up of GBM of right temporal lobe.   Over the past month, his wife is noticing that he is having more forgetfulness. She is having to repeat simple instructions multiple times.   He fell once about 4 weeks ago, but has not fallen since then.   He reports h/o neck pain from cervical stenosis but no headaches.   Denies seizures, weakness, numbness, or balance problems. No vision or hearing issues.  He had one episode of emesis last week after taking Temodar, but no other side effects. No fevers.     Review Of Systems:  complete ROS reviewed. Pertinent symptoms reviewed above per HPI.    PAST  "MEDICAL HISTORY:   1.  Glioblastoma, status post gross total resection on 10/03/2017 of the right temporal lobe preceded and indicated by some seizure-like activity without loss of consciousness.   2.  Chronic back pain.   3.  Ganglionic cysts.   4.  Hyperlipidemia.   5.  He has psoriasis and was taking what sounds like a TNF and an alpha inhibitor up until the summer of .       PAST SURGICAL HISTORY:     1.  The aforementioned right temporal craniotomy for gross total resection of right temporal glioblastoma done 10/03/2017 with Dr. Harry Wiggins at the [facility].   2.  Colonoscopy performed 2011, unremarkable.       FAMILY HISTORY:  He had a Father and 2 paternal uncles who had prostate cancer.  His mother had some sort of \"tumor of the rib,\" and  at age 54 of this unknown cancer.       SOCIAL HISTORY:  The patient lives in [place], [place].  He is accompanied by his wife, who is extremely supportive.  They have 3 sons ages 18, 23 and 25.  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  He has since then been working for [employer], about 27 miles from his home.    Tobacco:  Never smoker.    Allergies:none    Current Medications: reviewed    Physical Exam:  /80  Pulse 91  Temp 97.2  F (36.2  C) (Oral)  Resp 16  Ht 1.753 m (5' 9\")  Wt 97.8 kg (215 lb 8 oz)  SpO2 96%  BMI 31.82 kg/m2  General: NAD. Affect is dulled compared to prior visits.   HEENT: PERRL, MMM, no oral lesions  Lungs: CTA bilaterally  Cardiovascular: RRR, no M/R/G, no edema   MSK: normal muscle tone  Skin: no rashes or petechaie  Neuro: A&O, CNs II-XII intact, 5/5 strength all four extremities, normal cerebellar testing. His responses are slightly delayed and he is more withdrawn compared to prior visits.        Laboratory/Imaging Studies    The MRI brain from 18 and labs were reviewed in detail with the patient, " and hard copies were provided for the patient. The MRI is concerning for true progression and has increased edema.       ASSESSMENT/PLAN:  Seth Iglesias is a 58 y/o man with right temporal glioblastoma (WHO grade IV). He had gross total resection on 10/3/17 and completed adjuvant chemorads on 12/22/17. His 4 week post-treatment brain MRI showed areas of enhancement around the resection cavity thought to represent radiation necrosis rather than recurrence.     He then started temozolomide maintenance in January 2018, at a dose of 150 mg/m2 for cycle 1. This was increased to 200 mg/m2 for cycle 2. He also started the Optune device in January 2018. The report from [employer] in January showed a 79% compliance rate.     MRI brain after 2 cycles of adjuvant Temodar showed increased nodular enhancement at the margins of the resection cavity which could indicate either pseudo-progression or actual disease progression. Clinically he had no neurologic symptoms to suggest progression. We did a short term follow-up MRI in one month and unfortunately this now shows continued increase in size of the enhancing mass adjacent to the resection cavity, with worsening vasogenic edema. This is very likely disease progression. In addition, he has clinical progression with new cognitive deficits as noted by his wife. We do not know how Optune may impact the radiographic appearance, so this being a more delayed pseudoprogression is not impossible, though unlikely.     He has completed 3 cycles of adjuvant Temodar which we will now discontinue.   His case will be discussed today at the neuro-oncology tumor board. Options going forward include re-resection or radiation, with chemotherapy following either of those options. The standard 2nd-line systemic therapy is bevacizumab, although we need to look at our available clinical trials and make sure he would not be excluded after receiving bevacizumab.     We will get back to him later this  week with the plan. He will continue with Optune in the meantime. Per the protocol of the recently published article in [facility] (Perlita et al, Dec 2017), Optune was continued until 2nd radiologic progression or for a maximum of 24 months.      For the symptomatic vasogenic edema, he will start dexamethasone 4 mg BID with pantoprozole.       Patient seen and discussed with staff Dr. Patti Chavez MD  Heme/Onc Fellow  Pager: [phone]          MEDICAL ONCOLOGY ATTENDING PHYSICIAN ADDENDUM:  I have seen and evaluated this patient with the medical oncology fellow. I have reviewed and edited this fellow's note, and agree with the assessment and plan stated above. A complete review of systems was assessed, and pertinent positives/negatives are discussed in detail above and as follows.    Mr. Iglesias is here with his wife.  Unfortunately, he has been having some symptomatic neurologic issues.  He has specifically been having some memory lapses, according to his wife.  He is not having significant unsteadiness, but he is having some mild expressive dysphasia.  He is certainly less talkative than he has been at certain visits with me.  We brought him back for a 1 month MRI followup due to concern for pseudoprogression versus early recurrence of disease, having had a gross total resection, chemoradiation and treatment in the adjuvant setting with temozolomide and the Optune device.  Unfortunately, this most recent MRI that was done last week on 04/09 shows a significant worsening of the enhancing mass with surrounding vasogenic edema.  In the right temporal lobe, there is slight impingement on the right lateral ventricle.  MR perfusion and diffusion imaging was done as well and is indeterminant, but there is definitely increase in size of the enhancement, rather than diminishment that would normally be seen by pseudoprogression.  Of note, the patient does not have any evidence of promoter methylation of MGMT enzyme.   Between clinical progression and significant radiographic progression, I have strong concern that he has recurrence of disease less than 6 months out from his gross total resection and chemoradiation.  For that reason, we will discuss at our Neuro-Oncology multidisciplinary tumor board to consider resection.  Most specifically to debulk tumor that is causing compression of the right lateral ventricle and to decrease the mass effect thereof.  In the immediate short-term, prescribed dexamethasone 4 mg b.i.d. starting today.  He will take it at 8:00 a.m. and 4:00 p.m. after having eaten some food each time.  We will discuss with our Neurosurgery colleagues whether or not it would be beneficial to proceed with repeat surgical resection.  We will investigate potential clinical trial options.  If he does have a surgical resection, I will send his tumor to [facility] to test for tumor genomic profiling to see whether or not he would be eligible for the MATCH trial.  Otherwise, we will look at small target therapy, kinase inhibitors, which is at least 1 trial that he might be eligible for.  We have the Tocagen trial but that unfortunately would not be applicable until the patient has a second recurrence and would be able to go forward with surgery at that time.  He stated understanding, as did his wife.  We will go forward and have discussions with Dr. Dominic Louis from Radiation Oncology and the Neurosurgery Team and then we will get back to the patient later this week with a more definitive plan.       I spent 30 minutes in consultation, including H&P and Discussion. >50% of time was spent in counseling and in coordination of care.    Augustus Armstrong MD, PhD

## 2018-05-01 NOTE — TELEPHONE ENCOUNTER
RESEARCH:    Left message to call my cell phone about the TOCA trial, 2017 .  Pt is scheduled to see Dr. Gracia on Friday, 5/4/18 to discuss surgery.  Would like to schedule meeting to consent any time this week.    Manisha Castellanos RN  Clinical Research Coordinator

## 2018-05-04 NOTE — LETTER
5/4/2018       RE: Seth Iglesias  [address]  [facility] [address]     Dear Colleague,    Thank you for referring your patient, Seth Iglesias, to the [facility]. Please see a copy of my visit note below.    Neurosurgery Clinic Note    Evaluation for enrollment in Toca5 trial    57 M with wtIDH glioblastoma (MGMT promoter unmethylated) s/p resection on 10/27/2017 followed by RT/TMZ with serial MRIs showing enlargement of CE+ consistent with tumor progression. The patient is referred for consideration of Toca5.    PMH: Glioblastoma, chronic back pain, ganglionic cyst, hyperlipidemia,     Current Outpatient Prescriptions   Medication     dexamethasone (DECADRON) 4 MG tablet     levETIRAcetam (KEPPRA) 1000 MG TABS     Multiple Vitamins-Minerals (MULTIVITAMIN & MINERAL PO)     pantoprazole (PROTONIX) 40 MG EC tablet     tamsulosin (FLOMAX) 0.4 MG capsule     ENBREL SURECLICK 50 MG/ML autoinjector     Ondansetron (ZOFRAN ODT PO)     prochlorperazine (COMPAZINE) 10 MG tablet     senna-docusate (SENOKOT-S;PERICOLACE) 8.6-50 MG per tablet     Temozolomide (TEMODAR) 180 MG capsule     No current facility-administered medications for this visit.      Allergies   Allergen Reactions     Nkda [No Known Drug Allergies]      /85 (BP Location: Right arm, Patient Position: Right side, Cuff Size: Adult Regular)  Pulse 80  Temp 96  F (35.6  C) (Oral)  Wt 97.3 kg (214 lb 9.6 oz)  SpO2 96%  BMI 31.69 kg/m2    Examination is non-focal. The incision is well healed.  MRI of brain from 4/19/2018 reviewed and compared to 3/15/2018. There is increased size of a solidly enhancing mass in the right temporal lobe. Measuring approximately 2.5 x 2 x 2.5 cm. The finding is consistent with tumor recurrence.  No evidence of hemorrhage or ventriculomegaly.  AP: 57 M who is an excellent candidate for the Toca5 trial. From a surgical perspective, I think that the patient can benefit from a right temporal  lobectomy. I have reviewed the details of the trial with the patient as well as the details of the surgical resection. All questions were answered. Informed consent was obtained.   >50% of the time was dedicated to counseling. The visit lasted ~45 minutes.    Again, thank you for allowing me to participate in the care of your patient.      Sincerely,    Beto Gracia MD

## 2018-05-04 NOTE — NURSING NOTE
"Oncology Rooming Note    May 4, 2018 2:20 PM   Seth Iglesias is a 57 year old male who presents for:    Chief Complaint   Patient presents with     Oncology Clinic Visit     Pt si here for a New Glioblastoma Multiforme      Initial Vitals: /85 (BP Location: Right arm, Patient Position: Right side, Cuff Size: Adult Regular)  Pulse 80  Temp 96  F (35.6  C) (Oral)  Wt 97.3 kg (214 lb 9.6 oz)  SpO2 96%  BMI 31.69 kg/m2 Estimated body mass index is 31.69 kg/(m^2) as calculated from the following:    Height as of 4/23/18: 1.753 m (5' 9\").    Weight as of this encounter: 97.3 kg (214 lb 9.6 oz). Body surface area is 2.18 meters squared.  Data Unavailable Comment: Data Unavailable   No LMP for male patient.  Allergies reviewed: Yes  Medications reviewed: Yes    Medications: Medication refills not needed today.  Pharmacy name entered into [facility]:    [id number] - [place], [place] - [id number] - [place], [place] - [address]    Clinical concerns: none     6 minutes for nursing intake (face to face time)     Denise Chen MA              "

## 2018-05-04 NOTE — NURSING NOTE
RESEARCH:    I met with the patient, spouse and son to discuss the  TOCA trial, 2017  in detail.    Risks and benefits were presented as well as a description of the screening and study procedures.  Patient and family verbalized understanding about the randomized process and of the treatment options.    Patient and family also verbalized understanding that patient can withdraw from the study at any time.  A copy of the signed consent was given to the patient as well as study RN contact information.    Patient will meet with the surgeon for more information.  RN will order and schedule study screening procedures once aware of the planned date of surgery.    Manisha Castellanos RN  Clinical Research Coordinator

## 2018-05-04 NOTE — PROGRESS NOTES
Neurosurgery Clinic Note    Evaluation for enrollment in Toca5 trial    57 M with wtIDH glioblastoma (MGMT promoter unmethylated) s/p resection on 10/27/2017 followed by RT/TMZ with serial MRIs showing enlargement of CE+ consistent with tumor progression. The patient is referred for consideration of Toca5.    PMH: Glioblastoma, chronic back pain, ganglionic cyst, hyperlipidemia,     Current Outpatient Prescriptions   Medication     dexamethasone (DECADRON) 4 MG tablet     levETIRAcetam (KEPPRA) 1000 MG TABS     Multiple Vitamins-Minerals (MULTIVITAMIN & MINERAL PO)     pantoprazole (PROTONIX) 40 MG EC tablet     tamsulosin (FLOMAX) 0.4 MG capsule     ENBREL SURECLICK 50 MG/ML autoinjector     Ondansetron (ZOFRAN ODT PO)     prochlorperazine (COMPAZINE) 10 MG tablet     senna-docusate (SENOKOT-S;PERICOLACE) 8.6-50 MG per tablet     Temozolomide (TEMODAR) 180 MG capsule     No current facility-administered medications for this visit.      Allergies   Allergen Reactions     Nkda [No Known Drug Allergies]      /85 (BP Location: Right arm, Patient Position: Right side, Cuff Size: Adult Regular)  Pulse 80  Temp 96  F (35.6  C) (Oral)  Wt 97.3 kg (214 lb 9.6 oz)  SpO2 96%  BMI 31.69 kg/m2    Examination is non-focal. The incision is well healed.  MRI of brain from 4/19/2018 reviewed and compared to 3/15/2018. There is increased size of a solidly enhancing mass in the right temporal lobe. Measuring approximately 2.5 x 2 x 2.5 cm. The finding is consistent with tumor recurrence.  No evidence of hemorrhage or ventriculomegaly.  AP: 57 M who is an excellent candidate for the Toca5 trial. From a surgical perspective, I think that the patient can benefit from a right temporal lobectomy. I have reviewed the details of the trial with the patient as well as the details of the surgical resection. All questions were answered. Informed consent was obtained.   >50% of the time was dedicated to counseling. The visit lasted  ~45 minutes.

## 2018-05-04 NOTE — MR AVS SNAPSHOT
After Visit Summary   5/4/2018    Seth Iglesias    MRN: [id number]           Patient Information     Date Of Birth          1960        Visit Information        Provider Department      5/4/2018 1:00 PM Coordinator,  Oncology Research;  [address] CON[address] [facility]        Today's Diagnoses     Glioblastoma multiforme of temporal lobe (H)    -  1    Examination of participant in clinical trial           Follow-ups after your visit        Who to contact     If you have questions or need follow up information about today's clinic visit or your schedule please contact [facility] directly at [phone].  Normal or non-critical lab and imaging results will be communicated to you by [email or web address], letter or phone within 4 business days after the clinic has received the results. If you do not hear from us within 7 days, please contact the clinic through [email or web address] or phone. If you have a critical or abnormal lab result, we will notify you by phone as soon as possible.  Submit refill requests through [employer] or call your pharmacy and they will forward the refill request to us. Please allow 3 business days for your refill to be completed.          Additional Information About Your Visit        MyChart Information     [employer] gives you secure access to your electronic health record. If you see a primary care provider, you can also send messages to your care team and make appointments. If you have questions, please call your primary care clinic.  If you do not have a primary care provider, please call [phone] and they will assist you.        Care EveryWhere ID     This is your Care EveryWhere ID. This could be used by other organizations to access your [place] medical records  [id number]         Blood Pressure from Last 3 Encounters:   05/04/18 131/85   04/23/18 123/80   04/06/18 106/76    Weight from Last 3 Encounters:   05/04/18 97.3 kg (214 lb 9.6 oz)    04/23/18 97.8 kg (215 lb 8 oz)   04/06/18 94.3 kg (208 lb)              Today, you had the following     No orders found for display       Primary Care Provider Office Phone # Fax #    R Nigel Salmon [phone] [address] [address]        Equal Access to Services     [facility]: Hadii aad ku hadasho Soomaali, waaxda luqadaha, qaybta kaalmada adeegyada, waxay idiin hayaan adeeg yuliya laJessicaaan . So [facility] [phone].    ATENCIÓN: Si habla español, tiene a granados disposición servicios gratuitos de asistencia lingüística. Armandame al [phone].    We comply with applicable federal civil rights laws and [place] laws. We do not discriminate on the basis of race, color, national origin, age, disability, sex, sexual orientation, or gender identity.            Thank you!     Thank you for choosing [facility]  for your care. Our goal is always to provide you with excellent care. Hearing back from our patients is one way we can continue to improve our services. Please take a few minutes to complete the written survey that you may receive in the mail after your visit with us. Thank you!             Your Updated Medication List - Protect others around you: Learn how to safely use, store and throw away your medicines at www.disposemymeds.org.          This list is accurate as of 5/4/18  3:30 PM.  Always use your most recent med list.                   Brand Name Dispense Instructions for use Diagnosis    dexamethasone 4 MG tablet    DECADRON    60 tablet    Take 4 mg twice daily, at 8am and 4pm    Glioblastoma multiforme of temporal lobe (H)       ENBREL SURECLICK 50 MG/ML autoinjector   Generic drug:  Etanercept           levETIRAcetam 1000 MG Tabs    KEPPRA    180 tablet    Take 1,000 mg by mouth every 12 hours    Brain mass       MULTIVITAMIN & MINERAL PO      Take 1 tablet by mouth daily        pantoprazole 40 MG EC tablet    PROTONIX    30 tablet    Take 1  tablet (40 mg) by mouth daily    Glioblastoma multiforme of temporal lobe (H)       prochlorperazine 10 MG tablet    COMPAZINE    30 tablet    Take 1 tablet (10 mg) by mouth every 6 hours as needed (Nausea/Vomiting)    Glioblastoma multiforme of temporal lobe (H)       senna-docusate 8.6-50 MG per tablet    SENOKOT-S;PERICOLACE    100 tablet    Take 1-2 tablets by mouth 2 times daily    Brain mass       tamsulosin 0.4 MG capsule    FLOMAX    30 capsule    Take 1 capsule (0.4 mg) by mouth At Bedtime    Urinary frequency       Temozolomide 180 MG capsule    TEMODAR    5 capsule    Take 1 capsule (180 mg) by mouth daily in addition to 250 mg capsule for 5 days    Glioblastoma multiforme of temporal lobe (H)       ZOFRAN ODT PO      Take 8 mg by mouth every 8 hours as needed for nausea

## 2018-05-04 NOTE — MR AVS SNAPSHOT
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  Normal or non-critical lab and imaging results will be communicated to you by Granularhart, letter or phone within 4 business days after the clinic has received the results. If you do not hear from us within 7 days, please contact the clinic through Eventifiert or phone. If you have a critical or abnormal lab result, we will notify you by phone as soon as possible.  Submit refill requests through Skyscanner or call your pharmacy and they will forward the refill request to us. Please allow 3 business days for your refill to be completed.          Additional Information About Your Visit        MyChart Information     Skyscanner gives you secure access to your electronic health record. If you see a primary care provider, you can also send messages to your care team and make appointments. If you have questions, please call your primary care clinic.  If you do not have a primary care provider, please call [phone] and they will assist you.        Care EveryWhere ID     This is your Care EveryWhere ID. This could be used by other organizations to access your [place] medical records  [id number]        Your Vitals Were     Pulse Temperature Pulse Oximetry BMI (Body Mass Index)          80 96  F (35.6  C) (Oral) 96% 31.69 kg/m2         Blood Pressure from Last 3 Encounters:   05/04/18 131/85   04/23/18 123/80   04/06/18 106/76    Weight from  Last 3 Encounters:   05/04/18 97.3 kg (214 lb 9.6 oz)   04/23/18 97.8 kg (215 lb 8 oz)   04/06/18 94.3 kg (208 lb)              Today, you had the following     No orders found for display       Primary Care Provider Office Phone # Fax #    R Nigel Salmon [phone] [address] [address]        Equal Access to Services     PERICO ESPINOZA : Hadii aad ku hadasho Soomaali, waaxda luqadaha, qaybta kaalmada adeegyada, waxay idiin hayaan adeeg kharajimmy lachris . So [facility] [phone].    ATENCIÓN: Si lorie lyle, tiene a granados disposición servicios gratuitos de asistencia lingüística. Llame al [phone].    We comply with applicable federal civil rights laws and [place] laws. We do not discriminate on the basis of race, color, national origin, age, disability, sex, sexual orientation, or gender identity.            Thank you!     Thank you for choosing [facility]  for your care. Our goal is always to provide you with excellent care. Hearing back from our patients is one way we can continue to improve our services. Please take a few minutes to complete the written survey that you may receive in the mail after your visit with us. Thank you!             Your Updated Medication List - Protect others around you: Learn how to safely use, store and throw away your medicines at www.disposemymeds.org.          This list is accurate as of 5/4/18  5:31 PM.  Always use your most recent med list.                   Brand Name Dispense Instructions for use Diagnosis    dexamethasone 4 MG tablet    DECADRON    60 tablet    Take 4 mg twice daily, at 8am and 4pm    Glioblastoma multiforme of temporal lobe (H)       ENBREL SURECLICK 50 MG/ML autoinjector   Generic drug:  Etanercept           levETIRAcetam 1000 MG Tabs    KEPPRA    180 tablet    Take 1,000 mg by mouth every 12 hours    Brain mass       MULTIVITAMIN & MINERAL PO      Take 1 tablet by mouth daily         pantoprazole 40 MG EC tablet    PROTONIX    30 tablet    Take 1 tablet (40 mg) by mouth daily    Glioblastoma multiforme of temporal lobe (H)       prochlorperazine 10 MG tablet    COMPAZINE    30 tablet    Take 1 tablet (10 mg) by mouth every 6 hours as needed (Nausea/Vomiting)    Glioblastoma multiforme of temporal lobe (H)       senna-docusate 8.6-50 MG per tablet    SENOKOT-S;PERICOLACE    100 tablet    Take 1-2 tablets by mouth 2 times daily    Brain mass       tamsulosin 0.4 MG capsule    FLOMAX    30 capsule    Take 1 capsule (0.4 mg) by mouth At Bedtime    Urinary frequency       Temozolomide 180 MG capsule    TEMODAR    5 capsule    Take 1 capsule (180 mg) by mouth daily in addition to 250 mg capsule for 5 days    Glioblastoma multiforme of temporal lobe (H)       ZOFRAN ODT PO      Take 8 mg by mouth every 8 hours as needed for nausea

## 2018-05-10 PROBLEM — Z00.6 EXAMINATION OF PARTICIPANT IN CLINICAL TRIAL: Status: ACTIVE | Noted: 2018-01-01

## 2018-05-11 NOTE — MR AVS SNAPSHOT
After Visit Summary   5/11/2018    Seth Iglesias    MRN: [id number]           Patient Information     Date Of Birth          1960        Visit Information        Provider Department      5/11/2018 11:00 AM Augustus Armstrong MD [facility]        Today's Diagnoses     Glioblastoma multiforme of temporal lobe (H)        Examination of participant in clinical trial           Follow-ups after your visit        Your next 10 appointments already scheduled     May 17, 2018   Procedure with Beto Gracia MD   [facility], [place], Same Day Surgery (--)    [address] [id number] [phone]            Jun 01, 2018  1:15 PM CDT   (Arrive by 1:00 PM)   Return Visit with Beto Gracia MD   [facility] ([facility])    [address]  [address]  [place] [address] [phone]              Who to contact     If you have questions or need follow up information about today's clinic visit or your schedule please contact [facility] directly at [phone].  Normal or non-critical lab and imaging results will be communicated to you by GameSaladhart, letter or phone within 4 business days after the clinic has received the results. If you do not hear from us within 7 days, please contact the clinic through IZI-collectet or phone. If you have a critical or abnormal lab result, we will notify you by phone as soon as possible.  Submit refill requests through An Estuary or call your pharmacy and they will forward the refill request to us. Please allow 3 business days for your refill to be completed.          Additional Information About Your Visit        GameSaladhart Information     An Estuary gives you secure access to your electronic health record. If you see a primary care provider, you can also send messages to your care team and make appointments. If you have questions, please call your primary care clinic.  If you do  "not have a primary care provider, please call [phone] and they will assist you.        Care EveryWhere ID     This is your Care EveryWhere ID. This could be used by other organizations to access your [place] medical records  [id number]        Your Vitals Were     Pulse Temperature Respirations Height Pulse Oximetry BMI (Body Mass Index)    71 96.9  F (36.1  C) (Oral) 16 1.765 m (5' 9.49\") 96% 31.2 kg/m2       Blood Pressure from Last 3 Encounters:   05/11/18 130/86   05/11/18 130/86   05/04/18 131/85    Weight from Last 3 Encounters:   05/11/18 97.2 kg (214 lb 4.8 oz)   05/11/18 97.2 kg (214 lb 4.8 oz)   05/04/18 97.3 kg (214 lb 9.6 oz)              We Performed the Following     CBC with platelets differential     Comprehensive metabolic panel     EBV Antibody to Early Antigen IgG     EBV Nuclear Antigen EBNA Antibody IgG     Hepatitis B core antibody     Hepatitis B Surface Antibody     Hepatitis B surface antigen     Hepatitis C antibody     HIV Antigen Antibody Combo     Lactate Dehydrogenase     Research Lab     UA with Microscopic     Uric acid          Today's Medication Changes          These changes are accurate as of 5/11/18 11:59 PM.  If you have any questions, ask your nurse or doctor.               Stop taking these medicines if you haven't already. Please contact your care team if you have questions.     prochlorperazine 10 MG tablet   Commonly known as:  COMPAZINE   Stopped by:  Augustus Armstrong MD           Temozolomide 180 MG capsule   Commonly known as:  TEMODAR   Stopped by:  Augustus Arsmtrong MD                    Primary Care Provider Office Phone # Fax #    R Nigel Salmon [phone] [address] [address] [address]        Equal Access to Services     [facility]: Hadii vero Carrasco, waaxda lugeorgiaadaha, qaybta kaalmahéctor poe. So [facility] [phone].    ATENCIÓN: Si habla español, tiene a granados disposición servicios " yamile de asistencia lingüística. Rodney lopez [phone].    We comply with applicable federal civil rights laws and [place] laws. We do not discriminate on the basis of race, color, national origin, age, disability, sex, sexual orientation, or gender identity.            Thank you!     Thank you for choosing [facility]  for your care. Our goal is always to provide you with excellent care. Hearing back from our patients is one way we can continue to improve our services. Please take a few minutes to complete the written survey that you may receive in the mail after your visit with us. Thank you!             Your Updated Medication List - Protect others around you: Learn how to safely use, store and throw away your medicines at www.disposemymeds.org.          This list is accurate as of 5/11/18 11:59 PM.  Always use your most recent med list.                   Brand Name Dispense Instructions for use Diagnosis    dexamethasone 4 MG tablet    DECADRON    60 tablet    Take 4 mg twice daily, at 8am and 4pm    Glioblastoma multiforme of temporal lobe (H)       ENBREL SURECLICK 50 MG/ML autoinjector   Generic drug:  Etanercept           levETIRAcetam 1000 MG Tabs    KEPPRA    180 tablet    Take 1,000 mg by mouth every 12 hours    Brain mass       MULTIVITAMIN & MINERAL PO      Take 1 tablet by mouth daily        pantoprazole 40 MG EC tablet    PROTONIX    30 tablet    Take 1 tablet (40 mg) by mouth daily    Glioblastoma multiforme of temporal lobe (H)       senna-docusate 8.6-50 MG per tablet    SENOKOT-S;PERICOLACE    100 tablet    Take 1-2 tablets by mouth 2 times daily    Brain mass       tamsulosin 0.4 MG capsule    FLOMAX    30 capsule    Take 1 capsule (0.4 mg) by mouth At Bedtime    Urinary frequency       ZOFRAN ODT PO      Take 8 mg by mouth every 8 hours as needed for nausea

## 2018-05-11 NOTE — NURSING NOTE
Performed EKG and vital signs on pt in clinic for research. Please see flowsheet for results. Keira Velez, CMRA

## 2018-05-11 NOTE — MR AVS SNAPSHOT
After Visit Summary   5/11/2018    Seth Iglesias    MRN: [id number]           Patient Information     Date Of Birth          1960        Visit Information        Provider Department      5/11/2018 10:00 AM Coordinator,  Oncology Research;  [address] CONSULT [facility]        Today's Diagnoses     Glioblastoma multiforme of temporal lobe (H)    -  1    Examination of participant in clinical trial           Follow-ups after your visit        Your next 10 appointments already scheduled     May 11, 2018 11:00 AM CDT   (Arrive by 10:45 AM)   Return Visit with Augustus Armstrong MD   [facility] ([facility])    [address]  [address]  [place] [address]   [phone]            May 11, 2018 12:30 PM CDT   [facility] Lab Draw with [facility] DRAW   [facility] Lab Draw ([facility])    [address]  [address]  [place] [address] [phone]            May 11, 2018  1:00 PM CDT   MR BRAIN W/O & W CONTRAST with [id number]   [facility] MRI ([facility])    [address]  1[address]  [place] [address] [phone]           Take your medicines as usual, unless your doctor tells you not to. Bring a list of your current medicines to your exam (including vitamins, minerals and over-the-counter drugs).  You may or may not receive intravenous (IV) contrast for this exam pending the discretion of the Radiologist.  You do not need to do anything special to prepare.  The MRI machine uses a strong magnet. Please wear clothes without metal (snaps, zippers). A sweatsuit works well, or we may give you a hospital gown.  Please remove any body piercings and hair extensions before you arrive. You will also remove watches, jewelry, hairpins, wallets, dentures, partial dental plates and hearing aids. You may wear contact lenses, and you may be able  to wear your rings. We have a safe place to keep your personal items, but it is safer to leave them at home.  **IMPORTANT** THE INSTRUCTIONS BELOW ARE ONLY FOR THOSE PATIENTS WHO HAVE BEEN PRESCRIBED SEDATION OR GENERAL ANESTHESIA DURING THEIR MRI PROCEDURE:  IF YOUR DOCTOR PRESCRIBED ORAL SEDATION (take medicine to help you relax during your exam):   You must get the medicine from your doctor (oral medication) before you arrive. Bring the medicine to the exam. Do not take it at home. You ll be told when to take it upon arriving for your exam.   Arrive one hour early. Bring someone who can take you home after the test. Your medicine will make you sleepy. After the exam, you may not drive, take a bus or take a taxi by yourself.  IF YOUR DOCTOR PRESCRIBED IV SEDATION:   Arrive one hour early. Bring someone who can take you home after the test. Your medicine will make you sleepy. After the exam, you may not drive, take a bus or take a taxi by yourself.   No eating 6 hours before your exam. You may have clear liquids up until 4 hours before your exam. (Clear liquids include water, clear tea, black coffee and fruit juice without pulp.)  IF YOUR DOCTOR PRESCRIBED ANESTHESIA (be asleep for your exam):   Arrive 1 1/2 hours early. Bring someone who can take you home after the test. You may not drive, take a bus or take a taxi by yourself.   No eating 8 hours before your exam. You may have clear liquids up until 4 hours before your exam. (Clear liquids include water, clear tea, black coffee and fruit juice without pulp.)   You will spend four to five hours in the recovery room.  Please call the Imaging Department at your exam site with any questions.            May 17, 2018   Procedure with Beto Gracia MD   [facility], [place], Same Day Surgery (--)    [address] [id number] [phone]              Future tests that were ordered for you today     Open Future Orders        Priority Expected Expires  Ordered    UA with Microscopic Routine 5/11/2018 5/15/2018 5/10/2018    Lactate Dehydrogenase Routine 5/11/2018 5/15/2018 5/10/2018    Uric acid Routine 5/11/2018 5/15/2018 5/10/2018    HIV Antigen Antibody Combo Routine 5/11/2018 5/15/2018 5/10/2018    EBV Antibody to Early Antigen IgG Routine 5/11/2018 5/15/2018 5/10/2018    EBV Nuclear Antigen EBNA Antibody IgG Routine 5/11/2018 5/15/2018 5/10/2018    Hepatitis B Surface Antibody Routine 5/11/2018 5/15/2018 5/10/2018    Hepatitis B core antibody Routine 5/11/2018 5/15/2018 5/10/2018    Hepatitis B surface antigen Routine 5/11/2018 5/15/2018 5/10/2018    Hepatitis C antibody Routine 5/11/2018 5/15/2018 5/10/2018    Research Lab Routine 5/11/2018 5/15/2018 5/10/2018    CBC with platelets differential Routine 5/11/2018 5/15/2018 5/10/2018    Comprehensive metabolic panel Routine 5/11/2018 5/15/2018 5/10/2018    MRI Brain w & w/o contrast Routine 5/11/2018 8/8/2018 5/10/2018            Who to contact     If you have questions or need follow up information about today's clinic visit or your schedule please contact [facility] directly at [phone].  Normal or non-critical lab and imaging results will be communicated to you by [place] second hand information companyhart, letter or phone within 4 business days after the clinic has received the results. If you do not hear from us within 7 days, please contact the clinic through Safaricrosst or phone. If you have a critical or abnormal lab result, we will notify you by phone as soon as possible.  Submit refill requests through DivvyDown or call your pharmacy and they will forward the refill request to us. Please allow 3 business days for your refill to be completed.          Additional Information About Your Visit        [place] second hand information companyhart Information     DivvyDown gives you secure access to your electronic health record. If you see a primary care provider, you can also send messages to your care team and make appointments. If you have questions, please  "call your primary care clinic.  If you do not have a primary care provider, please call [phone] and they will assist you.        Care EveryWhere ID     This is your Care EveryWhere ID. This could be used by other organizations to access your [place] medical records  [id number]        Your Vitals Were     Pulse Temperature Respirations Height Pulse Oximetry BMI (Body Mass Index)    71 96.9  F (36.1  C) (Oral) 16 1.765 m (5' 9.5\") 96% 31.19 kg/m2       Blood Pressure from Last 3 Encounters:   05/11/18 130/86   05/04/18 131/85   04/23/18 123/80    Weight from Last 3 Encounters:   05/11/18 97.2 kg (214 lb 4.8 oz)   05/04/18 97.3 kg (214 lb 9.6 oz)   04/23/18 97.8 kg (215 lb 8 oz)              We Performed the Following     EKG 12-LEAD CLINIC READ        Primary Care Provider Office Phone # Fax #    R Nigel Salmon [phone] [address] [address]        Equal Access to Services     [facility] : Hadii vero qureshi hadasho Soneha, waaxda luqadaha, qaybta kaalmada singh, héctor rand . So [facility] [phone].    ATENCIÓN: Si habla español, tiene a granados disposición servicios gratuitos de asistencia lingüística. [facility] [phone].    We comply with applicable federal civil rights laws and [place] laws. We do not discriminate on the basis of race, color, national origin, age, disability, sex, sexual orientation, or gender identity.            Thank you!     Thank you for choosing [facility]  for your care. Our goal is always to provide you with excellent care. Hearing back from our patients is one way we can continue to improve our services. Please take a few minutes to complete the written survey that you may receive in the mail after your visit with us. Thank you!             Your Updated Medication List - Protect others around you: Learn how to safely use, store and throw away your medicines at www.Gradeableeds.org.    "       This list is accurate as of 5/11/18 10:53 AM.  Always use your most recent med list.                   Brand Name Dispense Instructions for use Diagnosis    dexamethasone 4 MG tablet    DECADRON    60 tablet    Take 4 mg twice daily, at 8am and 4pm    Glioblastoma multiforme of temporal lobe (H)       ENBREL SURECLICK 50 MG/ML autoinjector   Generic drug:  Etanercept           levETIRAcetam 1000 MG Tabs    KEPPRA    180 tablet    Take 1,000 mg by mouth every 12 hours    Brain mass       MULTIVITAMIN & MINERAL PO      Take 1 tablet by mouth daily        pantoprazole 40 MG EC tablet    PROTONIX    30 tablet    Take 1 tablet (40 mg) by mouth daily    Glioblastoma multiforme of temporal lobe (H)       prochlorperazine 10 MG tablet    COMPAZINE    30 tablet    Take 1 tablet (10 mg) by mouth every 6 hours as needed (Nausea/Vomiting)    Glioblastoma multiforme of temporal lobe (H)       senna-docusate 8.6-50 MG per tablet    SENOKOT-S;PERICOLACE    100 tablet    Take 1-2 tablets by mouth 2 times daily    Brain mass       tamsulosin 0.4 MG capsule    FLOMAX    30 capsule    Take 1 capsule (0.4 mg) by mouth At Bedtime    Urinary frequency       Temozolomide 180 MG capsule    TEMODAR    5 capsule    Take 1 capsule (180 mg) by mouth daily in addition to 250 mg capsule for 5 days    Glioblastoma multiforme of temporal lobe (H)       ZOFRAN ODT PO      Take 8 mg by mouth every 8 hours as needed for nausea

## 2018-05-11 NOTE — NURSING NOTE
"Oncology Rooming Note    May 11, 2018 11:09 AM   Seth Iglesias is a 57 year old male who presents for:    Chief Complaint   Patient presents with     Oncology Clinic Visit     Glioblastoma f/u     Initial Vitals: /86  Pulse 71  Temp 96.9  F (36.1  C) (Oral)  Resp 16  Ht 1.765 m (5' 9.49\")  Wt 97.2 kg (214 lb 4.8 oz)  SpO2 96%  BMI 31.2 kg/m2 Estimated body mass index is 31.2 kg/(m^2) as calculated from the following:    Height as of this encounter: 1.765 m (5' 9.49\").    Weight as of this encounter: 97.2 kg (214 lb 4.8 oz). Body surface area is 2.18 meters squared.  Data Unavailable Comment: Data Unavailable   No LMP for male patient.  Allergies reviewed: Yes  Medications reviewed: Yes    Medications: Medication refills not needed today.  Pharmacy name entered into [facility]:    [id number] - [place], [place] - [address]  [id number] [place] [place] [facility] [address]    Clinical concerns: no new concerns     6 minutes for nursing intake (face to face time)     Kizyz Austin CMA              "

## 2018-05-11 NOTE — DISCHARGE INSTRUCTIONS
MRI Contrast Discharge Instructions    The IV contrast you received today will pass out of your body in your  urine. This will happen in the next 24 hours. You will not feel this process.  Your urine will not change color.    Drink at least 4 extra glasses of water or juice today (unless your doctor  has restricted your fluids). This reduces the stress on your kidneys.  You may take your regular medicines.    If you are on dialysis: It is best to have dialysis today.    If you have a reaction: Most reactions happen right away. If you have  any new symptoms after leaving the hospital (such as hives or swelling),  call your hospital at the correct number below. Or call your family doctor.  If you have breathing distress or wheezing, call 911.    Special instructions: ***    I have read and understand the above information.    Signature:______________________________________ Date:___________    Staff:__________________________________________ Date:___________     Time:__________    [facility] Departments:    ___[place]: [phone]  ___[facility]: [phone]  ___[place]: [phone] ___[facility]: [phone]  ___[facility]: [phone]  ___[facility]: [phone]  ___[phone]  ___[facility]: [phone]  ___[phone]

## 2018-05-11 NOTE — LETTER
5/11/2018       RE: Seth Iglesias  [id number]  [facility] [address]     Dear Colleague,    Thank you for referring your patient, Seth Iglesias, to the [facility]. Please see a copy of my visit note below.    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 MGMT promoter methylation was absent.    Treatment to date: status post gross total resection 10/3/17; initiated concurrent chemoradiation with temozolomide on 11/13/17 and completed 12/22/17. Adjuvant temozolomide and Optune device started on 1/22/18.     Referring physicians:  Dr. Harry Wiggins and Dr. Beto Gracia, Neurosurgical Service, [facility]  Dr. Dominic Louis, radiation oncology, [facility]    Oncology History:   Seth presented in September 2017 with dizzy spells, headache, and increasing somnolence. He was admitted on 9/28/17 after imaging in ED found a right temporal lobe mass. He went on to have gross total resection on 10/3/17.   10/27/17 - - neuro-oncology/medical oncology consultation, Dr. Armstrong.  11/12/17 through December 22, 2017 adjuvant treatment with concurrent chemoradiation with temozolomide. 6000 cGy, and 30 fractions, standard 200 cGy per fraction  1/19/18 - - brain MRI: Impression: 1. 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 Recommend attention on follow-up. Postsurgical changes of prior resection of right temporal glioblastoma. Resolution of right frontotemporal subdural hematoma and T2 hyperintensity along the right internal capsule and commisural white matter.    1/22/18 - - oncology/medical oncology follow-up, Dr. Armstrong. Plan: initiate adjuvant five day temozolomide and OPTune  device. Cycle 1/day one of temozolomide, 150 mg/m  on days one through five of each 28 day cycle.    2/20/18 - - oncology follow-up, SCARLETT Hillman. Cycle 2/day one of five day temozolomide. Dose increased to 200 mg/m .    3/15/18 - - brain MRI: Impression: In this patient with glioblastoma multiforme status post gross total resection, chemoradiation and adjuvant Temodar and Optune, there is increased nodular contrast enhancement and T2 hyperintensity at the margins of the right temporal resection cavity. Perfusion characteristics suggestive of tumor recurrence, particularly medially.    3/19/18 - - neuro-oncology/medical oncology follow-up, Dr. Armstrong.    4/6/18 - - neurology follow-up, Dr. Boyer. plan to continue Kera     4/19/18 MRI brain   1. Increased size of enhancing mass and surrounding vasogenic edema in  the right temporal lobe, which is suggestive of recurrence of  high-grade tumor, while the MR perfusion and diffusion imaging  characteristics are indeterminate for recurrent tumor (described in  detail above). Therefore, recommend shorter term follow-up study such  as one month.  2. Increasing mass effect has caused further compression of the right  lateral ventricle. No hydrocephalus at this time.    4/23/18 -- neuro-oncology follow-up, Dr. Armstrong  Consensus of multidisciplinary neuro-oncology tumor board: MRI consistent with progression of disease while on Optune and Temozolomide.  5/4/18-neurosurgical consultation with Dr. Beto Gracia re: TOCA trial  5/11/18--neuro-oncology follow-up, Dr. Armstrong      Interim History:  Mr. Iglesias returns today in the company of his wife.  I am asked to see him as an add on to enrollment in the Tocagen trial.  Briefly, as above, when I last met with Mr. Iglesias a few weeks ago, we reviewed his MRI.  It was a short-term 4-week followup with concern for pseudoprogression versus actual true progression of disease while on treatment first-line adjuvant temozolomide and the  Optune device.  Unfortunately, there were findings of further enhancement as noted above in detail.  This is consistent with progression of disease.      The patient's case was presented again at our Multidisciplinary Neuro-Oncology Tumor Board.  I personally sent the case further to Dr. Beto Gracia from our Neurosurgical team.  He is the PI of a national trial with [facility], incorporating surgery and randomization to an oncolytic viral  treatment with fluorocytasine therapy.  Dr. Gracia kindly met with the patient in consultation last week.  The patient opted to enroll into the trial and is scheduled for surgery next Thursday, which would be 05/17/2018.  Today's visit is an add-on visit in order to perform a final assessment from an oncologic standpoint prior to proceeding to surgery and fully enrolling on the trial.  He is also being sent to the clinical trial team to further complete eligibility criteria.  He is generally feeling well.  He denies any new neurologic complaints or blurred vision or other issues.  His wife states he is actually walking the dog and trying to maintain conditioning going into surgery.             Review Of Systems:  complete ROS reviewed. Pertinent symptoms reviewed above per HPI.    PAST MEDICAL HISTORY:   1.  Glioblastoma, status post gross total resection on 10/03/2017 of the right temporal lobe preceded and indicated by some seizure-like activity without loss of consciousness.   2.  Chronic back pain.   3.  Ganglionic cysts.   4.  Hyperlipidemia.   5.  He has psoriasis and was taking what sounds like a TNF and an alpha inhibitor up until the summer of 2017.       PAST SURGICAL HISTORY:     1.  The aforementioned right temporal craniotomy for gross total resection of right temporal glioblastoma done 10/03/2017 with Dr. Harry Wiggins at the [facility].   2.  Colonoscopy performed 04/04/2011, unremarkable.       FAMILY HISTORY:  He had a Father and 2 paternal uncles who had  "prostate cancer.  His mother had some sort of \"tumor of the rib,\" and  at age 54 of this unknown cancer.       SOCIAL HISTORY:  The patient lives in [place], [place].  He is accompanied by his wife, who is extremely supportive.  They have 3 sons ages 18, 23 and 25.  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  He has since then been working for [employer], about 27 miles from his home.    Tobacco:  Never smoker.    Allergies:none    Current Medications:   Current Outpatient Prescriptions   Medication     dexamethasone (DECADRON) 4 MG tablet     levETIRAcetam (KEPPRA) 1000 MG TABS     Multiple Vitamins-Minerals (MULTIVITAMIN & MINERAL PO)     pantoprazole (PROTONIX) 40 MG EC tablet     tamsulosin (FLOMAX) 0.4 MG capsule     ENBREL SURECLICK 50 MG/ML autoinjector     Ondansetron (ZOFRAN ODT PO)     prochlorperazine (COMPAZINE) 10 MG tablet     senna-docusate (SENOKOT-S;PERICOLACE) 8.6-50 MG per tablet     Temozolomide (TEMODAR) 180 MG capsule     No current facility-administered medications for this visit.          Physical Exam:  /86  Pulse 71  Temp 96.9  F (36.1  C) (Oral)  Resp 16  Ht 1.765 m (5' 9.49\")  Wt 97.2 kg (214 lb 4.8 oz)  SpO2 96%  BMI 31.2 kg/m2  PHYSICAL EXAMINATION:     GENERAL:  Very pleasant middle-aged  gentleman who appears in no acute distress, alert and oriented x3.  Speech is fluent.  He does not have any evidence of receptive or expressive aphasia.   HEENT:  Anicteric sclerae.  Oropharynx is without evidence of mucositis or thrush.  He has alopecia due to the Optune device and pads being on his scalp.  His uvula is midline.   CARDIOVASCULAR:  Regular rate and rhythm, normal S1, S2.  No murmurs, gallops or rubs.   LUNGS:  Clear to auscultation throughout.   ABDOMEN:  Protuberant but soft.  No palpable masses, nontender and nondistended.   EXTREMITIES:  No lower extremity " edema noted.     NEUROLOGIC:  Cranial nerves II-XII grossly intact.  Motor strength was 5/5 throughout and symmetric.  Sensation is grossly intact throughout.  No evidence of cerebellar signs, as finger-to-nose is unremarkable without tremor.  No dysdiadochokinesia.  No evidence of visual field abnormalities.  His gait is unremarkable as normal.  Negative Romberg sign.           Laboratory/Imaging Studies  Results for orders placed or performed during the hospital encounter of 04/19/18   MRI Brain w & w/o contrast    Narrative    Brain MRI without and with contrast  MR perfusion with contrast    History: glioblastoma s/p resection, adjuvant chemo/rads, and now  chemotherapy plus Optune device; Glioblastoma multiforme of temporal  lobe (H).  ICD-10: Glioblastoma multiforme of temporal lobe (H)  Comparison: 3/15/2018    Technique: Axial T1-weighted, axial FLAIR and susceptibility images  were obtained without intravenous contrast. Following intravenous  gadolinium-based contrast administration, axial T2-weighted,  diffusion, and T1-weighted images (in multiple planes) were obtained.  Contrast: 10ml Gadavist    Findings: As in the prior examination, there are findings of prior  right temporal craniotomy, with a large area of edema surrounding the  right anterior temporal cavity, which has markedly increased in T2  bright extent on FLAIR images since the examination one month earlier.  m the contrast enhancing mass has an irregular rim of enhancement, and  grossly measures 3.0 x 3.1 x 3.5 cm, without overt reduced diffusion,  and with small amount of hemorrhage within the enhancing lesion, 8 mm  diameter. Previously, this mass measured 2.6 x 1.9 x 2.6 cm, and there  was rim enhancement along the fluid anterior to the right temporal  pole at the resection site. Therefore, the overall size of this  enhancing lesion has increased, as well as the irregular enhancement  along the edge of the cavity, which are suspicious for  residual tumor.    Slightly increased compression of the right lateral ventricle, without  hydrocephalus. The basal cisterns are patent. Dural enhancement along  the right frontal temporal convexity is mild and likely related to  prior surgery and grossly unchanged. There is no change in the amount  of hemorrhage within the resection site. On T2-weighted imaging, the  major intracranial vascular flow-voids are grossly patent, while the  mass surrounding the right sylvian fissure does surround the right  middle cerebral artery, although it has patent flow voids.    Dedicated MR perfusion imaging at the 3-D workstation demonstrates  increased relative CBV and CBF, and peak height being 2 times  normal-appearing white matter more superiorly, which is indeterminate  for residual high-grade tumor, and could be related to presence of  hemorrhage or necrosis.      Impression    Impression:  1. Increased size of enhancing mass and surrounding vasogenic edema in  the right temporal lobe, which is suggestive of recurrence of  high-grade tumor, while the MR perfusion and diffusion imaging  characteristics are indeterminate for recurrent tumor (described in  detail above). Therefore, recommend shorter term follow-up study such  as one month.  2. Increasing mass effect has caused further compression of the right  lateral ventricle. No hydrocephalus at this time.    3D imaging obtained by the radiologist, using the 3D workstation.  I personally participated in the 3D reconstruction process and  interpretation of the final, archived 3D images on an independent  workstation.    MARY GALLAGHER MD             ASSESSMENT/PLAN:  Seth Iglesias is a 58 y/o man with right temporal glioblastoma (WHO grade IV). He had gross total resection on 10/3/17 and completed adjuvant chemorads on 12/22/17. His 4 week post-treatment brain MRI showed areas of enhancement around the resection cavity thought to represent radiation necrosis rather  than recurrence.     He then started temozolomide maintenance in January 2018, at a dose of 150 mg/m2 for cycle 1. This was increased to 200 mg/m2 for cycle 2. He also started the Optune device in January 2018. The report from [facility] in January showed a 79% compliance rate.     With progression of disease now with fewer than 5 cycles of adjuvant temozolomide and Optune, we will discontinue that therapy.  [facility] as per above.  He is scheduled for surgery next Thursday, which is 6 days from now.  We will follow the protocol.  I will look forward to seeing him back approximately 6 weeks postoperative or otherwise as indicated by the protocol.     I spent 30 minutes in consultation, including H&P and Discussion. >50% of time was spent in counseling and in coordination of care.    Augustus Armstorng MD, PhD

## 2018-05-11 NOTE — PROGRESS NOTES
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 MGMT promoter methylation was absent.    Treatment to date: status post gross total resection 10/3/17; initiated concurrent chemoradiation with temozolomide on 11/13/17 and completed 12/22/17. Adjuvant temozolomide and Optune device started on 1/22/18.     Referring physicians:  Dr. Harry Wiggins and Dr. Beto Gracia, Neurosurgical Service, [facility]  Dr. Dominic Louis, radiation oncology, [facility]    Oncology History:   Seth presented in September 2017 with dizzy spells, headache, and increasing somnolence. He was admitted on 9/28/17 after imaging in ED found a right temporal lobe mass. He went on to have gross total resection on 10/3/17.   10/27/17 - - neuro-oncology/medical oncology consultation, Dr. Armstrong.  11/12/17 through December 22, 2017 adjuvant treatment with concurrent chemoradiation with temozolomide. 6000 cGy, and 30 fractions, standard 200 cGy per fraction  1/19/18 - - brain MRI: Impression: 1. 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 Recommend attention on follow-up. Postsurgical changes of prior resection of right temporal glioblastoma. Resolution of right frontotemporal subdural hematoma and T2 hyperintensity along the right internal capsule and commisural white matter.    1/22/18 - - oncology/medical oncology follow-up, Dr. Armstrong. Plan: initiate adjuvant five day temozolomide and OPTune device. Cycle 1/day one of temozolomide, 150 mg/m  on days one through five of each 28 day cycle.    2/20/18 - - oncology follow-up, SCARLETT Hillman. Cycle 2/day one of five day temozolomide. Dose increased to 200 mg/m .    3/15/18 - - brain MRI:  Impression: In this patient with glioblastoma multiforme status post gross total resection, chemoradiation and adjuvant Temodar and Optune, there is increased nodular contrast enhancement and T2 hyperintensity at the margins of the right temporal resection cavity. Perfusion characteristics suggestive of tumor recurrence, particularly medially.    3/19/18 - - neuro-oncology/medical oncology follow-up, Dr. Armstrong.    4/6/18 - - neurology follow-up, Dr. Boyer. plan to continue [facility]     4/19/18 MRI brain   1. Increased size of enhancing mass and surrounding vasogenic edema in  the right temporal lobe, which is suggestive of recurrence of  high-grade tumor, while the MR perfusion and diffusion imaging  characteristics are indeterminate for recurrent tumor (described in  detail above). Therefore, recommend shorter term follow-up study such  as one month.  2. Increasing mass effect has caused further compression of the right  lateral ventricle. No hydrocephalus at this time.    4/23/18 -- neuro-oncology follow-up, Dr. Armstrong  Consensus of multidisciplinary neuro-oncology tumor board: MRI consistent with progression of disease while on Optune and Temozolomide.  5/4/18-neurosurgical consultation with Dr. Beto Gracia re: TOCA trial  5/11/18--neuro-oncology follow-up, Dr. Armstrong      Interim History:  Mr. Iglesias returns today in the company of his wife.  I am asked to see him as an add on to enrollment in the Tocagen trial.  Briefly, as above, when I last met with Mr. Iglesias a few weeks ago, we reviewed his MRI.  It was a short-term 4-week followup with concern for pseudoprogression versus actual true progression of disease while on treatment first-line adjuvant temozolomide and the Optune device.  Unfortunately, there were findings of further enhancement as noted above in detail.  This is consistent with progression of disease.      The patient's case was presented again at our Multidisciplinary Neuro-Oncology Tumor Board.  I  "personally sent the case further to Dr. Beto Gracia from our Neurosurgical team.  He is the PI of a national trial with [facility], incorporating surgery and randomization to an oncolytic viral  treatment with fluorocytasine therapy.  Dr. Gracia kindly met with the patient in consultation last week.  The patient opted to enroll into the trial and is scheduled for surgery next Thursday, which would be 2018.  Today's visit is an add-on visit in order to perform a final assessment from an oncologic standpoint prior to proceeding to surgery and fully enrolling on the trial.  He is also being sent to the clinical trial team to further complete eligibility criteria.  He is generally feeling well.  He denies any new neurologic complaints or blurred vision or other issues.  His wife states he is actually walking the dog and trying to maintain conditioning going into surgery.             Review Of Systems:  complete ROS reviewed. Pertinent symptoms reviewed above per HPI.    PAST MEDICAL HISTORY:   1.  Glioblastoma, status post gross total resection on 10/03/2017 of the right temporal lobe preceded and indicated by some seizure-like activity without loss of consciousness.   2.  Chronic back pain.   3.  Ganglionic cysts.   4.  Hyperlipidemia.   5.  He has psoriasis and was taking what sounds like a TNF and an alpha inhibitor up until the summer of .       PAST SURGICAL HISTORY:     1.  The aforementioned right temporal craniotomy for gross total resection of right temporal glioblastoma done 10/03/2017 with Dr. Harry Wiggins at the [facility].   2.  Colonoscopy performed 2011, unremarkable.       FAMILY HISTORY:  He had a Father and 2 paternal uncles who had prostate cancer.  His mother had some sort of \"tumor of the rib,\" and  at age 54 of this unknown cancer.       SOCIAL HISTORY:  The patient lives in [place], [place].  He is accompanied by his wife, who is extremely supportive.  They have 3 " "sons ages 18, 23 and 25.  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  He has since then been working for [employer], about 27 miles from his home.    Tobacco:  Never smoker.    Allergies:none    Current Medications:   Current Outpatient Prescriptions   Medication     dexamethasone (DECADRON) 4 MG tablet     levETIRAcetam (KEPPRA) 1000 MG TABS     Multiple Vitamins-Minerals (MULTIVITAMIN & MINERAL PO)     pantoprazole (PROTONIX) 40 MG EC tablet     tamsulosin (FLOMAX) 0.4 MG capsule     ENBREL SURECLICK 50 MG/ML autoinjector     Ondansetron (ZOFRAN ODT PO)     prochlorperazine (COMPAZINE) 10 MG tablet     senna-docusate (SENOKOT-S;PERICOLACE) 8.6-50 MG per tablet     Temozolomide (TEMODAR) 180 MG capsule     No current facility-administered medications for this visit.          Physical Exam:  /86  Pulse 71  Temp 96.9  F (36.1  C) (Oral)  Resp 16  Ht 1.765 m (5' 9.49\")  Wt 97.2 kg (214 lb 4.8 oz)  SpO2 96%  BMI 31.2 kg/m2  KPS 90     GENERAL:  Very pleasant middle-aged  gentleman who appears in no acute distress, alert and oriented x3.  Speech is fluent.  He does not have any evidence of receptive or expressive aphasia.   HEENT:  Anicteric sclerae.  Oropharynx is without evidence of mucositis or thrush.  He has alopecia due to the Optune device and pads being on his scalp.  His uvula is midline.   NODES: no palpable pre- or post-auricular, supraclavicular, anterior or posterior cervical chain, or axillary nodes.  CARDIOVASCULAR:  Regular rate and rhythm, normal S1, S2.  No murmurs, gallops or rubs.   LUNGS:  Clear to auscultation throughout.   ABDOMEN:  Protuberant but soft.  No palpable masses, nontender and nondistended.   EXTREMITIES:  No lower extremity edema noted.     NEUROLOGIC:  Cranial nerves II-XII grossly intact.  Motor strength was 5/5 throughout and symmetric.  Sensation is grossly intact " throughout.  No evidence of cerebellar signs, as finger-to-nose is unremarkable without tremor.  No dysdiadochokinesia.  No evidence of visual field abnormalities.  His gait is unremarkable as normal.  Negative Romberg sign.           Laboratory/Imaging Studies  Results for orders placed or performed during the hospital encounter of 04/19/18   MRI Brain w & w/o contrast    Narrative    Brain MRI without and with contrast  MR perfusion with contrast    History: glioblastoma s/p resection, adjuvant chemo/rads, and now  chemotherapy plus Optune device; Glioblastoma multiforme of temporal  lobe (H).  ICD-10: Glioblastoma multiforme of temporal lobe (H)  Comparison: 3/15/2018    Technique: Axial T1-weighted, axial FLAIR and susceptibility images  were obtained without intravenous contrast. Following intravenous  gadolinium-based contrast administration, axial T2-weighted,  diffusion, and T1-weighted images (in multiple planes) were obtained.  Contrast: 10ml Gadavist    Findings: As in the prior examination, there are findings of prior  right temporal craniotomy, with a large area of edema surrounding the  right anterior temporal cavity, which has markedly increased in T2  bright extent on FLAIR images since the examination one month earlier.  m the contrast enhancing mass has an irregular rim of enhancement, and  grossly measures 3.0 x 3.1 x 3.5 cm, without overt reduced diffusion,  and with small amount of hemorrhage within the enhancing lesion, 8 mm  diameter. Previously, this mass measured 2.6 x 1.9 x 2.6 cm, and there  was rim enhancement along the fluid anterior to the right temporal  pole at the resection site. Therefore, the overall size of this  enhancing lesion has increased, as well as the irregular enhancement  along the edge of the cavity, which are suspicious for residual tumor.    Slightly increased compression of the right lateral ventricle, without  hydrocephalus. The basal cisterns are patent. Dural  enhancement along  the right frontal temporal convexity is mild and likely related to  prior surgery and grossly unchanged. There is no change in the amount  of hemorrhage within the resection site. On T2-weighted imaging, the  major intracranial vascular flow-voids are grossly patent, while the  mass surrounding the right sylvian fissure does surround the right  middle cerebral artery, although it has patent flow voids.    Dedicated MR perfusion imaging at the 3-D workstation demonstrates  increased relative CBV and CBF, and peak height being 2 times  normal-appearing white matter more superiorly, which is indeterminate  for residual high-grade tumor, and could be related to presence of  hemorrhage or necrosis.      Impression    Impression:  1. Increased size of enhancing mass and surrounding vasogenic edema in  the right temporal lobe, which is suggestive of recurrence of  high-grade tumor, while the MR perfusion and diffusion imaging  characteristics are indeterminate for recurrent tumor (described in  detail above). Therefore, recommend shorter term follow-up study such  as one month.  2. Increasing mass effect has caused further compression of the right  lateral ventricle. No hydrocephalus at this time.    3D imaging obtained by the radiologist, using the 3D workstation.  I personally participated in the 3D reconstruction process and  interpretation of the final, archived 3D images on an independent  workstation.    MARY GALLAGHER MD             ASSESSMENT/PLAN:  Seth Iglesias is a 56 y/o man with right temporal glioblastoma (WHO grade IV). He had gross total resection on 10/3/17 and completed adjuvant chemorads on 12/22/17. His 4 week post-treatment brain MRI showed areas of enhancement around the resection cavity thought to represent radiation necrosis rather than recurrence.     He then started temozolomide maintenance in January 2018, at a dose of 150 mg/m2 for cycle 1. This was increased to 200  mg/m2 for cycle 2. He also started the Optune device in January 2018. [facility] showed [facility] 79% [facility] rate.     With progression of disease now with fewer than 5 cycles of adjuvant temozolomide and Optune, we will discontinue that therapy.  [facility] as per above.  He is scheduled for surgery next Thursday, which is 6 days from now.  We will follow the protocol.  I will look forward to seeing him back approximately 6 weeks postoperative or otherwise as indicated by the protocol.             I spent 30 minutes in consultation, including H&P and Discussion. >50% of time was spent in counseling and in coordination of care.    Augustus Armstrong MD, PhD

## 2018-05-17 PROBLEM — D49.6 BRAIN TUMOR (H): Status: ACTIVE | Noted: 2018-01-01

## 2018-05-17 NOTE — ANESTHESIA PROCEDURE NOTES
Arterial Line Procedure Note  Staff:     Anesthesiologist:  ANDREA KELLY    Resident/CRNA:  SANTHOSH GOMEZ    Arterial line performed by resident/CRNA in presence of a teaching physician    Location: In OR After Induction  Procedure Start/Stop Times:     patient identified, IV checked, risks and benefits discussed, informed consent, monitors and equipment checked, pre-op evaluation and at physician/surgeon's request      Correct Patient: Yes      Correct Position: Yes      Correct Site: Yes      Correct Procedure: Yes      Correct Laterality:  Yes    Site Marked:  N/A  Line Placement:     Procedure:  Arterial Line    Insertion Site:  Radial    Insertion laterality:  Left    Skin Prep: Chloraprep      Patient Prep: patient draped, mask, sterile gloves, hat and hand hygiene      Local skin infiltration:  None    Ultrasound Guided?: No      Catheter size:  20 gauge, Quick cath    Cath secured with: suture and other (comment)      Dressing:  Tegaderm    Complications:  None obvious    Arterial waveform: Yes      IBP within 10% of NIBP: Yes    Assessment/Narrative:      Smooth easy placement in sterile fashion without problems; good waveform.  ---rcp

## 2018-05-17 NOTE — IP AVS SNAPSHOT
[address] [address]    Phone:  [phone]                                       After Visit Summary   5/17/2018    Seth Iglesias    MRN: [id number]           After Visit Summary Signature Page     I have received my discharge instructions, and my questions have been answered. I have discussed any challenges I see with this plan with the nurse or doctor.    ..........................................................................................................................................  Patient/Patient Representative Signature      ..........................................................................................................................................  Patient Representative Print Name and Relationship to Patient    ..................................................               ................................................  Date                                            Time    ..........................................................................................................................................  Reviewed by Signature/Title    ...................................................              ..............................................  Date                                                            Time

## 2018-05-17 NOTE — IP AVS SNAPSHOT
MRN:[id number]                      After Visit Summary   5/17/2018    Seth Iglesias    MRN: [id number]           Thank you!     Thank you for choosing [facility] for your care. Our goal is always to provide you with excellent care. Hearing back from our patients is one way we can continue to improve our services. Please take a few minutes to complete the written survey that you may receive in the mail after you visit with us. Thank you!        Patient Information     Date Of Birth          1960        Designated Caregiver       Most Recent Value    Caregiver    Will someone help with your care after discharge? yes    Name of designated caregiver wife    Phone number of caregiver in chart    Caregiver address in chart      About your hospital stay     You were admitted on:  May 17, 2018 You last received care in the:  [address] [facility]    You were discharged on:  May 18, 2018        Reason for your hospital stay       You underwent surgery for brain tumor resection                  Who to Call     For medical emergencies, please call 911.  For non-urgent questions about your medical care, please call your primary care provider or clinic, [phone]  For questions related to your surgery, please call your surgery clinic        Attending Provider     Provider Specialty    Beto Gracia MD Neurosurgery       Primary Care Provider Office Phone # Fax #    R Nigel Salmon [phone]      After Care Instructions     Activity       Your activity upon discharge:   Do not do any bending, twisting, strenuous exercise, or heavy lifting (greater than 10 pounds) for 4-6 weeks. Be careful and ask for assistance when walking or going up and down stairs. Avoid any activities that could result in trauma to the surgical wound. Do not drive within 3 months of having your last seizure or while using narcotics or other sedating medications, such as sleep aids, muscle relaxants,  etc.            Diet       Follow this diet upon discharge: Orders Placed This Encounter      Advance Diet as Tolerated: Regular Diet Adult; Regular Diet Adult            Discharge Instructions       You underwent surgery to have a brain tumor removed by Beto Gracia MD   - If you have not received the results of the pathology (diagnosis) at the time of discharge, our office will call you with these results as soon as they become available, or we will discuss them with you during your clinic visit, whichever is first.     - If you are on a steroid medication (decadron or dexamethasone) please follow the detailed instructions with the prescription to slowly decrease the amount you are taking.     - You are taking Keppra (levtiracitam) for prophylaxis.  Please continue to take your anti-seizure medications as prescribed. You will be given a schedule of how to slowly decrease these medications as well which you should follow. If this is not clear to you, please call our clinic for assistance. .    - You will have follow up scheduled with the physician assistants and/or nurse practitioners in our clinic 2 weeks after your surgery.  If you live far away, you may see your primary care doctor for a wound check at 2 weeks.     - If you have not heard from our clinic about your follow up visit by 3-4 days following your discharge, please call our clinic at [phone] to schedule an appointment with the Neurosurgery teams.     After discharge, your activity restrictions are:   -We encourage short frequent walks, increasing as tolerated.  - No driving until you are seen in clinic and cleared by your neurosurgeon.  If you have had a seizure, you may not drive for at least 3 months according to [place] law.    - No strenuous activity.  - No lifting more than 10 pounds until you are seen in clinic (a gallon of milk weighs approximately 8 pounds)    Wound cares after surgery  - You are ok to shower, but do not soak your  incisions. Pat them dry if they get damp.   - Avoid coloring your hair or permanent styling until cleared by your surgeon  - No baths, hot tubs or pools for 4-6 weeks after surgery.   - No strenuous activity.  - No lifting more than 10 pounds until you are seen in clinic (a gallon of milk weighs approximately 8 pounds)  - You are ok to shower, but do not soak your incisions. Pat them dry if they get damp.   - Avoid coloring your hair or permanent styling until cleared by your surgeon  - No baths, hot tubs or pools for 4-6 weeks after surgery.       Call if you have any of the followin. Temperature greater than 101.5 F.   2. Any redness, swelling or discharge from the wound.   3. Any new weakness, numbness or altered mental status.  4. Worsening pain that is not improving with the pain medications you were prescribed.     Call [phone] or after 5:00 pm or on weekends call [phone] and ask for the neurosurgery resident on call. Thank You.            Wound care and dressings       Instructions to care for your wound at home:   You should remove your dressings and bandages on post-operative day #2. You should then keep the wound undressed and open to air. You are allowed to take showers and get the wound wet starting on post-operative day #3 but you may not scrub or soak the wound or keep it submerged under water. If you do happen to get the wound wet, be sure to pat dry it rather than scrubbing it with a towel.                  Follow-up Appointments     Adult [facility] Follow-up and recommended labs and tests       Please follow up with Dr Beto Gracia in 2 weeks in Neurosurgery clinic, please call [phone] if you have not heard from our office in 3-4 business days    Please follow up with your primary Oncologist in one week to have your platelet levels rechecked    Appointments on [place] and/or [facility] (with [facility] or [facility] provider or service). Call [phone] if you haven't heard regarding  "these appointments within 7 days of discharge.                  Your next 10 appointments already scheduled     Jun 01, 2018  1:15 PM CDT   (Arrive by 1:00 PM)   Return Visit with Beto Gracia MD   [facility] ([facility] [place])    [address] [place] [address]  [address] [place] [id number] [phone]              Pending Results     Date and Time Order Name Status Description    5/18/2018 0042 MR Brain w/o & w Contrast In process     5/17/2018 1557 Tissue Culture Aerobic Bacterial Preliminary     5/17/2018 1557 Fungus Culture, non-blood Preliminary     5/17/2018 1557 Anaerobic bacterial culture Preliminary     5/17/2018 1529 Surgical pathology exam Preliminary             Statement of Approval     Ordered          05/18/18 1206  I have reviewed and agree with all the recommendations and orders detailed in this document.  EFFECTIVE NOW     Approved and electronically signed by:  Cheli Salazar APRN CNP             Admission Information     Date & Time Provider Department Dept. Phone    5/17/2018 Beto Gracia MD [address] [facility] [phone]      Your Vitals Were     Blood Pressure Pulse Temperature Respirations Height Weight    134/62 67 98.7  F (37.1  C) (Axillary) 18 1.753 m (5' 9\") 96 kg (211 lb 10.3 oz)    Pulse Oximetry BMI (Body Mass Index)                98% 31.25 kg/m2          MyChart Information     [employer] gives you secure access to your electronic health record. If you see a primary care provider, you can also send messages to your care team and make appointments. If you have questions, please call your primary care clinic.  If you do not have a primary care provider, please call [phone] and they will assist you.        Care EveryWhere ID     This is your Care EveryWhere ID. This could be used by other organizations to access your [facility] medical records  [id number]        Equal Access to Services     PERICO ESPINOZA " AH: Hadii vero wright Delilahali, waaxda luqadaha, qaybta karosalie winters, waxkylah rafaela hayeagle soriatomasajimmy kerns. So [facility] [phone].    ATENCIÓN: Si habla marissaañol, tiene a granados disposición servicios gratuitos de asistencia lingüística. Llame al [phone].    We comply with applicable federal civil rights laws and [place] laws. We do not discriminate on the basis of race, color, national origin, age, disability, sex, sexual orientation, or gender identity.               Review of your medicines      START taking        Dose / Directions    oxyCODONE IR 5 MG tablet   Commonly known as:  ROXICODONE   Used for:  S/P craniotomy        Dose:  5-10 mg   Take 1-2 tablets (5-10 mg) by mouth every 3 hours as needed for other (pain control or improvement in physical function. Hold dose for analgesic side effects.)   Quantity:  45 tablet   Refills:  0       polyethylene glycol Packet   Commonly known as:  MIRALAX/GLYCOLAX   Used for:  S/P craniotomy        Dose:  17 g   Start taking on:  5/19/2018   Take 17 g by mouth daily   Quantity:  7 packet   Refills:  1         CONTINUE these medicines which may have CHANGED, or have new prescriptions. If we are uncertain of the size of tablets/capsules you have at home, strength may be listed as something that might have changed.        Dose / Directions    dexamethasone 2 MG tablet   Commonly known as:  DECADRON   This may have changed:    - medication strength  - additional instructions   Used for:  S/P craniotomy        4 mg every 8 hours for 4 days, 2 mg every 8 hours for 4 days, then continue 2 mg every 12 hours until seen by Oncology   Quantity:  100 tablet   Refills:  0         CONTINUE these medicines which have NOT CHANGED        Dose / Directions    levETIRAcetam 1000 MG Tabs   Commonly known as:  KEPPRA   Used for:  Brain mass        Dose:  1000 mg   Take 1,000 mg by mouth every 12 hours   Quantity:  180 tablet   Refills:  11       MULTIVITAMIN & MINERAL PO         Dose:  1 tablet   Take 1 tablet by mouth daily   Refills:  0       RANITIDINE HCL PO        Take by mouth as needed for heartburn   Refills:  0       senna-docusate 8.6-50 MG per tablet   Commonly known as:  SENOKOT-S;PERICOLACE   Used for:  Brain mass        Dose:  1-2 tablet   Take 1-2 tablets by mouth 2 times daily   Quantity:  100 tablet   Refills:  0       ZOFRAN ODT PO        Dose:  8 mg   Take 8 mg by mouth every 8 hours as needed for nausea   Refills:  0            Where to get your medicines      These medications were sent to [facility] [place], [place] - [address] [address]     Phone:  [phone]     dexamethasone 2 MG tablet    polyethylene glycol Packet         Some of these will need a paper prescription and others can be bought over the counter. Ask your nurse if you have questions.     Bring a paper prescription for each of these medications     oxyCODONE IR 5 MG tablet                Protect others around you: Learn how to safely use, store and throw away your medicines at www.disposemymeds.org.        Information about OPIOIDS     PRESCRIPTION OPIOIDS: WHAT YOU NEED TO KNOW   You have a prescription for an opioid (narcotic) pain medicine. Opioids can cause addiction. If you have a history of chemical dependency of any type, you are at a higher risk of becoming addicted to opioids. Only take this medicine after all other options have been tried. Take it for as short a time and as few doses as possible.     Do not:    Drive. If you drive while taking these medicines, you could be arrested for driving under the influence (DUI).    Operate heavy machinery    Do any other dangerous activities while taking these medicines.     Drink any alcohol while taking these medicines.      Take with any other medicines that contain acetaminophen. Read all labels carefully. Look for the word  acetaminophen  or  Tylenol.  Ask your pharmacist if you  have questions or are unsure.    Store your pills in a secure place, locked if possible. We will not replace any lost or stolen medicine. If you don t finish your medicine, please throw away (dispose) as directed by your pharmacist. The [place] Pollution Control Agency has more information about safe disposal: https://www.pca.[facility].[place].us/living-green/managing-unwanted-medications    All opioids tend to cause constipation. Drink plenty of water and eat foods that have a lot of fiber, such as fruits, vegetables, prune juice, apple juice and high-fiber cereal. Take a laxative (Miralax, milk of magnesia, Colace, Senna) if you don t move your bowels at least every other day.              Medication List: This is a list of all your medications and when to take them. Check marks below indicate your daily home schedule. Keep this list as a reference.      Medications           Morning Afternoon Evening Bedtime As Needed    dexamethasone 2 MG tablet   Commonly known as:  DECADRON   4 mg every 8 hours for 4 days, 2 mg every 8 hours for 4 days, then continue 2 mg every 12 hours until seen by Oncology   Last time this was given:  4 mg on 5/18/2018  6:05 AM                                levETIRAcetam 1000 MG Tabs   Commonly known as:  KEPPRA   Take 1,000 mg by mouth every 12 hours   Last time this was given:  1,000 mg on 5/18/2018  8:05 AM                                MULTIVITAMIN & MINERAL PO   Take 1 tablet by mouth daily                                oxyCODONE IR 5 MG tablet   Commonly known as:  ROXICODONE   Take 1-2 tablets (5-10 mg) by mouth every 3 hours as needed for other (pain control or improvement in physical function. Hold dose for analgesic side effects.)   Last time this was given:  5 mg on 5/18/2018  1:40 AM                                polyethylene glycol Packet   Commonly known as:  MIRALAX/GLYCOLAX   Take 17 g by mouth daily   Start taking on:  5/19/2018                                RANITIDINE HCL PO    Take by mouth as needed for heartburn                                senna-docusate 8.6-50 MG per tablet   Commonly known as:  SENOKOT-S;PERICOLACE   Take 1-2 tablets by mouth 2 times daily                                ZOFRAN ODT PO   Take 8 mg by mouth every 8 hours as needed for nausea

## 2018-05-17 NOTE — BRIEF OP NOTE
[facility], [place]    Brief Operative Note    Pre-operative diagnosis: Brain Tumor, Glioma   Post-operative diagnosis Brain Tumor, Glioma   Procedure: Procedure(s):  Stealth Assisted Right Craniotomy And Tumor Resection  - Wound Class: I-Clean  Surgeon: Surgeon(s) and Role:     * Beto Gracia MD - Primary     * Agapito Lutz MD - Resident - Assisting  Anesthesia: General   Estimated blood loss: 20 ml  Drains: None  Specimens:   ID Type Source Tests Collected by Time Destination   1 : Right brain tissue Tissue Brain AFB CULTURE NON BLOOD, ANAEROBIC BACTERIAL CULTURE, FUNGUS CULTURE, GRAM STAIN, TISSUE CULTURE AEROBIC BACTERIAL Beto Gracia MD 5/17/2018  3:56 PM    A : Right tumor 1 Tissue Brain SURGICAL PATHOLOGY EXAM Beto Gracia MD 5/17/2018  3:28 PM    B : Right tumor 2 Tissue Brain SURGICAL PATHOLOGY EXAM Beto Gracia MD 5/17/2018  3:29 PM    C : Right tumor 3 Tissue Brain SURGICAL PATHOLOGY EXAM Beto Gracia MD 5/17/2018  3:29 PM    D : Right tumor 4 Tissue Brain SURGICAL PATHOLOGY EXAM Beto Gracia MD 5/17/2018  3:44 PM    E : Right tumor 5 Tissue Brain SURGICAL PATHOLOGY EXAM Beto Gracia MD 5/17/2018  3:45 PM    F : Right tumor 6 Tissue Brain SURGICAL PATHOLOGY EXAM Beto Gracia MD 5/17/2018  3:50 PM    G : Right tumor 7 Tissue Brain SURGICAL PATHOLOGY EXAM Beto Gracia MD 5/17/2018  3:58 PM    H : right tumor 8 Tissue Brain SURGICAL PATHOLOGY EXAM Beto Gracia MD 5/17/2018  3:58 PM    I : right tumor 9 Tissue Brain SURGICAL PATHOLOGY EXAM Beto rGacia MD 5/17/2018  4:02 PM    J : Right tumor 10 Tissue Brain SURGICAL PATHOLOGY EXAM Beto Gracia MD 5/17/2018  4:37 PM      Findings:   Scar tissue and recurrent right brain tumor.  Complications: None.  Implants: None.

## 2018-05-17 NOTE — OR NURSING
Family in to see pt in PreOp per pt request.   Pt has been seen by Shahana and 2 separate researchers for participation.  Dr. Gracia has to see pt yet in PreOp.

## 2018-05-17 NOTE — PROGRESS NOTES
SPIRITUAL HEALTH SERVICES  SPIRITUAL ASSESSMENT Progress Note  [facility] ([place]) 3C     REFERRAL SOURCE: Patient/family requested a pre surgery  visit today.     Patient was seen pre-surgery. His wife Maryanne and two of his three sons were at his bedside.  Patient states that he will be happy to have this surgery over as this is the second go round for him. The family is hopeful that he will be accepted into a clinical trial.  Patient does not have a stated dodie tradition but  wanted a prayer.   Prayed with patient/family.    PLAN: I let the patient and family know that they can request a  visit at any time.     Beverley Marcos  Staff   Pager [phone]

## 2018-05-17 NOTE — NURSING NOTE
RESEARCH:      Per randomization documentation, patient was assigned to the standard of care arm.  Per discussion with primary oncologist, Dr. Augustus Armstrong, patient will receive [place] as treatment plan.    Orders forwarded to Dr. Kylee Noble to sign the plan orders.      Manisha Castellanos RN  Clinical Research Coordinator

## 2018-05-17 NOTE — OR NURSING
Dr. Swanson sent text page to confirm Laterality of procedure.  Consent and order do not match. Pt says it is right side.   Pt off PreOp to MRI. Wife came in to see pt prior to MRI.. PreOp and Research labs were drawn by Lab.

## 2018-05-17 NOTE — ANESTHESIA CARE TRANSFER NOTE
Patient: Seth Iglesias    Procedure(s):  Stealth Assisted Right Craniotomy And Tumor Resection  - Wound Class: I-Clean    Diagnosis: Brain Tumor, Glioma   Diagnosis Additional Information: No value filed.    Anesthesia Type:   General, ETT     Note:  Airway :Face Mask  Patient transferred to:PACU  Comments: Patient resting comfortably, breathing spontaneously.  Juan Muniz  Handoff Report: Identifed the Patient, Identified the Reponsible Provider, Reviewed the pertinent medical history, Discussed the surgical course, Reviewed Intra-OP anesthesia mangement and issues during anesthesia, Set expectations for post-procedure period and Allowed opportunity for questions and acknowledgement of understanding      Vitals: (Last set prior to Anesthesia Care Transfer)    CRNA VITALS  5/17/2018 1730 - 5/17/2018 1807      5/17/2018             Resp Rate (set): 10                Electronically Signed By: Juan Muniz MD  May 17, 2018  6:07 PM

## 2018-05-17 NOTE — ANESTHESIA PREPROCEDURE EVALUATION
Anesthesia Evaluation     . Pt has had prior anesthetic. Type: General           ROS/MED HX    ENT/Pulmonary:  - neg pulmonary ROS     Neurologic: Comment: Glioblastoma multiforme stage IV recurrent s/p surgery, chemo, xrt     (+)seizures     Cardiovascular:  - neg cardiovascular ROS       METS/Exercise Tolerance:  >4 METS   Hematologic:  - neg hematologic  ROS       Musculoskeletal:  - neg musculoskeletal ROS       GI/Hepatic:     (+) GERD       Renal/Genitourinary:  - ROS Renal section negative       Endo:  - neg endo ROS       Psychiatric:  - neg psychiatric ROS       Infectious Disease:         Malignancy:   (+) Malignancy History of Neuro  Neuro CA Active status post Surgery, Chemo and Radiation.  Glioblastoma multiforme diagnosed September 2017        Other:    - neg other ROS             Past Tumor Hx: Right temporal glioblastoma (WHO grade IV)     Tumor genomics: IDH testing was negative. MGMT promoter methylation was absent.     Treatment to date: status post gross total resection 10/3/17; initiated concurrent chemoradiation with temozolomide on 11/13/17 and completed 12/22/17. Adjuvant temozolomide and Optune device started on 1/22/18.      Referring physicians:  Dr. Harry Wiggins and Dr. Beto Gracia, Neurosurgical Service, [facility]  Dr. Dominic Louis, radiation oncology, [facility]     Oncology History:   Seth presented in September 2017 with dizzy spells, headache, and increasing somnolence. He was admitted on 9/28/17 after imaging in ED found a right temporal lobe mass. He went on to have gross total resection on 10/3/17.   10/27/17 - - neuro-oncology/medical oncology consultation, Dr. Armstrong.  11/12/17 through December 22, 2017 adjuvant treatment with concurrent chemoradiation with temozolomide. 6000 cGy, and 30 fractions, standard 200 cGy per fraction  Procedure: Procedure(s):  Stealth Assisted Craniotomy And Tumor Resection  - Wound Class:     HPI: Seth Iglesias  is a 58 year old male who is presenting for above stated procedure. History of GBM stage IV diagnosed in September 2017 after he presented with some vague neurological symptoms. The patient underwent surgery in 10/2017 with subsequent chemo and radiation but unfortunately developed a recurrent lesion at right temporal margin of the original resection. The patient is enrolled in a trial evaluating oncolytic viral therapy.     PMHx/PSHx/ROS:  Past Medical History:   Diagnosis Date     Gastroesophageal reflux disease      Glioblastoma (H)      Psoriasis     On Enbrel Injections Q 1 month for about 10 years, [facility] [place]     Seizures (H)        Past Surgical History:   Procedure Laterality Date     COLONOSCOPY  4/4/2011    Procedure:COLONOSCOPY; Surgeon:TC HARE; Location:[facility]     OPTICAL TRACKING SYSTEM CRANIOTOMY, EXCISE TUMOR, COMBINED Right 10/3/2017    Procedure: COMBINED OPTICAL TRACKING SYSTEM CRANIOTOMY, EXCISE TUMOR;  Stealth Assisted Right Temporal Craniotomy For Tumor Resection;  Surgeon: Harry Wiggins MD;  Location: [facility]     SURGICAL HISTORY OF -   02/05/75    Torn Meniscus Right Knee     SURGICAL HISTORY OF -   11/30/73    Arthrotomy & medial meniscectomy left knee       ROS as stated above    Soc Hx:   Social History   Substance Use Topics     Smoking status: Never Smoker     Smokeless tobacco: Never Used     Alcohol use Yes      Comment: occasionally       Allergies:   Allergies   Allergen Reactions     Nkda [No Known Drug Allergies]        Meds:   No prescriptions prior to admission.       Current Outpatient Prescriptions   Medication Sig Dispense Refill     dexamethasone (DECADRON) 4 MG tablet Take 4 mg twice daily, at 8am and 4pm 60 tablet 0     levETIRAcetam (KEPPRA) 1000 MG TABS Take 1,000 mg by mouth every 12 hours 180 tablet 11     Multiple Vitamins-Minerals (MULTIVITAMIN & MINERAL PO) Take 1 tablet by mouth daily       RANITIDINE HCL PO Take by mouth as  needed for heartburn       Ondansetron (ZOFRAN ODT PO) Take 8 mg by mouth every 8 hours as needed for nausea       senna-docusate (SENOKOT-S;PERICOLACE) 8.6-50 MG per tablet Take 1-2 tablets by mouth 2 times daily (Patient not taking: Reported on 5/4/2018) 100 tablet 0     [DISCONTINUED] Temozolomide (TEMODAR) 180 MG capsule Take 1 capsule (180 mg) by mouth daily in addition to 250 mg capsule for 5 days (Patient not taking: Reported on 5/4/2018) 5 capsule 0       Physical Exam:  VS:      , Weight   Wt Readings from Last 2 Encounters:   05/11/18 97.2 kg (214 lb 4.8 oz)   05/11/18 97.2 kg (214 lb 4.8 oz)       Labs:    BMP:  Recent Labs   Lab Test  05/11/18   1245   NA  134   POTASSIUM  4.3   CHLORIDE  100   CO2  23   BUN  27   CR  0.85   GLC  100*   ARA  8.5     LFTs:   Recent Labs   Lab Test  05/11/18   1245   PROTTOTAL  6.7*   ALBUMIN  3.1*   BILITOTAL  0.7   ALKPHOS  53   AST  14   ALT  54     CBC:   Recent Labs   Lab Test  05/11/18   1245   WBC  11.3*   RBC  4.46   HGB  14.4   HCT  41.8   MCV  94   MCH  32.3   MCHC  34.4   RDW  15.0   PLT  168     Coags:  Recent Labs   Lab Test  10/03/17   0753   INR  1.12   PTT  25                            Anesthesia Plan      History & Physical Review  History and physical reviewed and following examination; no interval change.    ASA Status:  3 .    NPO Status:  > 8 hours    Plan for General and ETT with Intravenous induction. Maintenance will be Balanced.    PONV prophylaxis:  Ondansetron (or other 5HT-3) and Dexamethasone or Solumedrol  Additional equipment: 2nd IV and Arterial Line History and physical assessed; Patient examined.  I have reviewed and agree with this pre-op assessment and anesthetic plan.  Patient and family also agree.   Risks and alternatives presented and discussed.   AQA.  -rcp        Postoperative Care  Postoperative pain management:  IV analgesics, Oral pain medications and Multi-modal analgesia.      Consents  Anesthetic plan, risks, benefits and  alternatives discussed with:  Patient.  Use of blood products discussed: Yes.   Use of blood products discussed with Patient.  Consented to blood products.  .      Edilberto Spann MD  [facility]  [phone]

## 2018-05-18 NOTE — DISCHARGE SUMMARY
Pt left unit via wheelchair with spouse and transporter at 1455.  Pt left in stable condition.  Both pt and spouse received discharge teaching and had no questions or concerns.

## 2018-05-18 NOTE — DISCHARGE SUMMARY
[facility] Discharge Summary and Instructions    Seth Iglesias MRN# [id number]   Age: 58 year old YOB: 1960     Date of Admission:  5/17/2018  Date of Discharge::  5/18/2018  Admitting Physician:  Beto Gracia MD  Discharge Physician:  Beto Gracia MD          Admission Diagnoses:   Brain tumor (H) [D49.6]          Discharge Diagnosis:   Brain tumor (H) [D49.6]          Procedures:   5/17: Right-sided craniotomy and tumor resection            Brief History of Illness:   Mr. Iglesias is a 58 year old man with hx glioblastoma s/p resection on 10/27/2017 followed by radiation and temodar with MRIs showing enlargement of  contrast enhancement consistent with tumor progression. The patient enrolled in TOCA5 with possibility of randomization to receiving [id number] injection during planned re-resection for which he was admitted.            Hospital Course:   Patient underwent above-mentioned procedure on 5/17. The operation was uncomplicated and he was admitted to the surgical ICU for routine post operative cares. On post operative day 1 he was doing well, ambulating, voiding without a hunter, eating a regular diet, pain was well controlled. He received a post-operative MRI brain for continued surveillance also on post operative day 1. His labs did initially show a post-operative platelet check of 90, for which he received 1 u platelets. Platelets improved to 144 after transfusion. He remained stable, and was therefore discharged on 5/18/18 home with plan to follow-up with oncology in one week with repeat platelet check.           Discharge Medications:     Current Discharge Medication List      START taking these medications    Details   oxyCODONE IR (ROXICODONE) 5 MG tablet Take 1-2 tablets (5-10 mg) by mouth every 3 hours as needed for other (pain control or improvement in physical function. Hold dose for analgesic side effects.)  Qty: 45 tablet, Refills: 0    Associated  "Diagnoses: S/P craniotomy      polyethylene glycol (MIRALAX/GLYCOLAX) Packet Take 17 g by mouth daily  Qty: 7 packet, Refills: 1    Associated Diagnoses: S/P craniotomy         CONTINUE these medications which have CHANGED    Details   dexamethasone (DECADRON) 2 MG tablet 4 mg every 8 hours for 4 days, 2 mg every 8 hours for 4 days, then continue 2 mg every 12 hours until seen by Oncology  Qty: 100 tablet, Refills: 0    Associated Diagnoses: S/P craniotomy         CONTINUE these medications which have NOT CHANGED    Details   levETIRAcetam (KEPPRA) 1000 MG TABS Take 1,000 mg by mouth every 12 hours  Qty: 180 tablet, Refills: 11    Associated Diagnoses: Brain mass      Multiple Vitamins-Minerals (MULTIVITAMIN & MINERAL PO) Take 1 tablet by mouth daily      Ondansetron (ZOFRAN ODT PO) Take 8 mg by mouth every 8 hours as needed for nausea      RANITIDINE HCL PO Take by mouth as needed for heartburn      senna-docusate (SENOKOT-S;PERICOLACE) 8.6-50 MG per tablet Take 1-2 tablets by mouth 2 times daily  Qty: 100 tablet, Refills: 0    Comments: Indication: Prevention of Constipation with Concurrent use of Opioid Analgesics  Associated Diagnoses: Brain mass            Exam:   Physical Exam  /69  Pulse 67  Temp 98.7  F (37.1  C) (Axillary)  Resp 18  Ht 1.753 m (5' 9\")  Wt 96 kg (211 lb 10.3 oz)  SpO2 96%  BMI 31.25 kg/m2  General: Appears comfortable, NAD  Wound: Incision, clean, dry, intact.  Closed with Monocryl sutures.   Neurologic Exam:  - AOx3.  - Follows commands.  - Speech fluent, spontaneous. No aphasia or dysarthria.  - No gaze preference. No apparent hemineglect.  - PERRL, EOMI.  - Face symmetric with sensation intact to light touch.  - Palate elevates symmetrically, uvula midline, tongue protrudes midline.  - Trapezii and sternocleidomastoid muscles 5/5 bilaterally.  - No pronator drift.  Motor: Normal bulk/tone; no tremor, rigidity, or bradykinesia.   Right:  Deltoid 5/5, tricep 5/5, bicep 5/5, " wrist flexor 5/5, wrist extensor 5/5, finger intrinsic 5/5  Left:  Deltoid 5/5, tricep 5/5, bicep 5/5, wrist flexor 5/5, wrist extensor 5/5, finger intrinsic 5/5  Right: Iliopsoas 5/5, quadricep 5/5, hamstring 5/5, tibialis anterior 5/5, gastrocnemius 5/5, EHL 5/5  Left:  Iliopsoas 5/5, quadricep 5/5, hamstring 5/5, tibialis anterior 5/5, gastrocnemius 5/5, EHL 5/5    Sensation intact in bilateral L4-S1 dermatones                  Discharge Instructions and Follow-Up:   Discharge diet: Regular     Discharge activity: -We encourage short frequent walks, increasing as tolerated.  - No driving until you are seen in clinic and cleared by your neurosurgeon.  If you have had a seizure, you may not drive for at least 3 months according to [place] law.    - No strenuous activity.  - No lifting more than 10 pounds until you are seen in clinic (a gallon of milk weighs approximately 8 pounds)     Discharge follow-up: Follow-up with Dr. Beto Gracia MD in 2 weeks    Follow-up with your primary Oncologist in one week to have your platelet levels rechecked    You can taper down your steroid medication as instructed back to your original prior to admission decadron dosage. Your oncologist can then determine the amount of decadron you should remain on from there forward.      Wound care: Ok to shower,however no scrubbing of the wound and no soaking of the wound, meaning no bathtubs or swimming pools. Pat dry only. Leave wound open to air.  Sutures are not absorbable and need to be removed in 2 weeks. If patient still at rehab by this time, the sutures may be removed by the rehab physician if he or she considers that the wound has healed completely.     Please call if you have:  1. increased pain, redness, drainage, swelling at your incision  2. fevers > 101.5 F degrees  3. with any questions or concerns.  You may reach the Neurosurgery clinic at [phone] during regular work hours. ER at [phone].    and  ask for the Neurosurgery Resident on call at [phone], during off hours or weekends.         Discharge Disposition:   Discharged to home          Blanca Swanson MD  Neurosurgery PGY3

## 2018-05-18 NOTE — PLAN OF CARE
Problem: Patient Care Overview  Goal: Plan of Care/Patient Progress Review  Outcome: Improving  Neuro: Q1 neuros. Patient A&Ox4. Intact. Pupils equal and reactive. Follows all commands, moves all extremities. Denies numbness and tingling.  CV: NSR, HR 60s overnight. SBP goal <140, achieved overnight without intervention. Platelets low, transfused 1 unit.   Resp: 2L NC overnight. Clear lung sounds.   GI/Gu: Bonilla in place, DC POD #2 per order. Hypoactive BS. Clear liquid diet, can advance as tolerated.   Pain: Incisional pain, given 5mg of oxy and scheduled acetaminophen.  Skin: Incision on right side of head.   Plan: Continue to monitor. MRI today

## 2018-05-18 NOTE — PROGRESS NOTES
"S: Feeling well. Mild pulling from the incision site.    O:  Exam:  General: Awake;  Alert, In No Acute Distress  Pulm: Breathing Comfortably on RA  Mental status: Oriented x 3  Cranial Nerves: Cranial Nerves II-XII Grossly Intact Bilaterally  Strength:      Del Tr Bi WE WF Gr  R 5 5 5 5 5 5  L 5 5 5 5 5 5     HF KE KF DF PF EHL  R 5 5 5 5 5 5  L 5 5 5 5 5 5    Pronator Drift: Absent  Sensory: Intact to Light Touch  Reflexes: No Hyperreflexia, Rodriguez s or Clonus Present; Toes Down-Going Bilaterally  INCISION: c/d/i, closed with absorbable suture, covered with Dermabond    A: Doing well post-operatively.     P:  Operation: Status post craniotomy; Sutures: absorbable   Imaging: pending post-operative MRI  Pain: pain controlled  Anti-epileptics: home PTA keppra  Cerebral Edema: Decadron 4 mg q8h  Blood pressure goals: SBP < 140  Diet: Regular diet   Hematological goals: Platelets > 100k, INR < 1.5, Hemoglobin > 8; holding on transfusion this AM until recheck at 10am   PT/OT: not indicated  DVT prophylaxis: Sequential compression devices  Ulcer prophylaxis: protonix   DISPO:  Anticipate D/C Home 5/19  Barriers: ambulating, BM/flatus, voiding without a Bonilla, tolerating PO diet, pain controlled on PO medications    Te \"Sudheer\" MD Estela   Neurosurgery, PGY-1    Please contact neurosurgery resident on call with questions.    Dial * * [phone], enter [phone] when prompted.     "

## 2018-05-18 NOTE — ANESTHESIA POSTPROCEDURE EVALUATION
Patient: Seth Iglesias    Procedure(s):  Stealth Assisted Right Craniotomy And Tumor Resection  - Wound Class: I-Clean    Diagnosis:Brain Tumor, Glioma   Diagnosis Additional Information: No value filed.    Anesthesia Type:  General, ETT    Note:  Anesthesia Post Evaluation    Patient location during evaluation: PACU  Patient participation: Able to fully participate in evaluation  Level of consciousness: awake and alert  Pain management: adequate  Airway patency: patent  Cardiovascular status: acceptable and hemodynamically stable  Respiratory status: acceptable  Hydration status: acceptable  PONV: none     Anesthetic complications: None          Last vitals:  Vitals:    05/17/18 2000 05/17/18 2015 05/17/18 2030   BP: 125/86 128/89 120/82   Pulse:      Resp: 11  10   Temp:      SpO2: 98% 98% 99%         Electronically Signed By: Harry Vang MD  May 17, 2018  8:53 PM

## 2018-05-18 NOTE — OP NOTE
Name:  Seth Iglesias  MRN:  [id number]  YOB: 1960  Date of Surgery:  May 17, 2018      Pre-operative Diagnosis: Right temporal glioblastoma  Post-operative Diagnosis: Right temporal glioblastoma  Procedure:   1) Right craniotomy for tumor resection  2) Neuronavigation  3) Microdissection    Anesthesia: GETA    Surgeon: Beto Ricks MD, PhD  Assistant:  Agapito Lutz MD    Indications: 58 M s/p previous craniotomy for resection of a right temporal glioblastoma. The patient suffered tumor recurrence requiring another resection. The patient enrolled in TOCA5 with possibility of randomization to receiving [id number] injection.    Description of Procedure: After pre-operative laboratory value and informed consent were verified, the patient was brought into the operating room. The patient underwent general anesthesia and intubation. Antibiotic was administered.    The patient was placed in a supine position, with the head turned to the left, exposing the right cranium.  The [employer] Stealth reference frame was placed. The patient's anatomy was registered relative to the pre-operative MRI using the [employer] system.    The area encompassing the previous surgical incision was prepped and draped in a sterile manner.     Time out was performed. The previous incision was re-opened with a 10 blade. Hemostasis was achieved. The incision was retracted using a cerebellar retractor.  The previous craniotomy site was identified.    The previous Liz hole covers were dissected free and the craniotomy flap removed.  The inferior temporal bone was removed using a Rongeur to the level of the temporal floor.  The epidural space was packed with Floseal.     The dura was re-opened. There was an extensive amount of scarring that was dissected free. The floor of the temporal fossa was identified.  The posterior margin of the previous resection cavity was similarly identified. Measuring 5 cm back from the temporal  tip (a region corresponding to the posterior aspect of the lesion by Stealth navigationCorticectomy was performed in the region immediate superficial to the lesion) and under microscope magnification, I began a corticectomy in this region.  As I continue my resection anteriorly, there were significant adhesions and scarring. Because there was a branch of the MCA that course in the direction of the scar, a central bundle of adhesion was not lysed.  As the resection proceeded more anteriorly, abnormal tissues were identified.     Frozen pathology findings are consistent with glioblastoma.    Because the MCA and its main branches were immediately adjacent to the tumor, I left a margin of tissue between the tumor and these vessels. As such, only debulking of the tissue and tissue acquisition was achieved.       After hemostasis, I called our central research office and found that the patient was randomized to standard treatment without injection of [id number].  In this context, I turned my attention to the closure.  The craniotomy flap was secured using titanium plate/screw construct. The wound was amply irrigated with bacitracin containing saline. The galea was closed with 2'0 vicryl. The skin was closed with 3'0 Monocryl. Dermabond was applied.    I was present and performed the key portions of the procedure.    EBL: 20 cc's    Specimens: resected tumor specimens    Disposition: ICU

## 2018-05-18 NOTE — BRIEF OP NOTE
Brief Op Note  Pre-operative diagnosis: right temporal tumor  Post-operative diagnosis: same   Procedure:   1. Right temporal craniotomy and tumor resection  2. Use of intraoperative stealth guidance  3. Use of intraoperative microscope  Surgeon:  MD Feliciano  Assistant(s):  MD Yoshi; MD Kiki; MD Billie   Anesthesia: GETA  EBL: 100 cc  Fluids: 1100cc  UOP: 470 cc  Drains: none  Specimens:  Tumor for frozen, permanent and bionet  Implants: synthes plating system   Findings:   Tumor consistent with GBM.  Complications: None.  Condition: stable   Post op location:PACU  Comments: See dictated report for full details.    Plan:  - admit to 4 A post op  - MRI brain  - OK for diet after bedside swallow  - PT/OT  - OOB with assist  - Pain control   Page [phone] with questions     Requesting dispo plan for pt, waiting for Dr Carmona to update

## 2018-06-01 NOTE — PROGRESS NOTES
"Neurosurgery Clinic Note    Post-operative follow-up    58 M s/p right craniotomy for tumor debulking on 5/12/2018. The patient was enrolled in the TocaV trial. However, he was randomized to standard therapy. The procedure was performed without complication. The patient was discharged home on POD1.     No new complaints since discharge.    /89 (BP Location: Right arm, Patient Position: Sitting, Cuff Size: Adult Regular)  Pulse 86  Temp 98  F (36.7  C) (Oral)  Resp 16  Ht 1.753 m (5' 9\")  Wt 98.6 kg (217 lb 6.4 oz)  SpO2 97%  BMI 32.1 kg/m2    Patient is non-focal neurologically. Incision D/C/I    AP: 58 M doing well after craniotomy for tumor resection. The patient will follow-up with Dr. Armstrong in terms of therapeutic option.   "

## 2018-06-01 NOTE — LETTER
"6/1/2018       RE: eSth Iglesias  [address]  [facility] [address]     Dear Colleague,    Thank you for referring your patient, Seth Iglesias, to the [facility]. Please see a copy of my visit note below.    Neurosurgery Clinic Note    Post-operative follow-up    58 M s/p right craniotomy for tumor debulking on 5/12/2018. The patient was enrolled in the TocaV trial. However, he was randomized to standard therapy. The procedure was performed without complication. The patient was discharged home on POD1.     No new complaints since discharge.    /89 (BP Location: Right arm, Patient Position: Sitting, Cuff Size: Adult Regular)  Pulse 86  Temp 98  F (36.7  C) (Oral)  Resp 16  Ht 1.753 m (5' 9\")  Wt 98.6 kg (217 lb 6.4 oz)  SpO2 97%  BMI 32.1 kg/m2    Patient is non-focal neurologically. Incision D/C/I    AP: 58 M doing well after craniotomy for tumor resection. The patient will follow-up with Dr. Armstrong in terms of therapeutic option.     Again, thank you for allowing me to participate in the care of your patient.      Sincerely,    Beto Gracia MD      "

## 2018-06-01 NOTE — MR AVS SNAPSHOT
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 Bring a list of your current medicines to your exam (including vitamins, minerals and over-the-counter drugs).  You may or may not receive intravenous (IV) contrast for this exam pending the discretion of the Radiologist.  You do not need to do anything special to prepare.  The MRI machine uses a strong magnet. Please wear clothes without metal (snaps, zippers). A sweatsuit works well, or we may give you a hospital gown.  Please remove any body piercings and hair extensions before you arrive. You will also remove watches, jewelry, hairpins, wallets, dentures, partial dental plates and hearing aids. You may wear contact lenses, and you may be able to wear your rings. We have a safe place to keep your personal items, but it is safer to leave them at home.  **IMPORTANT** THE INSTRUCTIONS BELOW ARE ONLY FOR THOSE PATIENTS WHO HAVE BEEN PRESCRIBED SEDATION OR GENERAL ANESTHESIA DURING THEIR MRI PROCEDURE:  IF YOUR DOCTOR PRESCRIBED ORAL SEDATION (take medicine to help you relax during your exam):   You must get the medicine from your doctor (oral medication) before you arrive. Bring the medicine to the exam. Do not take it at home. You ll be told when to take it upon arriving for  your exam.   Arrive one hour early. Bring someone who can take you home after the test. Your medicine will make you sleepy. After the exam, you may not drive, take a bus or take a taxi by yourself.  IF YOUR DOCTOR PRESCRIBED IV SEDATION:   Arrive one hour early. Bring someone who can take you home after the test. Your medicine will make you sleepy. After the exam, you may not drive, take a bus or take a taxi by yourself.   No eating 6 hours before your exam. You may have clear liquids up until 4 hours before your exam. (Clear liquids include water, clear tea, black coffee and fruit juice without pulp.)  IF YOUR DOCTOR PRESCRIBED ANESTHESIA (be asleep for your exam):   Arrive 1 1/2 hours early. Bring someone who can take you home after the test. You may not drive, take a bus or take a taxi by yourself.   No eating 8 hours before your exam. You may have clear liquids up until 4 hours before your exam. (Clear liquids include water, clear tea, black coffee and fruit juice without pulp.)   You will spend four to five hours in the recovery room.  Please call the Imaging Department at your exam site with any questions.            Jun 22, 2018  9:15 AM CDT   [facility] Lab Draw with  [facility]GOBE LAB DRAW   [facility] Draw ([facility])    [address] [place] [address]  [address] [place] [id number] [phone]            Jun 22, 2018 10:00 AM CDT   Nurse Visit with  Oncology Nurse   [facility] ([facility])    [address] [place] [address]  [address] [place] [id number]   [phone]            Jun 22, 2018 10:15 AM CDT   (Arrive by 10:00 AM)   Return Visit with Augustus Armstrong MD   [facility] ([facility])    [address] [place] [address]  [address] [place] [id number] [phone]              Who to contact     If you have questions or need follow up information about today's clinic visit or your schedule  "please contact [facility] directly at [phone].  Normal or non-critical lab and imaging results will be communicated to you by MyChart, letter or phone within 4 business days after the clinic has received the results. If you do not hear from us within 7 days, please contact the clinic through MyChart or phone. If you have a critical or abnormal lab result, we will notify you by phone as soon as possible.  Submit refill requests through [employer] or call your pharmacy and they will forward the refill request to us. Please allow 3 business days for your refill to be completed.          Additional Information About Your Visit        [employer] Information     [employer] gives you secure access to your electronic health record. If you see a primary care provider, you can also send messages to your care team and make appointments. If you have questions, please call your primary care clinic.  If you do not have a primary care provider, please call [phone] and they will assist you.        Care EveryWhere ID     This is your Care EveryWhere ID. This could be used by other organizations to access your [facility] medical records  [id number]        Your Vitals Were     Pulse Temperature Respirations Height Pulse Oximetry BMI (Body Mass Index)    86 98  F (36.7  C) (Oral) 16 1.753 m (5' 9\") 97% 32.1 kg/m2       Blood Pressure from Last 3 Encounters:   06/01/18 131/89   05/18/18 129/80   05/11/18 130/86    Weight from Last 3 Encounters:   06/01/18 98.6 kg (217 lb 6.4 oz)   05/18/18 96 kg (211 lb 10.3 oz)   05/11/18 97.2 kg (214 lb 4.8 oz)              Today, you had the following     No orders found for display       Primary Care Provider Office Phone # Fax #    R Nigel Salmon [phone] [address] [facility] [address]        Equal Access to Services     PERICO ESPINOZA AH: Beto cho Soneha, waaxda luqadaha, qaybta kaalhéctor whitten " lachris kerns. So [facility] [phone].    ATENCIÓN: Si habla valdo, tiene a granados disposición servicios gratuitos de asistencia lingüística. Rodney lopez [phone].    We comply with applicable federal civil rights laws and [place] laws. We do not discriminate on the basis of race, color, national origin, age, disability, sex, sexual orientation, or gender identity.            Thank you!     Thank you for choosing [facility]  for your care. Our goal is always to provide you with excellent care. Hearing back from our patients is one way we can continue to improve our services. Please take a few minutes to complete the written survey that you may receive in the mail after your visit with us. Thank you!             Your Updated Medication List - Protect others around you: Learn how to safely use, store and throw away your medicines at www.disposemymeds.org.          This list is accurate as of 6/1/18  2:25 PM.  Always use your most recent med list.                   Brand Name Dispense Instructions for use Diagnosis    dexamethasone 2 MG tablet    DECADRON    100 tablet    4 mg every 8 hours for 4 days, 2 mg every 8 hours for 4 days, then continue 2 mg every 12 hours until seen by Oncology    S/P craniotomy       levETIRAcetam 1000 MG Tabs    KEPPRA    180 tablet    Take 1,000 mg by mouth every 12 hours    Brain mass       MULTIVITAMIN & MINERAL PO      Take 1 tablet by mouth daily        oxyCODONE IR 5 MG tablet    ROXICODONE    45 tablet    Take 1-2 tablets (5-10 mg) by mouth every 3 hours as needed for other (pain control or improvement in physical function. Hold dose for analgesic side effects.)    S/P craniotomy       polyethylene glycol Packet    MIRALAX/GLYCOLAX    7 packet    Take 17 g by mouth daily    S/P craniotomy       RANITIDINE HCL PO      Take by mouth as needed for heartburn        senna-docusate 8.6-50 MG per tablet    SENOKOT-S;PERICOLACE    100 tablet    Take 1-2 tablets by mouth 2  times daily    Brain mass       ZOFRAN ODT PO      Take 8 mg by mouth every 8 hours as needed for nausea

## 2018-06-01 NOTE — NURSING NOTE
"Oncology Rooming Note    June 1, 2018 2:03 PM   Seth Iglesias is a 58 year old male who presents for:    Chief Complaint   Patient presents with     RECHECK     Return: Glioblastoma multiforme of temporal lobe      Initial Vitals: /89 (BP Location: Right arm, Patient Position: Sitting, Cuff Size: Adult Regular)  Pulse 86  Temp 98  F (36.7  C) (Oral)  Resp 16  Ht 1.753 m (5' 9\")  Wt 98.6 kg (217 lb 6.4 oz)  SpO2 97%  BMI 32.1 kg/m2 Estimated body mass index is 32.1 kg/(m^2) as calculated from the following:    Height as of this encounter: 1.753 m (5' 9\").    Weight as of this encounter: 98.6 kg (217 lb 6.4 oz). Body surface area is 2.19 meters squared.  No Pain (0) Comment: Data Unavailable   No LMP for male patient.  Allergies reviewed: Yes  Medications reviewed: Yes    Medications: Medication refills not needed today.  Pharmacy name entered into [facility]:    [id number] - [place], [place] - [address]  [id number] [place] [place] [facility] [address]    Clinical concerns: N/A     5 minutes for nursing intake (face to face time)     Krys Albright CMA              "

## 2018-06-21 NOTE — PROGRESS NOTES
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 MGMT promoter methylation was absent.     Treatment to date: status post gross total resection 10/3/17; initiated concurrent chemoradiation with temozolomide on 11/13/17 and completed 12/22/17. Adjuvant temozolomide and Optune device started on 1/22/18.  Following progression of disease documented spring 2018, patient enrolled in the Toca trial underwent surgical debulking May 17, 2018; he was randomized to the standard of care arm (ie he did not receive the therapeutic virus intraoperatively).     Referring physicians:  Dr. Harry Wiggins and Dr. Beto Gracia, Neurosurgical Service, [facility]  Dr. Dominic Louis, radiation oncology, [facility]  Oncology History:   Seth presented in September 2017 with dizzy spells, headache, and increasing somnolence. He was admitted on 9/28/17 after imaging in ED found a right temporal lobe mass. He went on to have gross total resection on 10/3/17.   10/27/17 - - neuro-oncology/medical oncology consultation, Dr. Armstrong.  11/12/17 through December 22, 2017 adjuvant treatment with concurrent chemoradiation with temozolomide. 6000 cGy, and 30 fractions, standard 200 cGy per fraction  1/19/18 - - brain MRI: Impression: 1. 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 Recommend attention on follow-up. Postsurgical changes of prior resection of right temporal glioblastoma. Resolution of right frontotemporal subdural hematoma and T2 hyperintensity along the right internal capsule and commisural white matter.    1/22/18 - - oncology/medical oncology follow-up, Dr. Armstrong. Plan: initiate adjuvant five day temozolomide and OPTune  device. Cycle 1/day one of temozolomide, 150 mg/m  on days one through five of each 28 day cycle.    2/20/18 - - oncology follow-up, SCARLETT Hillman. Cycle 2/day one of five day temozolomide. Dose increased to 200 mg/m .    3/15/18 - - brain MRI: Impression: In this patient with glioblastoma multiforme status post gross total resection, chemoradiation and adjuvant Temodar and Optune, there is increased nodular contrast enhancement and T2 hyperintensity at the margins of the right temporal resection cavity. Perfusion characteristics suggestive of tumor recurrence, particularly medially.    3/19/18 - - neuro-oncology/medical oncology follow-up, Dr. Armstrong.    4/6/18 - - neurology follow-up, Dr. Boyer. plan to continue Kera     4/19/18 MRI brain   1. Increased size of enhancing mass and surrounding vasogenic edema in  the right temporal lobe, which is suggestive of recurrence of  high-grade tumor, while the MR perfusion and diffusion imaging  characteristics are indeterminate for recurrent tumor (described in  detail above). Therefore, recommend shorter term follow-up study such  as one month.  2. Increasing mass effect has caused further compression of the right  lateral ventricle. No hydrocephalus at this time.    4/23/18 -- neuro-oncology follow-up, Dr. Armstrong  Consensus of multidisciplinary neuro-oncology tumor board: MRI consistent with progression of disease while on Optune and Temozolomide.  5/4/18-neurosurgical consultation with Dr. Beto Gracia re: TOCA trial  5/11/18--neuro-oncology follow-up, Dr. Armstrong. Plan: proceed with surgical debulking/enrollment and randomization on the Toca trial.  5/17/18 - -Procedure:  tumor debulking (a portion of the tumor adherent vessels could not be resected). Patient was randomized to trial arm that did not include the Toca therapeutic virus.  1) Right craniotomy for tumor resection  2) Neuronavigation  3) Microdissection      Surgeon: Beto Gracia. MD, PhD  6/1/18 - -  neurosurgery follow-up, Dr. Gracia. Again noted. The patient was randomized to standard therapy.  6/19/18 - - brain MRI - stable since 5/18 post-op scan.  6/22/18--neuro-oncology follow-up, Dr. Armstrong. Plan: initiated 2nd-line treatment with lomustine (CCNU) once cleared by insurance.    Interim History:  Mr. Iglesias returns today with his wife.  He is approximately 4 weeks out from debulking surgery performed by Dr. Beto Gracia from our Neurosurgery team.  In reading the operative note, it was apparent that some portion of the tumor was adherent to the vessels, thus this portion of the tumor could not be resected.  Postoperative MRI was done 24 hours later on 05/18 and showed residual tumor with some information.  There was some hematoma that has since resolved.  Based on MRI done earlier this week, we noted interval change from the size of the tumor, but there is some decreased edema.  The patient was randomized to arm of the trial that did not administer therapeutic virus.      He remains on the trial protocol though which permits standard of care therapy.  The options include temozolomide, bevacizumab or lomustine.  After strong consideration, he opted to go forward with lomustine.  I am holding off on bevacizumab as he is not having full intact tumor and has already started to taper the steroids, so in order to preserve him for future eligibility for other clinical trials, we will forego the Avastin at this time.  In addition, as the temozolomide was not effective in his tumor in the first-line setting, temozolomide would likely not be effective at this point, so that left lomustine.  He is generally doing well.  His wife states that since the debulking surgery, his cognitive process has been much better.  He states that fatigue is his most significant symptom, but he is slowly and steadily improving.  He is on a long-term dexamethasone taper at this point.  For the last week or so, he has been on 2 mg twice a day  "and we are reinitiating a taper and that is part of our discussion today.  He is denying any pain or any other neurologic complaints or symptoms at this time.  He remains on levetiracetam for seizure prophylaxis under the care of Dr. Boyer from our Neurology team.               Review Of Systems:  Complete ROS reviewed. Pertinent symptoms reviewed above per HPI.    PAST MEDICAL HISTORY:   1.  Glioblastoma, status post gross total resection on 10/03/2017 of the right temporal lobe preceded and indicated by some seizure-like activity without loss of consciousness.   2.  Chronic back pain.   3.  Ganglionic cysts.   4.  Hyperlipidemia.   5.  He has psoriasis and was taking what sounds like a TNF and an alpha inhibitor up until the summer of .       PAST SURGICAL HISTORY:     1.  The aforementioned right temporal craniotomy for gross total resection of right temporal glioblastoma done 10/03/2017 with Dr. Harry Wiggins at the [facility].   2.  Colonoscopy performed 2011, unremarkable.   3. Surgical debulking of recurrent GB tumor, May 17 2018.      FAMILY HISTORY:  He had a Father and 2 paternal uncles who had prostate cancer.  His mother had some sort of \"tumor of the rib,\" and  at age 54 of this unknown cancer.       SOCIAL HISTORY:  The patient lives in [place], [place].  He is accompanied by his wife, who is extremely supportive.  They have 3 sons ages 18, 23 and 25.  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  He has since then been working for [employer], about 27 miles from his home.    Tobacco:  Never smoker.    Allergies:none    Current Medications:   Current Outpatient Prescriptions   Medication     dexamethasone (DECADRON) 2 MG tablet     levETIRAcetam (KEPPRA) 1000 MG TABS     Multiple Vitamins-Minerals (MULTIVITAMIN & MINERAL PO)     Ondansetron (ZOFRAN ODT PO)     oxyCODONE IR " "(ROXICODONE) 5 MG tablet     polyethylene glycol (MIRALAX/GLYCOLAX) Packet     RANITIDINE HCL PO     senna-docusate (SENOKOT-S;PERICOLACE) 8.6-50 MG per tablet     [DISCONTINUED] Temozolomide (TEMODAR) 180 MG capsule     No current facility-administered medications for this visit.          Physical Exam:  /62  Pulse 65  Temp 97.9  F (36.6  C) (Oral)  Resp 16  Ht 1.753 m (5' 9\")  Wt 97.6 kg (215 lb 3.2 oz)  SpO2 96%  BMI 31.78 kg/m2  KPS 80  GENERAL:  Very pleasant middle-aged  gentleman who appears in no acute distress, alert and oriented x3.  Speech is fluent.  He does not have any evidence of receptive or expressive aphasia.   HEENT:  Anicteric sclerae.  Oropharynx is without evidence of mucositis or thrush.  Surgical wound from right temporal craniotomy May 2018 has fully healed with several absorbable sutures still in place.  CARDIOVASCULAR:  Regular rate and rhythm, normal S1, S2.  No murmurs, gallops or rubs.   LUNGS:  Clear to auscultation throughout.   NEUROLOGIC:  Cranial nerves II-XII grossly intact.  Motor strength was 5/5 throughout and symmetric.  Sensation is grossly intact throughout.  No evidence of cerebellar signs, as finger-to-nose is unremarkable without tremor.  No dysdiadochokinesia.  No evidence of visual field abnormalities.  His gait is unremarkable as normal.  Negative Romberg sign.           Laboratory/Imaging Studies  Results for orders placed or performed during the hospital encounter of 06/19/18   MRI Brain w & w/o contrast    Narrative    Brain MRI without and with contrast    History: ; Glioblastoma multiforme of temporal lobe (H); Examination  of participant in clinical trial.  ICD-10: Glioblastoma multiforme of temporal lobe (H); Examination of  participant in clinical trial  Comparison: Multiple prior, most recently 5/18/2018    Technique: Axial T1-weighted, axial FLAIR and susceptibility images  were obtained without intravenous contrast. Following " intravenous  gadolinium-based contrast administration, axial T2-weighted,  diffusion, and T1-weighted images (in multiple planes) were obtained.  Contrast: 10ml Gadavist    Findings:  As in the prior examination, there is a right anterior temporal  resection site, with underlying vasogenic edema in the right anterior  temporal lobe, small amount of hemorrhage, and T2 hyperintensity  extending up into the right inferior frontal region, right temporal  lobe anteriorly, and subinsular white matter. The overall extent of T2  hyperintensity there is about the same or minimally decreased, and  there is no midline shift. The ventricles are not enlarged. However,  there is slight enlargement of the right frontal horn chronically due  to ex vacuo dilatation from focal atrophy post resection on the right  side.    Postcontrast images demonstrate a 3.0 x 2.4 x 2.4 cm irregular  enhancing region medial to the cystic resection cavity, with a  loculated rim-enhancing collection that is 3.0 x 2.1 x 2.3 cm just  anterior to that. The enhancing region medially within the temporal  lobe is unchanged, although the cystic, loculated region of  enhancement anteriorly relative to the resection site has increased  from 2.7 x 1.8 x 1.5 cm previously. Hence, the more solid appearing  component of enhancement is grossly not increased in size, although  the cystic, loculated collection anteriorly along the resection site  is slightly increased in size. On limited MR perfusion imaging, there  is limited evaluation due to the presence of the previously mentioned  extra-axial hematoma at the operative site, which is decreasing, now  less than 1 cm size.      Impression    Impression:     1. Enhancing lesion in extensive T2 hyperintensity at the operative  site are grossly unchanged in size in a patient with a history of  glioblastoma. However, the loculated enhancing and cystic component  along the anterior edge of the resection site is slightly  increased.  2. Decreasing extra-axial hematoma post resection without midline  shift.    MARY GALLAGHER MD       Lab Results   Component Value Date    WBC 7.6 06/22/2018     Lab Results   Component Value Date    RBC 4.18 06/22/2018     Lab Results   Component Value Date    HGB 13.6 06/22/2018     Lab Results   Component Value Date    HCT 39.9 06/22/2018     No components found for: MCT  Lab Results   Component Value Date    MCV 96 06/22/2018     Lab Results   Component Value Date    MCH 32.5 06/22/2018     Lab Results   Component Value Date    MCHC 34.1 06/22/2018     Lab Results   Component Value Date    RDW 15.1 06/22/2018     Lab Results   Component Value Date     06/22/2018           ASSESSMENT/PLAN:  Seth Iglesias is a 57 y/o man with right temporal glioblastoma (WHO grade IV). He had gross total resection on 10/3/17 and completed adjuvant chemorads on 12/22/17. His 4 week post-treatment brain MRI showed areas of enhancement around the resection cavity thought to represent radiation necrosis rather than recurrence.     He then started temozolomide maintenance in January 2018, at a dose of 150 mg/m2 for cycle 1. This was increased to 200 mg/m2 for cycle 2. He also started the Optune device in January 2018.     Upon progression of disease after about less than 6 months of temozolomide in the adjuvant setting, he underwent surgical debulking following randomization on the Tocagen trial to the non-viral therapeutic arm.  He underwent surgical debulking.  Dr. Gracia was not able to obtain a full total gross resection for this secondary surgery due to the involvement of some of the vessels.  The patient says in a palliative sense that he has improved in terms of cognitive processing since surgery.  He has recovered relatively well, although he remains significantly fatigued.      He is on the dexamethasone taper.  Of note, looking his labs, he has had some mild thrombocytopenia with platelets in the  90,000-110,000 range since early May.  Prior to that while he was on temozolomide, he was not having significant thrombocytopenia.  I reviewed the medication list with him.  He states that he is taking ranitidine twice a week, but as he likely was having stomach upset while on the higher dose dexamethasone, as he is tapering that to almost zero, I encouraged him to not take it unless it is absolutely necessary, to avoid significant thrombocytopenia and synergistic effects of both medications.  He remains on the Keppra in necessity under the care of Dr. Boyer and will continue to do so.      I have prescribed a dexamethasone taper to complete by dropping the evening dose of dexamethasone today.  He will continue with the 2 mg 1 tablet over the next 7 days and then stop altogether, and he will report to us whether or not he has any rebound headache or any other neurologic symptoms following the completion of the dexamethasone taper.  In terms of treatment, we will go forward with lomustine as noted.  Manisha Velasco from our clinical trial nursing team has met with him today to do extensive chemotherapy teaching.  We are just waiting full clearance from his insurance, and then he can come back to get the lomustine next week.      We will have him see Helena Landin from our physician assistant team within 2 weeks with a CBC in advance to ensure he is doing well and tolerating chemotherapy.  At that point, we will make plans for further visits.  Lomustine is given on 2-month cycles, so we will make sure to schedule that at that time.  I reviewed with him all of the above.  We discussed the Novocure device Optune which he had been on in the first-line adjuvant setting.  I stated that there is no clear evidence at this time to support or argue against the efficacy of Optune in the second-line setting.  There is some thought about continuation of Optune through chemotherapy resistance, but there is no firm evidence either  way.  His wife stated that the Novoc[employer] team has reached out to him to determine whether or not he wishes to continue Optune, and I stated that would be fine either way.  We will first confirm with the [facility] trial team whether or not it would be prohibited in the context of the trial.  If it is not, that would be fine if the patient wishes to resume the Optune device, but if he does not wish to for any reason or if it is cost prohibitive, that would be fine as well from my angle as long as we move forth with lomustine, again under the auspices of the trial.           I spent 30 minutes in consultation, including H&P and Discussion. >50% of time was spent in counseling and in coordination of care.    Augustus Armstrong MD, PhD      ADDENDUM: Following the visit, trial nurse Manisha Castellanos RN confirmed with the [facility] lead staff that the use of Optune in this setting is NOT permitted while on this part of the clinical trial.    Augustus Armstrong MD PhD

## 2018-06-22 NOTE — NURSING NOTE
RESEARCH:    I met with patient and spouse as they had their follow-up visit with Dr. Armstrong following tumor debulking surgery as part of the TOCA trial.    He is doing well, and reports some fatigue, bloating and gassiness.  The fatigue is improving with time, but seems to be taking longer as compared to the last surgery.  Occasional soreness along incision line is improved with ibuprofen.  Platelets have been trending downward since surgery.  Possibly due to temodar, keppra or dex.  Dex has been tapered to 2 mg daily x7 days, then stop.      Pt was provided information on CCNU/Lomustine from [employer].  We reviewed the information.  Pt and spouse verbalized understanding he will take 240mg or 6 caps once every six weeks.  He will take on an empty stomach.  Patient has a supply of zofran if he does become nauseated.  Reviewed with patient what to watch for and when to call the [facility] triage.    Rx for lomustine was sent to the retail pharmacy and is pending authorization.  About 2 weeks following the start of CCNU, pt will return for labs and provider visit.    6 weeks from today, pt will return to clinic to see Dr. Armstrong with labs after having had a MRI/brain.      Manisha Castellanos, RN  Clinical Research Coordinator, RN

## 2018-06-22 NOTE — MR AVS SNAPSHOT
After Visit Summary   6/22/2018    Seth Iglesias    MRN: [id number]           Patient Information     Date Of Birth          1960        Visit Information        Provider Department      6/22/2018 10:15 AM Augustus Armstrong MD [facility]        Today's Diagnoses     Examination of participant in clinical trial    -  1    Glioblastoma multiforme of temporal lobe (H)           Follow-ups after your visit        Follow-up notes from your care team     Return in about 2 weeks (around 7/6/2018).      Who to contact     If you have questions or need follow up information about today's clinic visit or your schedule please contact [facility] directly at [phone].  Normal or non-critical lab and imaging results will be communicated to you by Bannermanhart, letter or phone within 4 business days after the clinic has received the results. If you do not hear from us within 7 days, please contact the clinic through Bannermanhart or phone. If you have a critical or abnormal lab result, we will notify you by phone as soon as possible.  Submit refill requests through [facility] or call your pharmacy and they will forward the refill request to us. Please allow 3 business days for your refill to be completed.          Additional Information About Your Visit        MyChart Information     [facility] gives you secure access to your electronic health record. If you see a primary care provider, you can also send messages to your care team and make appointments. If you have questions, please call your primary care clinic.  If you do not have a primary care provider, please call [phone] and they will assist you.        Care EveryWhere ID     This is your Care EveryWhere ID. This could be used by other organizations to access your [facility] medical records  [id number]        Your Vitals Were     Pulse Temperature Respirations Height Pulse Oximetry BMI (Body Mass Index)    65 97.9  F (36.6  C)  "(Oral) 16 1.753 m (5' 9\") 96% 31.78 kg/m2       Blood Pressure from Last 3 Encounters:   06/22/18 121/62   06/01/18 131/89   05/18/18 129/80    Weight from Last 3 Encounters:   06/22/18 97.6 kg (215 lb 3.2 oz)   06/01/18 98.6 kg (217 lb 6.4 oz)   05/18/18 96 kg (211 lb 10.3 oz)              We Performed the Following     CBC with platelets differential     Comprehensive metabolic panel     Lactate Dehydrogenase     UA reflex to Microscopic     Uric acid          Today's Medication Changes          These changes are accurate as of 6/22/18 11:59 PM.  If you have any questions, ask your nurse or doctor.               Start taking these medicines.        Dose/Directions    * lomustine 100 MG capsule CHEMO   Commonly known as:  CEENU   Used for:  Examination of participant in clinical trial, Glioblastoma multiforme of temporal lobe (H)   Started by:  Jassi Mcmillan RPH        Dose:  200 mg   Take 2 capsules (200 mg) by mouth once for 1 dose Take on an empty stomach. Take with the 40 mg capsule for a total dose of 240 mg.   Quantity:  2 capsule   Refills:  0       * lomustine 40 MG capsule CHEMO   Commonly known as:  CEENU   Used for:  Examination of participant in clinical trial, Glioblastoma multiforme of temporal lobe (H)   Started by:  Jassi Mcmillan RPH        Dose:  40 mg   Take 1 capsule (40 mg) by mouth once for 1 dose Take on an empty stomach. Take with the 2 x 100 mg capsules for a total dose of 240 mg   Quantity:  1 capsule   Refills:  0       * Notice:  This list has 2 medication(s) that are the same as other medications prescribed for you. Read the directions carefully, and ask your doctor or other care provider to review them with you.         Where to get your medicines      These medications were sent to [place], [place] - [address] [facility] [address] [place] [address], [facility] [address]    Hours:  TRANSPLANT PHONE NUMBER [phone] Phone:  " [phone]     lomustine 100 MG capsule CHEMO    lomustine 40 MG capsule CHEMO                Primary Care Provider Office Phone # Fax #    R Nigel Salmon [phone] [address] [facility] [address]        Equal Access to Services     SHEJANEY OLGA : Hadii aad ku hadasho Soomaali, waaxda luqadaha, qaybta kaalmada adeegyada, waxay fayin haydaviden adecamelia dwyer laabdielmariluz kerns. So [facility] [phone].    ATENCIÓN: Si habla español, tiene a granados disposición servicios gratuitos de asistencia lingüística. Llame al [phone].    We comply with applicable federal civil rights laws and [place] laws. We do not discriminate on the basis of race, color, national origin, age, disability, sex, sexual orientation, or gender identity.            Thank you!     Thank you for choosing [facility]  for your care. Our goal is always to provide you with excellent care. Hearing back from our patients is one way we can continue to improve our services. Please take a few minutes to complete the written survey that you may receive in the mail after your visit with us. Thank you!             Your Updated Medication List - Protect others around you: Learn how to safely use, store and throw away your medicines at www.disposemymeds.org.          This list is accurate as of 6/22/18 11:59 PM.  Always use your most recent med list.                   Brand Name Dispense Instructions for use Diagnosis    dexamethasone 2 MG tablet    DECADRON    100 tablet    4 mg every 8 hours for 4 days, 2 mg every 8 hours for 4 days, then continue 2 mg every 12 hours until seen by Oncology    S/P craniotomy       levETIRAcetam 1000 MG Tabs    KEPPRA    180 tablet    Take 1,000 mg by mouth every 12 hours    Brain mass       * lomustine 100 MG capsule CHEMO    CEENU    2 capsule    Take 2 capsules (200 mg) by mouth once for 1 dose Take on an empty stomach. Take with the 40 mg capsule for a total dose of 240 mg.     Examination of participant in clinical trial, Glioblastoma multiforme of temporal lobe (H)       * lomustine 40 MG capsule CHEMO    CEENU    1 capsule    Take 1 capsule (40 mg) by mouth once for 1 dose Take on an empty stomach. Take with the 2 x 100 mg capsules for a total dose of 240 mg    Examination of participant in clinical trial, Glioblastoma multiforme of temporal lobe (H)       MULTIVITAMIN & MINERAL PO      Take 1 tablet by mouth daily        oxyCODONE IR 5 MG tablet    ROXICODONE    45 tablet    Take 1-2 tablets (5-10 mg) by mouth every 3 hours as needed for other (pain control or improvement in physical function. Hold dose for analgesic side effects.)    S/P craniotomy       polyethylene glycol Packet    MIRALAX/GLYCOLAX    7 packet    Take 17 g by mouth daily    S/P craniotomy       senna-docusate 8.6-50 MG per tablet    SENOKOT-S;PERICOLACE    100 tablet    Take 1-2 tablets by mouth 2 times daily    Brain mass       ZOFRAN ODT PO      Take 8 mg by mouth every 8 hours as needed for nausea        * Notice:  This list has 2 medication(s) that are the same as other medications prescribed for you. Read the directions carefully, and ask your doctor or other care provider to review them with you.

## 2018-06-22 NOTE — NURSING NOTE
"Oncology Rooming Note    June 22, 2018 10:27 AM   Seth Iglesias is a 58 year old male who presents for:    Chief Complaint   Patient presents with     Blood Draw     pt here for venipuncture, urine collected, vitals completed, pt checked in for appt.      Oncology Clinic Visit     return - Glioblastoma      Initial Vitals: /62  Pulse 65  Temp 97.9  F (36.6  C) (Oral)  Resp 16  Ht 1.753 m (5' 9\")  Wt 97.6 kg (215 lb 3.2 oz)  SpO2 96%  BMI 31.78 kg/m2 Estimated body mass index is 31.78 kg/(m^2) as calculated from the following:    Height as of this encounter: 1.753 m (5' 9\").    Weight as of this encounter: 97.6 kg (215 lb 3.2 oz). Body surface area is 2.18 meters squared.  No Pain (0) Comment: Data Unavailable   No LMP for male patient.  Allergies reviewed: Yes  Medications reviewed: Yes    Medications: Medication refills not needed today.  Pharmacy name entered into [facility]:    [id number] - [place], [place] - [address]  [id number] - [place], [place] - [address]    Clinical concerns: return -    6  minutes for nursing intake (face to face time)     Jordana Aviles CMA            "

## 2018-06-25 NOTE — PROGRESS NOTES
The patient's wife, Maryanne, was called with Dr. Armstrong's recommendations regarding bike riding.  She was told that Candelario should always wear a helmet, and only bike if he if feeling very steady and confident of his balance.  She was encouraged to have Candelario start slowly, not over-tire himself, and to stay well hydrated when biking.  She was told there are risks with bike riding, and she was encouraged to have Candelario proceed cautiously.  But, we did discuss that Candelario having something to look forward to that he enjoys is a good thing for him.

## 2018-06-26 NOTE — TELEPHONE ENCOUNTER
RESEARCH:    Left message regarding the followin.  What day did pt take the lomustine?  Pls call with update.  Will discuss any AE's.    2.  Study protocol does not allow another treatment while on this trial.  Specifically, resuming use of the Optune device would not be allowed.    3.  Reiterated Dimitry's message about biking with helmet.      Manisha Castellanos RN  Clinical Research Coordinator- RN

## 2018-07-08 NOTE — TELEPHONE ENCOUNTER
"Brain surgery on new chemo medication.  Has been very tired and sleeps a lot, and caller wants to know how much of this they should expect. Has had nausea but no vomiting. No fever.  Paged on call MD for Dr. Armstrong to [phone] via answering service.  Josy Jacobs RN  [facility]      Reason for Disposition    Taking a medicine that could cause weakness (e.g., blood pressure medications, diuretics)    Additional Information    Negative: Shock suspected (e.g., cold/pale/clammy skin, too weak to stand, low BP, rapid pulse)    Negative: Difficult to awaken or acting confused  (e.g., disoriented, slurred speech)    Negative: Sounds like a life-threatening emergency to the triager    Negative: [1] Vomiting AND [2] contains red blood or black (\"coffee ground\") material  (Exception: few red streaks in vomit that only happened once)    Negative: [1] Vomiting AND [2] recent head injury (within last 3 days)    Negative: [1] Vomiting AND [2] recent abdominal injury (within last 3 days)    Negative: [1] Vomiting AND [2] insulin-dependent diabetes (Type I) AND [3] glucose > 400 mg/dl (22 mmol/l)    Negative: [1] Neutropenia known or suspected (e.g., recent cancer chemotherapy) AND [2] fever > 100.4 F (38.0 C)    Negative: [1] Neutropenia known or suspected (e.g., recent cancer chemotherapy) AND [2] vomiting    Negative: SEVERE vomiting (e.g., 6 or more times / day)    Negative: [1] MODERATE vomiting (e.g., 3 - 5 times/day) AND [2] age > 60    Negative: [1] Vomiting AND [2] severe headache (e.g., excruciating) (Exception: same as previous migraines)    Negative: [1] Drinking very little AND [2] dehydration suspected (e.g., no urine > 12 hours, very dry mouth, very lightheaded)    Negative: Weight loss > 5 lbs (2.3 kg) in one week (or 5 pounds under target weight)    Negative: Patient sounds very sick or weak to the triager    Negative: [1] Vomiting AND [2] abdomen looks much more swollen than usual    Negative: [1] " "Vomiting AND [2] contains bile (green color)    Negative: [1] Vomiting AND [2] high-risk adult (e.g., brain tumor, V-P shunt, dialysis)    Negative: [1] Constant abdominal pain AND [2] present > 2 hours    Negative: [1] Fever > 101 F (38.3 C) AND [2] age > 60    Negative: [1] Fever > 101 F (38.3 C) AND [2] bedridden (e.g., nursing home patient, CVA, chronic illness, recovering from surgery)    Negative: [1] Fever > 101 F (38.3 C) AND [2] co-morbidity risk factor for serious infection (e.g., COPD, heart failure, liver failure, renal failure with dialysis )    Negative: [1] Fever >  100.5 F (38.1 C) AND weak immune system (e.g., diabetes, splenectomy, leukemia, HIV infection, chronic steroid use)    Negative: Taking any of the following medications: digoxin (Lanoxin), lithium, theophylline, phenytoin (Dilantin)    Negative: Fever present > 3 days (72 hours)    Negative: [1] MILD or MODERATE vomiting  (e.g., 1 - 5 times/day) AND [2] present > 48 hours (2 days)    Negative: Vomiting a prescription medication    [1] MILD or MODERATE nausea AND [2] NO symptoms of dehydration or new weight loss (all triage questions negative)    Negative: Severe difficulty breathing (e.g., struggling for each breath, speaks in single words)    Negative: Shock suspected (e.g., cold/pale/clammy skin, too weak to stand, low BP, rapid pulse)    Negative: Difficult to awaken or acting confused  (e.g., disoriented, slurred speech)    Negative: [1] Fainted > 15 minutes ago AND [2] still feels too weak or dizzy to stand    Negative: [1] SEVERE weakness (i.e., unable to walk or barely able to walk, requires support) AND     [2] new onset or worsening    Negative: Sounds like a life-threatening emergency to the triager    Negative: Difficulty breathing    Negative: Heart beating < 50 beats per minute OR > 140 beats per minute    Negative: Extra heart beats OR irregular heart beating   (i.e., \"palpitations\")    Negative: Follows bleeding (e.g., from " vomiting, rectum, vagina; EXCEPTION: small brief weakness from sight of a small amount blood)    Negative: Black or tarry bowel movements    Negative: [1] Drinking very little AND [2] dehydration suspected (e.g., no urine > 12 hours, very dry mouth, very lightheaded)    Negative: Patient sounds very sick or weak to the triager    Protocols used: WEAKNESS (GENERALIZED) AND FATIGUE-ADULT-AH, CANCER - NAUSEA AND VOMITING-ADULT-AH

## 2018-07-08 NOTE — TELEPHONE ENCOUNTER
Hem/Onc fellow was paged by wife for patient with glioblastoma started on Lomustin on 7/4th. They are camping in [place] currently. As per wife, patient feels very fatigued and is sleeping now. She denies any fever, cough, dysuria, diarrhea. He is able to tolerate fluids and oral intake. No new neurologic deficiencies or confusion are reported.   At this point advised to monitor for signs of infection, AMS; keep patient well hydrated and avoid sternous physical activity.   Will update dr. Armstrong, asked to call and make a follow up appointment with Dr. Armstrong next week.

## 2018-07-09 NOTE — PROGRESS NOTES
RN Care Coordination Note  Reason for Outgoing Call:   on call oncology fellow spoke with pt's wife over the weekend re: fatigued, denies any signs of fever, new neurologic changes and confusion.   Needs f/u call today per Dr. Armstrong  Nursing Action/Patient Instruction:  - Writer triaged pt sx through call to his wife:  Pt's wife reports pt has been extremely fatigued since 7/6, started lomustine 7/4 am. Now sleeping 12+ hours a day, has daily mild headaches, balance is off and he is shuffling. No problems swallowing and is staying hydrated and getting up out of bed to go to the bathroom. Mild nausea, no vomiting, using ondansetron.  Completed dex course end of June, does not have dex supply with them in [place], driving home today.  - Explained to Maryanne that I will review sx with Dr. Armstrong today and call her back with recs  - appt with PA in clinic tomorrow held at noon, but will review sx with Dr. Armstrong and call her back.  - per Dr. Armstrong: ok to wait to see Helena at noon tomorrow unless sx worsen, then to ED. Did not want pt to start any dex today until evaluated. Writer offered 4:30 pm appt with Helena today as well - pt's wife declined but will call back. They are 2 hours away and she is going to leave the cabin around noon.  Patient Response/Evaluation:   Pt's wife voiced understanding of above instructions and information and denied further questions    Chastity Kaplan, RN, BSN, OCN  Care Coordinator  [facility]

## 2018-07-10 PROBLEM — R41.82 ALTERED MENTAL STATUS: Status: ACTIVE | Noted: 2018-01-01

## 2018-07-10 NOTE — Clinical Note
"7/10/2018       RE: Seth Iglesias  [address]  [facility] [address]     Dear Colleague,    Thank you for referring your patient, Seth Iglesias, to the [facility]. Please see a copy of my visit note below.    Hematology-Oncology Visit  Jul 10, 2018    Reason for Visit: follow-up glioblastoma; add-on weakness     HPI: Seth Iglesias is a 58 year old gentleman with past medical history of psoriasis, HLD with glioblastoma. He presented with \"dizzy spells\" 9/2017 and went to [facility] ED 9/28 with lethargy, confusion, and dizziness. CT was done showing R temporal lobe mass with edema. He was given IV steroids and had MRI showing large tumor in anterior right temporal lobe with associated intratumoral hemorrhage and cerebellar mass effect. Tumor was 4.4 cm. There was a potential additional lesion measuring 5 mm in paramedian right frontal lobe. He was admitted to [facility] and had right temporal craniotomy on 10/3 by Dr. Wiggins. He performed gross total resection of the tumor, confirming WHO grade 4 glioblastoma. IDH testing was negative. MGMT promoter methylation was absent. He started concurrent chemoradiation with Temodar on 11/12/17 and completed on 12/22/17. He started adjuvant Temodar + Optune device on 1/22/18. He received 5 cycles of adjuvant therapy and had clear progression of disease in R temporal lobe. He enrolled in TOCA trial and had resection on 5/17 with Dr. Gracia. He was randomized to standard of care arm. Of note, gross total resection was not possible due to vessel involvement. He had a baseline MRI on 6/19/18 with residual tumor. He was on dex taper, dex stopped 6/29. Started lomustine on 7/4. Called in on 7/7 with extreme fatigue and acute change in clinical status. He was told to be observed. Called in on 7/9 with continued symptoms and was added to my schedule today.    Please see prior notes from Dr. Armstrong for further details on patient's oncology history. " "    Interval History: Mr. Iglesias is here with his wife today. I have not seen him since end of February. It sounds like he was recovering well after surgery in May and then once he stopped dexamethasone (Dr. Armstrong escalated taper due to TCP) on 6/29 he began to experience daily headaches upon awakening and felt more tired throughout the day. He took Lomustine on 7/4 and then fatigue began to increase, especially the past 2-3 days. He has been sleeping 16+ hours per day and is dozing on and off. He feels weak all over and legs \"give out\" when he stands and walks. He has fallen a few times. His arms feel weak as well but he has been able function okay. He denies one side feeling weaker than the other or paraesthesias. He has had mild nausea and is taking zofran once a day. Waking up still with headaches and taking one ibuprofen or tylenol with relief. He denies any fevers/chills, vomiting, abdominal pain, diarrhea/constipation, difficulties with urinating, cough, SOB, CP, rash. His appetite has declined, but he is eating and drinking fairly well.     Current Outpatient Prescriptions   Medication     levETIRAcetam (KEPPRA) 1000 MG TABS     Ondansetron (ZOFRAN ODT PO)     polyethylene glycol (MIRALAX/GLYCOLAX) Packet     dexamethasone (DECADRON) 2 MG tablet     GLEOSTINE 100 MG capsule CHEMO     GLEOSTINE 40 MG capsule CHEMO     Multiple Vitamins-Minerals (MULTIVITAMIN & MINERAL PO)     oxyCODONE IR (ROXICODONE) 5 MG tablet     senna-docusate (SENOKOT-S;PERICOLACE) 8.6-50 MG per tablet     [DISCONTINUED] Temozolomide (TEMODAR) 180 MG capsule     No current facility-administered medications for this visit.        PHYSICAL EXAM:  /88 (BP Location: Right arm, Patient Position: Chair, Cuff Size: Adult Regular)  Pulse 112  Temp 99.7  F (37.6  C) (Oral)  Resp 20  Wt 97.6 kg (215 lb 1.6 oz)  SpO2 95%  BMI 31.76 kg/m2      Labs:    7/10/2018 12:11   Sodium 139   Potassium 3.9   Chloride 106   Carbon Dioxide 26 " "  Urea Nitrogen 13   Creatinine 1.09   GFR Estimate 69   GFR Estimate If Black 83   Calcium 8.7   Anion Gap 7   Magnesium 2.0   Albumin 3.3 (L)   Protein Total 7.7   Bilirubin Total 0.8   Alkaline Phosphatase 93   ALT 22   AST 17   Lactic Acid 1.3   Glucose 90   WBC 6.1   Hemoglobin 12.4 (L)   Hematocrit 36.9 (L)   Platelet Count 209   RBC Count 3.78 (L)   MCV 98   MCH 32.8   MCHC 33.6   RDW 14.2   Diff Method Automated Method   % Neutrophils 59.4   % Lymphocytes 32.6   % Monocytes 7.2   % Eosinophils 0.3   % Basophils 0.2   % Immature Granulocytes 0.3   Nucleated RBCs 0   Absolute Neutrophil 3.6   Absolute Lymphocytes 2.0   Absolute Monocytes 0.4   Absolute Eosinophils 0.0   Absolute Basophils 0.0   Abs Immature Granulocytes 0.0   Absolute Nucleated RBC 0.0           Assessment & Plan:     1. Right temporal GBM: S/p gross total resection on 10/3/17 and chemoradiation 11/13-12/22/17. He started adjuvant Temodar and Optune device on 1/22/18. ***   Helena Landin PA-C      [facility]  [address] [place] [id number]  [phone]                              Hematology-Oncology Visit  Jul 10, 2018    Reason for Visit: follow-up glioblastoma; add-on weakness     HPI: Seth Iglesias is a 58 year old gentleman with past medical history of psoriasis, HLD with glioblastoma. He presented with \"dizzy spells\" 9/2017 and went to [facility] ED 9/28 with lethargy, confusion, and dizziness. CT was done showing R temporal lobe mass with edema. He was given IV steroids and had MRI showing large tumor in anterior right temporal lobe with associated intratumoral hemorrhage and cerebellar mass effect. Tumor was 4.4 cm. There was a potential additional lesion measuring 5 mm in paramedian right frontal lobe. He was admitted to [facility] and had right temporal craniotomy on 10/3 by Dr. Wiggins. He performed gross total resection of the tumor, confirming WHO grade 4 glioblastoma. IDH testing was negative. MGMT promoter " "methylation was absent. He started concurrent chemoradiation with Temodar on 11/12/17 and completed on 12/22/17. He started adjuvant Temodar + Optune device on 1/22/18. He received 5 cycles of adjuvant therapy and had clear progression of disease in R temporal lobe. He enrolled in TOCA trial and had resection on 5/17 with Dr. Gracia. He was randomized to standard of care arm. Of note, gross total resection was not possible due to vessel involvement. He had a baseline MRI on 6/19/18 with residual tumor. He was on dex taper, dex stopped 6/29. Started lomustine on 7/4. Called in on 7/7 with extreme fatigue and acute change in clinical status. He was told to be observed. Called in on 7/9 with continued symptoms and was added to my schedule today.    Please see prior notes from Dr. Armstrong for further details on patient's oncology history.     Interval History: Mr. Iglesias is here with his wife today. I have not seen him since end of February. It sounds like he was recovering well after surgery in May and then once he stopped dexamethasone (Dr. Armstrong escalated taper due to TCP) on 6/29 he began to experience daily headaches upon awakening and felt more tired throughout the day. He took Lomustine on 7/4 and then fatigue began to increase, especially the past 2-3 days. He has been sleeping 16+ hours per day and is dozing on and off. His wife feels like he has trouble speaking because it takes too much effort. He is not slurring or having aphasia. He has not been confused. He feels weak all over and legs \"give out\" when he stands and walks. He has fallen a few times. His arms feel weak as well but he has been able function okay. He denies one side feeling weaker than the other or paraesthesias. He has had mild nausea and is taking zofran once a day. Waking up still with headaches and taking one ibuprofen or tylenol with relief. He denies any fevers/chills, vomiting, abdominal pain, diarrhea/constipation, difficulties with " urinating, cough, SOB, CP, rash. His appetite has declined, but he is eating and drinking fairly well.     Current Outpatient Prescriptions   Medication     levETIRAcetam (KEPPRA) 1000 MG TABS     Ondansetron (ZOFRAN ODT PO)     polyethylene glycol (MIRALAX/GLYCOLAX) Packet     dexamethasone (DECADRON) 2 MG tablet     GLEOSTINE 100 MG capsule CHEMO     GLEOSTINE 40 MG capsule CHEMO     Multiple Vitamins-Minerals (MULTIVITAMIN & MINERAL PO)     oxyCODONE IR (ROXICODONE) 5 MG tablet     senna-docusate (SENOKOT-S;PERICOLACE) 8.6-50 MG per tablet     [DISCONTINUED] Temozolomide (TEMODAR) 180 MG capsule     No current facility-administered medications for this visit.        PHYSICAL EXAM:  /88 (BP Location: Right arm, Patient Position: Chair, Cuff Size: Adult Regular)  Pulse 112  Temp 99.7  F (37.6  C) (Oral)  Resp 20  Wt 97.6 kg (215 lb 1.6 oz)  SpO2 95%  BMI 31.76 kg/m2  General: Appears extremely fatigued. Falls asleep during the interview and exam. I have to shake him awake. Generally tracks well with conversation and is able to follow commands with prompting.   HEENT: Normocephalic, atraumatic, PERRLA, EOMI. Moist mucus membranes, no lesions, or thrush  Lungs: CTA bilaterally, normal work of breathing  Cardiac: RRR, S1, S2, no murmurs  Abdomen: Soft, nontender, nondistended. Normoactive bowel sounds. No hepatosplenomegaly, masses  Neuro: Appears to have mild facial droop on left, tongue slightly deviates to left, trigeminal sensation and motor intact, strength 5/5 on RUE and 3/5 LUE, 5/5 RLE and 4/5 LLE, normal reflexes. Finger to nose test he tries to touch my opposite pointer finger and is unable to touch my fingertip directly on left. Did not attempt Romberg.   Extremities: No pedal edema    Labs:    7/10/2018 12:11   Sodium 139   Potassium 3.9   Chloride 106   Carbon Dioxide 26   Urea Nitrogen 13   Creatinine 1.09   GFR Estimate 69   GFR Estimate If Black 83   Calcium 8.7   Anion Gap 7   Magnesium  2.0   Albumin 3.3 (L)   Protein Total 7.7   Bilirubin Total 0.8   Alkaline Phosphatase 93   ALT 22   AST 17   Lactic Acid 1.3   Glucose 90   WBC 6.1   Hemoglobin 12.4 (L)   Hematocrit 36.9 (L)   Platelet Count 209   RBC Count 3.78 (L)   MCV 98   MCH 32.8   MCHC 33.6   RDW 14.2   Diff Method Automated Method   % Neutrophils 59.4   % Lymphocytes 32.6   % Monocytes 7.2   % Eosinophils 0.3   % Basophils 0.2   % Immature Granulocytes 0.3   Nucleated RBCs 0   Absolute Neutrophil 3.6   Absolute Lymphocytes 2.0   Absolute Monocytes 0.4   Absolute Eosinophils 0.0   Absolute Basophils 0.0   Abs Immature Granulocytes 0.0   Absolute Nucleated RBC 0.0       Assessment & Plan:     1. Right temporal GBM: S/p gross total resection on 10/3/17 and chemoradiation 11/13-12/22/17. He started adjuvant Temodar and Optune device on 1/22/18. PD after 5 cycles. Had repeat resection per TOCA trial and randomized to standard of care. Had residual tumor left upon starting treatment. I am concerned with his L sided deficits today he has worsening disease and increased edema. He needs brain imaging ASAP. Cannot schedule today and given his degree of lethargy and clinical change, I needed to send him to ED. Called report to ED MD. Acute lethargy and generalized weakness seems to be too acute to be PD alone however he is not presenting toxic--has no evidence of myelosuppression from treatment, metabolic panel benign, normal lactic acid. Lomustine can cause fatigue but generally not this acute either.         Helena Landin PA-C    [facility] [address] [id number] [phone]                              Again, thank you for allowing me to participate in the care of your patient.      Sincerely,    Helena Lanidn PA-C

## 2018-07-10 NOTE — IP AVS SNAPSHOT
[address] [address]    Phone:  [phone]                                       After Visit Summary   7/10/2018    Seth Iglesias    MRN: [id number]           After Visit Summary Signature Page     I have received my discharge instructions, and my questions have been answered. I have discussed any challenges I see with this plan with the nurse or doctor.    ..........................................................................................................................................  Patient/Patient Representative Signature      ..........................................................................................................................................  Patient Representative Print Name and Relationship to Patient    ..................................................               ................................................  Date                                            Time    ..........................................................................................................................................  Reviewed by Signature/Title    ...................................................              ..............................................  Date                                                            Time

## 2018-07-10 NOTE — ED TRIAGE NOTES
Presents from clinic for CT scan after being seen at clinic. Pt has L sided weakness, fatigue, slurred speech that started Saturday. Pt had fall on Friday where patient hit R head and R shoulder Pt has brain tumor, currently receiving chemo (last received 7/4).

## 2018-07-10 NOTE — ED NOTES
[facility], [place]   ED Nurse to Floor Handoff     Seth Iglesias is a 58 year old male who speaks English and lives with a spouse,  in a home  They arrived in the ED by wheelchair from clinic    ED Chief Complaint: Altered Mental Status    ED Dx;   Final diagnoses:   GBM (glioblastoma multiforme) (H)   Cerebral edema (H)   LUE weakness         Needed?: No    Allergies:   Allergies   Allergen Reactions     Nkda [No Known Drug Allergies]    .  Past Medical Hx:   Past Medical History:   Diagnosis Date     Gastroesophageal reflux disease      Glioblastoma (H)      Psoriasis     On Enbrel Injections Q 1 month for about 10 years, [facility] [place]     Seizures (H)       Baseline Mental status: cognitively impaired  Current Mental Status changes: worsening cognitive impairment     Infection present or suspected this encounter: no  Sepsis suspected: No  Isolation type: No active isolations     Activity level - Baseline/Home:  Independent  Activity Level - Current:   Stand with Assist of 2    Bariatric equipment needed?: No    In the ED these meds were given:   Medications   dexamethasone PF (DECADRON) injection 10 mg (10 mg Intravenous Given 7/10/18 1375)       Drips running?  No    Home pump  No    Current LDAs  Peripheral IV 07/10/18 Left Upper forearm (Active)   Site Assessment WDL 7/10/2018  2:38 PM   Line Status Saline locked 7/10/2018  2:38 PM   Phlebitis Scale 0-->no symptoms 7/10/2018  2:38 PM   Infiltration Scale 0 7/10/2018  2:38 PM   Extravasation? No 7/10/2018  2:38 PM   Dressing Intervention New dressing  7/10/2018  2:38 PM   Number of days:0       Incision/Surgical Site 10/03/17 Right Scalp (Active)   Number of days:280       Incision/Surgical Site 05/17/18 Right Head (Active)   Number of days:54       Labs results: Labs Ordered and Resulted from Time of ED Arrival Up to the Time of Departure from the ED - No data to display    Imaging Studies:    Recent Results (from the past 24 hour(s))   CT Head w/o Contrast    Narrative         Impression    Impression:   1. Significant amount of edema in the right middle cerebral artery  territory, concerning for stroke.  2. Poorly defined, heterogeneous mass in the right anterior temporal  lobe has increased in size from the comparison MRI 6/19/2018.  Contrast-enhanced brain MRI recommended.         Recent vital signs:   /72  Temp 98.1  F (36.7  C) (Oral)  Resp 18  SpO2 94%    Cardiac Rhythm: Normal Sinus  Pt needs tele? No  Skin/wound Issues: None    Code Status: unknown    Pain control: pt had none    Nausea control: pt had none    Abnormal labs/tests/findings requiring intervention: CT concerning for stroke, MRI ordered    Family present during ED course? Yes   Family Comments/Social Situation comments: none    Tasks needing completion: None    Pt presented with L sided weakness, increased fatigue. Pt has hx of brain tumor. Last chemo on 7/4. Decadron given. CT completed, MRI ordered.     Aimee Brown, RN  [phone] [facility] ED

## 2018-07-10 NOTE — ED PROVIDER NOTES
History     Chief Complaint   Patient presents with     Altered Mental Status     HPI  Seth Iglesias is a 58 year old male who has glioblastoma s/p right sided resection. He is on chemotherapy and was in the clinic today. I spoke to the provider who wishes he would be evaluated here in the ED. The issue is he is sedated and seems to have new upper left extremity weakness. She reports that the chemotherapy agent he is on can cause sedation. She did do labs today which were essentially all normal. It appears that he is on Decadron taper 4 mg. He is on a drug called Ggleostine. Other concern would be progression of the tumor or worsening edema but he does not seem to be infected according to that provider. The plan is brain imaging and likely admission to the Oncology service. On the 29-30 of June he finished his Decadron taper and on July 3 or 4 he started the new chemotherapy drug so I suspect that his altered mental status is multi-factorial.    I have reviewed the Medications, Allergies, Past Medical and Surgical History, and Social History in the [facility] system.  Past Medical History:   Diagnosis Date     Gastroesophageal reflux disease      Glioblastoma (H)      Psoriasis     On Enbrel Injections Q 1 month for about 10 years, [facility] [place]     Seizures (H)        Past Surgical History:   Procedure Laterality Date     COLONOSCOPY  4/4/2011    Procedure:COLONOSCOPY; Surgeon:TC HARE; Location:[facility]     OPTICAL TRACKING SYSTEM CRANIOTOMY, EXCISE TUMOR, COMBINED Right 10/3/2017    Procedure: COMBINED OPTICAL TRACKING SYSTEM CRANIOTOMY, EXCISE TUMOR;  Stealth Assisted Right Temporal Craniotomy For Tumor Resection;  Surgeon: Harry Wiggins MD;  Location:  OR     OPTICAL TRACKING SYSTEM CRANIOTOMY, EXCISE TUMOR, COMBINED N/A 5/17/2018    Procedure: COMBINED OPTICAL TRACKING SYSTEM CRANIOTOMY, EXCISE TUMOR;  Stealth Assisted Right Craniotomy And Tumor Resection ;  Surgeon: Samia  "Beto Garcia MD;  Location: [facility] OR     SURGICAL HISTORY OF -   02/05/75    Torn Meniscus Right Knee     SURGICAL HISTORY OF -   11/30/73    Arthrotomy & medial meniscectomy left knee       Family History   Problem Relation Age of Onset     C.A.D. Father      age 52     Cancer Father      prostate CA     Lung Cancer Mother      C.A.D. Paternal Uncle      50's       Social History   Substance Use Topics     Smoking status: Never Smoker     Smokeless tobacco: Never Used     Alcohol use No      Comment: occasionally       No current facility-administered medications for this encounter.      Current Outpatient Prescriptions   Medication     dexamethasone (DECADRON) 4 MG tablet     GLEOSTINE 100 MG capsule CHEMO     GLEOSTINE 40 MG capsule CHEMO     levETIRAcetam (KEPPRA) 1000 MG TABS     ondansetron (ZOFRAN-ODT) 8 MG ODT tab     pantoprazole (PROTONIX) 40 MG EC tablet     polyethylene glycol (MIRALAX/GLYCOLAX) Packet     tamsulosin (FLOMAX) 0.4 MG capsule     Multiple Vitamins-Minerals (MULTIVITAMIN & MINERAL PO)     oxyCODONE IR (ROXICODONE) 5 MG tablet     [DISCONTINUED] Temozolomide (TEMODAR) 180 MG capsule        Allergies   Allergen Reactions     Nkda [No Known Drug Allergies]        Review of Systems   Constitutional: Positive for fatigue. Negative for fever.        Positive - sedated   Respiratory: Negative.    Gastrointestinal: Negative.    Genitourinary: Negative.    Musculoskeletal: Negative.    Neurological: Positive for weakness (upper left extremity) and headaches. Negative for seizures.   All other systems reviewed and are negative.      Physical Exam   BP: 118/87  Pulse: 73  Heart Rate: 112  Temp: 98.1  F (36.7  C)  Resp: 18  Height: 175.3 cm (5' 9\")  Weight: 95.3 kg (210 lb 1.6 oz)  SpO2: 98 %      Physical Exam   Constitutional: He is oriented to person, place, and time. He appears well-developed and well-nourished. No distress.   Eyes: EOM are normal. Pupils are equal, round, and reactive to light. "   Neck: Neck supple.   Cardiovascular: Normal rate and normal heart sounds.    Pulmonary/Chest: Effort normal and breath sounds normal.   Neurological: He is alert and oriented to person, place, and time. No cranial nerve deficit. He exhibits abnormal muscle tone. Coordination abnormal. GCS eye subscore is 4. GCS verbal subscore is 5. GCS motor subscore is 6. Right Babinski's sign: LUE strength 3/5.   Mildly sedated   Skin: Skin is warm.   Psychiatric: He has a normal mood and affect. His behavior is normal.   Nursing note and vitals reviewed.      ED Course     ED Course     Procedures             EKG Interpretation:      Interpreted by Les Aguilar  Time reviewed: 1400    Symptoms at time of EKG: weakness   Rhythm: normal sinus   Rate: normal  Axis: normal  Ectopy: none  Conduction: normal  ST Segments/ T Waves: No ST-T wave changes  Q Waves: none  Comparison to prior: No old EKG available    Clinical Impression: normal EKG      Discussed with Onc Fellow and Neuro CC Fellow    Medications   dexamethasone PF (DECADRON) injection 10 mg (10 mg Intravenous Given 7/10/18 1445)   gadobutrol (GADAVIST) injection 10 mL (10 mLs Intravenous Given 7/10/18 1629)   pentamidine (NEBUPENT) neb solution 300 mg (300 mg Inhalation Given 7/12/18 1226)   albuterol neb solution 2.5 mg (2.5 mg Nebulization Given 7/12/18 1225)     Results for orders placed or performed during the hospital encounter of 07/10/18   CT Head w/o Contrast    Narrative         Impression    Impression:   1. Significant amount of edema in the right middle cerebral artery  territory, concerning for stroke.  2. Poorly defined, heterogeneous mass in the right anterior temporal  lobe has increased in size from the comparison MRI 6/19/2018.  Contrast-enhanced brain MRI recommended.              Labs Ordered and Resulted from Time of ED Arrival Up to the Time of Departure from the ED - No data to display         Assessments & Plan (with Medical Decision  Making)   58-year-old male with history of glioblastoma status-post resection on the right side. He is also receiving chemotherapy. He has been off steroids now for almost two weeks and about a week ago he started a new chemotherapy agent. He was in the Oncology clinic today because of fatigue, generalized weakness, and left upper extremity motor weakness. Labs were done there which were all normal. Patient was sent here for imaging. A non-contrast head CT was performed that shows edema. He was then given Decadron 10 mg IV. MRI of the brain was ordered. Case was discussed with the Oncology fellow who will admit the patient. I also spoke to the Neurology Critical Care fellow who is aware and they generally would not intervene on a possible stroke at this point his symptoms could certainly be related to be edema. The patient and his wife understand the plan, he is stable and will go to Oncology after the MRI.    I have reviewed the nursing notes.    I have reviewed the findings, diagnosis, plan and need for follow up with the patient.    Discharge Medication List as of 7/12/2018  1:22 PM      START taking these medications    Details   pantoprazole (PROTONIX) 40 MG EC tablet Take 1 tablet (40 mg) by mouth every morning (before breakfast), Disp-30 tablet, R-0, E-Prescribe             Final diagnoses:   GBM (glioblastoma multiforme) (H)   Cerebral edema (H)   LUE weakness     I, Harry Whelan, am serving as a trained medical scribe to document services personally performed by Juancarlos Aguilar MD, based on the provider's statements to me.   I, Juancarlos Aguilar MD, was physically present and have reviewed and verified the accuracy of this note documented by Harry Whelan.    7/10/2018   [facility], [place], EMERGENCY DEPARTMENT     Les Aguilar MD  07/14/18 3848

## 2018-07-10 NOTE — LETTER
"7/10/2018      RE: Seth Iglesias  [address]  [facility] [id number]       Hematology-Oncology Visit  Jul 10, 2018    Reason for Visit: follow-up glioblastoma; add-on weakness     HPI: Seth Iglesias is a 58 year old gentleman with past medical history of psoriasis, HLD with glioblastoma. He presented with \"dizzy spells\" 9/2017 and went to [facility] ED 9/28 with lethargy, confusion, and dizziness. CT was done showing R temporal lobe mass with edema. He was given IV steroids and had MRI showing large tumor in anterior right temporal lobe with associated intratumoral hemorrhage and cerebellar mass effect. Tumor was 4.4 cm. There was a potential additional lesion measuring 5 mm in paramedian right frontal lobe. He was admitted to [facility] and had right temporal craniotomy on 10/3 by Dr. Wiggins. He performed gross total resection of the tumor, confirming WHO grade 4 glioblastoma. IDH testing was negative. MGMT promoter methylation was absent. He started concurrent chemoradiation with Temodar on 11/12/17 and completed on 12/22/17. He started adjuvant Temodar + Optune device on 1/22/18. He received 5 cycles of adjuvant therapy and had clear progression of disease in R temporal lobe. He enrolled in TOCA trial and had resection on 5/17 with Dr. Gracia. He was randomized to standard of care arm. Of note, gross total resection was not possible due to vessel involvement. He had a baseline MRI on 6/19/18 with residual tumor. He was on dex taper, dex stopped 6/29. Started lomustine on 7/4. Called in on 7/7 with extreme fatigue and acute change in clinical status. He was told to be observed. Called in on 7/9 with continued symptoms and was added to my schedule today.    Please see prior notes from Dr. Armstrong for further details on patient's oncology history.     Interval History: Mr. Iglesias is here with his wife today. I have not seen him since end of February. It sounds like he was recovering well after surgery in May and " "then once he stopped dexamethasone (Dr. Armsrtong escalated taper due to TCP) on 6/29 he began to experience daily headaches upon awakening and felt more tired throughout the day. He took Lomustine on 7/4 and then fatigue began to increase, especially the past 2-3 days. He has been sleeping 16+ hours per day and is dozing on and off. His wife feels like he has trouble speaking because it takes too much effort. He is not slurring or having aphasia. He has not been confused. He feels weak all over and legs \"give out\" when he stands and walks. He has fallen a few times. His arms feel weak as well but he has been able function okay. He denies one side feeling weaker than the other or paraesthesias. He has had mild nausea and is taking zofran once a day. Waking up still with headaches and taking one ibuprofen or tylenol with relief. He denies any fevers/chills, vomiting, abdominal pain, diarrhea/constipation, difficulties with urinating, cough, SOB, CP, rash. His appetite has declined, but he is eating and drinking fairly well.     Current Outpatient Prescriptions   Medication     levETIRAcetam (KEPPRA) 1000 MG TABS     Ondansetron (ZOFRAN ODT PO)     polyethylene glycol (MIRALAX/GLYCOLAX) Packet     dexamethasone (DECADRON) 2 MG tablet     GLEOSTINE 100 MG capsule CHEMO     GLEOSTINE 40 MG capsule CHEMO     Multiple Vitamins-Minerals (MULTIVITAMIN & MINERAL PO)     oxyCODONE IR (ROXICODONE) 5 MG tablet     senna-docusate (SENOKOT-S;PERICOLACE) 8.6-50 MG per tablet     [DISCONTINUED] Temozolomide (TEMODAR) 180 MG capsule     No current facility-administered medications for this visit.        PHYSICAL EXAM:  /88 (BP Location: Right arm, Patient Position: Chair, Cuff Size: Adult Regular)  Pulse 112  Temp 99.7  F (37.6  C) (Oral)  Resp 20  Wt 97.6 kg (215 lb 1.6 oz)  SpO2 95%  BMI 31.76 kg/m2  General: Appears extremely fatigued. Falls asleep during the interview and exam. I have to shake him awake. Generally tracks " well with conversation and is able to follow commands with prompting.   HEENT: Normocephalic, atraumatic, PERRLA, EOMI. Moist mucus membranes, no lesions, or thrush  Lungs: CTA bilaterally, normal work of breathing  Cardiac: RRR, S1, S2, no murmurs  Abdomen: Soft, nontender, nondistended. Normoactive bowel sounds. No hepatosplenomegaly, masses  Neuro: Appears to have mild facial droop on left, tongue slightly deviates to left, trigeminal sensation and motor intact, strength 5/5 on RUE and 3/5 LUE, 5/5 RLE and 4/5 LLE, normal reflexes. Finger to nose test he tries to touch my opposite pointer finger and is unable to touch my fingertip directly on left. Did not attempt Romberg.   Extremities: No pedal edema    Labs:    7/10/2018 12:11   Sodium 139   Potassium 3.9   Chloride 106   Carbon Dioxide 26   Urea Nitrogen 13   Creatinine 1.09   GFR Estimate 69   GFR Estimate If Black 83   Calcium 8.7   Anion Gap 7   Magnesium 2.0   Albumin 3.3 (L)   Protein Total 7.7   Bilirubin Total 0.8   Alkaline Phosphatase 93   ALT 22   AST 17   Lactic Acid 1.3   Glucose 90   WBC 6.1   Hemoglobin 12.4 (L)   Hematocrit 36.9 (L)   Platelet Count 209   RBC Count 3.78 (L)   MCV 98   MCH 32.8   MCHC 33.6   RDW 14.2   Diff Method Automated Method   % Neutrophils 59.4   % Lymphocytes 32.6   % Monocytes 7.2   % Eosinophils 0.3   % Basophils 0.2   % Immature Granulocytes 0.3   Nucleated RBCs 0   Absolute Neutrophil 3.6   Absolute Lymphocytes 2.0   Absolute Monocytes 0.4   Absolute Eosinophils 0.0   Absolute Basophils 0.0   Abs Immature Granulocytes 0.0   Absolute Nucleated RBC 0.0       Assessment & Plan:     1. Right temporal GBM: S/p gross total resection on 10/3/17 and chemoradiation 11/13-12/22/17. He started adjuvant Temodar and Optune device on 1/22/18. PD after 5 cycles. Had repeat resection per TOCA trial and randomized to standard of care. Had residual tumor left upon starting treatment. I am concerned with his L sided deficits today he  has worsening disease and increased edema. He needs brain imaging ASAP. Cannot schedule today and given his degree of lethargy and clinical change, I needed to send him to ED. Called report to ED MD. Acute lethargy and generalized weakness seems to be too acute to be PD alone however he is not presenting toxic--has no evidence of myelosuppression from treatment, metabolic panel benign, normal lactic acid. Lomustine can cause fatigue but generally not this acute either. Will follow his work-up in ED. May need admission pending course.     Helena Landin PA-C    [facility]  [address] [place] [id number]  [phone]

## 2018-07-10 NOTE — TELEPHONE ENCOUNTER
RESEARCH:    Spouse called and reported patient more fatigued and sleeping more.  Pt's appointments scheduled for earlier   today.  Patient now 30+min late for apts.  Left message on cell/home phone regarding today's appointments.  Left my contact information.  Updated SCARLETT Monroy.    ADDENDUM:    Maryanne, spouse called. Patient is here in clinic and getting labs drawn.  She has him in a wheel chair.      Manisha Castellanos RN  Clinical Research Coordinator-RN`

## 2018-07-10 NOTE — PROGRESS NOTES
"Hematology-Oncology Visit  Jul 10, 2018    Reason for Visit: follow-up glioblastoma; add-on weakness     HPI: Seth Iglesias is a 58 year old gentleman with past medical history of psoriasis, HLD with glioblastoma. He presented with \"dizzy spells\" 9/2017 and went to [facility] ED 9/28 with lethargy, confusion, and dizziness. CT was done showing R temporal lobe mass with edema. He was given IV steroids and had MRI showing large tumor in anterior right temporal lobe with associated intratumoral hemorrhage and cerebellar mass effect. Tumor was 4.4 cm. There was a potential additional lesion measuring 5 mm in paramedian right frontal lobe. He was admitted to [facility] and had right temporal craniotomy on 10/3 by Dr. Wiggins. He performed gross total resection of the tumor, confirming WHO grade 4 glioblastoma. IDH testing was negative. MGMT promoter methylation was absent. He started concurrent chemoradiation with Temodar on 11/12/17 and completed on 12/22/17. He started adjuvant Temodar + Optune device on 1/22/18. He received 5 cycles of adjuvant therapy and had clear progression of disease in R temporal lobe. He enrolled in TOCA trial and had resection on 5/17 with Dr. Gracia. He was randomized to standard of care arm. Of note, gross total resection was not possible due to vessel involvement. He had a baseline MRI on 6/19/18 with residual tumor. He was on dex taper, dex stopped 6/29. Started lomustine on 7/4. Called in on 7/7 with extreme fatigue and acute change in clinical status. He was told to be observed. Called in on 7/9 with continued symptoms and was added to my schedule today.    Please see prior notes from Dr. Armstrong for further details on patient's oncology history.     Interval History: Mr. Iglesias is here with his wife today. I have not seen him since end of February. It sounds like he was recovering well after surgery in May and then once he stopped dexamethasone (Dr. Armstrong escalated taper due to TCP) on 6/29 he " "began to experience daily headaches upon awakening and felt more tired throughout the day. He took Lomustine on 7/4 and then fatigue began to increase, especially the past 2-3 days. He has been sleeping 16+ hours per day and is dozing on and off. His wife feels like he has trouble speaking because it takes too much effort. He is not slurring or having aphasia. He has not been confused. He feels weak all over and legs \"give out\" when he stands and walks. He has fallen a few times. His arms feel weak as well but he has been able function okay. He denies one side feeling weaker than the other or paraesthesias. He has had mild nausea and is taking zofran once a day. Waking up still with headaches and taking one ibuprofen or tylenol with relief. He denies any fevers/chills, vomiting, abdominal pain, diarrhea/constipation, difficulties with urinating, cough, SOB, CP, rash. His appetite has declined, but he is eating and drinking fairly well.     Current Outpatient Prescriptions   Medication     levETIRAcetam (KEPPRA) 1000 MG TABS     Ondansetron (ZOFRAN ODT PO)     polyethylene glycol (MIRALAX/GLYCOLAX) Packet     dexamethasone (DECADRON) 2 MG tablet     GLEOSTINE 100 MG capsule CHEMO     GLEOSTINE 40 MG capsule CHEMO     Multiple Vitamins-Minerals (MULTIVITAMIN & MINERAL PO)     oxyCODONE IR (ROXICODONE) 5 MG tablet     senna-docusate (SENOKOT-S;PERICOLACE) 8.6-50 MG per tablet     [DISCONTINUED] Temozolomide (TEMODAR) 180 MG capsule     No current facility-administered medications for this visit.        PHYSICAL EXAM:  /88 (BP Location: Right arm, Patient Position: Chair, Cuff Size: Adult Regular)  Pulse 112  Temp 99.7  F (37.6  C) (Oral)  Resp 20  Wt 97.6 kg (215 lb 1.6 oz)  SpO2 95%  BMI 31.76 kg/m2  General: Appears extremely fatigued. Falls asleep during the interview and exam. I have to shake him awake. Generally tracks well with conversation and is able to follow commands with prompting.   HEENT: " Normocephalic, atraumatic, PERRLA, EOMI. Moist mucus membranes, no lesions, or thrush  Lungs: CTA bilaterally, normal work of breathing  Cardiac: RRR, S1, S2, no murmurs  Abdomen: Soft, nontender, nondistended. Normoactive bowel sounds. No hepatosplenomegaly, masses  Neuro: Appears to have mild facial droop on left, tongue slightly deviates to left, trigeminal sensation and motor intact, strength 5/5 on RUE and 3/5 LUE, 5/5 RLE and 4/5 LLE, normal reflexes. Finger to nose test he tries to touch my opposite pointer finger and is unable to touch my fingertip directly on left. Did not attempt Romberg.   Extremities: No pedal edema    Labs:    7/10/2018 12:11   Sodium 139   Potassium 3.9   Chloride 106   Carbon Dioxide 26   Urea Nitrogen 13   Creatinine 1.09   GFR Estimate 69   GFR Estimate If Black 83   Calcium 8.7   Anion Gap 7   Magnesium 2.0   Albumin 3.3 (L)   Protein Total 7.7   Bilirubin Total 0.8   Alkaline Phosphatase 93   ALT 22   AST 17   Lactic Acid 1.3   Glucose 90   WBC 6.1   Hemoglobin 12.4 (L)   Hematocrit 36.9 (L)   Platelet Count 209   RBC Count 3.78 (L)   MCV 98   MCH 32.8   MCHC 33.6   RDW 14.2   Diff Method Automated Method   % Neutrophils 59.4   % Lymphocytes 32.6   % Monocytes 7.2   % Eosinophils 0.3   % Basophils 0.2   % Immature Granulocytes 0.3   Nucleated RBCs 0   Absolute Neutrophil 3.6   Absolute Lymphocytes 2.0   Absolute Monocytes 0.4   Absolute Eosinophils 0.0   Absolute Basophils 0.0   Abs Immature Granulocytes 0.0   Absolute Nucleated RBC 0.0       Assessment & Plan:     1. Right temporal GBM: S/p gross total resection on 10/3/17 and chemoradiation 11/13-12/22/17. He started adjuvant Temodar and Optune device on 1/22/18. PD after 5 cycles. Had repeat resection per TOCA trial and randomized to standard of care. Had residual tumor left upon starting treatment. I am concerned with his L sided deficits today (onset unknown) he has worsening disease and increased edema. He needs brain  imaging ASAP. Cannot schedule today and given his degree of lethargy and clinical change, I needed to send him to ED. Called report to ED MD. Acute lethargy and generalized weakness seems to be too acute to be PD alone however he is not presenting toxic--has no evidence of myelosuppression from treatment, metabolic panel benign, normal lactic acid. Lomustine can cause fatigue but generally not this acute either. Will follow his work-up in ED. May need admission pending course.     Helena Landin PA-C    [facility]  [address] [place] [id number]  [phone]

## 2018-07-10 NOTE — H&P
[facility], [place]    History and Physical  Hospitalist       Date of Admission:  7/10/2018  Date of Service (when I saw the patient): 07/10/18    Assessment & Plan   Seth Iglesias is a 58 year old gentleman with past medical history of psoriasis, HLD, with glioblastoma who is admitted with progressive lethargy and left sided weakness.     #Lethargy, left sided weakness: No myelosuppression and no symptoms or lab/vital changes to suggest infection. Lomustine can cause fatigue/lethargy. Prelim read of CT head done in the ED showed significant amount of edema in the right middle cerebral artery territory, concerning for stroke. Poorly defined heterogenous mass right anterior temple lobe, increased in size from 6/19/18. Per ED, spoke with neuro stroke team who agreed with proceeding with MRI of the brain, said they would advise further stroke treatment at this time as symptoms could be due to edema. Dex 10 mg IV given in the ED  - Brain MRI pending  - IV dex 4 mg q6h for edema  - will start insulin sliding scale for possible steroid induced hyperglycemia  - Consider neuro consult if MRI shows evidence of stroke  - Will consult Neurosurgery if evidence of cerebral edema on MRI    #Right temporal GBM: S/p gross total resection on 10/3/17 and chemoradiation 11/13-12/22/17. He started adjuvant Temodar and Optune device on 1/22/18. PD after 5 cycles. Had repeat resection per TOCA trial and randomized to standard of care. Started lomustine on 7/4. Tapered off steroids on 6/29. Follows with Dr. Armstrong    DVT Prophylaxis: Hold at this time pending results of brain MRI  GI ppx with Protonix 40 mg IV bid in setting of high dose steroids    Code Status: Full Code    Disposition: Expected discharge in 2-3 days with completion of work up and improvement of acute symptoms.      Primary Care Physician   JUSTYN Salmon    Chief Complaint   Weakness, fatigue    History is obtained from the patient and chart  "review    History of Present Illness   Seth Iglesias is a 58 year old gentleman with past medical history of psoriasis, HLD, with glioblastoma who is admitted with progressive lethargy and left sided weakness.     ONCOLOGIC HISTORY (adapted from clinic note):   He presented with \"dizzy spells\" 9/2017 and went to [facility] ED 9/28 with lethargy, confusion, and dizziness. CT was done showing R temporal lobe mass with edema. He was given IV steroids and had MRI showing large tumor in anterior right temporal lobe with associated intratumoral hemorrhage and cerebellar mass effect. Tumor was 4.4 cm. There was a potential additional lesion measuring 5 mm in paramedian right frontal lobe. He was admitted to [facility] and had right temporal craniotomy on 10/3 by Dr. Wiggins. He performed gross total resection of the tumor, confirming WHO grade 4 glioblastoma. IDH testing was negative. MGMT promoter methylation was absent. He started concurrent chemoradiation with Temodar on 11/12/17 and completed on 12/22/17. He started adjuvant Temodar + Optune device on 1/22/18. He received 5 cycles of adjuvant therapy and had clear progression of disease in R temporal lobe. He enrolled in TOCA trial and had resection on 5/17 with Dr. Gracia. He was randomized to standard of care arm. Of note, gross total resection was not possible due to vessel involvement. He had a baseline MRI on 6/19/18 with residual tumor. He was on dex taper, dex stopped 6/29. Started lomustine on 7/4.    Past Medical History    I have reviewed this patient's medical history and updated it with pertinent information if needed.   Past Medical History:   Diagnosis Date     Gastroesophageal reflux disease      Glioblastoma (H)      Psoriasis     On Enbrel Injections Q 1 month for about 10 years, [facility]     Seizures (H)        Past Surgical History   I have reviewed this patient's surgical history and updated it with pertinent information if " needed.  Past Surgical History:   Procedure Laterality Date     COLONOSCOPY  2011    Procedure:COLONOSCOPY; Surgeon:TC HARE; Location:[facility]     OPTICAL TRACKING SYSTEM CRANIOTOMY, EXCISE TUMOR, COMBINED Right 10/3/2017    Procedure: COMBINED OPTICAL TRACKING SYSTEM CRANIOTOMY, EXCISE TUMOR;  Stealth Assisted Right Temporal Craniotomy For Tumor Resection;  Surgeon: Harry Wiggins MD;  Location: [facility]     OPTICAL TRACKING SYSTEM CRANIOTOMY, EXCISE TUMOR, COMBINED N/A 2018    Procedure: COMBINED OPTICAL TRACKING SYSTEM CRANIOTOMY, EXCISE TUMOR;  Stealth Assisted Right Craniotomy And Tumor Resection ;  Surgeon: Beto Gracia MD;  Location: [facility]     SURGICAL HISTORY OF -   73    Torn Meniscus Right Knee     SURGICAL HISTORY OF -   71    Arthrotomy & medial meniscectomy left knee       Prior to Admission Medications   Prior to Admission Medications   Prescriptions Last Dose Informant Patient Reported? Taking?   GLEOSTINE 100 MG capsule CHEMO 2018 at Unknown  Yes Yes   Sig: Take 240 mg every 6 weeks.   GLEOSTINE 40 MG capsule CHEMO 2018 at Unknown time  Yes Yes   Sig: Take 240 mg every 6 weeks   Multiple Vitamins-Minerals (MULTIVITAMIN & MINERAL PO) More than a month  Yes No   Sig: Take 1 tablet by mouth daily   Ondansetron (ZOFRAN ODT PO) 2018 at Unkown time  Yes Yes   Sig: Take 8 mg by mouth every 8 hours as needed for nausea   dexamethasone (DECADRON) 2 MG tablet 2018  No No   Si mg every 8 hours for 4 days, 2 mg every 8 hours for 4 days, then continue 2 mg every 12 hours until seen by Oncology   Patient not taking: Reported on 7/10/2018   levETIRAcetam (KEPPRA) 1000 MG TABS 7/10/2018 at 0500  No Yes   Sig: Take 1,000 mg by mouth every 12 hours   oxyCODONE IR (ROXICODONE) 5 MG tablet More than a month  No No   Sig: Take 1-2 tablets (5-10 mg) by mouth every 3 hours as needed for other (pain control or improvement in physical function. Hold dose for  analgesic side effects.)   Patient not taking: Reported on 7/10/2018   polyethylene glycol (MIRALAX/GLYCOLAX) Packet 7/8/2018 at Unknown time  No Yes   Sig: Take 17 g by mouth daily   tamsulosin (FLOMAX) 0.4 MG capsule 7/8/2018 at PM  Yes Yes   Sig: Take 0.4 mg by mouth daily      Facility-Administered Medications: None     Allergies   Allergies   Allergen Reactions     Nkda [No Known Drug Allergies]        Social History   I have reviewed this patient's social history and updated it with pertinent information if needed. Seth Iglesias  reports that he has never smoked. He has never used smokeless tobacco. He reports that he does not drink alcohol or use illicit drugs.    Family History   I have reviewed this patient's family history and updated it with pertinent information if needed.   Family History   Problem Relation Age of Onset     C.A.D. Father      age 52     Cancer Father      prostate CA     Lung Cancer Mother      C.A.D. Paternal Uncle      50's       Review of Systems     Patient unable to provide medical details or updated ROS due to barriers to communication including acute illness, sedation and hypersomnolence    Physical Exam   Temp: 98.1  F (36.7  C) Temp src: Oral BP: 95/63   Heart Rate: 112 Resp: 18 SpO2: 92 % O2 Device: None (Room air)    Vital Signs with Ranges  Temp:  [98.1  F (36.7  C)-99.7  F (37.6  C)] 98.1  F (36.7  C)  Pulse:  [112] 112  Heart Rate:  [112] 112  Resp:  [18-20] 18  BP: ()/(63-88) 95/63  SpO2:  [92 %-98 %] 92 %  0 lbs 0 oz    PHYSICAL EXAMINATION:   GEN: Somnulent in no acute distress, sitting upright in chair. Pleasant and cooperative  HEENT: Pupils equal and reactive to light, conjunctiva are pink conjunctivae, sclera anicteric,  not able to fully inspect oropharynx secondary to lack of cooperation, MMM  NECK: supple no JVD/LAD  PULM: Good air flow bilaterally, symmetric in expansion,CTA anteriorly anteriorly No rhonchi, no wheezes. Breathing non-labored.   CV:  Normal S1 and S2. RRR.  No murmur, gallop, or rub.  No peripheral edema.  Peripheral pulses intact.   ABD: Soft, nontender, nondistended. Bowel sound normoactive, no guarding, no rebound.   MSK: .  No apparent muscle wasting. No clubbing, cyanosis, or edema  NEURO: very somnolent, minimally arousable not able to assess oreintation withdraws appropriately to painful stimuli  SKIN: Warm and dry. No visible rashes or lesions       Data   Data reviewed today:  I personally reviewed no images or EKG's today.    Recent Labs  Lab 07/10/18  1211   WBC 6.1   HGB 12.4*   MCV 98         POTASSIUM 3.9   CHLORIDE 106   CO2 26   BUN 13   CR 1.09   ANIONGAP 7   ARA 8.7   GLC 90   ALBUMIN 3.3*   PROTTOTAL 7.7   BILITOTAL 0.8   ALKPHOS 93   ALT 22   AST 17       Recent Results (from the past 24 hour(s))   CT Head w/o Contrast    Narrative    CT HEAD W/O CONTRAST 7/10/2018 2:14 PM    Provided History: GBM with new left side symptoms and weakness;     Comparison: MRI 6/19/2018, 9/28/2017, 9/29/2017.    Technique: Using multidetector thin collimation helical acquisition  technique, axial, coronal and sagittal CT images from the skull base  to the vertex were obtained without intravenous contrast.     Findings:    Slight increase in size of heterogeneous mass of the anterior right  temporal lobe, now measuring approximately 2.7 x 2.7 cm (previously  2.2 x 2.2 cm). Additionally, extra-axial simple fluid in the right  temporal lobe region. Significant amount of edema of the white matter  in the right frontoparietal region, extending into the temporal lobe.  This appears to correspond with the right middle cerebral artery  territory. Slight effacement of the right lateral ventricle. However,  no hydrocephalus. Basilar cisterns are patent. No intracranial  hemorrhage. Right frontotemporal craniotomy changes.    The visualized paranasal sinuses are clear. The mastoid air cells are  clear.      Impression    Impression:   1.  Significant amount of edema in the right middle cerebral artery  territory which likely is related to underlying mass and presumed  progression of disease.  2. Poorly defined, heterogeneous mass in the right anterior temporal  lobe has increased in size from the comparison MRI 6/19/2018.  Contrast-enhanced brain MRI recommended.    [Result: Edema of the right middle cerebral artery territory,  concerning for stroke]    Finding was identified on 7/10/2018 2:20 PM.     Dr. Aguilar was contacted by Dr. Issa at 7/10/2018 2:34 PM and  verbalized understanding of the urgent finding.     After staffing, it was felt that the edema in the right cerebral  hemisphere was more likely related to the underlying tumor and  possible progression.    I have personally reviewed the examination and initial interpretation  and I agree with the findings.    KIKI ANGEL MD     Patient was seen and examined by me. I personally reviewed the electronic medical record including labs, flowsheets, imaging reports and films, vitals, and medications.       Delroy Salvador MD, BEHZAD   of Medicine  Pulmonary, Critical Care and Sleep Medicine  [facility]  Pager: [phone]    ADDENDUM    MRI 7/10/2018  Impression:  1. Right anterior temporal lobe tumor progression. Progression of  tumor into the right corpus striatum, which may explain the patient's  left upper extremity motor weakness. Extensive associated edema with 5  mm of leftward midline shift and borderline right uncal herniation.  2. No ischemic infarct.    - will urgently consult neurosurgery given above findings    Delroy Salvador MD, BEHZAD   of Medicine  Pulmonary, Critical Care and Sleep Medicine  [facility]  Pager: [phone]

## 2018-07-10 NOTE — NURSING NOTE
Chief Complaint   Patient presents with     Blood Draw     labs drawn from right hand via veinpuncture and vitals taken by MEHDI Fisher CMA July 10, 2018

## 2018-07-10 NOTE — MR AVS SNAPSHOT
After Visit Summary   7/10/2018    Seth Iglesias    MRN: [id number]           Patient Information     Date Of Birth          1960        Visit Information        Provider Department      7/10/2018 12:10 PM Helena Landin PA-C [facility]        Today's Diagnoses     Generalized muscle weakness    -  1    Glioblastoma multiforme of temporal lobe (H)           Follow-ups after your visit        Your next 10 appointments already scheduled     Jul 10, 2018  4:00 PM CDT   MR BRAIN W CONTRAST with UUMR2   [facility] [place] ([facility], [facility])    [address] [id number] [phone]           Take your medicines as usual, unless your doctor tells you not to. Bring a list of your current medicines to your exam (including vitamins, minerals and over-the-counter drugs).  You may or may not receive intravenous (IV) contrast for this exam pending the discretion of the Radiologist.  You do not need to do anything special to prepare.  The MRI machine uses a strong magnet. Please wear clothes without metal (snaps, zippers). A sweatsuit works well, or we may give you a hospital gown.  Please remove any body piercings and hair extensions before you arrive. You will also remove watches, jewelry, hairpins, wallets, dentures, partial dental plates and hearing aids. You may wear contact lenses, and you may be able to wear your rings. We have a safe place to keep your personal items, but it is safer to leave them at home.  **IMPORTANT** THE INSTRUCTIONS BELOW ARE ONLY FOR THOSE PATIENTS WHO HAVE BEEN PRESCRIBED SEDATION OR GENERAL ANESTHESIA DURING THEIR MRI PROCEDURE:  IF YOUR DOCTOR PRESCRIBED ORAL SEDATION (take medicine to help you relax during your exam):   You must get the medicine from your doctor (oral medication) before you arrive. Bring the medicine to the exam. Do not take it at home. You ll be told when  to take it upon arriving for your exam.   Arrive one hour early. Bring someone who can take you home after the test. Your medicine will make you sleepy. After the exam, you may not drive, take a bus or take a taxi by yourself.  IF YOUR DOCTOR PRESCRIBED IV SEDATION:   Arrive one hour early. Bring someone who can take you home after the test. Your medicine will make you sleepy. After the exam, you may not drive, take a bus or take a taxi by yourself.   No eating 6 hours before your exam. You may have clear liquids up until 4 hours before your exam. (Clear liquids include water, clear tea, black coffee and fruit juice without pulp.)  IF YOUR DOCTOR PRESCRIBED ANESTHESIA (be asleep for your exam):   Arrive 1 1/2 hours early. Bring someone who can take you home after the test. You may not drive, take a bus or take a taxi by yourself.   No eating 8 hours before your exam. You may have clear liquids up until 4 hours before your exam. (Clear liquids include water, clear tea, black coffee and fruit juice without pulp.)   You will spend four to five hours in the recovery room.  Please call the Imaging Department at your exam site with any questions.            Jul 18, 2018  1:00 PM CDT   [facility] Lab Draw with [facility] [employer] LAB DRAW   [facility] Lab Draw ([facility])    [address] [place] [address]  [address]  [facility] [address] [phone]            Jul 18, 2018  1:40 PM CDT   (Arrive by 1:25 PM)   Return Visit with Helena Landin PA-C   [facility] ([facility])    [address] [place] [address]  [address]  [facility] [address]   [phone]            Aug 13, 2018 10:00 AM CDT   (Arrive by 9:30 AM)   MR BRAIN W/O & W CONTRAST with [address] [facility] [place] [facility] ([facility])    [address] [place] [address]  [address] [facility] [id number] [phone]           Take your medicines as usual, unless your doctor tells you  not to. Bring a list of your current medicines to your exam (including vitamins, minerals and over-the-counter drugs).  You may or may not receive intravenous (IV) contrast for this exam pending the discretion of the Radiologist.  You do not need to do anything special to prepare.  The MRI machine uses a strong magnet. Please wear clothes without metal (snaps, zippers). A sweatsuit works well, or we may give you a hospital gown.  Please remove any body piercings and hair extensions before you arrive. You will also remove watches, jewelry, hairpins, wallets, dentures, partial dental plates and hearing aids. You may wear contact lenses, and you may be able to wear your rings. We have a safe place to keep your personal items, but it is safer to leave them at home.  **IMPORTANT** THE INSTRUCTIONS BELOW ARE ONLY FOR THOSE PATIENTS WHO HAVE BEEN PRESCRIBED SEDATION OR GENERAL ANESTHESIA DURING THEIR MRI PROCEDURE:  IF YOUR DOCTOR PRESCRIBED ORAL SEDATION (take medicine to help you relax during your exam):   You must get the medicine from your doctor (oral medication) before you arrive. Bring the medicine to the exam. Do not take it at home. You ll be told when to take it upon arriving for your exam.   Arrive one hour early. Bring someone who can take you home after the test. Your medicine will make you sleepy. After the exam, you may not drive, take a bus or take a taxi by yourself.  IF YOUR DOCTOR PRESCRIBED IV SEDATION:   Arrive one hour early. Bring someone who can take you home after the test. Your medicine will make you sleepy. After the exam, you may not drive, take a bus or take a taxi by yourself.   No eating 6 hours before your exam. You may have clear liquids up until 4 hours before your exam. (Clear liquids include water, clear tea, black coffee and fruit juice without pulp.)  IF YOUR DOCTOR PRESCRIBED ANESTHESIA (be asleep for your exam):   Arrive 1 1/2 hours early. Bring someone who can take you home after  the test. You may not drive, take a bus or take a taxi by yourself.   No eating 8 hours before your exam. You may have clear liquids up until 4 hours before your exam. (Clear liquids include water, clear tea, black coffee and fruit juice without pulp.)   You will spend four to five hours in the recovery room.  Please call the Imaging Department at your exam site with any questions.            Aug 13, 2018 10:45 AM CDT   LAB with  LAB   [facility] ([facility])    [address]  1st Floor  [facility] [address] [phone]           Please do not eat 10-12 hours before your appointment if you are coming in fasting for labs on lipids, cholesterol, or glucose (sugar). This does not apply to pregnant women. Water, hot tea and black coffee (with nothing added) are okay. Do not drink other fluids, diet soda or chew gum.            Aug 17, 2018  7:45 AM CDT   (Arrive by 7:30 AM)   Return Visit with Augustus Armstrong MD   [facility] ([facility])    [address] [place] [address]  [address]  [facility] [address] [phone]            Sep 24, 2018  9:00 AM CDT   (Arrive by 8:30 AM)   MR BRAIN W/O & W CONTRAST with UUMR1   [facility] [place], MRI ([facility], [facility])    [address] [facility] [address] [phone]           Take your medicines as usual, unless your doctor tells you not to. Bring a list of your current medicines to your exam (including vitamins, minerals and over-the-counter drugs).  You may or may not receive intravenous (IV) contrast for this exam pending the discretion of the Radiologist.  You do not need to do anything special to prepare.  The MRI machine uses a strong magnet. Please wear clothes without metal (snaps, zippers). A sweatsuit works well, or we may give you a hospital gown.  Please remove any body piercings and hair extensions before you arrive. You will also  remove watches, jewelry, hairpins, wallets, dentures, partial dental plates and hearing aids. You may wear contact lenses, and you may be able to wear your rings. We have a safe place to keep your personal items, but it is safer to leave them at home.  **IMPORTANT** THE INSTRUCTIONS BELOW ARE ONLY FOR THOSE PATIENTS WHO HAVE BEEN PRESCRIBED SEDATION OR GENERAL ANESTHESIA DURING THEIR MRI PROCEDURE:  IF YOUR DOCTOR PRESCRIBED ORAL SEDATION (take medicine to help you relax during your exam):   You must get the medicine from your doctor (oral medication) before you arrive. Bring the medicine to the exam. Do not take it at home. You ll be told when to take it upon arriving for your exam.   Arrive one hour early. Bring someone who can take you home after the test. Your medicine will make you sleepy. After the exam, you may not drive, take a bus or take a taxi by yourself.  IF YOUR DOCTOR PRESCRIBED IV SEDATION:   Arrive one hour early. Bring someone who can take you home after the test. Your medicine will make you sleepy. After the exam, you may not drive, take a bus or take a taxi by yourself.   No eating 6 hours before your exam. You may have clear liquids up until 4 hours before your exam. (Clear liquids include water, clear tea, black coffee and fruit juice without pulp.)  IF YOUR DOCTOR PRESCRIBED ANESTHESIA (be asleep for your exam):   Arrive 1 1/2 hours early. Bring someone who can take you home after the test. You may not drive, take a bus or take a taxi by yourself.   No eating 8 hours before your exam. You may have clear liquids up until 4 hours before your exam. (Clear liquids include water, clear tea, black coffee and fruit juice without pulp.)   You will spend four to five hours in the recovery room.  Please call the Imaging Department at your exam site with any questions.            Sep 28, 2018  9:30 AM T   [facility] Lab Draw with  [employer] LAB DRAW   [facility] Draw ([facility])    [address]  [address]  [facility] [id number] [phone]            Sep 28, 2018 10:15 AM CDT   (Arrive by 10:00 AM)   Return Visit with Augustus Armstrong MD   [facility] ([facility])    [address]  [address]  [facility] [id number] [phone]              Future tests that were ordered for you today     Open Future Orders        Priority Expected Expires Ordered    MRI Brain w & w/o contrast STAT 7/10/2018 10/8/2018 7/10/2018            Who to contact     If you have questions or need follow up information about today's clinic visit or your schedule please contact [facility] directly at [phone].  Normal or non-critical lab and imaging results will be communicated to you by [email or web address], letter or phone within 4 business days after the clinic has received the results. If you do not hear from us within 7 days, please contact the clinic through [email or web address] or phone. If you have a critical or abnormal lab result, we will notify you by phone as soon as possible.  Submit refill requests through [email or web address] or call your pharmacy and they will forward the refill request to us. Please allow 3 business days for your refill to be completed.          Additional Information About Your Visit        [email or web address] Information     [email or web address] gives you secure access to your electronic health record. If you see a primary care provider, you can also send messages to your care team and make appointments. If you have questions, please call your primary care clinic.  If you do not have a primary care provider, please call [phone] and they will assist you.        Care EveryWhere ID     This is your Care EveryWhere ID. This could be used by other organizations to access your [place] medical records  [id number]        Your Vitals Were     Pulse Temperature Respirations Pulse Oximetry BMI (Body Mass Index)       112 99.7  F (37.6  C) (Oral)  20 95% 31.76 kg/m2        Blood Pressure from Last 3 Encounters:   07/10/18 107/72   07/10/18 124/88   06/22/18 121/62    Weight from Last 3 Encounters:   07/10/18 97.6 kg (215 lb 1.6 oz)   06/22/18 97.6 kg (215 lb 3.2 oz)   06/01/18 98.6 kg (217 lb 6.4 oz)              We Performed the Following     CBC with platelets differential     Comprehensive metabolic panel     Lactic acid     Magnesium        Primary Care Provider Office Phone # Fax #    R Nigel Salmon [phone] [address] [address]        Equal Access to Services     PERICO ESPINOZA : Hadii vero Carrasco, waalex gautam, dashawn morrisonmaaddis winters, héctor kerns. So [facility] [phone].    ATENCIÓN: Si habla español, tiene a granados disposición servicios gratuitos de asistencia lingüística. Llame al [phone].    We comply with applicable federal civil rights laws and [place] laws. We do not discriminate on the basis of race, color, national origin, age, disability, sex, sexual orientation, or gender identity.            Thank you!     Thank you for choosing [facility]  for your care. Our goal is always to provide you with excellent care. Hearing back from our patients is one way we can continue to improve our services. Please take a few minutes to complete the written survey that you may receive in the mail after your visit with us. Thank you!             Your Updated Medication List - Protect others around you: Learn how to safely use, store and throw away your medicines at www.disposemymeds.org.          This list is accurate as of 7/10/18  3:13 PM.  Always use your most recent med list.                   Brand Name Dispense Instructions for use Diagnosis    dexamethasone 2 MG tablet    DECADRON    100 tablet    4 mg every 8 hours for 4 days, 2 mg every 8 hours for 4 days, then continue 2 mg every 12 hours until seen by Oncology    S/P craniotomy        levETIRAcetam 1000 MG Tabs    KEPPRA    180 tablet    Take 1,000 mg by mouth every 12 hours    Brain mass       * lomustine 40 MG capsule CHEMO    CEENU    1 capsule    Take 1 capsule (40 mg) by mouth once for 1 dose Take on an empty stomach. Take with the 2 x 100 mg capsules for a total dose of 240 mg    Examination of participant in clinical trial, Glioblastoma multiforme of temporal lobe (H)       * GLEOSTINE 100 MG capsule CHEMO   Generic drug:  lomustine           * GLEOSTINE 40 MG capsule CHEMO   Generic drug:  lomustine           MULTIVITAMIN & MINERAL PO      Take 1 tablet by mouth daily        oxyCODONE IR 5 MG tablet    ROXICODONE    45 tablet    Take 1-2 tablets (5-10 mg) by mouth every 3 hours as needed for other (pain control or improvement in physical function. Hold dose for analgesic side effects.)    S/P craniotomy       polyethylene glycol Packet    MIRALAX/GLYCOLAX    7 packet    Take 17 g by mouth daily    S/P craniotomy       senna-docusate 8.6-50 MG per tablet    SENOKOT-S;PERICOLACE    100 tablet    Take 1-2 tablets by mouth 2 times daily    Brain mass       ZOFRAN ODT PO      Take 8 mg by mouth every 8 hours as needed for nausea        * Notice:  This list has 3 medication(s) that are the same as other medications prescribed for you. Read the directions carefully, and ask your doctor or other care provider to review them with you.

## 2018-07-10 NOTE — LETTER
Transition Communication Hand-off for Care Transitions to Next Level of Care Provider    Name: Seth Iglesias  : 1960  MRN #: [id number]  Primary Care Provider: JUSTYN Salmon     Primary Clinic: [id number] [place], [place] [id number]     Reason for Hospitalization:  Cerebral edema (H) [G93.6]  GBM (glioblastoma multiforme) (H) [C71.9]  LUE weakness [R29.898]  Admit Date/Time: 7/10/2018  1:48 PM  Discharge Date:   Payor Source: Payor: [employer] / Plan: [employer] FULLY INSURED / Product        Reason for Communication Hand-off Referral: Patient of Dr Armstrong    Discharge Plan: home with close clinic follow up and OP PT     Follow-up plan:  Future Appointments  Date Time Provider Department Center          2018 2:00 PM [facility] Randolph[facility]   2018 1:00 PM  [facility] DRAW [facility]   2018 1:40 PM Helena Landin PA-C [facility]   2018 6:30 AM [address] O   8/10/2018 1:15 PM Beto Gracia MD [facility]   2018 10:00 AM MR1 [facility]   2018 10:45 AM  LAB [facility]   2018 7:45 AM Augustus Armstrong MD [facility]   2018 9:00 AM [address] O   2018 9:30 AM  [facility] DRAW [facility]   2018 10:15 AM Augustus Armstrong MD [facility]     [place] Recommendations:  Candelario has hx of glioblastoma and was admitted with progressive lethargy and left sided weakness.  He was seen by neurosurgery.  An MRI was done which showed increased contrast-enhancing region and edema surrounding the resection cavity concerning for tumor progression.  He was started on dexamethasone 4 mg every 6 hours.  He worked with physical therapy daily who suggested him having 24 hour assist which his wife was comfortable with.  Neurosurgery is hoping to proceed with surgery on     Amina Morgan, 7D RNCC

## 2018-07-10 NOTE — NURSING NOTE
"RESEARCH:    Patient scheduled for visit after call from spouse reporting patient fatigue.  Sleeping 20/24 hours; increasing weakness.    Patient seen in wheel chair, slouching to the left side.  Took iii to reposition pt.  Pt left arm appears limp in the chair.  Speech is not as crisp as usual; volume is more quiet and slower to answer.  L-sided facial weakness.  Patient needed to be awoken several times during the appointment as he drifted off to sleep.    Spouse reports patient gets his days and nights mixed up.  Reports having R-sided HA, especially in the mornings when he sleeps on his R side.  Also reports of balance being \"off,\" and \"shuffling gait.\"  Gait not assessed at this visit.    Neuro exam done.  VS and labs are baseline.    Since unable to get MRI done quickly enough, pt will go to the [facility] ED for evaluation.    Manisha Castellanos RN  Clinical Research Coordinator-RN  "

## 2018-07-10 NOTE — NURSING NOTE
"Oncology Rooming Note    July 10, 2018 12:24 PM   Seth Iglesias is a 58 year old male who presents for:    Chief Complaint   Patient presents with     Blood Draw     labs drawn from right hand via veinpuncture and vitals taken by [facility]     Oncology Clinic Visit     Return, Glioblastoma     Initial Vitals: /88 (BP Location: Right arm, Patient Position: Chair, Cuff Size: Adult Regular)  Pulse 112  Temp 99.7  F (37.6  C) (Oral)  Resp 20  Wt 97.6 kg (215 lb 1.6 oz)  SpO2 95%  BMI 31.76 kg/m2 Estimated body mass index is 31.76 kg/(m^2) as calculated from the following:    Height as of 6/22/18: 1.753 m (5' 9\").    Weight as of this encounter: 97.6 kg (215 lb 1.6 oz). Body surface area is 2.18 meters squared.  Moderate Pain (4) Comment: Data Unavailable   No LMP for male patient.  Allergies reviewed: Yes  Medications reviewed: Yes    Medications: MEDICATION REFILLS NEEDED TODAY. Provider was notified.  Pharmacy name entered into [facility]:    [id number] - [place], [place] - [address]  [id number] - [place], [place] - [address]    Clinical concerns: Yes, Refills for Zofran and possibly senokot. Patient also has been experiencing pain by his incision in the mornings and will take 600 mg of ibuprofen that normally helps with that pain.He has been sleeping a lot lately sleeps about 20 hrs out of the 24, unable to lift feet while walking, balance, strength and speech are declining. Wife states she almost took him to the ER last night but wanted him to see someone who knows him.  Helena was notified.    10 minutes for nursing intake (face to face time)     Bridget Renteria [facility]              "

## 2018-07-10 NOTE — IP AVS SNAPSHOT
MRN:[id number]                      After Visit Summary   7/10/2018    Seth Iglesias    MRN: [id number]           Thank you!     Thank you for choosing [facility] for your care. Our goal is always to provide you with excellent care. Hearing back from our patients is one way we can continue to improve our services. Please take a few minutes to complete the written survey that you may receive in the mail after you visit with us. Thank you!        Patient Information     Date Of Birth          1960        Designated Caregiver       Most Recent Value    Caregiver    Will someone help with your care after discharge? yes    Name of designated caregiver Maryanne Iglesias    Phone number of caregiver [phone]    Caregiver address [address] [place] [address]      About your hospital stay     You were admitted on:  July 10, 2018 You last received care in the:  [address] [facility] [facility]    You were discharged on:  July 12, 2018        Reason for your hospital stay       You were admitted for left sided weakness due to the Glioblastoma.                  Who to Call     For medical emergencies, please call 911.  For non-urgent questions about your medical care, please call your primary care provider or clinic, [phone]          Attending Provider     Provider Specialty    Les Aguilar MD Emergency Medicine    [facility], Les Neumann MD Oncology       Primary Care Provider Office Phone # Fax #    R Nigel Salmon [phone]      After Care Instructions     Activity       Your activity upon discharge: 24/7 assist with supervision, 4 wheel walker            Diet       Follow this diet upon discharge: Regular            Discharge Instructions       Continue taking Dex 4mg every 6 hours until you see Dr. Gracia  Continue taking daily Protonix while on steroids  You received a Pentamidine treatment today prior to discharge due to prolonged steroids and the risk for PJP  pneumonia  F/u with oncology provider on 7/18  Neurosurg will contact you regarding upcoming appts                  Follow-up Appointments     Follow Up and recommended labs and tests       F/u with Onc provider on 7/18 for hospital follow up    Neurosurgery will schedule your appts for upcoming surgery and follow up                  Your next 10 appointments already scheduled     Jul 13, 2018  2:00 PM CDT   Neuro Eval with Lorraine Randolph, PT   [facility] ([facility])    [address]  [facility] [address]   [phone]            Jul 18, 2018  1:00 PM CDT   [facility] Draw with  [facility] DRAW   [facility] Draw ([facility])    [address] [place] [address]  [address]  [facility] [address]   [phone]            Jul 18, 2018  1:40 PM CDT   (Arrive by 1:25 PM)   Return Visit with Helena Landin PA-C   [facility] ([facility])    [address] [place] [address]  [address]  [facility] [address]   [phone]            Jul 25, 2018  6:30 AM CDT   MR BRAIN W/O & W CONTRAST with UUMR1   [facility] [place] MRI ([facility])    [address] [address] [phone]           Take your medicines as usual, unless your doctor tells you not to. Bring a list of your current medicines to your exam (including vitamins, minerals and over-the-counter drugs).  You may or may not receive intravenous (IV) contrast for this exam pending the discretion of the Radiologist.  You do not need to do anything special to prepare.  The MRI machine uses a strong magnet. Please wear clothes without metal (snaps, zippers). A sweatsuit works well, or we may give you a hospital gown.  Please remove any body piercings and hair extensions before you arrive. You will also remove watches, jewelry, hairpins, wallets, dentures, partial dental plates  and hearing aids. You may wear contact lenses, and you may be able to wear your rings. We have a safe place to keep your personal items, but it is safer to leave them at home.  **IMPORTANT** THE INSTRUCTIONS BELOW ARE ONLY FOR THOSE PATIENTS WHO HAVE BEEN PRESCRIBED SEDATION OR GENERAL ANESTHESIA DURING THEIR MRI PROCEDURE:  IF YOUR DOCTOR PRESCRIBED ORAL SEDATION (take medicine to help you relax during your exam):   You must get the medicine from your doctor (oral medication) before you arrive. Bring the medicine to the exam. Do not take it at home. You ll be told when to take it upon arriving for your exam.   Arrive one hour early. Bring someone who can take you home after the test. Your medicine will make you sleepy. After the exam, you may not drive, take a bus or take a taxi by yourself.  IF YOUR DOCTOR PRESCRIBED IV SEDATION:   Arrive one hour early. Bring someone who can take you home after the test. Your medicine will make you sleepy. After the exam, you may not drive, take a bus or take a taxi by yourself.   No eating 6 hours before your exam. You may have clear liquids up until 4 hours before your exam. (Clear liquids include water, clear tea, black coffee and fruit juice without pulp.)  IF YOUR DOCTOR PRESCRIBED ANESTHESIA (be asleep for your exam):   Arrive 1 1/2 hours early. Bring someone who can take you home after the test. You may not drive, take a bus or take a taxi by yourself.   No eating 8 hours before your exam. You may have clear liquids up until 4 hours before your exam. (Clear liquids include water, clear tea, black coffee and fruit juice without pulp.)   You will spend four to five hours in the recovery room.  Please call the Imaging Department at your exam site with any questions.            Jul 25, 2018   Procedure with Beto Gracia MD   [facility], Same Day Surgery (--)    [address] [place] [address]   [phone]            Aug 10, 2018  1:15 PM CDT   (Arrive by  1:00 PM)   Return Visit with Beto Gracia MD   [facility] ([facility])    [address]  [address]  [facility] [address] [phone]            Aug 13, 2018 10:00 AM CDT   (Arrive by 9:30 AM)   MR BRAIN W/O & W CONTRAST with UCMR1   [facility] ([facility])    [address]  1st Floor  [facility] [address] [phone]           Take your medicines as usual, unless your doctor tells you not to. Bring a list of your current medicines to your exam (including vitamins, minerals and over-the-counter drugs).  You may or may not receive intravenous (IV) contrast for this exam pending the discretion of the Radiologist.  You do not need to do anything special to prepare.  The MRI machine uses a strong magnet. Please wear clothes without metal (snaps, zippers). A sweatsuit works well, or we may give you a hospital gown.  Please remove any body piercings and hair extensions before you arrive. You will also remove watches, jewelry, hairpins, wallets, dentures, partial dental plates and hearing aids. You may wear contact lenses, and you may be able to wear your rings. We have a safe place to keep your personal items, but it is safer to leave them at home.  **IMPORTANT** THE INSTRUCTIONS BELOW ARE ONLY FOR THOSE PATIENTS WHO HAVE BEEN PRESCRIBED SEDATION OR GENERAL ANESTHESIA DURING THEIR MRI PROCEDURE:  IF YOUR DOCTOR PRESCRIBED ORAL SEDATION (take medicine to help you relax during your exam):   You must get the medicine from your doctor (oral medication) before you arrive. Bring the medicine to the exam. Do not take it at home. You ll be told when to take it upon arriving for your exam.   Arrive one hour early. Bring someone who can take you home after the test. Your medicine will make you sleepy. After the exam, you may not drive, take a bus or take a taxi by yourself.  IF YOUR DOCTOR PRESCRIBED IV  SEDATION:   Arrive one hour early. Bring someone who can take you home after the test. Your medicine will make you sleepy. After the exam, you may not drive, take a bus or take a taxi by yourself.   No eating 6 hours before your exam. You may have clear liquids up until 4 hours before your exam. (Clear liquids include water, clear tea, black coffee and fruit juice without pulp.)  IF YOUR DOCTOR PRESCRIBED ANESTHESIA (be asleep for your exam):   Arrive 1 1/2 hours early. Bring someone who can take you home after the test. You may not drive, take a bus or take a taxi by yourself.   No eating 8 hours before your exam. You may have clear liquids up until 4 hours before your exam. (Clear liquids include water, clear tea, black coffee and fruit juice without pulp.)   You will spend four to five hours in the recovery room.  Please call the Imaging Department at your exam site with any questions.            Aug 13, 2018 10:45 AM CDT   LAB with [facility] ([facility])    [address] [place] [address]  1st Floor  [facility] [address] [phone]           Please do not eat 10-12 hours before your appointment if you are coming in fasting for labs on lipids, cholesterol, or glucose (sugar). This does not apply to pregnant women. Water, hot tea and black coffee (with nothing added) are okay. Do not drink other fluids, diet soda or chew gum.            Aug 17, 2018  7:45 AM CDT   (Arrive by 7:30 AM)   Return Visit with Augustus Armstrong MD   [facility] ([facility])    [address] [place] [address]  [address]  [facility] [id number] [phone]            Sep 24, 2018  9:00 AM CDT   (Arrive by 8:30 AM)   MR BRAIN W/O & W CONTRAST with UUMR1   [facility] [place], MRI ([facility] [place] [employer])    [address] [facility] [address] [phone]           Take your medicines as usual, unless your doctor tells you not  to. Bring a list of your current medicines to your exam (including vitamins, minerals and over-the-counter drugs).  You may or may not receive intravenous (IV) contrast for this exam pending the discretion of the Radiologist.  You do not need to do anything special to prepare.  The MRI machine uses a strong magnet. Please wear clothes without metal (snaps, zippers). A sweatsuit works well, or we may give you a hospital gown.  Please remove any body piercings and hair extensions before you arrive. You will also remove watches, jewelry, hairpins, wallets, dentures, partial dental plates and hearing aids. You may wear contact lenses, and you may be able to wear your rings. We have a safe place to keep your personal items, but it is safer to leave them at home.  **IMPORTANT** THE INSTRUCTIONS BELOW ARE ONLY FOR THOSE PATIENTS WHO HAVE BEEN PRESCRIBED SEDATION OR GENERAL ANESTHESIA DURING THEIR MRI PROCEDURE:  IF YOUR DOCTOR PRESCRIBED ORAL SEDATION (take medicine to help you relax during your exam):   You must get the medicine from your doctor (oral medication) before you arrive. Bring the medicine to the exam. Do not take it at home. You ll be told when to take it upon arriving for your exam.   Arrive one hour early. Bring someone who can take you home after the test. Your medicine will make you sleepy. After the exam, you may not drive, take a bus or take a taxi by yourself.  IF YOUR DOCTOR PRESCRIBED IV SEDATION:   Arrive one hour early. Bring someone who can take you home after the test. Your medicine will make you sleepy. After the exam, you may not drive, take a bus or take a taxi by yourself.   No eating 6 hours before your exam. You may have clear liquids up until 4 hours before your exam. (Clear liquids include water, clear tea, black coffee and fruit juice without pulp.)  IF YOUR DOCTOR PRESCRIBED ANESTHESIA (be asleep for your exam):   Arrive 1 1/2 hours early. Bring someone who can take you home after the  test. You may not drive, take a bus or take a taxi by yourself.   No eating 8 hours before your exam. You may have clear liquids up until 4 hours before your exam. (Clear liquids include water, clear tea, black coffee and fruit juice without pulp.)   You will spend four to five hours in the recovery room.  Please call the Imaging Department at your exam site with any questions.              Additional Services     Physical Therapy Referral       *This therapy referral will be filtered to a centralized scheduling office at [facility] and the patient will receive a call to schedule an appointment at a [facility] location most convenient for them. *     [facility] provides Physical Therapy evaluation and treatment and many specialty services across the [facility] system.  If requesting a specialty program, please choose from the list below.    If you have not heard from the scheduling office within 2 business days, please call [phone] for all locations, with the exception of [facility], please call [phone] and [facility], please call [phone]  Treatment: Evaluation & Treatment  Special Instructions/Modalities: None  Special Programs: per therapist evaluation    Please be aware that coverage of these services is subject to the terms and limitations of your health insurance plan.  Call member services at your health plan with any benefit or coverage questions.                  Pending Results     No orders found from 7/8/2018 to 7/11/2018.            Statement of Approval     Ordered          07/12/18 1015  I have reviewed and agree with all the recommendations and orders detailed in this document.  EFFECTIVE NOW     Approved and electronically signed by:  Layne Medina APRN CNP             Admission Information     Date & Time Provider Department Dept. Phone    7/10/2018 Les Jacobs MD [address] [facility] [phone]      Your Vitals Were     Blood  "Pressure Pulse Temperature Respirations Height Weight    124/82 (BP Location: Left arm) 73 96.4  F (35.8  C) (Oral) 20 1.753 m (5' 9\") 96.9 kg (213 lb 11.2 oz)    Pulse Oximetry BMI (Body Mass Index)                95% 31.56 kg/m2          [facility] Information     [facility] gives you secure access to your electronic health record. If you see a primary care provider, you can also send messages to your care team and make appointments. If you have questions, please call your primary care clinic.  If you do not have a primary care provider, please call [phone] and they will assist you.        Care EveryWhere ID     This is your Care EveryWhere ID. This could be used by other organizations to access your [facility] medical records  [id number]        Equal Access to Services     PERICO ESPINOZA : Beto Carrasco, peggy gautam, dashawn winters, héctor rand . So [facility] [phone].    ATENCIÓN: Si habla español, tiene a granados disposición servicios gratuitos de asistencia lingüística. Rodney al [phone].    We comply with applicable federal civil rights laws and [place] laws. We do not discriminate on the basis of race, color, national origin, age, disability, sex, sexual orientation, or gender identity.               Review of your medicines      START taking        Dose / Directions    pantoprazole 40 MG EC tablet   Commonly known as:  PROTONIX   Used for:  GBM (glioblastoma multiforme) (H)        Dose:  40 mg   Start taking on:  7/13/2018   Take 1 tablet (40 mg) by mouth every morning (before breakfast)   Quantity:  30 tablet   Refills:  0         CONTINUE these medicines which may have CHANGED, or have new prescriptions. If we are uncertain of the size of tablets/capsules you have at home, strength may be listed as something that might have changed.        Dose / Directions    dexamethasone 4 MG tablet   Commonly known as:  DECADRON   This may have changed:    - " medication strength  - how much to take  - how to take this  - when to take this  - additional instructions   Used for:  GBM (glioblastoma multiforme) (H), LUE weakness        Dose:  4 mg   Take 1 tablet (4 mg) by mouth every 6 hours   Quantity:  85 tablet   Refills:  0       ondansetron 8 MG ODT tab   Commonly known as:  ZOFRAN-ODT   This may have changed:    - medication strength  - reasons to take this   Used for:  GBM (glioblastoma multiforme) (H)        Dose:  8 mg   Take 1 tablet (8 mg) by mouth every 8 hours as needed   Quantity:  60 tablet   Refills:  0         CONTINUE these medicines which have NOT CHANGED        Dose / Directions    * GLEOSTINE 100 MG capsule CHEMO   Generic drug:  lomustine        Take 240 mg every 6 weeks.   Refills:  0       * GLEOSTINE 40 MG capsule CHEMO   Generic drug:  lomustine        Take 240 mg every 6 weeks   Refills:  0       levETIRAcetam 1000 MG Tabs   Commonly known as:  KEPPRA   Used for:  Brain mass        Dose:  1000 mg   Take 1,000 mg by mouth every 12 hours   Quantity:  180 tablet   Refills:  11       MULTIVITAMIN & MINERAL PO        Dose:  1 tablet   Take 1 tablet by mouth daily   Refills:  0       oxyCODONE IR 5 MG tablet   Commonly known as:  ROXICODONE   Used for:  S/P craniotomy        Dose:  5-10 mg   Take 1-2 tablets (5-10 mg) by mouth every 3 hours as needed for other (pain control or improvement in physical function. Hold dose for analgesic side effects.)   Quantity:  45 tablet   Refills:  0       polyethylene glycol Packet   Commonly known as:  MIRALAX/GLYCOLAX   Used for:  S/P craniotomy        Dose:  17 g   Take 17 g by mouth daily   Quantity:  7 packet   Refills:  1       tamsulosin 0.4 MG capsule   Commonly known as:  FLOMAX        Dose:  0.4 mg   Take 0.4 mg by mouth daily   Refills:  0       * Notice:  This list has 2 medication(s) that are the same as other medications prescribed for you. Read the directions carefully, and ask your doctor or other care  provider to review them with you.         Where to get your medicines      These medications were sent to [facility] - [place], [place] - [address]  [address] [facility] [address]     Phone:  [phone]     dexamethasone 4 MG tablet    ondansetron 8 MG ODT tab    pantoprazole 40 MG EC tablet                Protect others around you: Learn how to safely use, store and throw away your medicines at www.disposemymeds.org.             Medication List: This is a list of all your medications and when to take them. Check marks below indicate your daily home schedule. Keep this list as a reference.      Medications           Morning Afternoon Evening Bedtime As Needed    dexamethasone 4 MG tablet   Commonly known as:  DECADRON   Take 1 tablet (4 mg) by mouth every 6 hours                                * GLEOSTINE 100 MG capsule CHEMO   Take 240 mg every 6 weeks.   Generic drug:  lomustine                                * GLEOSTINE 40 MG capsule CHEMO   Take 240 mg every 6 weeks   Generic drug:  lomustine                                levETIRAcetam 1000 MG Tabs   Commonly known as:  KEPPRA   Take 1,000 mg by mouth every 12 hours   Last time this was given:  1,000 mg on 7/12/2018  5:49 AM                                MULTIVITAMIN & MINERAL PO   Take 1 tablet by mouth daily                                ondansetron 8 MG ODT tab   Commonly known as:  ZOFRAN-ODT   Take 1 tablet (8 mg) by mouth every 8 hours as needed                                oxyCODONE IR 5 MG tablet   Commonly known as:  ROXICODONE   Take 1-2 tablets (5-10 mg) by mouth every 3 hours as needed for other (pain control or improvement in physical function. Hold dose for analgesic side effects.)                                pantoprazole 40 MG EC tablet   Commonly known as:  PROTONIX   Take 1 tablet (40 mg) by mouth every morning (before breakfast)   Start taking on:  7/13/2018   Last time this was given:  40 mg on  7/12/2018  9:13 AM                                polyethylene glycol Packet   Commonly known as:  MIRALAX/GLYCOLAX   Take 17 g by mouth daily                                tamsulosin 0.4 MG capsule   Commonly known as:  FLOMAX   Take 0.4 mg by mouth daily   Last time this was given:  0.4 mg on 7/11/2018  8:46 PM                                * Notice:  This list has 2 medication(s) that are the same as other medications prescribed for you. Read the directions carefully, and ask your doctor or other care provider to review them with you.

## 2018-07-10 NOTE — PHARMACY-ADMISSION MEDICATION HISTORY
Admission medication history interview status for the 7/10/2018 admission is complete. See Epic admission navigator for allergy information, pharmacy, prior to admission medications and immunization status.     Medication history interview sources:  Patient, claims history    Changes made to PTA medication list (reason)  Added:   -Tamsulosin 0.4 mg (per patient and claims history)  Deleted:   -Senna/docusate (per patient, hasn't taken since may. Uses Miralax instead)  Changed:   -Gleostine, added directions (per patient)    Additional medication history information (including reliability of information, actions taken by pharmacist):     1. Patient was a reliable historian. Knew medications and last doses.   2. Patient confirmed allergies and reactions. They have no additional allergies to report.    3. PRNs    -Ondansetron. Patient takes 8 mg once day since 7/4/18    -Oxycodone. Has not used since may, but still has some at home.      Prior to Admission medications    Medication Sig Last Dose Taking? Auth Provider   GLEOSTINE 100 MG capsule CHEMO Take 240 mg every 6 weeks. 7/4/2018 at Unknown Yes Reported, Patient   GLEOSTINE 40 MG capsule CHEMO Take 240 mg every 6 weeks 7/4/2018 at Unknown time Yes Reported, Patient   levETIRAcetam (KEPPRA) 1000 MG TABS Take 1,000 mg by mouth every 12 hours 7/10/2018 at 0500 Yes Rafael Boyer MD   Ondansetron (ZOFRAN ODT PO) Take 8 mg by mouth every 8 hours as needed for nausea 7/9/2018 at Unkown time Yes Reported, Patient   polyethylene glycol (MIRALAX/GLYCOLAX) Packet Take 17 g by mouth daily 7/8/2018 at Unknown time Yes Cheli Salazar APRN CNP   tamsulosin (FLOMAX) 0.4 MG capsule Take 0.4 mg by mouth daily 7/8/2018 at PM Yes Unknown, Entered By History   dexamethasone (DECADRON) 2 MG tablet 4 mg every 8 hours for 4 days, 2 mg every 8 hours for 4 days, then continue 2 mg every 12 hours until seen by Oncology  Patient not taking: Reported on 7/10/2018 6/29/2018   Cheli Salazar APRN CNP   Multiple Vitamins-Minerals (MULTIVITAMIN & MINERAL PO) Take 1 tablet by mouth daily More than a month  Reported, Patient   oxyCODONE IR (ROXICODONE) 5 MG tablet Take 1-2 tablets (5-10 mg) by mouth every 3 hours as needed for other (pain control or improvement in physical function. Hold dose for analgesic side effects.)  Patient not taking: Reported on 7/10/2018 More than a month  Cheli Salazar APRN CNP           Medication history completed by: Keith Barber, PD4 Student Pharmacist

## 2018-07-11 NOTE — PLAN OF CARE
Problem: Patient Care Overview  Goal: Plan of Care/Patient Progress Review  Nursing Focus: Admission  D: Arrived at  1740 from ED via stretcher. Patient accompanied by wife. Admitted for altered mental status and concerns for stroke. Complains of letheragy and L sided weakness. Neuro team consulted.     I: Admission process began.  Patient oriented to room, enviroment, call light.  MD notified of patient's arrival on unit.     A: Vital signs stable, afebrile.  A/O x 4. Q4hr neuro checks. Denies blurry vision or visual changes. L sided weakness observed. Pt extremely lethargic. MRI negative for stroke but showed increased edema in brain. B/A on. IV Decadron given. Pt's wife at bedside. Around 2300 pt had increased confusion and impulsiveness. Moonlighter notified. Pt eventually settled down after going to BR. 2 assist to BR. Would benefit from PT/OT consult.     P: Implement plan of care when available. Continue to monitor patient. Nursing interventions as appropriate. Notify MD with changes in pt status.

## 2018-07-11 NOTE — CONSULTS
"[facility]       NEUROSURGERY CONSULTATION NOTE    This consultation was requested by Dr. Delroy Salvador from the Heme/Onc service.    Reason for Consultation: \"cerebral edema on recent imaging with altered mental status\"    HPI:  Seth Iglesias is a 57 yo male with history of psoriasis, hyperlipidemia, seizures, and gastro-esophageal reflux disease, and glioblastoma s/p 2 resections (10/2017 and 05/2018).     Patient unable to provide significant history due to fatigue. Primary amount of history is provided by wife who states that he has been having more fatigue worsening in the last 4 days. Also having worsening weakness of the left upper extremity, imbalance, shuffling gait. No recent fevers, chills, nausea.     PAST MEDICAL HISTORY:   Past Medical History:   Diagnosis Date     Gastroesophageal reflux disease      Glioblastoma (H)      Psoriasis     On Enbrel Injections Q 1 month for about 10 years, [facility] [place]     Seizures (H)      PAST SURGICAL HISTORY:   Past Surgical History:   Procedure Laterality Date     COLONOSCOPY  4/4/2011    Procedure:COLONOSCOPY; Surgeon:TC HARE; Location:[facility]     OPTICAL TRACKING SYSTEM CRANIOTOMY, EXCISE TUMOR, COMBINED Right 10/3/2017    Procedure: COMBINED OPTICAL TRACKING SYSTEM CRANIOTOMY, EXCISE TUMOR;  Stealth Assisted Right Temporal Craniotomy For Tumor Resection;  Surgeon: Harry Wiggins MD;  Location:  OR     OPTICAL TRACKING SYSTEM CRANIOTOMY, EXCISE TUMOR, COMBINED N/A 5/17/2018    Procedure: COMBINED OPTICAL TRACKING SYSTEM CRANIOTOMY, EXCISE TUMOR;  Stealth Assisted Right Craniotomy And Tumor Resection ;  Surgeon: Beto Gracia MD;  Location:  OR     SURGICAL HISTORY OF -   02/05/75    Torn Meniscus Right Knee     SURGICAL HISTORY OF -   11/30/73    Arthrotomy & medial meniscectomy left knee     FAMILY HISTORY:   Family History   Problem Relation Age of Onset     " "DOE Father      age 52     Cancer Father      prostate CA     Lung Cancer Mother      DOE Paternal Uncle      50's     SOCIAL HISTORY:   Social History   Substance Use Topics     Smoking status: Never Smoker     Smokeless tobacco: Never Used     Alcohol use No      Comment: occasionally     MEDICATIONS:  Current Outpatient Prescriptions   Medication Sig Dispense Refill     [DISCONTINUED] Temozolomide (TEMODAR) 180 MG capsule Take 1 capsule (180 mg) by mouth daily in addition to 250 mg capsule for 5 days (Patient not taking: Reported on 5/4/2018) 5 capsule 0     Allergies:  Allergies   Allergen Reactions     Nkda [No Known Drug Allergies]      ROS: 10 point ROS of systems including Constitutional, Eyes, Respiratory, Cardiovascular, Gastroenterology, Genitourinary, Integumentary, Muscularskeletal, Psychiatric were all negative except for pertinent positives noted in my HPI.    Physical exam:   Blood pressure 120/80, temperature 98.4  F (36.9  C), temperature source Oral, resp. rate 18, height 1.753 m (5' 9\"), weight 95.3 kg (210 lb 1.6 oz), SpO2 91 %.  General: fatigued, eyes closed, intermittent snoring.   PULM: breathing comfortably on room air   NEUROLOGIC:  -- Asleep, arousable by voice and stimulation. oriented x 4.   -- Follows commands intermittently and incompletely.   -- Speech fluent, spontaneous. No aphasia or dysarthria.  -- dysconjugate gaze. 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   -- visual fields unable to be tested.   -- face symmetrical. tongue protrudes midline.   -- hearing grossly intact   Motor: 5/5 strength in the R hemibody. 3/5 strength in the left upper extremity. 2/5 in hip flexor on the left. 4/5 in plantarflexion/dorsiflexion on L  Sensory:  intact to LT x 4 extremities     IMAGING:  MRI 7/10/18: increased contrast enhancing region and edema surrounding the resection cavity concerning for tumor progression     LABS:   BMP within normal limits  CBC with " "thrombocytopenia of 123 otherwise normal  UA unremarkable     ASSESSMENT:  R temporal glioblastoma, found to have tumor progression after 2 resections    RECOMMENDATIONS:  - No neurosurgical intervention indicated at this time   - agree with decadron 4 mg q6h for treatment of cerebral edema   - initiate sliding scale insulin  - initiate protonix 40 mg qday  - continue keppra 1g bid   - patient to be staffed with Dr. Gracia in AM    The patient was discussed with Dr. Agapito Lutz, neurosurgery chief resident, and he agrees with the above.    Te \"Sudheer\" MD Estela   Neurosurgery, PGY-2    Please contact neurosurgery resident on call with questions.    Dial * * [phone], enter [phone] when prompted.     "

## 2018-07-11 NOTE — PLAN OF CARE
Problem: Patient Care Overview  Goal: Plan of Care/Patient Progress Review  PT 7D: Up with use of gait belt (at all times!) and FWW    Discharge Planner PT   Patient plan for discharge: home with 24/7 assist  Current status: Evaluation complete and treatment indicated. Engaged pt in bed mobility at A, sit <> stand at CGA to SBA, gait with FWW ~210ft at CGA to close SBA + no LOB with head turns or distractions, gait without AD at CGA to min A for intermittent lateral LOB for ~180ft., ascending/descending 4 steps with 1 hand railing at SBA/CGA with instruction to family on how to guard and step to pattern, and tub shower transfer with both tub bench and shower chair at CGA.    Pt demonstrating difficulty with multi-tasking and multi-step commands, pt also demonstrating slowed processing of commands during session. Pt able to complete heel-to-shin and forearm supination/pronation tasks within normal limits, finger to nose normal with B hands (very slightly delayed on L as compared to R however).    Pt with intermittent demonstration of L side neglect or visual field deficits. During visual field assessment, pt reporting he could see the object in his L peripheral vision only when it was ~45 deg from the auditory meatis - however, pt also with difficulty during multi-step tasks so difficult to determine if this was a true visual field deficit or challenge with the task itself. However, pt often did not see objects to his L that writer would instruct pt to perform a task with. Vitals stable throughout activity. Pt does scan environment equally between L and R sides and did turn head to address writer on his L throughout evaluation.    Barriers to return to prior living situation: medical status, home set up, cognition    Recommendations for discharge: Home with 24/7 assist + OP PT for balance rehab and L LE/UE strengthening. Pt will need a FWW upon discharge. Instructed in purchase/recommendation for a shower chair. Pt  will stay on main level of home and will use friend's shower (2 steps to enter that home) or drive to walkout basement of his house to use stand-up shower.     Rationale for recommendations: Pt is below baseline for mobility which is limiting pt IND and safety. Pt is limited by weakness (L weaker than R), impaired dual tasking and concentration, impaired balance, and impaired insight into deficits.        Entered by: Yana Chen 07/11/2018 2:38 PM

## 2018-07-11 NOTE — PLAN OF CARE
Problem: Safety Awareness Impairment (IRF) (Adult)  Goal: Safety Awareness Fxn Tripp  Outcome: Improving  Seth's wife Maryanne said he is more alert and oriented this am, also stronger.  Oriented to person, time, place and situation.  Continues with weakness left hand and left leg.  Up with assist of 2.  Walked around unit with assist of 2, walker, and gait belt.  Not able to lift left leg as well as his right and leans slightly to right while walking.   Alarms on while in bed and chair.  PT here to eval for safety with going home.  Eating well and appetite is good.  Wife her at bedside.  Remains on IV Decadron.  Dr Gracia here to talk with pt/wife and plans for OR in 5-7 days to remove more tumor.

## 2018-07-11 NOTE — PROGRESS NOTES
"S: stating he is fatigued     O:  General: eyes open this morning. More alert than day prior.   PULM: breathing comfortably on room air   NEUROLOGIC:  -- awake, alert   -- Follows commands intermittently and incompletely.   -- Speech fluent, spontaneous. No aphasia or dysarthria.  -- face symmetrical. tongue protrudes midline.   -- hearing grossly intact   Motor: 5/5 strength in the R hemibody. 4/5 strength in the left upper extremity. 4-/5 in left lower extremity  Sensory:  intact to LT x 4 extremities     A: History of glioblastoma with tumor progression.     P:  - possibility of re-resection; final plan for operation pending   - agree with decadron 4 mg q6h for cerebral edema with brain compression  - sliding scale insulin for blood glucose < 150   - protonix 40 mg qday  - continue keppra 1g bid     Patient discussed with Dr. Gracia.     Te \"Sudheer\" MD Estela   Neurosurgery, PGY-2    Please contact neurosurgery resident on call with questions.    Dial * * [phone], enter [phone] when prompted.     "

## 2018-07-11 NOTE — PROGRESS NOTES
Care Coordinator Progress Note    Admission Date/Time:  7/10/2018  Attending MD:  Les Jacobs, *    Data  Chart reviewed, discussed with interdisciplinary team.   Patient was admitted for:    GBM (glioblastoma multiforme) (H)  Cerebral edema (H)  LUE weakness.    Concerns with insurance coverage for discharge needs: None.  Current Living Situation: Patient lives with spouse.  Support System: Supportive and Involved  Services Involved: none  Transportation at Discharge: Family or friend will provide  Transportation to Medical Appointments:   - Name of caregiver: Maryanne, spouse  Barriers to Discharge: none    Coordination of Care and Referrals: Provided patient/family with options for Outpatient Rehab vs home PT.   Patient would like to go to clinic for ongoing PT and would like [facility] location. Between Maryanne and their high school children, he will have no issues with transportation to \[facility]\"". Referral in place and reflected on AVS. Patient has first clinic PT scheduled for 7/16.     Assessment  Patient with hx of glioblastoma with tumor progression admitted with lethargy and left sided weakness. Per Heme/Onc team, steroids started and it's anticipated patient will have surgery arranged with Neurosurgery      Plan  Anticipated Discharge Date:  Thurs 7/12  Anticipated Discharge Plan:  home    Amina Morgan  7D Heme/Onc RN Care Coordinator  Pager [phone]

## 2018-07-11 NOTE — PROGRESS NOTES
Hematology / Oncology Progress Note <<07/11/2018>>  ASSESSMENT & PLAN Seth Iglesias is a 58 year old gentleman with past medical history of psoriasis, HLD, with glioblastoma who is admitted with progressive lethargy and left sided weakness.     # Lethargy, left sided weakness  # Head CT with concerning findings for disease progression & cerebral edema  # Brain MRI with Right anterior temporal lobe tumor progression. Progression of tumor into the right corpus striatum, which may explain the patient's  left upper extremity motor weakness. Extensive associated edema with 5 mm of leftward midline shift and borderline right uncal herniation.  # Vasogenic edema  - Neurosurgery consulted & appreciate recommendations (will do surgery sometime within the next week)  - Continue steroids 4 mg decadron q 6 hours (add protonix) - I was going to send meds down to d/c pharmacy but I will wait for neuro-surgery rec's on steroid amount & schedule for discharge with surgery pending  - PT consult & disposition planning    # R temporal GBM S/p gross total resection on 10/3/17 and chemoradiation 11/13-12/22/17. He started adjuvant Temodar and Optune device on 1/22/18. PD after 5 cycles. Had repeat resection per TOCA trial and randomized to standard of care. Started lomustine on 7/4. Tapered off steroids on 6/29. Follows with Dr. Armstrong & Dr Gracia.    # Pain Assessment:  Current Pain Score 7/11/2018   Patient currently in pain? denies   Pain score (0-10) -   Pain location -   Pain descriptors -   Seth montoya pain level was assessed and he currently denies pain.      FEN - regular diet as tolerated  PPX - started protonix r/t HD steroids  CONSULTS - neurosurgery & PT  CODE - full  DISPO - likely d/c tomorrow, wife needs a night to get safe dispo-plan set up, just completed PT consult this afternoon & see there note for discharge details    Interval history Per wife patient symptoms are better than PTA, much better. Said initially steady  decline in function but then really bad last 48 hours. L side weak & very fatigued. Continues to have fatigue. No N/V. Tolerating HD steroids okay. But historically have interrupted sleep & needed to frequently urinate on higher doses.     Physical Exam  Constitutional: Awake, alert, cooperative, in NAD.  Eyes: PERRL, EOMI, sclera clear, conjunctiva normal.  ENT: Normocephalic, without obvious abnormality, moist mucus membranes, no lesions or thrush.   Respiratory: Non-labored breathing, good air exchange, CTAB, no crackles or wheezing.  Cardiovascular: RRR, no murmur noted.  GI: +BS, soft, non-distended, non-tender, no masses palpated, no hepatosplenomegaly.  Skin: No concerning lesions or rash on exposed areas. PIV c/d/i  Musculoskeletal: No edema blanca LEs.  Neurologic: Awake, A&O x 3. Intermittent command cooperation. Fluent speech. 5/5 RUE/RLE & 4/5 LUE/LLE, intact sensation.  Neuropsychiatric: Calm, normal affect.     Rounding:  Temp:  [96.7  F (35.9  C)-99.6  F (37.6  C)] 96.7  F (35.9  C)  Heart Rate:  [79-99] 92  Resp:  [16-18] 18  BP: ()/(63-84) 105/63  SpO2:  [91 %-95 %] 94 %    I/O last 3 completed shifts:  In: 420 [P.O.:420]  Out: 75 [Urine:75]    Vitals:    07/10/18 1740 07/11/18 1100   Weight: 95.3 kg (210 lb 1.6 oz) 95.3 kg (210 lb)         Recent Labs  Lab 07/11/18  0536 07/10/18  1211   WBC 5.0 6.1   RBC 3.64* 3.78*   HGB 11.9* 12.4*   HCT 34.3* 36.9*   MCV 94 98   MCH 32.7 32.8   MCHC 34.7 33.6   RDW 14.1 14.2    209       Recent Labs  Lab 07/11/18  0536 07/10/18  1211    139   POTASSIUM 4.1 3.9   CHLORIDE 106 106   CO2 22 26   ANIONGAP 10 7   * 90   BUN 20 13   CR 1.03 1.09   GFRESTIMATED 74 69   GFRESTBLACK 89 83   ARA 8.7 8.7   MAG  --  2.0   PROTTOTAL  --  7.7   ALBUMIN  --  3.3*   BILITOTAL  --  0.8   ALKPHOS  --  93   AST  --  17   ALT  --  22     Recent Results (from the past 24 hour(s))   MR Brain w/o & w Contrast    Narrative     MR BRAIN W/O & W CONTRAST 7/10/2018  4:28 PM    Provided History: GBM with worsening symptoms.    Comparison: Earlier 7/10/2018 and 6/19/2018.    Technique: Multiplanar T1-weighted, axial FLAIR, and susceptibility  images were obtained without intravenous contrast. Following  intravenous gadolinium-based contrast administration, axial  T2-weighted, diffusion, and T1-weighted images (in multiple planes)  were obtained.    Contrast: 10 mL of Gadavist.    Findings:  Enlarging, heterogeneously enhancing mass centered in the anterior  right temporal lobe. Few foci of associated microhemorrhage on SWI,  unchanged. The mass now measures approximately 3.5 x 2.6 cm  (previously 2.3 x 1.8 cm). Additionally, there is abnormal contrast  enhancement extending into the right internal capsule and lentiform  nuclei. Relative sparing of the head of the caudate. Additionally,  substantial edema of the white matter in the right frontal and  temporal lobes with partial effacement of overlying sulci and the  ventricles. Leftward midline shift of 5 mm. Borderline right uncal  herniation. The abnormally increased T2 signal extends into the right  thalamus, right cerebral peduncle, and right midbrain. No  hydrocephalus. Postoperative fluid in the anterior aspect of the right  middle cranial fossa. Postoperative changes of right frontotemporal  craniotomy. Possible narrowing of the M1 segment of the right middle  cerebral artery, however the flow voids in the right MCA are present.    Mild paranasal sinus mucosal thickening. Moderate right and mild left  mastoid effusions. The orbits are grossly unremarkable.      Impression    Impression:  1. Right anterior temporal lobe tumor progression. Progression of  tumor into the right corpus striatum, which may explain the patient's  left upper extremity motor weakness. Extensive associated edema with 5  mm of leftward midline shift and borderline right uncal herniation.  2. No ischemic infarct.    [Result: Enlarging right temporal  lobe mass]    Finding was identified on 7/10/2018 4:31 PM.     SCARLETT Hayden was contacted by Dr. Issa at 7/10/2018 4:47 PM  and verbalized understanding of the urgent finding.     I have personally reviewed the examination and initial interpretation  and I agree with the findings.    ORLANDO SANDRA MD     Anti-infectives     None          dexamethasone  4 mg Intravenous Q6H     levETIRAcetam  1,000 mg Oral Q12H     [START ON 7/12/2018] pantoprazole  40 mg Oral QAM AC     tamsulosin  0.4 mg Oral QPM       - MEDICATION INSTRUCTIONS -         Kylee Carrillo, [facility], [phone].  Hematology/Oncology July 11, 2018

## 2018-07-11 NOTE — PLAN OF CARE
Problem: Patient Care Overview  Goal: Plan of Care/Patient Progress Review  Alert and oriented X 3. AVSS. Denies pain. Intermittently disoriented to situation. Occasional expressive aphasia and repetitive self-talk noted. At about 2300 pt was agitated and unable to follow directions. He indicated he needed to have a bowel movement but did not sit down on commode despite cues. Ambulated to bathroom with assist of two and was calm and cooperative after having a bowel movement. It appears he was unable to state that he did not want to use bedside commode. On the neurocheck his strength is 5/5 in RUE and bilat. LEs and 4/5 in LUE. Slept in between cares. Bed alarm on for safety. Wife at bedside.

## 2018-07-12 NOTE — DISCHARGE SUMMARY
[facility], [place]    Discharge Summary  Hematology / Oncology    Date of Admission:  7/10/2018   Date of Discharge:  7/12/2018  Discharging Provider: Layne Medina  Date of Service (when I saw the patient): 07/12/18    Discharge Diagnoses      GBM (glioblastoma multiforme) (H)  Cerebral edema (H)  LUE weakness  S/P craniotomy    History of Present Illness   Seth Iglesias is a 58 year old gentleman with past medical history of psoriasis, HLD, with glioblastoma who is admitted with progressive lethargy and left sided weakness.  He was seen by neurosurgery.  An MRI was done which showed increased contrast-enhancing region and edema surrounding the resection cavity concerning for tumor progression.  He was started on dexamethasone 4 mg every 6 hours.  He worked with physical therapy daily who suggested him having 24 hour assist which his wife was comfortable with.  He will be discharged with a follow-up with oncology as well as neurosurgery.  Neurosurgery is hoping to proceed with surgery on 7/25.     Plan:  Continue dexamethasone 4 mg every 6 hours until seen by neurosurgery  Continue with physical therapy  Contaminating treatment was given prior to discharge for PCP prophylaxis  Continue Protonix while on high-dose steroids  (Neurosurgery is planning to proceed with surgery on 7/25) (They will schedule)    Hospital Course   Seth Iglesias was admitted on 7/10/2018.  The following problems were addressed during his hospitalization:    # Lethargy, left sided weakness  # Head CT with concerning findings for disease progression & cerebral edema  # Brain MRI with Right anterior temporal lobe tumor progression. Progression of tumor into the right corpus striatum, which may explain the patient's  left upper extremity motor weakness. Extensive associated edema with 5 mm of leftward midline shift and borderline right uncal herniation.  # Vasogenic edema  - Neurosurgery consulted &  appreciate recommendations (will do surgery sometime within the next week) scheduled for 7/25  - Continue steroids 4 mg decadron q 6 hour, he will continue on this until seen by neurosurgery.  Prescription sent for Protonix.  1 dose of Pentamadine has been given given long-term steroid use.  - PT was consulted and suggested 24-hour cyst.  His wife and children will be able to do this.  He has a 4 wheel walker at home as well as a bath seat.     # R temporal GBM S/p gross total resection on 10/3/17 and chemoradiation 11/13-12/22/17. He started adjuvant Temodar and Optune device on 1/22/18. PD after 5 cycles. Had repeat resection per TOCA trial and randomized to standard of care. Started lomustine on 7/4. Tapered off steroids on 6/29. Follows with Dr. Armstrong & Dr Gracia.      Layne Meidna CNP      EXAM:  Constitutional: Awake, alert, cooperative, no apparent distress  ENT: Oral mucosa pink and moist without visible lesions or petechiae  Respiratory: No increased work of breathing, good air exchange, clear to auscultation bilaterally, no crackles or wheezing.  Cardiovascular: regular rate and rhythm, normal S1 and S2, no S3 or S4, and no murmur noted.  GI: normal bowel sounds, soft, non-distended, non-tender, no masses palpated  Lymph/Hematologic: No cervical lymphadenopathy and no supraclavicular lymphadenopathy.  Skin: No bruising or bleeding, rashes or lesions  Extremeties: No peripheral extremity edema noted  Neurologic: Awake, alert, oriented to name, place and time. 5/5 upper and lower right extremeties, left upper and lower 4/5       Code Status   Full Code    Primary Care Physician   JUSTYN [place]        Discharge Disposition   Discharged to home  Condition at discharge: Stable    Consultations This Hospital Stay   MEDICATION HISTORY IP PHARMACY CONSULT  NEUROSURGERY ADULT IP CONSULT  VASCULAR ACCESS CARE ADULT IP CONSULT  PHYSICAL THERAPY ADULT IP CONSULT    Discharge Orders     Physical Therapy Referral      Reason for your hospital stay   You were admitted for left sided weakness due to the Glioblastoma.     Follow Up and recommended labs and tests   F/u with Onc provider on 7/18 for hospital follow up    Neurosurgery will schedule your appts for upcoming surgery and follow up     Discharge Instructions   Continue taking Dex 4mg every 6 hours until you see Dr. Gracia  Continue taking daily Protonix while on steroids  You received a Pentamidine treatment today prior to discharge due to prolonged steroids and the risk for PJP pneumonia  F/u with oncology provider on 7/18  Neurosurg will contact you regarding upcoming appts     Activity   Your activity upon discharge: 24/7 assist with supervision, 4 wheel walker     Full Code     Diet   Follow this diet upon discharge: Regular       Discharge Medications   Current Discharge Medication List      START taking these medications    Details   pantoprazole (PROTONIX) 40 MG EC tablet Take 1 tablet (40 mg) by mouth every morning (before breakfast)  Qty: 30 tablet, Refills: 0    Associated Diagnoses: GBM (glioblastoma multiforme) (H)         CONTINUE these medications which have CHANGED    Details   dexamethasone (DECADRON) 4 MG tablet Take 1 tablet (4 mg) by mouth every 6 hours  Qty: 85 tablet, Refills: 0    Associated Diagnoses: GBM (glioblastoma multiforme) (H); LUE weakness      ondansetron (ZOFRAN-ODT) 8 MG ODT tab Take 1 tablet (8 mg) by mouth every 8 hours as needed  Qty: 60 tablet, Refills: 0    Associated Diagnoses: GBM (glioblastoma multiforme) (H)         CONTINUE these medications which have NOT CHANGED    Details   !! GLEOSTINE 100 MG capsule CHEMO Take 240 mg every 6 weeks.      !! GLEOSTINE 40 MG capsule CHEMO Take 240 mg every 6 weeks      levETIRAcetam (KEPPRA) 1000 MG TABS Take 1,000 mg by mouth every 12 hours  Qty: 180 tablet, Refills: 11    Associated Diagnoses: Brain mass      polyethylene glycol (MIRALAX/GLYCOLAX) Packet Take 17 g by mouth daily  Qty: 7  packet, Refills: 1    Associated Diagnoses: S/P craniotomy      tamsulosin (FLOMAX) 0.4 MG capsule Take 0.4 mg by mouth daily      Multiple Vitamins-Minerals (MULTIVITAMIN & MINERAL PO) Take 1 tablet by mouth daily      oxyCODONE IR (ROXICODONE) 5 MG tablet Take 1-2 tablets (5-10 mg) by mouth every 3 hours as needed for other (pain control or improvement in physical function. Hold dose for analgesic side effects.)  Qty: 45 tablet, Refills: 0    Associated Diagnoses: S/P craniotomy       !! - Potential duplicate medications found. Please discuss with provider.        Allergies   Allergies   Allergen Reactions     Nkda [No Known Drug Allergies]      Data   Results for orders placed or performed during the hospital encounter of 07/10/18   CT Head w/o Contrast    Narrative    CT HEAD W/O CONTRAST 7/10/2018 2:14 PM    Provided History: GBM with new left side symptoms and weakness;     Comparison: MRI 6/19/2018, 9/28/2017, 9/29/2017.    Technique: Using multidetector thin collimation helical acquisition  technique, axial, coronal and sagittal CT images from the skull base  to the vertex were obtained without intravenous contrast.     Findings:    Slight increase in size of heterogeneous mass of the anterior right  temporal lobe, now measuring approximately 2.7 x 2.7 cm (previously  2.2 x 2.2 cm). Additionally, extra-axial simple fluid in the right  temporal lobe region. Significant amount of edema of the white matter  in the right frontoparietal region, extending into the temporal lobe.  This appears to correspond with the right middle cerebral artery  territory. Slight effacement of the right lateral ventricle. However,  no hydrocephalus. Basilar cisterns are patent. No intracranial  hemorrhage. Right frontotemporal craniotomy changes.    The visualized paranasal sinuses are clear. The mastoid air cells are  clear.      Impression    Impression:   1. Significant amount of edema in the right middle cerebral  artery  territory which likely is related to underlying mass and presumed  progression of disease.  2. Poorly defined, heterogeneous mass in the right anterior temporal  lobe has increased in size from the comparison MRI 6/19/2018.  Contrast-enhanced brain MRI recommended.    [Result: Edema of the right middle cerebral artery territory,  concerning for stroke]    Finding was identified on 7/10/2018 2:20 PM.     Dr. Aguilar was contacted by Dr. Issa at 7/10/2018 2:34 PM and  verbalized understanding of the urgent finding.     After staffing, it was felt that the edema in the right cerebral  hemisphere was more likely related to the underlying tumor and  possible progression.    I have personally reviewed the examination and initial interpretation  and I agree with the findings.    KIKI ANGEL MD   MR Brain w/o & w Contrast    Narrative     MR BRAIN W/O & W CONTRAST 7/10/2018 4:28 PM    Provided History: GBM with worsening symptoms.    Comparison: Earlier 7/10/2018 and 6/19/2018.    Technique: Multiplanar T1-weighted, axial FLAIR, and susceptibility  images were obtained without intravenous contrast. Following  intravenous gadolinium-based contrast administration, axial  T2-weighted, diffusion, and T1-weighted images (in multiple planes)  were obtained.    Contrast: 10 mL of Gadavist.    Findings:  Enlarging, heterogeneously enhancing mass centered in the anterior  right temporal lobe. Few foci of associated microhemorrhage on SWI,  unchanged. The mass now measures approximately 3.5 x 2.6 cm  (previously 2.3 x 1.8 cm). Additionally, there is abnormal contrast  enhancement extending into the right internal capsule and lentiform  nuclei. Relative sparing of the head of the caudate. Additionally,  substantial edema of the white matter in the right frontal and  temporal lobes with partial effacement of overlying sulci and the  ventricles. Leftward midline shift of 5 mm. Borderline right uncal  herniation. The  abnormally increased T2 signal extends into the right  thalamus, right cerebral peduncle, and right midbrain. No  hydrocephalus. Postoperative fluid in the anterior aspect of the right  middle cranial fossa. Postoperative changes of right frontotemporal  craniotomy. Possible narrowing of the M1 segment of the right middle  cerebral artery, however the flow voids in the right MCA are present.    Mild paranasal sinus mucosal thickening. Moderate right and mild left  mastoid effusions. The orbits are grossly unremarkable.      Impression    Impression:  1. Right anterior temporal lobe tumor progression. Progression of  tumor into the right corpus striatum, which may explain the patient's  left upper extremity motor weakness. Extensive associated edema with 5  mm of leftward midline shift and borderline right uncal herniation.  2. No ischemic infarct.    [Result: Enlarging right temporal lobe mass]    Finding was identified on 7/10/2018 4:31 PM.     SCARLETT Hayden was contacted by Dr. Issa at 7/10/2018 4:47 PM  and verbalized understanding of the urgent finding.     I have personally reviewed the examination and initial interpretation  and I agree with the findings.    ORLANDO SANDRA MD

## 2018-07-12 NOTE — PLAN OF CARE
Problem: Patient Care Overview  Goal: Plan of Care/Patient Progress Review    A/O x 4. Pleasant. VSS. Afebrile. Received IV Decadron as scheduled. Ambulated in halls and to BR w/ 2 assist, walker, and gait belt. L sided weakness. Neuros unchanged. B/A on. Pt evaluated. See note. Potential D/C tomorrow. Continue POC.

## 2018-07-12 NOTE — PROGRESS NOTES
Pt d/c'd to home via private vehicle accompanied by wife. Provided d/c education. All personal belongings sent with.

## 2018-07-12 NOTE — PLAN OF CARE
Problem: Patient Care Overview  Goal: Plan of Care/Patient Progress Review  Alert and oriented X 3. Some forgetfulness and anxiety noted. He is concerned about his wallet and car keys. Neuro check unchanged. Ambulated to and from bathroom with walker and assist of 1. Slept in between cares.

## 2018-07-12 NOTE — PLAN OF CARE
Problem: Patient Care Overview  Goal: Plan of Care/Patient Progress Review  Discharge Planner PT 7D  Patient plan for discharge: home  Current status: pt completes bed mobility and sit <> Stand transfer from bed height with SBA. Ambulates x 200' with FWW and light CGA, improved stability from previous session with equal step lengths. Pt does continue to exhibit narrow NBA however no LOB occur. Demonstrates improved stability with ambulation and completing head turns as well as physical turns. Spouse present, endorses family has 4WW; therefore practiced ambulation with 4WW. Ambulates x 600' with 4WW and CGA progressing to SBA; needs cues for appropriate proximity to walker as well as to avoid ambulating too fast at this time.   Barriers to return to prior living situation: balance, cognition, weakness.   Recommendations for discharge: home with 24/7 supervision; OP PT   Rationale for recommendations: pt will benefit from continued therapy to address balance deficits and safety with independent mobility.        Entered by: Mac Park 07/12/2018 9:01 AM

## 2018-07-12 NOTE — PROGRESS NOTES
"S: stating he is fatigued     O:  Exam:  General: Awake;  Alert, In No Acute Distress  Pulm: Breathing Comfortably on RA  Mental status: Oriented x 3  Cranial Nerves: Cranial Nerves II-XII Intact Bilaterally  Strength:      Del Tr Bi WE WF Gr  R 5 5 5 5 5 5  L 3 4 4+ 4+ 4+ 4+     HF KE KF DF PF EHL  R 5 5 5 5 5 5  L 5 5 5 5 5 5    Pronator Drift: Absent  Sensory: Intact to Light Touch    A: History of glioblastoma with tumor progression. Improved alertness and strength following decadron.    P:  - continue decadron 4 mg q6h for cerebral edema with brain compression  - sliding scale insulin for blood glucose < 150   - protonix 40 mg qday  - continue keppra 1g bid     Tiwari \"Sudheer\" MD [place]   Neurosurgery, PGY-2    Please contact neurosurgery resident on call with questions.    Dial * * [phone], enter [phone] when prompted.       "

## 2018-07-13 NOTE — PROGRESS NOTES
07/13/18 1400   Quick Adds   Type of Visit Initial OP PT Evaluation   General Information   Start of Care Date 07/13/18   Referring Physician Herrity   Orders Evaluate and Treat as Indicated   Order Date 07/11/18   Medical Diagnosis Cerebral Edema, LUE weakness   Onset of illness/injury or Date of Surgery 06/13/18   Precautions/Limitations no known precautions/limitations   Surgical/Medical history reviewed Yes   Pertinent history of current problem (include personal factors and/or comorbidities that impact the POC) pmhx: brain tumor, brain surgery and cancer, seizures, allergies, broken bones. currently taking antiseizure medications and steroids due to inflammation. pt states he was in the ED due to having swelling on the brain recently, swelling is down now and is feeling more energized, overall better. pt states the left side is weaker overall, especially when he was evaluated by the [facility]. pt reports brain surgery 7/25/2018, however, they want PT prior to try to maintain L sided strength especially the left arm. pt states he is not aware that it is weak but it must be since everyone keeps telling him it is.   Pertinent Visual History  some decreased peripheral vision.   Patient role/Employment history Disabled  (pt worked as an [employer])   Living environment [facility]   Current Assistive Devices Four Wheeled Walker   Patient/Family Goals Statement decrease fatigue, get back to doing regular exercise   Fall Risk Screen   Fall screen completed by PT   Have you fallen 2 or more times in the past year? Yes   Have you fallen and had an injury in the past year? No   Timed Up and Go score (seconds) 17s   Is patient a fall risk? Yes;Department fall risk interventions implemented   Fall screen comments pt has fallen due to feeling off balance per subjective. pt amb with 4WW during TUG best of 3 attempts. pt required cueing to perform to the correct target on the floor   Cognitive Status Examination    Orientation orientation to person, place and time   Level of Consciousness alert   Follows Commands and Answers Questions 75% of the time   Personal Safety and Judgment impaired   Memory intact   Cognitive Comment pt able to identify why he is here, past medical history. pt demonstrates difficulty remembering short term commands and requires increased cueing to participate physically and verbally   Observation   Observation pt arrived to PT using 4WW with decreased foot clearance on the left. when patient attempted to hold water with the LUE, pt demonstrated decreased awareness and neglect of the left side as the water cup slowly slid out of the left hand.   Strength   Manual Muscle Testing Quick Adds MMT: Shoulder;MMT: Knee   MMT: Shoulder, Rehab Eval   Shoulder Flexion - Left Side (3/5) fair, left   Shoulder Extension - Left Side (3+/5) fair plus, left   Shoulder ABduction - Left Side (3/5) fair, left   Shoulder Internal Rotation - Left Side (3+/5) fair plus, left   Shoulder External Rotation - Left Side (3+/5) fair plus, left   Shoulder Flexion - Right Side (5/5) normal,right   Shoulder Extension - Right Side (5/5) normal,right   Shoulder ABduction - Right Side (5/5) normal,right   Shoulder Internal Rotation - Right Side (4/5) good, right   Shoulder External Rotation - Right Side (4/5) good, right   MMT: Knee, Rehab Eval   Knee Flexion - Left Side (4/5) good, left   Knee Extension - Left Side (4/5) good, left   Knee Flexion - Right Side (5/5) normal,right   Knee Extension - Right Side (5/5) normal,right   Transfer Skills   Transfer Comments pt able to perform sit to stand transfer with B UE assistance and set up for use with the 4WW   Gait   Gait Gait Analysis;Gait Skill   Gait Skills   Level of [place]: Gait other (see comments)  (cueing on mobility safety concerns verbally)   Physical Assist/Nonphysical Assist: Gait set-up required;nonverbal cues (demo/gestures);verbal cues   Weight-Bearing Restrictions:  Gait full weight-bearing   Assistive Device for Transfer: Gait rolling walker   Gait Distance other (see comments)  (pt amb in clinic with 4/10 fatigue)   Gait Analysis   Gait Pattern Used swing-through gait   Gait Deviations Noted decreased nikhil;decreased toe-to-floor clearance;decreased weight-shifting ability   Balance Special Tests   Balance Special Tests Timed up and go;Modified CTSIB Conditions   Balance Special Tests Timed Up and Go   Seconds 17 Seconds   Comments 17s with 4WW, pt performed 3 sets in the following order: 26s, 25s and 17s   Balance Special Tests Modified CTSIB Conditions   Condition 1, seconds 30 Seconds   Condition 2, seconds 30 Seconds   Condition 4, seconds 30 Seconds   Condition 5, seconds 6 Seconds   Planned Therapy Interventions   Planned Therapy Interventions IADL retraining;balance training;bed mobility training;joint mobilization;motor coordination training;neuromuscular re-education;ROM;strengthening;stretching;transfer training;manual therapy   Clinical Impression   Criteria for Skilled Therapeutic Interventions Met yes, treatment indicated   PT Diagnosis Impaired strength and balance secondary to Brain tumor   Influenced by the following impairments impaired strength and balance   Functional limitations due to impairments impaired participation with walking, lifting with the LUE   Clinical Presentation Evolving/Changing   Clinical Presentation Rationale pt presents with evolving condition of brain tumor with left sided weakness, gait deviations and increased cueing to perform safely.   Clinical Decision Making (Complexity) Moderate complexity   Therapy Frequency other (see comments)  (1-2x per week)   Predicted Duration of Therapy Intervention (days/wks) 10 weeks   Risk & Benefits of therapy have been explained Yes   Patient, Family & other staff in agreement with plan of care Yes   GOALS   PT Eval Goals 1;2;3   Goal 1   Goal Identifier TUG   Goal Description Pt will demonstrate  a TUG <13.5s in order to demonstrate a decreased falls risk.   Target Date 09/21/18   Goal 2   Goal Identifier Strength   Goal Description Pt will demonstrate atleast 4/5 shoulder flexion in order to participate with lifting objects of coffee cups   Target Date 09/21/18   Goal 3   Goal Identifier Modified CTSIB   Goal Description Pt will demonstrate atleast 15s for CTSIB modified CTSIB condition 4 in order to participate with standing at a countertop without support.   Target Date 09/07/18   Total Evaluation Time   Total Evaluation Time (Minutes) 30

## 2018-07-13 NOTE — PROGRESS NOTES
Physical Therapy Discharge Summary    Reason for therapy discharge:    Discharged to home with outpatient therapy.    Progress towards therapy goal(s). See goals on Care Plan in [facility] health record for goal details.  Goals partially met.  Barriers to achieving goals:   discharge from facility.    Therapy recommendation(s)to progress safety and IND with mobility secondary to impairments to strength, balance, and coordination.   Continued therapy is recommended.  Rationale/Recommendations:  to progress safety and IND with mobility secondary to impairments to strength, balance, and coordination. .

## 2018-07-16 NOTE — TELEPHONE ENCOUNTER
RESEARCH:    Patient had apt scheduled to follow up on CCNU tolerance.  Since pt not on the med at this time, is it necessary to keep the appt?  Is it still needed to follow up from the hospital stay.    Also, pt has apt with Dr. Armstrong on Friday for pre-surg exam.    Per THO Landin and Dr. Armstrong, both agree it's not needed.    Left message with patient to ask whether they think the apt is needed (sx issues, etc) or whether they are okay with our cancelling it.    Await their response.    Manisha Castellanos, RN  Clinical Research Coordinator-RN

## 2018-07-16 NOTE — PROGRESS NOTES
RN Care Coordination Note  Post Hospital f/u call   Discharged from [facility]   Date of Admission:  7/10/2018   Date of Discharge:  7/12/2018    Detailed messages for both pt and wife re:   7/18 appt was originally set up to check on how pt was doing 2 wks after starting CCNU. Not needed for the study. Candelario has return visit with Dr. Armstrong to see Augustus on 7/20 prior to the scheduled surgery on 7/25.  We will add lab appt at 0800.    I will cancel 7/18 when I've heard back from them re: how Candelario is doing and that they understand rationale    Chastity Kaplan, RN, BSN, OCN  Care Coordinator  [facility]      Addendum:  Maryanne called me back and says that Candelario is doing pretty well. Still really tired but up most of the day and continues the dex q 6 hrs. We will cx 7/18 appt and add lab to 7/20 appt.   She is going to be contacting Dr. Gracia about the possibility of delaying surgery to the week of 7/30 to allow him to attend an important wedding on 7/27. She will also be sending in a message via [employer] re: questions for Dr. Armstrong about next steps, asking about Right to Try and immunotherapy options. Worried that going to surgery next is not the right thing and look forward to discussing in person with Dr. Armstrong on Friday. Updated Dr. Armstrong and message sent to scheduling.

## 2018-07-17 NOTE — PROGRESS NOTES
07/11/18 1330   Quick Adds   Type of Visit Initial PT Evaluation      Language English   Living Environment   Lives With spouse;child(nina), dependent;child(nina), adult   Living Arrangements house   Home Accessibility bed and bath are not on the first floor;stairs to enter home;stairs within home;tub/shower is not walk in   Number of Stairs to Enter Home 3   Number of Stairs Within Home 10   Stair Railings at Home inside, present on left side;outside, present on right side   Transportation Available car;family or friend will provide   Living Environment Comment Pt lives in house with wife and 17yo son. Son lives on basement level of home. Pt is planning on stay on day-bed on main level of home and will either drive to friend's rambler to use tub shower (2 SYLVIA at that home without railings) or drive down to walk-out basement of own home and use walk-in shower. Pt and wife planning to avoid stairs to 2nd floor and basement in home.    Self-Care   Dominant Hand left   Usual Activity Tolerance good   Current Activity Tolerance moderate   Regular Exercise no   Equipment Currently Used at Home none   Activity/Exercise/Self-Care Comment Pt was IND in all self-cares prior to admission.    Functional Level Prior   Ambulation 0-->independent   Transferring 0-->independent   Toileting 0-->independent   Bathing 0-->independent   Dressing 0-->independent   Fall history within last six months yes   Number of times patient has fallen within last six months 1   Which of the above functional risks had a recent onset or change? ambulation;transferring;bathing;dressing;cognition;fall history   Prior Functional Level Comment Pt was IND in all ADLs and iADLs prior to mobility. Pt does not usually ambulate with an AD. Per wife, pt fell outside of their camper onto his R side, but no injury.    General Information   Onset of Illness/Injury or Date of Surgery - Date 07/11/18  (date of PT orders)   Referring Physician Gerardo  JULIEN Bridges CNP   Patient/Family Goals Statement to return home   Pertinent History of Current Problem (include personal factors and/or comorbidities that impact the POC) Pt is a 59 yo male with history of psoriasis, hyperlipidemia, seizures, and gastro-esophageal reflux disease, and glioblastoma s/p 2 resections (10/2017 and 05/2018).  - per resident note   Precautions/Limitations fall precautions   General Observations Pt sitting up in bed and agreeable to session.    General Info Comments Activity: up with assist   Cognitive Status Examination   Orientation orientation to person, place and time   Level of Consciousness alert   Follows Commands and Answers Questions 100% of the time;able to follow single-step instructions  (able to follow multi-step commands with increased tiime)   Personal Safety and Judgment at risk behaviors demonstrated  (demonstrating poor insight)   Memory intact   Cognitive Comment Pt with delayed processing, especially of multi-step commands. Decreased insight into mobility and fatigue.    Pain Assessment   Patient Currently in Pain No   Integumentary/Edema   Integumentary/Edema no deficits were identifed   Posture    Posture Forward head position;Protracted shoulders   Range of Motion (ROM)   ROM Comment B UE/LE WFL   Strength   Strength Comments R UE 5/5 grossly with L UE 4+/5. B LE: hip flex 4/5, hip ext 4/5, knee ext 5/5, knee flex 5/5, ankle DF 5/5, ankle PF 5/5 (non weight bearing) -- pt demonstrating L LE functional weakness as noted with deviations in transfers and gait.    Bed Mobility   Bed Mobility Comments SBA - HOB elevated, without hand railings.    Transfer Skills   Transfer Comments Sit <> stand at CGA with intermittent sway with transfer.    Gait   Gait Comments Pt ambulating with FWW at CGA, FWW too anterior to pt, flexed posture, shuffled gait speed.    Balance   Balance Comments Standing static fair - Rhomberg eyes open SBA with very mild sway, Rhomberg eyes  "closed - CGA with moderate sway, Sharpened rhomberg eyes open - min A to maintain standing. Standing dynamic poor - min to mod A to maintain balance with reaching    Sensory Examination   Sensory Perception Comments Pt with normal sensation to light touch. Visual field assessment: with pt looking at writer's nose, does not see object in L peripheral vision until ~45 degrees from auditory meatis.    Coordination   Coordination Comments Normal - heel to shin normal B, dysdiadokinesia normal B for both UE and LE, finger to nose normal -- slightly delayed with L UE but no under or over shooting.    Muscle Tone   Muscle Tone Comments Normal.    General Therapy Interventions   Planned Therapy Interventions ADL retraining;balance training;bed mobility training;gait training;motor coordination training;neuromuscular re-education;strengthening;transfer training;risk factor education;home program guidelines;progressive activity/exercise   Clinical Impression   Criteria for Skilled Therapeutic Intervention yes, treatment indicated   PT Diagnosis Impaired functional mobility.    Influenced by the following impairments Impaired strength, balance, sensory/visual field, posture, activity tolerance.    Functional limitations due to impairments Impaired mobility, limiting return to community at [facility].    Clinical Presentation Evolving/Changing   Clinical Presentation Rationale PMHx, current medical management, fall, described deficits, home set up, social support, PLOF    Clinical Decision Making (Complexity) Low complexity   Therapy Frequency` other (see comments)  (6x/week)   Predicted Duration of Therapy Intervention (days/wks) 1 week   Anticipated Equipment Needs at Discharge front wheeled walker   Anticipated Discharge Disposition Home with Assist;Home with Outpatient Therapy   Risk & Benefits of therapy have been explained Yes   Patient, Family & other staff in agreement with plan of care Yes   [facility] AM-PAC TM \"6 " "Clicks\"   2016, Trustees of [facility], under license to [employer].  All rights reserved.   6 Clicks Short Forms Basic Mobility Inpatient Short Form   [facility] AM-PAC  \"6 Clicks\" V.2 Basic Mobility Inpatient Short Form   1. Turning from your back to your side while in a flat bed without using bedrails? 4 - None   2. Moving from lying on your back to sitting on the side of a flat bed without using bedrails? 3 - A Little   3. Moving to and from a bed to a chair (including a wheelchair)? 3 - A Little   4. Standing up from a chair using your arms (e.g., wheelchair, or bedside chair)? 3 - A Little   5. To walk in hospital room? 3 - A Little   6. Climbing 3-5 steps with a railing? 3 - A Little   Basic Mobility Raw Score (Score out of 24.Lower scores equate to lower levels of function) 19   Total Evaluation Time   Total Evaluation Time (Minutes) 7     "

## 2018-07-19 NOTE — PROGRESS NOTES
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 MGMT promoter methylation was absent.     Treatment to date: status post gross total resection 10/3/17; initiated concurrent chemoradiation with temozolomide on 11/13/17 and completed 12/22/17. Adjuvant temozolomide and Optune device started on 1/22/18.  Following progression of disease documented spring 2018, patient enrolled in the Toca trial underwent surgical debulking May 17, 2018; he was randomized to the standard of care arm (ie he did not receive the therapeutic virus intraoperatively). He developed left sided weakness in early July, within days of initiating lomustine therapy 7/4/18 (patient opted to delay starting this medication treatment by 1-2 weeks), MRI indicated rapid progression/recurrence of his tumor.     Referring physicians:  Dr. Harry Wiggins and Dr. Beto rGacia, Neurosurgical Service, [facility]  Dr. Dominic Louis, radiation oncology, [facility]  Oncology History:   Seth presented in September 2017 with dizzy spells, headache, and increasing somnolence. He was admitted on 9/28/17 after imaging in ED found a right temporal lobe mass. He went on to have gross total resection on 10/3/17.   10/27/17 - - neuro-oncology/medical oncology consultation, Dr. Armstrong.  11/12/17 through December 22, 2017 adjuvant treatment with concurrent chemoradiation with temozolomide. 6000 cGy, and 30 fractions, standard 200 cGy per fraction  1/19/18 - - brain MRI: Impression: 1. 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 Recommend attention on follow-up. Postsurgical changes of prior resection of right temporal glioblastoma. Resolution of right  frontotemporal subdural hematoma and T2 hyperintensity along the right internal capsule and commisural white matter.    1/22/18 - - oncology/medical oncology follow-up, Dr. Armstrong. Plan: initiate adjuvant five day temozolomide and OPTune device. Cycle 1/day one of temozolomide, 150 mg/m  on days one through five of each 28 day cycle.    2/20/18 - - oncology follow-up, SCARLETT Hillman. Cycle 2/day one of five day temozolomide. Dose increased to 200 mg/m .    3/15/18 - - brain MRI: Impression: In this patient with glioblastoma multiforme status post gross total resection, chemoradiation and adjuvant Temodar and Optune, there is increased nodular contrast enhancement and T2 hyperintensity at the margins of the right temporal resection cavity. Perfusion characteristics suggestive of tumor recurrence, particularly medially.    3/19/18 - - neuro-oncology/medical oncology follow-up, Dr. Armstrong.    4/6/18 - - neurology follow-up, Dr. Boyer. [facility]     4/19/18 MRI brain   1. Increased size of enhancing mass and surrounding vasogenic edema in  the right temporal lobe, which is suggestive of recurrence of  high-grade tumor, while the MR perfusion and diffusion imaging  characteristics are indeterminate for recurrent tumor (described in  detail above). Therefore, recommend shorter term follow-up study such  as one month.  2. Increasing mass effect has caused further compression of the right  lateral ventricle. No hydrocephalus at this time.    4/23/18 -- neuro-oncology follow-up, Dr. Armstrong  Consensus of multidisciplinary neuro-oncology tumor board: MRI consistent with progression of disease while on Optune and Temozolomide.  5/4/18-neurosurgical consultation with Dr. Beto Gracia re: TOCA trial  5/11/18--neuro-oncology follow-up, Dr. Armstrong. Plan: proceed with surgical debulking/enrollment and randomization on the Toca trial.  5/17/18 - -Procedure:  tumor debulking (a portion of the tumor adherent vessels could not be  resected). Patient was randomized to trial arm that did not include the Toca therapeutic virus.  1) Right craniotomy for tumor resection  2) Neuronavigation  3) Microdissection      Surgeon: Beto Gracia. MD, PhD  6/1/18 - - neurosurgery follow-up, Dr. Gracia. Again noted. The patient was randomized to standard therapy.  6/19/18 - - brain MRI - stable since 5/18 post-op scan.  6/22/18--neuro-oncology follow-up, Dr. Armstrong. Plan: initiate 2nd-line treatment with lomustine (CCNU) once cleared by insurance.  NOTE: patient delayed initiation of chemotherapy with lomustine until on or around July 4, but did not notify the care team of this fact until one week later.  7/9/18: patient s wife called in due to patient being last arousable, severely worsening fatigue, slouching the left side with left-sided facial weakness, and worsening gait.  7/10/18 - - oncology follow-up, SCARLETT Hillman.  Plan: direct admission to the ER for concern for acute stroke versus progression of disease.  7/10/18 - - brain MRI, compared to June 19 MRI: Impression:  1. Right anterior temporal lobe tumor progression. Progression of  tumor into the right corpus striatum, which may explain the patient's  left upper extremity motor weakness. Extensive associated edema with 5  mm of leftward midline shift and borderline right uncal herniation.  2. No ischemic infarct.     [Result: Enlarging right temporal lobe mass]  7/10 through 7/12/18 - - hospitalization at the [facility] for severely worsening left-sided weakness and cerebral edema.  MRI was done which showed increased contrast-enhancing region and edema surrounding the resection cavity concerning for tumor progression.  Patient was administered course of dexamethasone and prolonged course of steroids, with plans for potential repeat resection by Dr. Gracia in end of July.  7/20/18 - - neuro-oncology follow-up, Dr. Armstrong.          Interim History:  HISTORY OF PRESENT ILLNESS:    Harris returns today in the company of his wife.  The visit as an add-on visit at his wife's request.  Please see her [employer] message from yesterday regarding extensive questions.  Briefly, at my last evaluation in the third week of June, he had followed up with Dr. Gracia as well.  He had the debulking surgery in under the auspices of the Tocage trial.  He had been randomized to the non-gene therapy arm.  We will evaluate him in late June and initiated standard of care therapy with lomustine still under the auspices of the trial, but for best available chemotherapy option.  My understanding had been that he would pick it up and take it once insurance had cleared it.  However, he did get the medication, but he opted not to initiate taking until 07/04 due to some family related activities.      Within a few days of that event, he was in [place] for 4 days and his wife called in stating the patient was less arousable and had severely worsened fatigue, left-sided facial weakness and gait.  We asked him to present to the nearest emergency room or come back here to the [facility] for further evaluation.  He came in the next day and saw our physician assistant, Helena Landin, and was sent immediately to the emergency room for concern of acute stroke versus rapid progression of disease.  The brain MRI done on 07/10 compared to the 06/19 MRI showed significant progression along the corpus striatum with significant edema.      He was initiated on high-dose dexamethasone and entered into our hospital through 07/12.  Dr. Gracia from the Neurosurgery Department met with the patient and indicated that he may benefit from further debulking and resection with possible thermal ablation.  Dr. Gracia has also communicated with me prior to this appointment, and I was in favor of this as well as 1 option.  After finding out the timing of the therapies, it was confirmed that the patient did not sustain actual progression of disease  "while on the lomustine, but rather this rapid progression of disease likely occurred at a very rapid and short amount of time compared to that baseline scan just several weeks prior.  He continues to have left-sided weakness.  He denies any falls.  He uses either a walker or leaning on his wife to get around.  He is very lucid in discussion.  His wife had some concerns and sent a [employer] message to myself and Dr. Gracia yesterday, primarily focusing on interest in a poliovirus through [facility] and also the recent \"Right to Try\" legislation and what effect that would have on her 's care.               Review Of Systems:  Complete 12-point ROS reviewed. Pertinent symptoms reviewed above per HPI.    PAST MEDICAL HISTORY:   1.  Glioblastoma, status post gross total resection on 10/03/2017 of the right temporal lobe preceded and indicated by some seizure-like activity without loss of consciousness.   2.  Chronic back pain.   3.  Ganglionic cysts.   4.  Hyperlipidemia.   5.  He has psoriasis and was taking what sounds like a TNF and an alpha inhibitor up until the summer of 2017.       PAST SURGICAL HISTORY:     1.  The aforementioned right temporal craniotomy for gross total resection of right temporal glioblastoma done 10/03/2017 with Dr. Harry Wiggins at the [facility].   2.  Colonoscopy performed 2011, unremarkable.   3. Surgical debulking of recurrent GB tumor, May 17 2018.      FAMILY HISTORY:  He had a Father and 2 paternal uncles who had prostate cancer.  His mother had some sort of \"tumor of the rib,\" and  at age 54 of this unknown cancer.       SOCIAL HISTORY:  The patient lives in [place], [place].  He is accompanied by his wife, who is extremely supportive.  They have 3 sons ages 18, 23 and 25.  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  He has since then been working for " "[employer], about 27 miles from his home.    Tobacco:  Never smoker.    Allergies:none    Current Medications:   Current Outpatient Prescriptions   Medication     dexamethasone (DECADRON) 4 MG tablet     GLEOSTINE 100 MG capsule CHEMO     GLEOSTINE 40 MG capsule CHEMO     levETIRAcetam (KEPPRA) 1000 MG TABS     Multiple Vitamins-Minerals (MULTIVITAMIN & MINERAL PO)     ondansetron (ZOFRAN-ODT) 8 MG ODT tab     oxyCODONE IR (ROXICODONE) 5 MG tablet     pantoprazole (PROTONIX) 40 MG EC tablet     polyethylene glycol (MIRALAX/GLYCOLAX) Packet     tamsulosin (FLOMAX) 0.4 MG capsule     [DISCONTINUED] Temozolomide (TEMODAR) 180 MG capsule     No current facility-administered medications for this visit.          Physical Exam:  BP (!) 130/93 (BP Location: Right arm, Patient Position: Sitting, Cuff Size: Adult Regular)  Pulse 84  Temp 97.6  F (36.4  C) (Oral)  Resp 18  Ht 1.753 m (5' 9\")  Wt 97 kg (213 lb 14.4 oz)  SpO2 96%  BMI 31.59 kg/m2  KPS 70  GENERAL:  Very pleasant middle-aged  gentleman who appears in no acute distress, alert and oriented x3.  Speech is fluent.  He does not have any evidence of receptive or expressive aphasia.   HEAD: right frontal region of craniotomy is fully healed, indented, no overlying fluctuance, erythema or tenderness.  HEENT:  Anicteric sclerae.  Oropharynx is without evidence of mucositis or thrush.    CARDIOVASCULAR:  Regular rate and rhythm, normal S1, S2.  No murmurs, gallops or rubs.   LUNGS:  Clear to auscultation throughout.   NEUROLOGIC:  Cranial nerves II-XII grossly intact.  Motor strength was 5/5 on the right, 4+/5 on the left upper and lower extremities.    Sensation is grossly intact throughout.  No evidence of cerebellar signs, as finger-to-nose is unremarkable without tremor.  No dysdiadochokinesia.  He does have some possible new visual field abnormalities, in the form of left-sided hemianopsia, but it is unclear whether his subjective " response is from lack of understanding of questions during the exam.    His gait is hesitant and favors the left side.  Negative Romberg sign.           Laboratory/Imaging Studies  Results for orders placed or performed during the hospital encounter of 07/10/18   CT Head w/o Contrast    Narrative    CT HEAD W/O CONTRAST 7/10/2018 2:14 PM    Provided History: GBM with new left side symptoms and weakness;     Comparison: MRI 6/19/2018, 9/28/2017, 9/29/2017.    Technique: Using multidetector thin collimation helical acquisition  technique, axial, coronal and sagittal CT images from the skull base  to the vertex were obtained without intravenous contrast.     Findings:    Slight increase in size of heterogeneous mass of the anterior right  temporal lobe, now measuring approximately 2.7 x 2.7 cm (previously  2.2 x 2.2 cm). Additionally, extra-axial simple fluid in the right  temporal lobe region. Significant amount of edema of the white matter  in the right frontoparietal region, extending into the temporal lobe.  This appears to correspond with the right middle cerebral artery  territory. Slight effacement of the right lateral ventricle. However,  no hydrocephalus. Basilar cisterns are patent. No intracranial  hemorrhage. Right frontotemporal craniotomy changes.    The visualized paranasal sinuses are clear. The mastoid air cells are  clear.      Impression    Impression:   1. Significant amount of edema in the right middle cerebral artery  territory which likely is related to underlying mass and presumed  progression of disease.  2. Poorly defined, heterogeneous mass in the right anterior temporal  lobe has increased in size from the comparison MRI 6/19/2018.  Contrast-enhanced brain MRI recommended.    [Result: Edema of the right middle cerebral artery territory,  concerning for stroke]    Finding was identified on 7/10/2018 2:20 PM.     Dr. Aguilar was contacted by Dr. Issa at 7/10/2018 2:34 PM and  verbalized  understanding of the urgent finding.     After staffing, it was felt that the edema in the right cerebral  hemisphere was more likely related to the underlying tumor and  possible progression.    I have personally reviewed the examination and initial interpretation  and I agree with the findings.    KIKI ANGEL MD   MR Brain w/o & w Contrast    Narrative     MR BRAIN W/O & W CONTRAST 7/10/2018 4:28 PM    Provided History: GBM with worsening symptoms.    Comparison: Earlier 7/10/2018 and 6/19/2018.    Technique: Multiplanar T1-weighted, axial FLAIR, and susceptibility  images were obtained without intravenous contrast. Following  intravenous gadolinium-based contrast administration, axial  T2-weighted, diffusion, and T1-weighted images (in multiple planes)  were obtained.    Contrast: 10 mL of Gadavist.    Findings:  Enlarging, heterogeneously enhancing mass centered in the anterior  right temporal lobe. Few foci of associated microhemorrhage on SWI,  unchanged. The mass now measures approximately 3.5 x 2.6 cm  (previously 2.3 x 1.8 cm). Additionally, there is abnormal contrast  enhancement extending into the right internal capsule and lentiform  nuclei. Relative sparing of the head of the caudate. Additionally,  substantial edema of the white matter in the right frontal and  temporal lobes with partial effacement of overlying sulci and the  ventricles. Leftward midline shift of 5 mm. Borderline right uncal  herniation. The abnormally increased T2 signal extends into the right  thalamus, right cerebral peduncle, and right midbrain. No  hydrocephalus. Postoperative fluid in the anterior aspect of the right  middle cranial fossa. Postoperative changes of right frontotemporal  craniotomy. Possible narrowing of the M1 segment of the right middle  cerebral artery, however the flow voids in the right MCA are present.    Mild paranasal sinus mucosal thickening. Moderate right and mild left  mastoid effusions. The orbits  are grossly unremarkable.      Impression    Impression:  1. Right anterior temporal lobe tumor progression. Progression of  tumor into the right corpus striatum, which may explain the patient's  left upper extremity motor weakness. Extensive associated edema with 5  mm of leftward midline shift and borderline right uncal herniation.  2. No ischemic infarct.    [Result: Enlarging right temporal lobe mass]    Finding was identified on 7/10/2018 4:31 PM.     SCARLETT Hayden was contacted by Dr. Issa at 7/10/2018 4:47 PM  and verbalized understanding of the urgent finding.     I have personally reviewed the examination and initial interpretation  and I agree with the findings.    ORLANDO SANDRA MD   CBC with platelets   Result Value Ref Range    WBC 5.0 4.0 - 11.0 10e9/L    RBC Count 3.64 (L) 4.4 - 5.9 10e12/L    Hemoglobin 11.9 (L) 13.3 - 17.7 g/dL    Hematocrit 34.3 (L) 40.0 - 53.0 %    MCV 94 78 - 100 fl    MCH 32.7 26.5 - 33.0 pg    MCHC 34.7 31.5 - 36.5 g/dL    RDW 14.1 10.0 - 15.0 %    Platelet Count 168 150 - 450 10e9/L   Basic metabolic panel   Result Value Ref Range    Sodium 139 133 - 144 mmol/L    Potassium 4.1 3.4 - 5.3 mmol/L    Chloride 106 94 - 109 mmol/L    Carbon Dioxide 22 20 - 32 mmol/L    Anion Gap 10 3 - 14 mmol/L    Glucose 129 (H) 70 - 99 mg/dL    Urea Nitrogen 20 7 - 30 mg/dL    Creatinine 1.03 0.66 - 1.25 mg/dL    GFR Estimate 74 >60 mL/min/1.7m2    GFR Estimate If Black 89 >60 mL/min/1.7m2    Calcium 8.7 8.5 - 10.1 mg/dL   CBC with platelets   Result Value Ref Range    WBC 8.7 4.0 - 11.0 10e9/L    RBC Count 3.46 (L) 4.4 - 5.9 10e12/L    Hemoglobin 11.4 (L) 13.3 - 17.7 g/dL    Hematocrit 33.3 (L) 40.0 - 53.0 %    MCV 96 78 - 100 fl    MCH 32.9 26.5 - 33.0 pg    MCHC 34.2 31.5 - 36.5 g/dL    RDW 14.4 10.0 - 15.0 %    Platelet Count 192 150 - 450 10e9/L   Basic metabolic panel   Result Value Ref Range    Sodium 141 133 - 144 mmol/L    Potassium 3.8 3.4 - 5.3 mmol/L    Chloride 107 94 -  "109 mmol/L    Carbon Dioxide 24 20 - 32 mmol/L    Anion Gap 10 3 - 14 mmol/L    Glucose 112 (H) 70 - 99 mg/dL    Urea Nitrogen 26 7 - 30 mg/dL    Creatinine 1.00 0.66 - 1.25 mg/dL    GFR Estimate 76 >60 mL/min/1.7m2    GFR Estimate If Black >90 >60 mL/min/1.7m2    Calcium 8.6 8.5 - 10.1 mg/dL   Neurosurgery Adult IP Consult: Patient to be seen: ASAP within 4 hrs; Call back #: [phone]; cerebral edema on recent imaging with altered mental status; Consultant may enter orders: Yes    Narrative    Te Palmer MD     7/10/2018  7:38 PM  [facility]       NEUROSURGERY CONSULTATION NOTE    This consultation was requested by Dr. Delroy Salvador from the   Heme/Onc service.    Reason for Consultation: \"cerebral edema on recent imaging with   altered mental status\"    HPI:  Seth Iglesias is a 57 yo male with history of psoriasis,   hyperlipidemia, seizures, and gastro-esophageal reflux disease,   and glioblastoma s/p 2 resections (10/2017 and 05/2018).     Patient unable to provide significant history due to fatigue.   Primary amount of history is provided by wife who states that he   has been having more fatigue worsening in the last 4 days. Also   having worsening weakness of the left upper extremity, imbalance,   shuffling gait. No recent fevers, chills, nausea.     PAST MEDICAL HISTORY:   Past Medical History:   Diagnosis Date     Gastroesophageal reflux disease      Glioblastoma (H)      Psoriasis     On Enbrel Injections Q 1 month for about 10 years, [facility]     Seizures (H)      PAST SURGICAL HISTORY:   Past Surgical History:   Procedure Laterality Date     COLONOSCOPY  4/4/2011    Procedure:COLONOSCOPY; Surgeon:TC HARE;   Location:[facility]     OPTICAL TRACKING SYSTEM CRANIOTOMY, EXCISE TUMOR, COMBINED   Right 10/3/2017    Procedure: COMBINED OPTICAL TRACKING SYSTEM CRANIOTOMY, EXCISE   TUMOR;  Stealth Assisted Right Temporal " "Craniotomy For Tumor   Resection;  Surgeon: Harry Wiggins MD;  Location: [facility] OR     OPTICAL TRACKING SYSTEM CRANIOTOMY, EXCISE TUMOR, COMBINED N/A   5/17/2018    Procedure: COMBINED OPTICAL TRACKING SYSTEM CRANIOTOMY, EXCISE   TUMOR;  Stealth Assisted Right Craniotomy And Tumor Resection ;    Surgeon: Beto Gracia MD;  Location: [facility] OR     SURGICAL HISTORY OF -   02/05/75    Torn Meniscus Right Knee     SURGICAL HISTORY OF -   11/30/73    Arthrotomy & medial meniscectomy left knee     FAMILY HISTORY:   Family History   Problem Relation Age of Onset     C.A.D. Father      age 52     Cancer Father      prostate CA     Lung Cancer Mother      C.A.D. Paternal Uncle      50's     SOCIAL HISTORY:   Social History   Substance Use Topics     Smoking status: Never Smoker     Smokeless tobacco: Never Used     Alcohol use No      Comment: occasionally     MEDICATIONS:  Current Outpatient Prescriptions   Medication Sig Dispense Refill     [DISCONTINUED] Temozolomide (TEMODAR) 180 MG capsule Take 1   capsule (180 mg) by mouth daily in addition to 250 mg capsule for   5 days (Patient not taking: Reported on 5/4/2018) 5 capsule 0     Allergies:  Allergies   Allergen Reactions     Nkda [No Known Drug Allergies]      ROS: 10 point ROS of systems including Constitutional, Eyes,   Respiratory, Cardiovascular, Gastroenterology, Genitourinary,   Integumentary, Muscularskeletal, Psychiatric were all negative   except for pertinent positives noted in my HPI.    Physical exam:   Blood pressure 120/80, temperature 98.4  F (36.9  C), temperature   source Oral, resp. rate 18, height 1.753 m (5' 9\"), weight 95.3   kg (210 lb 1.6 oz), SpO2 91 %.  General: fatigued, eyes closed, intermittent snoring.   PULM: breathing comfortably on room air   NEUROLOGIC:  -- Asleep, arousable by voice and stimulation. oriented x 4.   -- Follows commands intermittently and incompletely.   -- Speech fluent, spontaneous. No aphasia or " "dysarthria.  -- dysconjugate gaze. 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   -- visual fields unable to be tested.   -- face symmetrical. tongue protrudes midline.   -- hearing grossly intact   Motor: 5/5 strength in the R hemibody. 3/5 strength in the left   upper extremity. 2/5 in hip flexor on the left. 4/5 in   plantarflexion/dorsiflexion on L  Sensory:  intact to LT x 4 extremities     IMAGING:  MRI 7/10/18: increased contrast enhancing region and edema   surrounding the resection cavity concerning for tumor progression       LABS:   BMP within normal limits  CBC with thrombocytopenia of 123 otherwise normal  UA unremarkable     ASSESSMENT:  R temporal glioblastoma, found to have tumor progression after 2   resections    RECOMMENDATIONS:  - No neurosurgical intervention indicated at this time   - agree with decadron 4 mg q6h for treatment of cerebral edema   - initiate sliding scale insulin  - initiate protonix 40 mg qday  - continue keppra 1g bid   - patient to be staffed with Dr. Gracia in AM    The patient was discussed with Dr. Agapito Lutz, neurosurgery chief   resident, and he agrees with the above.    Te \"Sudheer\" MD [place]   Neurosurgery, PGY-2    Please contact neurosurgery resident on call with questions.    Dial * * *[phone], enter [phone] when prompted.          ASSESSMENT/PLAN:  Seth Iglesias is a 57 y/o man with right temporal glioblastoma (WHO grade IV). He had gross total resection on 10/3/17 and completed adjuvant chemorads on 12/22/17. His 4 week post-treatment brain MRI showed areas of enhancement around the resection cavity thought to represent radiation necrosis rather than recurrence.     He then started temozolomide maintenance in January 2018, at a dose of 150 mg/m2 for cycle 1. This was increased to 200 mg/m2 for cycle 2. He also started the Optune device in January 2018.     Upon progression of disease after about less than 6 months of temozolomide in the adjuvant setting, " he underwent surgical debulking following randomization on the Tocagen trial to the non-viral therapeutic arm.  He underwent surgical debulking.  Dr. Gracia was not able to obtain a full total gross resection for this secondary surgery due to the involvement of some of the vessels.      My impression overall is the patient has had a rapid progression of disease over the last few months after randomizing to the non-viral arm on the Tocagen trial.  We initiated standard-of-care lomustine in late June.  It was prescribed to him on 06/30. However, the patient did not begin taking this medication until 07/04.  Within a few days of that, he developed left-sided weakness.  He came back from [place] and was then found to have rapid progression of disease on the 07/10 MRI compared to the one done just several weeks prior that was intended as a baseline prior to treatment, specifically on 06/19.      With this rapid progression of disease, I have discussed this in depth with Dr. Gracia from Neurosurgery.  We have set up an appointment for Dr. Gracia to meet with the patient, also, today.  I best addressed the patient's and his wife's questions.  I encouraged them to keep an open mind about discussion about further debulking and attempt at surgical resection through Dr. Gracia, as Dr. Gracia may be able to also include laser or thermal ablation as part of that procedure.  I did state that overall while the patient did have a gross total resection done up-front, upfront treatment would portend the best attempt at intent-to-cure therapy for which generally is pretty aggressive and highly recurrent disease.  Unfortunately, with progression after just several months of temozolomide therapy, it portends a particularly aggressive form of his glioblastoma in this patient specifically.      They stated understanding, and in fact, I think there is a significant amount of progression in the last few months and disappointingly after excellent  debulking surgery by Dr. Gracia in May.  I would favor potential debulking or attempt at full resection of what is remaining in the tumor with axillary treatment attempt, but they will discuss this with Dr. Gracia further today.  Now that we have clarified a time line of therapy, I do not think he particularly had progression of disease while on the lomustine, as he took that late as of 07/04 and not several weeks prior.  Thus, that drug does remain of potential utility going forward in the postsurgical setting assuming he goes forward with the surgery.  I would also look to do genomic profiling of either the newly detected tumor from a few weeks from now, or if they opt not to go forward with surgery, then it is most reasonable to resect that tumor from May.  If that yields any targetable mutations that could be targetable per the NCI-MATCH or other trials or even off-label therapy of the actionable targets, we will certainly take that into consideration.  This segues into the patient's wife's question about right-to-try therapy.  I provided an [employer]-based printout report on frequently answered questions, and we clarified that right to try does not necessarily mean asking for drugs that would be of no medical use, but rather right to ask and explore options for other drugs on and off label through pharmaceutical companies for compassionate use.  He and his wife stated full understanding of the above.  In addition, we do have a clinical trial brought here by my colleague, Dr. Kylee Noble.  This is a Phase 2 trial using the most dosing emulsion combination of [id number] drug.  This is a BBI with bevacizumab versus bevacizumab alone on recurrent glioblastoma.  I have discussed this previously.  I did print out eligibility and criteria, but I have held off on providing it to the patient now until they have further discussion with Dr. Gracia.  I reviewed all of the above to the best of my ability.  We will not make any firm  followup plans until determining whether or not the patient will go forward with surgery that is currently planned for 07/25.               I spent 45 minutes in consultation, including H&P and Discussion. >50% of time was spent in counseling and in coordination of care.    Augustus Armstrong MD, PhD

## 2018-07-19 NOTE — PROGRESS NOTES
Notified pt's wife that Dr. Gracia is aware of concerns about surgery and is available to see pt tomorrow at 1 pm to discuss more extended resection. Maryanne and pt agree and would like appt tomorrow, scheduled.

## 2018-07-20 NOTE — NURSING NOTE
"Oncology Rooming Note    July 20, 2018 1:04 PM   Seth Iglseias is a 58 year old male who presents for:    Chief Complaint   Patient presents with     Oncology Clinic Visit     GLIOBLASTOMA     Initial Vitals: BP (!) 130/93  Pulse 84  Temp 97.6  F (36.4  C) (Oral)  Resp 16  Wt 97 kg (213 lb 13.5 oz)  SpO2 96%  BMI 31.58 kg/m2 Estimated body mass index is 31.58 kg/(m^2) as calculated from the following:    Height as of an earlier encounter on 7/20/18: 1.753 m (5' 9\").    Weight as of this encounter: 97 kg (213 lb 13.5 oz). Body surface area is 2.17 meters squared.  No Pain (0) Comment: Data Unavailable   No LMP for male patient.  Allergies reviewed: Yes  Medications reviewed: Yes    Medications: Medication refills not needed today.  Pharmacy name entered into [employer]:    [id number] - [facility] [address]  [id number] [address]    Clinical concerns: n/a     5 minutes for nursing intake (face to face time)     KANE BOB CMA              "

## 2018-07-20 NOTE — LETTER
7/20/2018       RE: Seth Iglesias  [address]  [place] [address]     Dear Colleague,    Thank you for referring your patient, Seth Iglesias, to the [facility]. Please see a copy of my visit note below.    Neurosurgery Clinic Note    Evaluation for a right sided craniotomy for tumor resection    58 M s/p craniotomy for tumor debulking on 6/1/2018. The patient participated in the TocaV trial but was randomized to standard therapy. The patient underwent CCNU, with subsequent worsening of left sided function and lethargy. The patient re-presented to the [facility] on 7/10/2018 with an MRI that revealed progression of the right temporal lesion. The patient's clinical examination improved with steroid therapy and returns today for discussion for a repeat resection.    On review of systems, the patient states that his left sided strength has significantly improved since discharge.     BP (!) 130/93  Pulse 84  Temp 97.6  F (36.4  C) (Oral)  Resp 16  Wt 97 kg (213 lb 13.5 oz)  SpO2 96%  BMI 31.58 kg/m2    On examination, the patient exhibit a mild left facial drop. His LUE and LLE strength is 4+/5 and improved relative to his in-patient examination.    AP: Given the size of the CE+ lesion (comparing 7/10/2018 relative to 6/19/2018 study), I do think that the patient can benefit from surgical resection, with NGS of the resected tumor specimens. The patient is under consideration for Avastin and Pembro after the surgical resection. I have reviewed the risks and benefits of the resection with the patient. All questions answered.  Informed consent was obtained. I will proceed to schedule the surgery.    >50% of time was dedicated to counseling. The visit lasted approximately 45 minutes.     Again, thank you for allowing me to participate in the care of your patient.      Sincerely,    Beto Gracia MD

## 2018-07-20 NOTE — MR AVS SNAPSHOT
After Visit Summary   7/20/2018    Seth Iglesias    MRN: [id number]           Patient Information     Date Of Birth          1960        Visit Information        Provider Department      7/20/2018 8:45 AM Augustus Armstrong MD [facility]        Today's Diagnoses     Glioblastoma multiforme of temporal lobe (H)        Examination of participant in clinical trial           Follow-ups after your visit        Follow-up notes from your care team     Return if symptoms worsen or fail to improve.      Your next 10 appointments already scheduled     Aug 01, 2018 11:00 AM CDT   MR BRAIN W/O & W CONTRAST with UUMR2   [facility] [place], MRI ([facility])    [address] [place] [facility] [address] [phone]           Take your medicines as usual, unless your doctor tells you not to. Bring a list of your current medicines to your exam (including vitamins, minerals and over-the-counter drugs).  You may or may not receive intravenous (IV) contrast for this exam pending the discretion of the Radiologist.  You do not need to do anything special to prepare.  The MRI machine uses a strong magnet. Please wear clothes without metal (snaps, zippers). A sweatsuit works well, or we may give you a hospital gown.  Please remove any body piercings and hair extensions before you arrive. You will also remove watches, jewelry, hairpins, wallets, dentures, partial dental plates and hearing aids. You may wear contact lenses, and you may be able to wear your rings. We have a safe place to keep your personal items, but it is safer to leave them at home.  **IMPORTANT** THE INSTRUCTIONS BELOW ARE ONLY FOR THOSE PATIENTS WHO HAVE BEEN PRESCRIBED SEDATION OR GENERAL ANESTHESIA DURING THEIR MRI PROCEDURE:  IF YOUR DOCTOR PRESCRIBED ORAL SEDATION (take medicine to help you relax during your exam):   You must get the medicine from your doctor (oral medication)  before you arrive. Bring the medicine to the exam. Do not take it at home. You ll be told when to take it upon arriving for your exam.   Arrive one hour early. Bring someone who can take you home after the test. Your medicine will make you sleepy. After the exam, you may not drive, take a bus or take a taxi by yourself.  IF YOUR DOCTOR PRESCRIBED IV SEDATION:   Arrive one hour early. Bring someone who can take you home after the test. Your medicine will make you sleepy. After the exam, you may not drive, take a bus or take a taxi by yourself.   No eating 6 hours before your exam. You may have clear liquids up until 4 hours before your exam. (Clear liquids include water, clear tea, black coffee and fruit juice without pulp.)  IF YOUR DOCTOR PRESCRIBED ANESTHESIA (be asleep for your exam):   Arrive 1 1/2 hours early. Bring someone who can take you home after the test. You may not drive, take a bus or take a taxi by yourself.   No eating 8 hours before your exam. You may have clear liquids up until 4 hours before your exam. (Clear liquids include water, clear tea, black coffee and fruit juice without pulp.)   You will spend four to five hours in the recovery room.  Please call the Imaging Department at your exam site with any questions.            Aug 01, 2018   Procedure with Beto Gracia MD   [facility], [place], Same Day Surgery (--)    [address] [address]   [phone]            Aug 10, 2018  1:15 PM CDT   (Arrive by 1:00 PM)   Return Visit with Beto Grcaia MD   [facility] ([facility])    [address]  [address]  [place] [address] [phone]            Aug 13, 2018 10:00 AM CDT   (Arrive by 9:30 AM)   MR BRAIN W/O & W CONTRAST with [facility] [facility] ([facility])    [address]  1st Floor  [place] [address] [phone]           Take your medicines as usual,  unless your doctor tells you not to. Bring a list of your current medicines to your exam (including vitamins, minerals and over-the-counter drugs).  You may or may not receive intravenous (IV) contrast for this exam pending the discretion of the Radiologist.  You do not need to do anything special to prepare.  The MRI machine uses a strong magnet. Please wear clothes without metal (snaps, zippers). A sweatsuit works well, or we may give you a hospital gown.  Please remove any body piercings and hair extensions before you arrive. You will also remove watches, jewelry, hairpins, wallets, dentures, partial dental plates and hearing aids. You may wear contact lenses, and you may be able to wear your rings. We have a safe place to keep your personal items, but it is safer to leave them at home.  **IMPORTANT** THE INSTRUCTIONS BELOW ARE ONLY FOR THOSE PATIENTS WHO HAVE BEEN PRESCRIBED SEDATION OR GENERAL ANESTHESIA DURING THEIR MRI PROCEDURE:  IF YOUR DOCTOR PRESCRIBED ORAL SEDATION (take medicine to help you relax during your exam):   You must get the medicine from your doctor (oral medication) before you arrive. Bring the medicine to the exam. Do not take it at home. You ll be told when to take it upon arriving for your exam.   Arrive one hour early. Bring someone who can take you home after the test. Your medicine will make you sleepy. After the exam, you may not drive, take a bus or take a taxi by yourself.  IF YOUR DOCTOR PRESCRIBED IV SEDATION:   Arrive one hour early. Bring someone who can take you home after the test. Your medicine will make you sleepy. After the exam, you may not drive, take a bus or take a taxi by yourself.   No eating 6 hours before your exam. You may have clear liquids up until 4 hours before your exam. (Clear liquids include water, clear tea, black coffee and fruit juice without pulp.)  IF YOUR DOCTOR PRESCRIBED ANESTHESIA (be asleep for your exam):   Arrive 1 1/2 hours early. Bring someone  who can take you home after the test. You may not drive, take a bus or take a taxi by yourself.   No eating 8 hours before your exam. You may have clear liquids up until 4 hours before your exam. (Clear liquids include water, clear tea, black coffee and fruit juice without pulp.)   You will spend four to five hours in the recovery room.  Please call the Imaging Department at your exam site with any questions.            Aug 13, 2018 10:45 AM CDT   LAB with [facility] ([facility])    [address]  1st Floor  [place] [address] [phone]           Please do not eat 10-12 hours before your appointment if you are coming in fasting for labs on lipids, cholesterol, or glucose (sugar). This does not apply to pregnant women. Water, hot tea and black coffee (with nothing added) are okay. Do not drink other fluids, diet soda or chew gum.            Aug 17, 2018  7:45 AM CDT   (Arrive by 7:30 AM)   Return Visit with Augustus Armstrong MD   [facility] ([facility])    [address]  [address]  [place] [address]   [phone]            Sep 24, 2018  9:00 AM CDT   (Arrive by 8:30 AM)   MR BRAIN W/O & W CONTRAST with UUMR1   [facility] [place], MRI ([facility])    [address] [address] [phone]           Take your medicines as usual, unless your doctor tells you not to. Bring a list of your current medicines to your exam (including vitamins, minerals and over-the-counter drugs).  You may or may not receive intravenous (IV) contrast for this exam pending the discretion of the Radiologist.  You do not need to do anything special to prepare.  The MRI machine uses a strong magnet. Please wear clothes without metal (snaps, zippers). A sweatsuit works well, or we may give you a hospital gown.  Please remove any body piercings and hair extensions before  you arrive. You will also remove watches, jewelry, hairpins, wallets, dentures, partial dental plates and hearing aids. You may wear contact lenses, and you may be able to wear your rings. We have a safe place to keep your personal items, but it is safer to leave them at home.  **IMPORTANT** THE INSTRUCTIONS BELOW ARE ONLY FOR THOSE PATIENTS WHO HAVE BEEN PRESCRIBED SEDATION OR GENERAL ANESTHESIA DURING THEIR MRI PROCEDURE:  IF YOUR DOCTOR PRESCRIBED ORAL SEDATION (take medicine to help you relax during your exam):   You must get the medicine from your doctor (oral medication) before you arrive. Bring the medicine to the exam. Do not take it at home. You ll be told when to take it upon arriving for your exam.   Arrive one hour early. Bring someone who can take you home after the test. Your medicine will make you sleepy. After the exam, you may not drive, take a bus or take a taxi by yourself.  IF YOUR DOCTOR PRESCRIBED IV SEDATION:   Arrive one hour early. Bring someone who can take you home after the test. Your medicine will make you sleepy. After the exam, you may not drive, take a bus or take a taxi by yourself.   No eating 6 hours before your exam. You may have clear liquids up until 4 hours before your exam. (Clear liquids include water, clear tea, black coffee and fruit juice without pulp.)  IF YOUR DOCTOR PRESCRIBED ANESTHESIA (be asleep for your exam):   Arrive 1 1/2 hours early. Bring someone who can take you home after the test. You may not drive, take a bus or take a taxi by yourself.   No eating 8 hours before your exam. You may have clear liquids up until 4 hours before your exam. (Clear liquids include water, clear tea, black coffee and fruit juice without pulp.)   You will spend four to five hours in the recovery room.  Please call the Imaging Department at your exam site with any questions.            Sep 28, 2018  9:30 AM CABRERA   [facility] Lab Draw with  [employer] LAB DRAW   [facility] Lab Draw ДМИТРИЙ  "[facility])    [address]  [address]  [place] [id number] [phone]            Sep 28, 2018 10:15 AM CDT   (Arrive by 10:00 AM)   Return Visit with Augustus Armstrong MD   [facility] ([facility])    [address]  [address]  [place] [address] [phone]              Who to contact     If you have questions or need follow up information about today's clinic visit or your schedule please contact [facility] directly at [phone].  Normal or non-critical lab and imaging results will be communicated to you by MyChart, letter or phone within 4 business days after the clinic has received the results. If you do not hear from us within 7 days, please contact the clinic through [employer] or phone. If you have a critical or abnormal lab result, we will notify you by phone as soon as possible.  Submit refill requests through [employer] or call your pharmacy and they will forward the refill request to us. Please allow 3 business days for your refill to be completed.          Additional Information About Your Visit        [employer] Information     [employer] gives you secure access to your electronic health record. If you see a primary care provider, you can also send messages to your care team and make appointments. If you have questions, please call your primary care clinic.  If you do not have a primary care provider, please call [phone] and they will assist you.        Care EveryWhere ID     This is your Care EveryWhere ID. This could be used by other organizations to access your [place] medical records  [id number]        Your Vitals Were     Pulse Temperature Respirations Height Pulse Oximetry BMI (Body Mass Index)    84 97.6  F (36.4  C) (Oral) 18 1.753 m (5' 9\") 96% 31.59 kg/m2       Blood Pressure from Last 3 Encounters:   07/20/18 (!) 130/93   07/20/18 (!) 130/93   07/12/18 124/82    Weight from Last 3 " Encounters:   07/20/18 97 kg (213 lb 13.5 oz)   07/20/18 97 kg (213 lb 14.4 oz)   07/12/18 96.9 kg (213 lb 11.2 oz)              We Performed the Following     CBC with platelets differential     Comprehensive metabolic panel     Lactate Dehydrogenase     Uric acid        Primary Care Provider Office Phone # Fax #    R Nigel Salmon [phone] [address] [facility] [address]        Equal Access to Services     PERICO ESPINOZA : Hadii aad ku hadasho Soomaali, waaxda luqadaha, qaybta kaalmada adeegyada, waxay idiin hayaan adeeg kharash la'aan . So [facility] [phone].    ATENCIÓN: Si habla español, tiene a granados disposición servicios gratuitos de asistencia lingüística. [facility] [phone].    We comply with applicable federal civil rights laws and [place] laws. We do not discriminate on the basis of race, color, national origin, age, disability, sex, sexual orientation, or gender identity.            Thank you!     Thank you for choosing [facility]  for your care. Our goal is always to provide you with excellent care. Hearing back from our patients is one way we can continue to improve our services. Please take a few minutes to complete the written survey that you may receive in the mail after your visit with us. Thank you!             Your Updated Medication List - Protect others around you: Learn how to safely use, store and throw away your medicines at www.disposemymeds.org.          This list is accurate as of 7/20/18 11:59 PM.  Always use your most recent med list.                   Brand Name Dispense Instructions for use Diagnosis    dexamethasone 4 MG tablet    DECADRON    85 tablet    Take 1 tablet (4 mg) by mouth every 6 hours    GBM (glioblastoma multiforme) (H), LUE weakness       * GLEOSTINE 100 MG capsule CHEMO   Generic drug:  lomustine      Take 240 mg every 6 weeks.        * GLEOSTINE 40 MG capsule CHEMO   Generic drug:  lomustine      Take 240 mg  every 6 weeks        levETIRAcetam 1000 MG Tabs    KEPPRA    180 tablet    Take 1,000 mg by mouth every 12 hours    Brain mass       MULTIVITAMIN & MINERAL PO      Take 1 tablet by mouth daily        ondansetron 8 MG ODT tab    ZOFRAN-ODT    60 tablet    Take 1 tablet (8 mg) by mouth every 8 hours as needed    GBM (glioblastoma multiforme) (H)       oxyCODONE IR 5 MG tablet    ROXICODONE    45 tablet    Take 1-2 tablets (5-10 mg) by mouth every 3 hours as needed for other (pain control or improvement in physical function. Hold dose for analgesic side effects.)    S/P craniotomy       pantoprazole 40 MG EC tablet    PROTONIX    30 tablet    Take 1 tablet (40 mg) by mouth every morning (before breakfast)    GBM (glioblastoma multiforme) (H)       polyethylene glycol Packet    MIRALAX/GLYCOLAX    7 packet    Take 17 g by mouth daily    S/P craniotomy       tamsulosin 0.4 MG capsule    FLOMAX     Take 0.4 mg by mouth daily        * Notice:  This list has 2 medication(s) that are the same as other medications prescribed for you. Read the directions carefully, and ask your doctor or other care provider to review them with you.

## 2018-07-20 NOTE — PROGRESS NOTES
Neurosurgery Clinic Note    Evaluation for a right sided craniotomy for tumor resection    58 M s/p craniotomy for tumor debulking on 6/1/2018. The patient participated in the TocaV trial but was randomized to standard therapy. The patient underwent CCNU, with subsequent worsening of left sided function and lethargy. The patient re-presented to the [facility] on 7/10/2018 with an MRI that revealed progression of the right temporal lesion. The patient's clinical examination improved with steroid therapy and returns today for discussion for a repeat resection.    On review of systems, the patient states that his left sided strength has significantly improved since discharge.     BP (!) 130/93  Pulse 84  Temp 97.6  F (36.4  C) (Oral)  Resp 16  Wt 97 kg (213 lb 13.5 oz)  SpO2 96%  BMI 31.58 kg/m2    On examination, the patient exhibit a mild left facial drop. His LUE and LLE strength is 4+/5 and improved relative to his in-patient examination.    AP: Given the size of the CE+ lesion (comparing 7/10/2018 relative to 6/19/2018 study), I do think that the patient can benefit from surgical resection, with NGS of the resected tumor specimens. The patient is under consideration for Avastin and Pembro after the surgical resection. I have reviewed the risks and benefits of the resection with the patient. All questions answered.  Informed consent was obtained. I will proceed to schedule the surgery.    >50% of time was dedicated to counseling. The visit lasted approximately 45 minutes.

## 2018-07-20 NOTE — MR AVS SNAPSHOT
After Visit Summary   7/20/2018    Seth Iglesias    MRN: [id number]           Patient Information     Date Of Birth          1960        Visit Information        Provider Department      7/20/2018 1:00 PM Beto Gracia MD [facility]        Today's Diagnoses     Malignant neoplasm of frontal lobe of brain (H)    -  1       Follow-ups after your visit        Your next 10 appointments already scheduled     Jul 25, 2018  6:30 AM CDT   MR BRAIN W/O & W CONTRAST with UUMR2   [facility], [place], [facility] ([facility])    [address] [facility] [address] [phone]           Take your medicines as usual, unless your doctor tells you not to. Bring a list of your current medicines to your exam (including vitamins, minerals and over-the-counter drugs).  You may or may not receive intravenous (IV) contrast for this exam pending the discretion of the Radiologist.  You do not need to do anything special to prepare.  The MRI machine uses a strong magnet. Please wear clothes without metal (snaps, zippers). A sweatsuit works well, or we may give you a hospital gown.  Please remove any body piercings and hair extensions before you arrive. You will also remove watches, jewelry, hairpins, wallets, dentures, partial dental plates and hearing aids. You may wear contact lenses, and you may be able to wear your rings. We have a safe place to keep your personal items, but it is safer to leave them at home.  **IMPORTANT** THE INSTRUCTIONS BELOW ARE ONLY FOR THOSE PATIENTS WHO HAVE BEEN PRESCRIBED SEDATION OR GENERAL ANESTHESIA DURING THEIR MRI PROCEDURE:  IF YOUR DOCTOR PRESCRIBED ORAL SEDATION (take medicine to help you relax during your exam):   You must get the medicine from your doctor (oral medication) before you arrive. Bring the medicine to the exam. Do not take it at home. You ll be told when to take it upon arriving for  your exam.   Arrive one hour early. Bring someone who can take you home after the test. Your medicine will make you sleepy. After the exam, you may not drive, take a bus or take a taxi by yourself.  IF YOUR DOCTOR PRESCRIBED IV SEDATION:   Arrive one hour early. Bring someone who can take you home after the test. Your medicine will make you sleepy. After the exam, you may not drive, take a bus or take a taxi by yourself.   No eating 6 hours before your exam. You may have clear liquids up until 4 hours before your exam. (Clear liquids include water, clear tea, black coffee and fruit juice without pulp.)  IF YOUR DOCTOR PRESCRIBED ANESTHESIA (be asleep for your exam):   Arrive 1 1/2 hours early. Bring someone who can take you home after the test. You may not drive, take a bus or take a taxi by yourself.   No eating 8 hours before your exam. You may have clear liquids up until 4 hours before your exam. (Clear liquids include water, clear tea, black coffee and fruit juice without pulp.)   You will spend four to five hours in the recovery room.  Please call the Imaging Department at your exam site with any questions.            Aug 01, 2018   Procedure with Beto Gracia MD   [facility], [place], Same Day Surgery (--)    [address] [facility] [address]   [phone]            Aug 10, 2018  1:15 PM CDT   (Arrive by 1:00 PM)   Return Visit with Beto Gracia MD   [facility] ([facility] [place])    [address]  [address]  [facility] [id number] [phone]            Aug 13, 2018 10:00 AM CDT   (Arrive by 9:30 AM)   MR BRAIN W/O & W CONTRAST with [address] [facility] [place] [facility] ([facility] [place])    [address]  1st Floor  [facility] [id number] [phone]           Take your medicines as usual, unless your doctor tells you not to. Bring a list of your current medicines to your exam (including vitamins, minerals and  over-the-counter drugs).  You may or may not receive intravenous (IV) contrast for this exam pending the discretion of the Radiologist.  You do not need to do anything special to prepare.  The MRI machine uses a strong magnet. Please wear clothes without metal (snaps, zippers). A sweatsuit works well, or we may give you a hospital gown.  Please remove any body piercings and hair extensions before you arrive. You will also remove watches, jewelry, hairpins, wallets, dentures, partial dental plates and hearing aids. You may wear contact lenses, and you may be able to wear your rings. We have a safe place to keep your personal items, but it is safer to leave them at home.  **IMPORTANT** THE INSTRUCTIONS BELOW ARE ONLY FOR THOSE PATIENTS WHO HAVE BEEN PRESCRIBED SEDATION OR GENERAL ANESTHESIA DURING THEIR MRI PROCEDURE:  IF YOUR DOCTOR PRESCRIBED ORAL SEDATION (take medicine to help you relax during your exam):   You must get the medicine from your doctor (oral medication) before you arrive. Bring the medicine to the exam. Do not take it at home. You ll be told when to take it upon arriving for your exam.   Arrive one hour early. Bring someone who can take you home after the test. Your medicine will make you sleepy. After the exam, you may not drive, take a bus or take a taxi by yourself.  IF YOUR DOCTOR PRESCRIBED IV SEDATION:   Arrive one hour early. Bring someone who can take you home after the test. Your medicine will make you sleepy. After the exam, you may not drive, take a bus or take a taxi by yourself.   No eating 6 hours before your exam. You may have clear liquids up until 4 hours before your exam. (Clear liquids include water, clear tea, black coffee and fruit juice without pulp.)  IF YOUR DOCTOR PRESCRIBED ANESTHESIA (be asleep for your exam):   Arrive 1 1/2 hours early. Bring someone who can take you home after the test. You may not drive, take a bus or take a taxi by yourself.   No eating 8 hours before  your exam. You may have clear liquids up until 4 hours before your exam. (Clear liquids include water, clear tea, black coffee and fruit juice without pulp.)   You will spend four to five hours in the recovery room.  Please call the Imaging Department at your exam site with any questions.            Aug 13, 2018 10:45 AM CDT   LAB with  LAB   [facility]    [address]  1st Floor  [facility] [address] [phone]           Please do not eat 10-12 hours before your appointment if you are coming in fasting for labs on lipids, cholesterol, or glucose (sugar). This does not apply to pregnant women. Water, hot tea and black coffee (with nothing added) are okay. Do not drink other fluids, diet soda or chew gum.            Aug 17, 2018  7:45 AM CDT   (Arrive by 7:30 AM)   Return Visit with Augustus Armstrong MD   [facility] ([facility])    [address]  [address]  [facility] [address] [phone]            Sep 24, 2018  9:00 AM CDT   (Arrive by 8:30 AM)   MR BRAIN W/O & W CONTRAST with UUMR1   [facility], [place], [facility] ([facility] [place])    [address] [id number] [phone]           Take your medicines as usual, unless your doctor tells you not to. Bring a list of your current medicines to your exam (including vitamins, minerals and over-the-counter drugs).  You may or may not receive intravenous (IV) contrast for this exam pending the discretion of the Radiologist.  You do not need to do anything special to prepare.  The MRI machine uses a strong magnet. Please wear clothes without metal (snaps, zippers). A sweatsuit works well, or we may give you a hospital gown.  Please remove any body piercings and hair extensions before you arrive. You will also remove watches, jewelry, hairpins, wallets, dentures, partial dental plates and hearing aids.  You may wear contact lenses, and you may be able to wear your rings. We have a safe place to keep your personal items, but it is safer to leave them at home.  **IMPORTANT** THE INSTRUCTIONS BELOW ARE ONLY FOR THOSE PATIENTS WHO HAVE BEEN PRESCRIBED SEDATION OR GENERAL ANESTHESIA DURING THEIR MRI PROCEDURE:  IF YOUR DOCTOR PRESCRIBED ORAL SEDATION (take medicine to help you relax during your exam):   You must get the medicine from your doctor (oral medication) before you arrive. Bring the medicine to the exam. Do not take it at home. You ll be told when to take it upon arriving for your exam.   Arrive one hour early. Bring someone who can take you home after the test. Your medicine will make you sleepy. After the exam, you may not drive, take a bus or take a taxi by yourself.  IF YOUR DOCTOR PRESCRIBED IV SEDATION:   Arrive one hour early. Bring someone who can take you home after the test. Your medicine will make you sleepy. After the exam, you may not drive, take a bus or take a taxi by yourself.   No eating 6 hours before your exam. You may have clear liquids up until 4 hours before your exam. (Clear liquids include water, clear tea, black coffee and fruit juice without pulp.)  IF YOUR DOCTOR PRESCRIBED ANESTHESIA (be asleep for your exam):   Arrive 1 1/2 hours early. Bring someone who can take you home after the test. You may not drive, take a bus or take a taxi by yourself.   No eating 8 hours before your exam. You may have clear liquids up until 4 hours before your exam. (Clear liquids include water, clear tea, black coffee and fruit juice without pulp.)   You will spend four to five hours in the recovery room.  Please call the Imaging Department at your exam site with any questions.            Sep 28, 2018  9:30 AM Formerly named [facility]   [facility] Lab Draw with  MASONIC LAB DRAW   [facility] Lab Draw ([facility])    [address]  [address]  [facility] [address] [phone]             Sep 28, 2018 10:15 AM CDT   (Arrive by 10:00 AM)   Return Visit with Augustus Armstrong MD   [facility] ([facility])    [address]  [address]  [facility] [address] [phone]              Who to contact     If you have questions or need follow up information about today's clinic visit or your schedule please contact [facility] directly at [phone].  Normal or non-critical lab and imaging results will be communicated to you by [employer], letter or phone within 4 business days after the clinic has received the results. If you do not hear from us within 7 days, please contact the clinic through [employer] or phone. If you have a critical or abnormal lab result, we will notify you by phone as soon as possible.  Submit refill requests through [employer] or call your pharmacy and they will forward the refill request to us. Please allow 3 business days for your refill to be completed.          Additional Information About Your Visit        [employer] Information     [employer] gives you secure access to your electronic health record. If you see a primary care provider, you can also send messages to your care team and make appointments. If you have questions, please call your primary care clinic.  If you do not have a primary care provider, please call [phone] and they will assist you.        Care EveryWhere ID     This is your Care EveryWhere ID. This could be used by other organizations to access your [place] medical records  [id number]        Your Vitals Were     Pulse Temperature Respirations Pulse Oximetry BMI (Body Mass Index)       84 97.6  F (36.4  C) (Oral) 16 96% 31.58 kg/m2        Blood Pressure from Last 3 Encounters:   07/20/18 (!) 130/93   07/20/18 (!) 130/93   07/12/18 124/82    Weight from Last 3 Encounters:   07/20/18 97 kg (213 lb 13.5 oz)   07/20/18 97 kg (213 lb 14.4 oz)   07/12/18 96.9 kg (213 lb 11.2 oz)              Today, you  had the following     No orders found for display       Primary Care Provider Office Phone # Fax #    R Nigel Salmon [phone] [address] [address]        Equal Access to Services     PERICO ESPINOZA : Hadii vero ku kyle Soneha, waaxda luqadaha, qaybta kaalmada adeegyada, héctor dwyer laJessicaeagle kerns. So [facility] [phone].    ATENCIÓN: Si habla español, tiene a granados disposición servicios gratuitos de asistencia lingüística. Llame al [phone].    We comply with applicable federal civil rights laws and [place] laws. We do not discriminate on the basis of race, color, national origin, age, disability, sex, sexual orientation, or gender identity.            Thank you!     Thank you for choosing [facility]  for your care. Our goal is always to provide you with excellent care. Hearing back from our patients is one way we can continue to improve our services. Please take a few minutes to complete the written survey that you may receive in the mail after your visit with us. Thank you!             Your Updated Medication List - Protect others around you: Learn how to safely use, store and throw away your medicines at www.disposemymeds.org.          This list is accurate as of 7/20/18  1:26 PM.  Always use your most recent med list.                   Brand Name Dispense Instructions for use Diagnosis    dexamethasone 4 MG tablet    DECADRON    85 tablet    Take 1 tablet (4 mg) by mouth every 6 hours    GBM (glioblastoma multiforme) (H), LUE weakness       * GLEOSTINE 100 MG capsule CHEMO   Generic drug:  lomustine      Take 240 mg every 6 weeks.        * GLEOSTINE 40 MG capsule CHEMO   Generic drug:  lomustine      Take 240 mg every 6 weeks        levETIRAcetam 1000 MG Tabs    KEPPRA    180 tablet    Take 1,000 mg by mouth every 12 hours    Brain mass       MULTIVITAMIN & MINERAL PO      Take 1 tablet by mouth daily        ondansetron 8 MG ODT  tab    ZOFRAN-ODT    60 tablet    Take 1 tablet (8 mg) by mouth every 8 hours as needed    GBM (glioblastoma multiforme) (H)       oxyCODONE IR 5 MG tablet    ROXICODONE    45 tablet    Take 1-2 tablets (5-10 mg) by mouth every 3 hours as needed for other (pain control or improvement in physical function. Hold dose for analgesic side effects.)    S/P craniotomy       pantoprazole 40 MG EC tablet    PROTONIX    30 tablet    Take 1 tablet (40 mg) by mouth every morning (before breakfast)    GBM (glioblastoma multiforme) (H)       polyethylene glycol Packet    MIRALAX/GLYCOLAX    7 packet    Take 17 g by mouth daily    S/P craniotomy       tamsulosin 0.4 MG capsule    FLOMAX     Take 0.4 mg by mouth daily        * Notice:  This list has 2 medication(s) that are the same as other medications prescribed for you. Read the directions carefully, and ask your doctor or other care provider to review them with you.

## 2018-07-20 NOTE — NURSING NOTE
Chief Complaint   Patient presents with     Blood Draw     Labs drawn via  by RN. VS taken.     Mimi Chen RN

## 2018-07-20 NOTE — NURSING NOTE
"Oncology Rooming Note    July 20, 2018 8:41 AM   Seth Iglesias is a 58 year old male who presents for:    Chief Complaint   Patient presents with     Blood Draw     Labs drawn via  by RN. VS taken.     Oncology Clinic Visit     Return: Glioblastoma     Initial Vitals: BP (!) 130/93 (BP Location: Right arm, Patient Position: Sitting, Cuff Size: Adult Regular)  Pulse 84  Temp 97.6  F (36.4  C) (Oral)  Resp 18  Ht 1.753 m (5' 9\")  Wt 97 kg (213 lb 14.4 oz)  SpO2 96%  BMI 31.59 kg/m2 Estimated body mass index is 31.59 kg/(m^2) as calculated from the following:    Height as of this encounter: 1.753 m (5' 9\").    Weight as of this encounter: 97 kg (213 lb 14.4 oz). Body surface area is 2.17 meters squared.  No Pain (0) Comment: Data Unavailable   No LMP for male patient.  Allergies reviewed: Yes  Medications reviewed: Yes    Medications: Medication refills not needed today.  Pharmacy name entered into [facility]:    [id number] - [place], [place] - [id number] - [place], [place] - [address]    Clinical concerns: new concerns they will discuss with  Dr. Armstrong was notified.    10 minutes for nursing intake (face to face time)     Corinne Michael CMA              "

## 2018-08-01 NOTE — TELEPHONE ENCOUNTER
RESEARCH:    [facility] has requested we include collection of tumor specimen in tomorrow's recurrence resection.  Instructions included.    Family updated on the request, which was included in the initial study consent discussion.  They are agreeable.    Dr. Gracia also updated that the request was make and will provide the sample.    [facility] updated and will be on-hand to accept the tissue.    Manisha Castellanos RN  Clinical Research Coordinator-RN

## 2018-08-01 NOTE — OR NURSING
Dr Little called regarding pt's high diastolic blood pressure readings preop, no new orders at this time. Pt currently at [facility].

## 2018-08-01 NOTE — OR NURSING
Nursing staff spent 60 minutes face-to-face with patient in pre op area. Case cancelled by surgeon for patient improvement.

## 2018-08-04 NOTE — NURSING NOTE
Chief Complaint   Patient presents with     Blood Draw     pt here for venipuncture, urine collected, vitals completed, pt checked in for appt.      Caity Liu, CMA     Your PCP in [place],   Phone: (   )    -  Fax: (   )    -

## 2018-08-06 NOTE — PROGRESS NOTES
RN Care Coordination Note  Reason for Outgoing Call:   Pt's wife calls today to request refill on dex again, spoke with another RN on Friday 8/3  Nursing Action/Patient Instruction:  - Ana Weaver, RN has staff message from Dr. Gracia re: ok for refill dex at current dose until seen by Dr. Armstrong. Refill sent to pt's preferred pharmacy.  - notified pt's wife of above, dex refilled. She would like to try to decrease the dex to tid if OK. Dr. Armstrong and SCARLETT Monroy not in clinic today. Pt without new neuro sx and and is tired a lot, but starting to have facial swelling and is not sleeping well at night, getting up to urinate a lot. - I discussed that this will be OK to try to decrease dex to tid due to SE and I will confirm with RACHEL on 8/8. Discussed sx to monitor for and report.  Patient Response/Evaluation:   Pt's wife voiced understanding of above instructions and information and denied further questions    Chastity Kaplan RN, BSN, OCN  Care Coordinator  [facility]

## 2018-08-08 NOTE — PROGRESS NOTES
Addendum:  Call to pt's wife re: SCARLETT Hillman's recommendations: okay to go down to 4 mg TID dosing  for a week and if tolerating okay then decrease to 4 mg BID and to keep monitoring for symptoms

## 2018-08-14 NOTE — DISCHARGE INSTRUCTIONS
MRI Contrast Discharge Instructions    The IV contrast you received today will pass out of your body in your  urine. This will happen in the next 24 hours. You will not feel this process.  Your urine will not change color.    Drink at least 4 extra glasses of water or juice today (unless your doctor  has restricted your fluids). This reduces the stress on your kidneys.  You may take your regular medicines.    If you are on dialysis: It is best to have dialysis today.    If you have a reaction: Most reactions happen right away. If you have  any new symptoms after leaving the hospital (such as hives or swelling),  call your hospital at the correct number below. Or call your family doctor.  If you have breathing distress or wheezing, call 911.    Special instructions: ***    I have read and understand the above information.    Signature:______________________________________ Date:___________    Staff:__________________________________________ Date:___________     Time:__________    [place] Rad[facility] Departments:    ___[place]: [phone]  ___[facility]: [phone]  ___[place]: [phone] ___[facility]: [phone]  ___[facility]: [phone]  ___[facility]: [phone]  ___[phone]  ___[facility]: [phone]  ___[phone]

## 2018-08-14 NOTE — TELEPHONE ENCOUNTER
Spoke with patient's wife. Informed of low platelets. Maryanne states Candelario has been very tired and fatigued but has no complaints of headache or pain. Denies bleeding of any kind. She does question of dexamethasone dosing needs to be adjusted to help with patient's fatigue. She will plan to follow up with Dr. Armstrong as scheduled and further discuss dex at that time. She had no further questions or concerns at this time and knows to call clinic should anything arise.

## 2018-08-14 NOTE — TELEPHONE ENCOUNTER
----- Message from Chastity Kaplan RN sent at 8/14/2018  2:01 PM CDT -----      ----- Message -----     From: Augustus Armstrong MD     Sent: 8/14/2018  11:38 AM       To: Manisha Castellanos RN, Chastity Kaplan RN    Hi - I just got called from lab that his platelets are at 36 K. Can one of you call him to make sure he is not having worsening headaches (worry for intracranial bleed), or other heme sequelae or issues urgently? If not, I'll see him in clinic Friday as previously scheduled.    Thanks,  Augustus

## 2018-08-16 NOTE — PROGRESS NOTES
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 MGMT promoter methylation was absent.     Treatment to date: status post gross total resection 10/3/17; initiated concurrent chemoradiation with temozolomide on 11/13/17 and completed 12/22/17. Adjuvant temozolomide and Optune device started on 1/22/18.  Following progression of disease documented spring 2018, patient enrolled in the Toca trial underwent surgical debulking May 17, 2018; he was randomized to the standard of care arm (ie he did not receive the therapeutic virus intraoperatively). He developed left sided weakness in early July, within days of initiating lomustine therapy 7/4/18 (patient opted to delay starting this medication treatment by 1-2 weeks), MRI indicated acute increase in intracranial edema that improved significantly with high-dose dexamethasone in July/August.     Referring physicians:  Dr. Harry Wiggins and Dr. Beto Gracia, Neurosurgical Service, [facility]  Dr. Dominic Louis, radiation oncology, [facility]  Oncology History:   Seth presented in September 2017 with dizzy spells, headache, and increasing somnolence. He was admitted on 9/28/17 after imaging in ED found a right temporal lobe mass. He went on to have gross total resection on 10/3/17.   10/27/17 - - neuro-oncology/medical oncology consultation, Dr. Armstrong.  11/12/17 through December 22, 2017 adjuvant treatment with concurrent chemoradiation with temozolomide. 6000 cGy, and 30 fractions, standard 200 cGy per fraction  1/19/18 - - brain MRI: Impression: 1. 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 Recommend attention on follow-up. Postsurgical changes of prior  resection of right temporal glioblastoma. Resolution of right frontotemporal subdural hematoma and T2 hyperintensity along the right internal capsule and commisural white matter.    1/22/18 - - oncology/medical oncology follow-up, Dr. Armstrong. Plan: initiate adjuvant five day temozolomide and OPTune device. Cycle 1/day one of temozolomide, 150 mg/m  on days one through five of each 28 day cycle.    2/20/18 - - oncology follow-up, SCARLETT Hillman. Cycle 2/day one of five day temozolomide. Dose increased to 200 mg/m .    3/15/18 - - brain MRI: Impression: In this patient with glioblastoma multiforme status post gross total resection, chemoradiation and adjuvant Temodar and Optune, there is increased nodular contrast enhancement and T2 hyperintensity at the margins of the right temporal resection cavity. Perfusion characteristics suggestive of tumor recurrence, particularly medially.    3/19/18 - - neuro-oncology/medical oncology follow-up, Dr. Armstrong.    4/6/18 - - neurology follow-up, Dr. Boyer. [facility]     4/19/18 MRI brain   1. Increased size of enhancing mass and surrounding vasogenic edema in  the right temporal lobe, which is suggestive of recurrence of  high-grade tumor, while the MR perfusion and diffusion imaging  characteristics are indeterminate for recurrent tumor (described in  detail above). Therefore, recommend shorter term follow-up study such  as one month.  2. Increasing mass effect has caused further compression of the right  lateral ventricle. No hydrocephalus at this time.    4/23/18 -- neuro-oncology follow-up, Dr. Armstrong  Consensus of multidisciplinary neuro-oncology tumor board: MRI consistent with progression of disease while on Optune and Temozolomide.  5/4/18-neurosurgical consultation with Dr. Beto Gracia re: TOCA trial  5/11/18--neuro-oncology follow-up, Dr. Armstrong. Plan: proceed with surgical debulking/enrollment and randomization on the Toca trial.  5/17/18 - -Procedure:  tumor  debulking (a portion of the tumor adherent vessels could not be resected). Patient was randomized to trial arm that did not include the Toca therapeutic virus.  1) Right craniotomy for tumor resection  2) Neuronavigation  3) Microdissection      Surgeon: Beto Ricks MD, PhD  6/1/18 - - neurosurgery follow-up, Dr. Gracia. Again noted. The patient was randomized to standard therapy.  6/19/18 - - brain MRI - stable since 5/18 post-op scan.  6/22/18--neuro-oncology follow-up, Dr. Armstrong. Plan: initiate 2nd-line treatment with lomustine (CCNU) once cleared by insurance.  NOTE: patient delayed initiation of chemotherapy with lomustine until on or around July 4, but did not notify the care team of this fact until one week later.  7/9/18: patient s wife called in due to patient being last arousable, severely worsening fatigue, slouching the left side with left-sided facial weakness, and worsening gait.  7/10/18 - - oncology follow-up, SCARLETT Hillman.  Plan: direct admission to the ER for concern for acute stroke versus progression of disease.  7/10/18 - - brain MRI, compared to June 19 MRI: Impression:  1. Right anterior temporal lobe tumor progression. Progression of  tumor into the right corpus striatum, which may explain the patient's  left upper extremity motor weakness. Extensive associated edema with 5  mm of leftward midline shift and borderline right uncal herniation.  2. No ischemic infarct.     [Result: Enlarging right temporal lobe mass]  7/10 through 7/12/18 - - hospitalization at the [facility] for severely worsening left-sided weakness and cerebral edema.  MRI was done which showed increased contrast-enhancing region and edema surrounding the resection cavity concerning for tumor progression.  Patient was administered course of dexamethasone and prolonged course of steroids, with plans for potential repeat resection by Dr. Gracia in end of July.  7/20/18 - - neuro-oncology follow-up,   Patti.  7/20/18 - - neurosurgical follow-up, Dr. Gracia. Impression/plan: given the size of the lesion, increased between June 19 and July 10, initial consideration was given for repeat surgical resection; however, after trial of dexamethasone steroids, with improvement in symptoms and FLAIR signal on MRI performed August 1, was recommended to resume systemic therapy rather than undergo repeat resection at this time.  8/6/18 - - recommendation was given for dexamethasone taper down to 4 mg three times a day for the next week, then 4 mg BID. However, confirmed during 8/16 visit that patient did not follow this recommendation.  8/14/18 - - brain MRI compared to July 10: Impression:.      1. Postsurgical changes from previous anterior right temporal tumor  resection with stable appearing resection cavity  2. Reduction in size and intensity of FLAIR hyperintensities in the  right frontal, parietal, and temporal lobes.  3. Stable nodular enhancement of the inferior medial portion of the  lesion, concerning for tumor recurrence.  4. Compared to the MRI dated 7/10/2018, the biopsy-proven GBM appears  to have reduced in size as described above..  8/16/18 - - neuro-oncology follow-up, Dr. Armstrong.        Interim History:  Mr. Iglesisa returns today with his wife.  I last evaluated him on 07/20 at that time in the context of significantly increased edema that was causing neurologic compromise earlier in July within days of him taking first treatment of lomustine.  There were plans to take him to the OR with Dr. Gracia for consideration of repeat resection.  Dr. Gracia met with the patient the same day.  He noted there was significant improvement in the patient's symptoms with dexamethasone and steroids.  Another MRI was requested, done 08/01, and showed significant retraction further of the FLAIR in the cherelle-resection cavity.  For that reason, Dr. Gracia communicated with me that with significant improvement already seen on the steroids  that he wished to defer surgical resection at that time and I completely agreed.      A subsequent MRI was done last week and showed a further retraction of the edema.  On 08/06, the patient's wife called communicated via [employer] requesting information on a dexamethasone taper.      It was recommended the dexamethasone be tapered down from 4 mg 3 times a day, instead doing 4 mg twice a day.  However, the wife today confirms for me that she did not understand those instructions and proceeded to continue with every-8-hour administration of 4 mg capsules of dexamethasone.  He was taking it late at night and, thus, was having consequent insomnia.  While he is not having dexamethasone-related myositis or proximal muscular weakness, she describes that he is having significant fatigue.  They believe it is secondary to the insomnia from the high dose of dexamethasone over time.      The recent MRI on 08/14 specifically, which I reviewed myself, showed a reduction in size of the FLAIR in the right frontal, parietal and temporal lobes.  It is difficult to discern the exact size of tumor.  Thus, the patient is back here today.  Today would thai the 6-week time in which he would be able to resume cycle 2, day 1 of lomustine in the adjuvant setting.  However, his platelets were checked 2 days ago and were only 38,000.  We checked again this morning, and they were 36,000 so we will have to continue to defer chemotherapy.  Of note, I discussed with them last time the [facility] genomic profiling.  I proceeded to place the order for the 05/17/2018 surgical resection specimen to be sent as of today, including PD-L1 testing.             Review Of Systems:  Complete 12-point ROS reviewed. Pertinent symptoms reviewed above per HPI.    PAST MEDICAL HISTORY:   1.  Glioblastoma, status post gross total resection on 10/03/2017 of the right temporal lobe preceded and indicated by some seizure-like activity without loss of  "consciousness.   2.  Chronic back pain.   3.  Ganglionic cysts.   4.  Hyperlipidemia.   5.  He has psoriasis and was taking what sounds like a TNF and an alpha inhibitor up until the summer of .       PAST SURGICAL HISTORY:     1.  The aforementioned right temporal craniotomy for gross total resection of right temporal glioblastoma done 10/03/2017 with Dr. Harry Wiggins at the [facility].   2.  Colonoscopy performed 2011, unremarkable.   3. Surgical debulking of recurrent GB tumor, May 17 2018.      FAMILY HISTORY:  He had a Father and 2 paternal uncles who had prostate cancer.  His mother had some sort of \"tumor of the rib,\" and  at age 54 of this unknown cancer.       SOCIAL HISTORY:  The patient lives in [place], [place].  He is accompanied by his wife, who is extremely supportive.  They have 3 sons ages 18, 23 and 25.  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  He has since then been working for [employer], about 27 miles from his home.    Tobacco:  Never smoker.    Allergies:none    Current Medications:   Current Outpatient Prescriptions   Medication     dexamethasone (DECADRON) 4 MG tablet     GLEOSTINE 100 MG capsule CHEMO     GLEOSTINE 40 MG capsule CHEMO     levETIRAcetam (KEPPRA) 1000 MG TABS     Multiple Vitamins-Minerals (MULTIVITAMIN & MINERAL PO)     ondansetron (ZOFRAN-ODT) 8 MG ODT tab     oxyCODONE IR (ROXICODONE) 5 MG tablet     pantoprazole (PROTONIX) 40 MG EC tablet     polyethylene glycol (MIRALAX/GLYCOLAX) Packet     tamsulosin (FLOMAX) 0.4 MG capsule     [DISCONTINUED] Temozolomide (TEMODAR) 180 MG capsule     No current facility-administered medications for this visit.          Physical Exam:  /76 (BP Location: Right arm, Patient Position: Chair, Cuff Size: Adult Large)  Pulse 106  Temp 98  F (36.7  C) (Oral)  Wt 97.9 kg (215 lb 14.4 oz)  SpO2 97%  BMI 31.88 " kg/m2  KPS 70  GENERAL:  Very pleasant middle-aged  gentleman who appears in no acute distress, alert and oriented x3.  Speech is fluent.  He does not have any evidence of receptive or expressive aphasia.   HEAD: right frontal region of craniotomy is fully healed, indented, no overlying fluctuance, erythema or tenderness.  HEENT:  Anicteric sclerae.  Oropharynx is without evidence of mucositis or thrush. PERRL; no vertical nor horizontal nystagmus.  NEUROLOGIC:  Cranial nerves II-XII grossly intact.  Motor strength was 5/5 on the right, 4+/5 on the left upper and lower extremities - stable since 7/20/18.    Sensation is grossly intact throughout.  No dysdiadochokinesia.   His gait is wide-based and continues to favor the left side.  Negative Romberg sign. + left-sided dysmetria.          Laboratory/Imaging Studies  Lab Results   Component Value Date    WBC 2.0 08/17/2018     Lab Results   Component Value Date    RBC 3.91 08/17/2018     Lab Results   Component Value Date    HGB 13.3 08/17/2018     Lab Results   Component Value Date    HCT 39.3 08/17/2018     No components found for: MCT  Lab Results   Component Value Date     08/17/2018     Lab Results   Component Value Date    MCH 34.0 08/17/2018     Lab Results   Component Value Date    MCHC 33.8 08/17/2018     Lab Results   Component Value Date    RDW 15.0 08/17/2018     Lab Results   Component Value Date    PLT 38 08/17/2018       Results for orders placed or performed in visit on 08/14/18   MRI Brain w & w/o contrast    Narrative     MR BRAIN W/O & W CONTRAST 8/14/2018 11:01 AM    Provided History: TOCA research trial; Glioblastoma multiforme of  temporal lobe (H); Examination of participant in clinical trial.  Additional chart review, patient is s/p gross total resection  10/3/2017, initiated chemoradiation with temozolomide 11/13/2017, and  complete 12/22/2017. Patient enrolled in TOCA trial, status post  repeat surgical the bulking  5/17/2018.    ICD-10: Glioblastoma multiforme of temporal lobe (H); Examination of  participant in clinical trial    Comparison:  MR brain 1/20/2018, MR brain 7/10/2018, MR brain  6/19/2018, MR brain 5/17/2018, MR brain 10/4/2017.    Technique: Multiplanar T1-weighted, axial FLAIR, and susceptibility  images were obtained without intravenous contrast. Following  intravenous gadolinium-based contrast administration, axial  T2-weighted, diffusion, and T1-weighted images (in multiple planes)  were obtained.    Contrast: 10 cc Gadavist    Findings:  Compared to the MR brains dated 7/10/2018 and 8/1/2020, there is  significant reduction in size and surrounding tumoral edema of the  right anterior temporal lobe GBM. There are postsurgical changes in  the right temporal lobe with a resection cavity in the anteriormost  portion of the right temporal fossa. On FLAIR sequences, there is  significant reduction of edema in the  white matter of the right  frontal, parietal, and temporal lobes. Postcontrast images show a  peripherally enhancing, necrotic region in the right anterior temporal  lobe that measures 2.2 x 2.6 x 1.5 cm (previously 2.5 x 2.7 cm).   There is some nodular aspect of the medial inferior portion the  lesion, measuring 0.6 x 0.7 cm which is stable. There is a chronic  appearing punctate bleed in the right temporal lobe posterior to the  tumor.    There is mild leftward midline shift, however this has improved  compared to the aforementioned studies. There is mild ex vacuo  dilatation of the right lateral ventricle.    No definite abnormality of the skull marrow signal is noted. The  visualized portions of paranasal sinuses, and mastoid air cells are  relatively clear. The orbits are grossly unremarkable.      Impression    Impression:.     1. Postsurgical changes from previous anterior right temporal tumor  resection with stable appearing resection cavity  2. Reduction in size and intensity of FLAIR  hyperintensities in the  right frontal, parietal, and temporal lobes.  3. Stable nodular enhancement of the inferior medial portion of the  lesion, concerning for tumor recurrence.  4. Compared to the MRI dated 7/10/2018, the biopsy-proven GBM appears  to have reduced in size as described above..    I have personally reviewed the examination and initial interpretation  and I agree with the findings.    KIKI ANGEL MD       ASSESSMENT/PLAN:  Seth Iglesias is a 59 y/o man with right temporal glioblastoma (WHO grade IV). He had gross total resection on 10/3/17 and completed adjuvant chemorads on 12/22/17. His 4 week post-treatment brain MRI showed areas of enhancement around the resection cavity thought to represent radiation necrosis rather than recurrence. He then started temozolomide maintenance in January 2018, at a dose of 150 mg/m2 for cycle 1. This was increased to 200 mg/m2 for cycle 2. He also started the Optune device in January 2018.     Upon progression of disease after about less than 6 months of temozolomide in the adjuvant setting, he underwent surgical debulking following randomization on the Tocagen trial to the non-viral therapeutic arm.  He underwent surgical debulking.  Dr. Gracia was not able to obtain a full total gross resection for this secondary surgery due to the involvement of some of the vessels.  He sustained clinical decline and worsening neurologic compromise in late June/early July after randomizing to the non-viral arm on the Tocagen trial.  We had recommended and attempted to initiate standard-of-care lomustine in late June.  It was prescribed to him on 06/30. However, the patient did not begin taking this medication until 07/04/18.  Within a few days of that, he developed left-sided weakness.      My overall impression of his clinical profile over the last 6 weeks is that he initiated lomustine on 07/04.  On or around leading up to that day, he was having some left-sided weakness  that has improved significantly with high-dose dexamethasone.  He has continued on 4 mg every 8 hours.  A taper was recommended on 08/06, but he did not proceed to do the taper and would like to forward with that taper today.  Today I recommended that he take 4 mg capsules at 8 a.m. and 4 p.m.  This will help avoid the insomnia and other issues he was having leading to fatigue by taking it late at night.  We will do that for the next 7 days.      He will return to clinic in 7 days from now with either myself or Helena Landin from our physician assistant team for reassessment.  We will not move forward with lomustine due to significant thrombocytopenia.  We had checked 2 days ago and checked again this morning and it is virtually the same level, 36,000 as of this morning. I will request a peripheral blood smear and also reticulocyte count to more effectively rule out TTP or other similar process inducing thrombocytopenia.    Thus, we will have another CBC checked 5 days from now on Tuesday, 08/21 at [facility] out of convenience for them as they are closer to that center.  They will be out of town the remainder of Tuesday, Wednesday and Thursday.  Thus, we will check again next Friday, 7 days from now.      If for any reason we cannot proceed safely with lomustine, then another alternative is a clinical trial led by my colleague, Dr. Kylee Noble encompassing a BBI small molecule inhibitor, [id number], with bevacizumab.  We discussed briefly, as Dr. Gracia had as well, the use of bevacizumab for routine clinical care is FDA approved for recurrent glioblastoma.  Thus, the patient will be eligible.  However, I described to him in depth that if we were to proceed with Avastin off trial it would immediately make him ineligible for pretty much every clinical trial that is available at this point.  I would like to preserve his eligibility at the current time, but we will wait until next week to reassess.  Other  options include genomic profiling and for that reason we will send off to [facility] for genomic profiling that includes MSI testing and also includes PD-L1 testing for consideration of checkpoint inhibition and determining whether or not he has viable targets for that approach.  I reviewed all the above in great detail with the patient and his wife.  They stated understanding by the end of the visit.  Manisha Castellanos from our clinical trial nurse team accompanied me throughout the entirety of today's visit with the patient.           I spent 45 minutes in consultation, including H&P and Discussion. >50% of time was spent in counseling and in coordination of care.    Augustus Armstrong MD, PhD

## 2018-08-17 NOTE — MR AVS SNAPSHOT
After Visit Summary   8/17/2018    Seth Iglesias    MRN: [id number]           Patient Information     Date Of Birth          1960        Visit Information        Provider Department      8/17/2018 7:45 AM Augustus Armstrong MD [facility]        Today's Diagnoses     Glioblastoma multiforme of temporal lobe (H)    -  1    Examination of participant in clinical trial           Follow-ups after your visit        Follow-up notes from your care team     Return in about 1 week (around 8/24/2018).      Your next 10 appointments already scheduled     Aug 24, 2018 10:30 AM CDT   Mason[facility] Lab Draw with  MASONIC LAB DRAW   [facility] ([facility])    [address] [place] [address]  [address]  [facility] [address]   [phone]            Aug 24, 2018 11:15 AM CDT   (Arrive by 11:00 AM)   Return Visit with Augustus Armstrong MD   [facility] ([facility])    [address] [place] [address]  [address]  [facility] [address]   [phone]            Sep 24, 2018  9:00 AM CDT   (Arrive by 8:30 AM)   MR BRAIN W/O & W CONTRAST with UUMR1   [facility] [place] ([facility])    [address] [facility] [address]   [phone]           Take your medicines as usual, unless your doctor tells you not to. Bring a list of your current medicines to your exam (including vitamins, minerals and over-the-counter drugs).  You may or may not receive intravenous (IV) contrast for this exam pending the discretion of the Radiologist.  You do not need to do anything special to prepare.  The MRI machine uses a strong magnet. Please wear clothes without metal (snaps, zippers). A sweatsuit works well, or we may give you a hospital gown.  Please remove any body piercings and hair extensions before you arrive. You will also remove watches, jewelry, hairpins, wallets, dentures, partial dental  plates and hearing aids. You may wear contact lenses, and you may be able to wear your rings. We have a safe place to keep your personal items, but it is safer to leave them at home.  **IMPORTANT** THE INSTRUCTIONS BELOW ARE ONLY FOR THOSE PATIENTS WHO HAVE BEEN PRESCRIBED SEDATION OR GENERAL ANESTHESIA DURING THEIR MRI PROCEDURE:  IF YOUR DOCTOR PRESCRIBED ORAL SEDATION (take medicine to help you relax during your exam):   You must get the medicine from your doctor (oral medication) before you arrive. Bring the medicine to the exam. Do not take it at home. You ll be told when to take it upon arriving for your exam.   Arrive one hour early. Bring someone who can take you home after the test. Your medicine will make you sleepy. After the exam, you may not drive, take a bus or take a taxi by yourself.  IF YOUR DOCTOR PRESCRIBED IV SEDATION:   Arrive one hour early. Bring someone who can take you home after the test. Your medicine will make you sleepy. After the exam, you may not drive, take a bus or take a taxi by yourself.   No eating 6 hours before your exam. You may have clear liquids up until 4 hours before your exam. (Clear liquids include water, clear tea, black coffee and fruit juice without pulp.)  IF YOUR DOCTOR PRESCRIBED ANESTHESIA (be asleep for your exam):   Arrive 1 1/2 hours early. Bring someone who can take you home after the test. You may not drive, take a bus or take a taxi by yourself.   No eating 8 hours before your exam. You may have clear liquids up until 4 hours before your exam. (Clear liquids include water, clear tea, black coffee and fruit juice without pulp.)   You will spend four to five hours in the recovery room.  Please call the Imaging Department at your exam site with any questions.            Oct 01, 2018  8:30 AM [facility]   Masonic Lab Draw with  MASONIC LAB DRAW   [facility] Masonic Lab Draw ([facility])    [address] [facility]  [address] [place] [address]  [id number] [phone]            Oct 01, 2018  9:15 AM CDT   (Arrive by 9:00 AM)   Return Visit with Augustus Armstrong MD   [facility] ([facility])    [address]  [address]  [facility] [id number] [phone]              Future tests that were ordered for you today     Open Future Orders        Priority Expected Expires Ordered    CBC with platelets differential Routine 8/24/2018 8/17/2019 8/17/2018    Blood Morphology Pathologist Review Routine  8/17/2019 8/17/2018    CBC with platelets differential Routine  8/17/2019 8/17/2018    Reticulocyte Count Routine  8/17/2019 8/17/2018    CBC with platelets differential Routine 8/21/2018 8/17/2019 8/17/2018            Who to contact     If you have questions or need follow up information about today's clinic visit or your schedule please contact [facility] directly at [phone].  Normal or non-critical lab and imaging results will be communicated to you by Mobivityhart, letter or phone within 4 business days after the clinic has received the results. If you do not hear from us within 7 days, please contact the clinic through kaufDAt or phone. If you have a critical or abnormal lab result, we will notify you by phone as soon as possible.  Submit refill requests through [employer] or call your pharmacy and they will forward the refill request to us. Please allow 3 business days for your refill to be completed.          Additional Information About Your Visit        MobivityharHOSTING Information     [employer] gives you secure access to your electronic health record. If you see a primary care provider, you can also send messages to your care team and make appointments. If you have questions, please call your primary care clinic.  If you do not have a primary care provider, please call [phone] and they will assist you.        Care EveryWhere ID     This is your Care EveryWhere ID. This could be used by other  organizations to access your [facility] medical records  [id number]        Your Vitals Were     Pulse Temperature Pulse Oximetry BMI (Body Mass Index)          106 98  F (36.7  C) (Oral) 97% 31.88 kg/m2         Blood Pressure from Last 3 Encounters:   08/17/18 117/76   08/01/18 (!) 140/103   07/20/18 (!) 130/93    Weight from Last 3 Encounters:   08/17/18 97.9 kg (215 lb 14.4 oz)   08/01/18 97.2 kg (214 lb 4.6 oz)   07/20/18 97 kg (213 lb 13.5 oz)              We Performed the Following     CBC with platelets differential     Lactate Dehydrogenase          Today's Medication Changes          These changes are accurate as of 8/17/18  9:04 AM.  If you have any questions, ask your nurse or doctor.               These medicines have changed or have updated prescriptions.        Dose/Directions    * GLEOSTINE 100 MG capsule CHEMO   This may have changed:  Another medication with the same name was added. Make sure you understand how and when to take each.   Generic drug:  lomustine   Changed by:  Augustus Armstrong MD        Take 240 mg every 6 weeks.   Refills:  0       * GLEOSTINE 40 MG capsule CHEMO   This may have changed:  Another medication with the same name was added. Make sure you understand how and when to take each.   Generic drug:  lomustine   Changed by:  Augustus Armstrong MD        Take 240 mg every 6 weeks   Refills:  0       * lomustine 40 MG capsule CHEMO   Commonly known as:  CEENU   This may have changed:  You were already taking a medication with the same name, and this prescription was added. Make sure you understand how and when to take each.   Used for:  Examination of participant in clinical trial, Glioblastoma multiforme of temporal lobe (H)   Changed by:  Augustus Armstrong MD        Dose:  110 mg/m2   Take 6 capsules (240 mg) by mouth once for 1 dose Take on an empty stomach.   Quantity:  6 capsule   Refills:  0       * Notice:  This list has 3 medication(s) that are the same as other medications prescribed for you.  Read the directions carefully, and ask your doctor or other care provider to review them with you.         Where to get your medicines      Call your pharmacy to confirm that your medication is ready for pickup. It may take up to 24 hours for them to receive the prescription. If the prescription is not ready within 3 business days, please contact your clinic or your provider.     We will let you know when these medications are ready. If you don't hear back within 3 business days, please contact us.     lomustine 40 MG capsule CHEMO                Primary Care Provider Office Phone # Fax #    R Nigel Salmon [phone] [address] [facility] [address]        Equal Access to Services     [facility] : Hadii vero Carrasco, waalex gautam, qaybta kaalmada singh, héctor kerns. So [facility] [phone].    ATENCIÓN: Si habla español, tiene a granados disposición servicios gratuitos de asistencia lingüística. [facility] [phone].    We comply with applicable federal civil rights laws and [place] laws. We do not discriminate on the basis of race, color, national origin, age, disability, sex, sexual orientation, or gender identity.            Thank you!     Thank you for choosing [facility]  for your care. Our goal is always to provide you with excellent care. Hearing back from our patients is one way we can continue to improve our services. Please take a few minutes to complete the written survey that you may receive in the mail after your visit with us. Thank you!             Your Updated Medication List - Protect others around you: Learn how to safely use, store and throw away your medicines at www.disposemymeds.org.          This list is accurate as of 8/17/18  9:04 AM.  Always use your most recent med list.                   Brand Name Dispense Instructions for use Diagnosis    dexamethasone 4 MG tablet    DECADRON    85 tablet     Take 1 tablet (4 mg) by mouth every 6 hours Or as directed by provider    GBM (glioblastoma multiforme) (H), LUE weakness       * GLEOSTINE 100 MG capsule CHEMO   Generic drug:  lomustine      Take 240 mg every 6 weeks.        * GLEOSTINE 40 MG capsule CHEMO   Generic drug:  lomustine      Take 240 mg every 6 weeks        * lomustine 40 MG capsule CHEMO    CEENU    6 capsule    Take 6 capsules (240 mg) by mouth once for 1 dose Take on an empty stomach.    Examination of participant in clinical trial, Glioblastoma multiforme of temporal lobe (H)       levETIRAcetam 1000 MG Tabs    KEPPRA    180 tablet    Take 1,000 mg by mouth every 12 hours    Brain mass       MULTIVITAMIN & MINERAL PO      Take 1 tablet by mouth daily        ondansetron 8 MG ODT tab    ZOFRAN-ODT    60 tablet    Take 1 tablet (8 mg) by mouth every 8 hours as needed    GBM (glioblastoma multiforme) (H)       oxyCODONE IR 5 MG tablet    ROXICODONE    45 tablet    Take 1-2 tablets (5-10 mg) by mouth every 3 hours as needed for other (pain control or improvement in physical function. Hold dose for analgesic side effects.)    S/P craniotomy       pantoprazole 40 MG EC tablet    PROTONIX    90 tablet    Take 1 tablet (40 mg) by mouth every morning (before breakfast)    GBM (glioblastoma multiforme) (H)       polyethylene glycol Packet    MIRALAX/GLYCOLAX    7 packet    Take 17 g by mouth daily    S/P craniotomy       tamsulosin 0.4 MG capsule    FLOMAX     Take 0.4 mg by mouth daily        * Notice:  This list has 3 medication(s) that are the same as other medications prescribed for you. Read the directions carefully, and ask your doctor or other care provider to review them with you.

## 2018-08-17 NOTE — NURSING NOTE
RESEARCH:    Pt seen for C2D1 TOCA trial.  Some generalized muscle weakness observed; using assistance to get around.  Facial, trunk edema.     Pt was to decrease dex in early August; which did not   happen. Was taking it TID including at hs.  Reported insomnia and day-time fatigue.  Platelets low at 38, possibly due to dex.     Taper ordered to begin today to 4 mg BID at 0800 and 1200.  Will redraw [facility]P at [facility] on Tuesday and RTC on Friday for provider visit and labs.  Hold off on restarting C2 lomustine due to platelets.  Evaluate at that time.    Survey completed. Reviewed plan.  Info given on platelets from [employer].    Manisha Castellanos RN  Clinical Research Coordinator-RN

## 2018-08-17 NOTE — LETTER
8/17/2018       RE: Seth Iglesias  [address]  [facility] [address]     Dear Colleague,    Thank you for referring your patient, Seth Iglesias, to the [facility]. Please see a copy of my visit note below.    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 MGMT promoter methylation was absent.     Treatment to date: status post gross total resection 10/3/17; initiated concurrent chemoradiation with temozolomide on 11/13/17 and completed 12/22/17. Adjuvant temozolomide and Optune device started on 1/22/18.  Following progression of disease documented spring 2018, patient enrolled in the Toca trial underwent surgical debulking May 17, 2018; he was randomized to the standard of care arm (ie he did not receive the therapeutic virus intraoperatively). He developed left sided weakness in early July, within days of initiating lomustine therapy 7/4/18 (patient opted to delay starting this medication treatment by 1-2 weeks), MRI indicated acute increase in intracranial edema that improved significantly with high-dose dexamethasone in July/August.     Referring physicians:  Dr. Harry Wiggins and Dr. Beto Gracia, Neurosurgical Service, [facility]  Dr. Dominic Louis, radiation oncology, [facility]  Oncology History:   Seth presented in September 2017 with dizzy spells, headache, and increasing somnolence. He was admitted on 9/28/17 after imaging in ED found a right temporal lobe mass. He went on to have gross total resection on 10/3/17.   10/27/17 - - neuro-oncology/medical oncology consultation, Dr. Armstrong.  11/12/17 through December 22, 2017 adjuvant treatment with concurrent chemoradiation with temozolomide. 6000 cGy, and 30 fractions, standard 200 cGy per fraction  1/19/18 - - brain MRI: Impression: 1. 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 Recommend attention on follow-up. Postsurgical changes of prior resection of right temporal glioblastoma. Resolution of right frontotemporal subdural hematoma and T2 hyperintensity along the right internal capsule and commisural white matter.    1/22/18 - - oncology/medical oncology follow-up, Dr. Armstrong. Plan: initiate adjuvant five day temozolomide and OPTune device. Cycle 1/day one of temozolomide, 150 mg/m  on days one through five of each 28 day cycle.    2/20/18 - - oncology follow-up, SCARLETT Hillman. Cycle 2/day one of five day temozolomide. Dose increased to 200 mg/m .    3/15/18 - - brain MRI: Impression: In this patient with glioblastoma multiforme status post gross total resection, chemoradiation and adjuvant Temodar and Optune, there is increased nodular contrast enhancement and T2 hyperintensity at the margins of the right temporal resection cavity. Perfusion characteristics suggestive of tumor recurrence, particularly medially.    3/19/18 - - neuro-oncology/medical oncology follow-up, Dr. Armstrong.    4/6/18 - - neurology follow-up, Dr. Boyer. plan to continue [facility]     4/19/18 MRI brain   1. Increased size of enhancing mass and surrounding vasogenic edema in  the right temporal lobe, which is suggestive of recurrence of  high-grade tumor, while the MR perfusion and diffusion imaging  characteristics are indeterminate for recurrent tumor (described in  detail above). Therefore, recommend shorter term follow-up study such  as one month.  2. Increasing mass effect has caused further compression of the right  lateral ventricle. No hydrocephalus at this time.    4/23/18 -- neuro-oncology follow-up, Dr. Armstrong  Consensus of multidisciplinary neuro-oncology tumor board: MRI consistent with progression of disease while on Optune and  Temozolomide.  5/4/18-neurosurgical consultation with Dr. Beto Gracia re: TOCA trial  5/11/18--neuro-oncology follow-up, Dr. Armstrong. Plan: proceed with surgical debulking/enrollment and randomization on the Toca trial.  5/17/18 - -Procedure:  tumor debulking (a portion of the tumor adherent vessels could not be resected). Patient was randomized to trial arm that did not include the Toca therapeutic virus.  1) Right craniotomy for tumor resection  2) Neuronavigation  3) Microdissection      Surgeon: Beto Gracia. MD, PhD  6/1/18 - - neurosurgery follow-up, Dr. Gracia. Again noted. The patient was randomized to standard therapy.  6/19/18 - - brain MRI - stable since 5/18 post-op scan.  6/22/18--neuro-oncology follow-up, Dr. Armstrong. Plan: initiate 2nd-line treatment with lomustine (CCNU) once cleared by insurance.  NOTE: patient delayed initiation of chemotherapy with lomustine until on or around July 4, but did not notify the care team of this fact until one week later.  7/9/18: patient s wife called in due to patient being last arousable, severely worsening fatigue, slouching the left side with left-sided facial weakness, and worsening gait.  7/10/18 - - oncology follow-up, SCARLETT Hillman.  Plan: direct admission to the ER for concern for acute stroke versus progression of disease.  7/10/18 - - brain MRI, compared to June 19 MRI: Impression:  1. Right anterior temporal lobe tumor progression. Progression of  tumor into the right corpus striatum, which may explain the patient's  left upper extremity motor weakness. Extensive associated edema with 5  mm of leftward midline shift and borderline right uncal herniation.  2. No ischemic infarct.     [Result: Enlarging right temporal lobe mass]  7/10 through 7/12/18 - - hospitalization at the [facility] for severely worsening left-sided weakness and cerebral edema.  MRI was done which showed increased contrast-enhancing region and edema surrounding the resection  cavity concerning for tumor progression.  Patient was administered course of dexamethasone and prolonged course of steroids, with plans for potential repeat resection by Dr. Gracia in end of July.  7/20/18 - - neuro-oncology follow-up, Dr. Armstrong.  7/20/18 - - neurosurgical follow-up, Dr. Gracia. Impression/plan: given the size of the lesion, increased between June 19 and July 10, initial consideration was given for repeat surgical resection; however, after trial of dexamethasone steroids, with improvement in symptoms and FLAIR signal on MRI performed August 1, was recommended to resume systemic therapy rather than undergo repeat resection at this time.  8/6/18 - - recommendation was given for dexamethasone taper down to 4 mg three times a day for the next week, then 4 mg BID. However, confirmed during 8/16 visit that patient did not follow this recommendation.  8/14/18 - - brain MRI compared to July 10: Impression:.      1. Postsurgical changes from previous anterior right temporal tumor  resection with stable appearing resection cavity  2. Reduction in size and intensity of FLAIR hyperintensities in the  right frontal, parietal, and temporal lobes.  3. Stable nodular enhancement of the inferior medial portion of the  lesion, concerning for tumor recurrence.  4. Compared to the MRI dated 7/10/2018, the biopsy-proven GBM appears  to have reduced in size as described above..  8/16/18 - - neuro-oncology follow-up, Dr. Armstrong.        Interim History:  Mr. Iglesias returns today with his wife.  I last evaluated him on 07/20 at that time in the context of significantly increased edema that was causing neurologic compromise earlier in July within days of him taking first treatment of lomustine.  There were plans to take him to the OR with Dr. Gracia for consideration of repeat resection.  Dr. Gracia met with the patient the same day.  He noted there was significant improvement in the patient's symptoms with dexamethasone and steroids.   Another MRI was requested, done 08/01, and showed significant retraction further of the FLAIR in the cherelle-resection cavity.  For that reason, Dr. Gracia communicated with me that with significant improvement already seen on the steroids that he wished to defer surgical resection at that time and I completely agreed.      A subsequent MRI was done last week and showed a further retraction of the edema.  On 08/06, the patient's wife called communicated via [email or web address] requesting information on a dexamethasone taper.      It was recommended the dexamethasone be tapered down from 4 mg 3 times a day, instead doing 4 mg twice a day.  However, the wife today confirms for me that she did not understand those instructions and proceeded to continue with every-8-hour administration of 4 mg capsules of dexamethasone.  He was taking it late at night and, thus, was having consequent insomnia.  While he is not having dexamethasone-related myositis or proximal muscular weakness, she describes that he is having significant fatigue.  They believe it is secondary to the insomnia from the high dose of dexamethasone over time.      The recent MRI on 08/14 specifically, which I reviewed myself, showed a reduction in size of the FLAIR in the right frontal, parietal and temporal lobes.  It is difficult to discern the exact size of tumor.  Thus, the patient is back here today.  Today would thai the 6-week time in which he would be able to resume cycle 2, day 1 of lomustine in the adjuvant setting.  However, his platelets were checked 2 days ago and were only 38,000.  We checked again this morning, and they were 36,000 so we will have to continue to defer chemotherapy.  Of note, I discussed with them last time the [facility] genomic profiling.  I proceeded to place the order for the 05/17/2018 surgical resection specimen to be sent as of today, including PD-L1 testing.             Review Of Systems:  Complete 12-point ROS reviewed.  "Pertinent symptoms reviewed above per HPI.    PAST MEDICAL HISTORY:   1.  Glioblastoma, status post gross total resection on 10/03/2017 of the right temporal lobe preceded and indicated by some seizure-like activity without loss of consciousness.   2.  Chronic back pain.   3.  Ganglionic cysts.   4.  Hyperlipidemia.   5.  He has psoriasis and was taking what sounds like a TNF and an alpha inhibitor up until the summer of .       PAST SURGICAL HISTORY:     1.  The aforementioned right temporal craniotomy for gross total resection of right temporal glioblastoma done 10/03/2017 with Dr. Harry Wiggins at the [facility].   2.  Colonoscopy performed 2011, unremarkable.   3. Surgical debulking of recurrent GB tumor, May 17 2018.      FAMILY HISTORY:  He had a Father and 2 paternal uncles who had prostate cancer.  His mother had some sort of \"tumor of the rib,\" and  at age 54 of this unknown cancer.       SOCIAL HISTORY:  The patient lives in [place], [place].  He is accompanied by his wife, who is extremely supportive.  They have 3 sons ages 18, 23 and 25.  Occupation:  He worked for [employer] for 28 years, and had to retire in  when the [employer] line shut down.  He has since then been working for [employer], about 27 miles from his home.    Tobacco:  Never smoker.    Allergies:none    Current Medications:   Current Outpatient Prescriptions   Medication     dexamethasone (DECADRON) 4 MG tablet     GLEOSTINE 100 MG capsule CHEMO     GLEOSTINE 40 MG capsule CHEMO     levETIRAcetam (KEPPRA) 1000 MG TABS     Multiple Vitamins-Minerals (MULTIVITAMIN & MINERAL PO)     ondansetron (ZOFRAN-ODT) 8 MG ODT tab     oxyCODONE IR (ROXICODONE) 5 MG tablet     pantoprazole (PROTONIX) 40 MG EC tablet     polyethylene glycol (MIRALAX/GLYCOLAX) Packet     tamsulosin (FLOMAX) 0.4 MG capsule     [DISCONTINUED] Temozolomide (TEMODAR) 180 MG capsule     No current facility-administered " medications for this visit.          Physical Exam:  /76 (BP Location: Right arm, Patient Position: Chair, Cuff Size: Adult Large)  Pulse 106  Temp 98  F (36.7  C) (Oral)  Wt 97.9 kg (215 lb 14.4 oz)  SpO2 97%  BMI 31.88 kg/m2  KPS 70  GENERAL:  Very pleasant middle-aged  gentleman who appears in no acute distress, alert and oriented x3.  Speech is fluent.  He does not have any evidence of receptive or expressive aphasia.   HEAD: right frontal region of craniotomy is fully healed, indented, no overlying fluctuance, erythema or tenderness.  HEENT:  Anicteric sclerae.  Oropharynx is without evidence of mucositis or thrush. PERRL; no vertical nor horizontal nystagmus.  NEUROLOGIC:  Cranial nerves II-XII grossly intact.  Motor strength was 5/5 on the right, 4+/5 on the left upper and lower extremities - stable since 7/20/18.    Sensation is grossly intact throughout.  No dysdiadochokinesia.   His gait is wide-based and continues to favor the left side.  Negative Romberg sign. + left-sided dysmetria.          Laboratory/Imaging Studies  Lab Results   Component Value Date    WBC 2.0 08/17/2018     Lab Results   Component Value Date    RBC 3.91 08/17/2018     Lab Results   Component Value Date    HGB 13.3 08/17/2018     Lab Results   Component Value Date    HCT 39.3 08/17/2018     No components found for: MCT  Lab Results   Component Value Date     08/17/2018     Lab Results   Component Value Date    MCH 34.0 08/17/2018     Lab Results   Component Value Date    MCHC 33.8 08/17/2018     Lab Results   Component Value Date    RDW 15.0 08/17/2018     Lab Results   Component Value Date    PLT 38 08/17/2018       Results for orders placed or performed in visit on 08/14/18   MRI Brain w & w/o contrast    Narrative     MR BRAIN W/O & W CONTRAST 8/14/2018 11:01 AM    Provided History: TOCA research trial; Glioblastoma multiforme of  temporal lobe (H); Examination of participant in clinical  trial.  Additional chart review, patient is s/p gross total resection  10/3/2017, initiated chemoradiation with temozolomide 11/13/2017, and  complete 12/22/2017. Patient enrolled in TOCA trial, status post  repeat surgical the bulking 5/17/2018.    ICD-10: Glioblastoma multiforme of temporal lobe (H); Examination of  participant in clinical trial    Comparison:  MR brain 1/20/2018, MR brain 7/10/2018, MR brain  6/19/2018, MR brain 5/17/2018, MR brain 10/4/2017.    Technique: Multiplanar T1-weighted, axial FLAIR, and susceptibility  images were obtained without intravenous contrast. Following  intravenous gadolinium-based contrast administration, axial  T2-weighted, diffusion, and T1-weighted images (in multiple planes)  were obtained.    Contrast: 10 cc Gadavist    Findings:  Compared to the MR brains dated 7/10/2018 and 8/1/2020, there is  significant reduction in size and surrounding tumoral edema of the  right anterior temporal lobe GBM. There are postsurgical changes in  the right temporal lobe with a resection cavity in the anteriormost  portion of the right temporal fossa. On FLAIR sequences, there is  significant reduction of edema in the  white matter of the right  frontal, parietal, and temporal lobes. Postcontrast images show a  peripherally enhancing, necrotic region in the right anterior temporal  lobe that measures 2.2 x 2.6 x 1.5 cm (previously 2.5 x 2.7 cm).   There is some nodular aspect of the medial inferior portion the  lesion, measuring 0.6 x 0.7 cm which is stable. There is a chronic  appearing punctate bleed in the right temporal lobe posterior to the  tumor.    There is mild leftward midline shift, however this has improved  compared to the aforementioned studies. There is mild ex vacuo  dilatation of the right lateral ventricle.    No definite abnormality of the skull marrow signal is noted. The  visualized portions of paranasal sinuses, and mastoid air cells are  relatively clear. The  orbits are grossly unremarkable.      Impression    Impression:.     1. Postsurgical changes from previous anterior right temporal tumor  resection with stable appearing resection cavity  2. Reduction in size and intensity of FLAIR hyperintensities in the  right frontal, parietal, and temporal lobes.  3. Stable nodular enhancement of the inferior medial portion of the  lesion, concerning for tumor recurrence.  4. Compared to the MRI dated 7/10/2018, the biopsy-proven GBM appears  to have reduced in size as described above..    I have personally reviewed the examination and initial interpretation  and I agree with the findings.    KIKI ANGEL MD       ASSESSMENT/PLAN:  Seth Iglesias is a 57 y/o man with right temporal glioblastoma (WHO grade IV). He had gross total resection on 10/3/17 and completed adjuvant chemorads on 12/22/17. His 4 week post-treatment brain MRI showed areas of enhancement around the resection cavity thought to represent radiation necrosis rather than recurrence. He then started temozolomide maintenance in January 2018, at a dose of 150 mg/m2 for cycle 1. This was increased to 200 mg/m2 for cycle 2. He also started the Optune device in January 2018.     Upon progression of disease after about less than 6 months of temozolomide in the adjuvant setting, he underwent surgical debulking following randomization on the Tocagen trial to the non-viral therapeutic arm.  He underwent surgical debulking.  Dr. Gracia was not able to obtain a full total gross resection for this secondary surgery due to the involvement of some of the vessels.  He sustained clinical decline and worsening neurologic compromise in late June/early July after randomizing to the non-viral arm on the Tocagen trial.  We had recommended and attempted to initiate standard-of-care lomustine in late June.  It was prescribed to him on 06/30. However, the patient did not begin taking this medication until 07/04/18.  Within a few days of  that, he developed left-sided weakness.      My overall impression of his clinical profile over the last 6 weeks is that he initiated lomustine on 07/04.  On or around leading up to that day, he was having some left-sided weakness that has improved significantly with high-dose dexamethasone.  He has continued on 4 mg every 8 hours.  A taper was recommended on 08/06, but he did not proceed to do the taper and would like to forward with that taper today.  Today I recommended that he take 4 mg capsules at 8 a.m. and 4 p.m.  This will help avoid the insomnia and other issues he was having leading to fatigue by taking it late at night.  We will do that for the next 7 days.      He will return to clinic in 7 days from now with either myself or Helena Landin from our physician assistant team for reassessment.  We will not move forward with lomustine due to significant thrombocytopenia.  We had checked 2 days ago and checked again this morning and it is virtually the same level, 36,000 as of this morning. I will request a peripheral blood smear and also reticulocyte count to more effectively rule out TTP or other similar process inducing thrombocytopenia.    Thus, we will have another CBC checked 5 days from now on Tuesday, 08/21 at [facility] out of convenience for them as they are closer to that center.  They will be out of town the remainder of Tuesday, Wednesday and Thursday.  Thus, we will check again next Friday, 7 days from now.      If for any reason we cannot proceed safely with lomustine, then another alternative is a clinical trial led by my colleague, Dr. Kylee Noble encompassing a BBI small molecule inhibitor, [id number], with bevacizumab.  We discussed briefly, as Dr. Gracia had as well, the use of bevacizumab for routine clinical care is FDA approved for recurrent glioblastoma.  Thus, the patient will be eligible.  However, I described to him in depth that if we were to proceed with Avastin off trial  it would immediately make him ineligible for pretty much every clinical trial that is available at this point.  I would like to preserve his eligibility at the current time, but we will wait until next week to reassess.  Other options include genomic profiling and for that reason we will send off to [facility] for genomic profiling that includes MSI testing and also includes PD-L1 testing for consideration of checkpoint inhibition and determining whether or not he has viable targets for that approach.  I reviewed all the above in great detail with the patient and his wife.  They stated understanding by the end of the visit.  Manisha Castellanos from our clinical trial nurse team accompanied me throughout the entirety of today's visit with the patient.         I spent 45 minutes in consultation, including H&P and Discussion. >50% of time was spent in counseling and in coordination of care.    Augustus Armstrong MD, PhD

## 2018-08-17 NOTE — NURSING NOTE
"Oncology Rooming Note    August 17, 2018 7:39 AM   Seth Iglesias is a 58 year old male who presents for:    Chief Complaint   Patient presents with     Blood Draw     labs drawn via venipuncture by RN     Oncology Clinic Visit     [facility] RETURN- GLIOBLASTOMA     Initial Vitals: /76 (BP Location: Right arm, Patient Position: Chair, Cuff Size: Adult Large)  Pulse 106  Temp 98  F (36.7  C) (Oral)  Wt 97.9 kg (215 lb 14.4 oz)  SpO2 97%  BMI 31.88 kg/m2 Estimated body mass index is 31.88 kg/(m^2) as calculated from the following:    Height as of 8/1/18: 1.753 m (5' 9\").    Weight as of this encounter: 97.9 kg (215 lb 14.4 oz). Body surface area is 2.18 meters squared.  No Pain (0) Comment: Data Unavailable   No LMP for male patient.  Allergies reviewed: Yes  Medications reviewed: Yes    Medications: MEDICATION REFILLS NEEDED TODAY. Provider was notified.  Pharmacy name entered into [facility]:    [id number] - [place], [place] - [address]  [id number] - [place], [place] - [address]    Clinical concerns: Refills on Flomax and Miralax.  Dr. Armstrong was notified.    10 minutes for nursing intake (face to face time)     Bam Flynn LPN            "

## 2018-08-17 NOTE — NURSING NOTE
Blood redrawn CBC in left hand 23 gauge butterfly used. Patient tolerated well with no complications.    Bam Flynn LPN

## 2018-08-17 NOTE — NURSING NOTE
Chief Complaint   Patient presents with     Blood Draw     labs drawn via venipuncture by RN     /76 (BP Location: Right arm, Patient Position: Chair, Cuff Size: Adult Large)  Pulse 106  Temp 98  F (36.7  C) (Oral)  Wt 97.9 kg (215 lb 14.4 oz)  SpO2 97%  BMI 31.88 kg/m2    Vitals taken.  Labs collected and sent from right hand venipuncture. Pt tolerated well. Pt checked in for next appointment.    Maria Luisa Wild RN

## 2018-08-23 NOTE — PROGRESS NOTES
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 MGMT promoter methylation was absent.     Treatment to date: status post gross total resection 10/3/17; initiated concurrent chemoradiation with temozolomide on 11/13/17 and completed 12/22/17. Adjuvant temozolomide and Optune device started on 1/22/18.  Following progression of disease documented spring 2018, patient enrolled in the Toca trial underwent surgical debulking May 17, 2018; he was randomized to the standard of care arm (ie he did not receive the therapeutic virus intraoperatively). He developed left sided weakness in early July, within days of initiating lomustine therapy 7/4/18 (patient opted to delay starting this medication treatment by 1-2 weeks), MRI indicated acute increase in intracranial edema that improved significantly with high-dose dexamethasone in July/August.     Referring physicians:  Dr. Harry Wiggins and Dr. Beto Gracia, Neurosurgical Service, [facility]  Dr. Dominic Louis, radiation oncology, [facility]  Oncology History:   Seth presented in September 2017 with dizzy spells, headache, and increasing somnolence. He was admitted on 9/28/17 after imaging in ED found a right temporal lobe mass. He went on to have gross total resection on 10/3/17.   10/27/17 - - neuro-oncology/medical oncology consultation, Dr. Armstrong.  11/12/17 through December 22, 2017 adjuvant treatment with concurrent chemoradiation with temozolomide. 6000 cGy, and 30 fractions, standard 200 cGy per fraction  1/19/18 - - brain MRI: Impression: 1. 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 Recommend attention on follow-up. Postsurgical changes of prior  resection of right temporal glioblastoma. Resolution of right frontotemporal subdural hematoma and T2 hyperintensity along the right internal capsule and commisural white matter.    1/22/18 - - oncology/medical oncology follow-up, Dr. Armstrong. Plan: initiate adjuvant five day temozolomide and OPTune device. Cycle 1/day one of temozolomide, 150 mg/m  on days one through five of each 28 day cycle.    2/20/18 - - oncology follow-up, SCARLETT Hillman. Cycle 2/day one of five day temozolomide. Dose increased to 200 mg/m .    3/15/18 - - brain MRI: Impression: In this patient with glioblastoma multiforme status post gross total resection, chemoradiation and adjuvant Temodar and Optune, there is increased nodular contrast enhancement and T2 hyperintensity at the margins of the right temporal resection cavity. Perfusion characteristics suggestive of tumor recurrence, particularly medially.    3/19/18 - - neuro-oncology/medical oncology follow-up, Dr. Armstrong.    4/6/18 - - neurology follow-up, Dr. Boyer. [facility]     4/19/18 MRI brain   1. Increased size of enhancing mass and surrounding vasogenic edema in  the right temporal lobe, which is suggestive of recurrence of  high-grade tumor, while the MR perfusion and diffusion imaging  characteristics are indeterminate for recurrent tumor (described in  detail above). Therefore, recommend shorter term follow-up study such  as one month.  2. Increasing mass effect has caused further compression of the right  lateral ventricle. No hydrocephalus at this time.    4/23/18 -- neuro-oncology follow-up, Dr. Armstrong  Consensus of multidisciplinary neuro-oncology tumor board: MRI consistent with progression of disease while on Optune and Temozolomide.  5/4/18-neurosurgical consultation with Dr. Beto Gracia re: TOCA trial  5/11/18--neuro-oncology follow-up, Dr. Armstrong. Plan: proceed with surgical debulking/enrollment and randomization on the Toca trial.  5/17/18 - -Procedure:  tumor  debulking (a portion of the tumor adherent vessels could not be resected). Patient was randomized to trial arm that did not include the Toca therapeutic virus.  1) Right craniotomy for tumor resection  2) Neuronavigation  3) Microdissection      Surgeon: Beto Ricks MD, PhD  6/1/18 - - neurosurgery follow-up, Dr. Gracia. Again noted. The patient was randomized to standard therapy.  6/19/18 - - brain MRI - stable since 5/18 post-op scan.  6/22/18--neuro-oncology follow-up, Dr. Armstrong. Plan: initiate 2nd-line treatment with lomustine ([facility]) once cleared by insurance.  NOTE: patient delayed initiation of chemotherapy with lomustine until on or around July 4, but did not notify the care team of this fact until one week later.  7/9/18: patient s wife called in due to patient being last arousable, severely worsening fatigue, slouching the left side with left-sided facial weakness, and worsening gait.  7/10/18 - - oncology follow-up, SCARLETT Hillman.  Plan: direct admission to the ER for concern for acute stroke versus progression of disease.  7/10/18 - - brain MRI, compared to June 19 MRI: Impression:  1. Right anterior temporal lobe tumor progression. Progression of  tumor into the right corpus striatum, which may explain the patient's  left upper extremity motor weakness. Extensive associated edema with 5  mm of leftward midline shift and borderline right uncal herniation.  2. No ischemic infarct.     [Result: Enlarging right temporal lobe mass]  7/10 through 7/12/18 - - hospitalization at the [facility] for severely worsening left-sided weakness and cerebral edema.  MRI was done which showed increased contrast-enhancing region and edema surrounding the resection cavity concerning for tumor progression.  Patient was administered course of dexamethasone and prolonged course of steroids, with plans for potential repeat resection by Dr. Gracia in end of July.  7/20/18 - - neuro-oncology follow-up,   Patti.  7/20/18 - - neurosurgical follow-up, Dr. Gracia. Impression/plan: given the size of the lesion, increased between June 19 and July 10, initial consideration was given for repeat surgical resection; however, after trial of dexamethasone steroids, with improvement in symptoms and FLAIR signal on MRI performed August 1, was recommended to resume systemic therapy rather than undergo repeat resection at this time.  8/6/18 - - recommendation was given for dexamethasone taper down to 4 mg three times a day for the next week, then 4 mg BID. However, confirmed during 8/16 visit that patient did not follow this recommendation.  8/14/18 - - brain MRI compared to July 10: Impression:.      1. Postsurgical changes from previous anterior right temporal tumor  resection with stable appearing resection cavity  2. Reduction in size and intensity of FLAIR hyperintensities in the  right frontal, parietal, and temporal lobes.  3. Stable nodular enhancement of the inferior medial portion of the  lesion, concerning for tumor recurrence.  4. Compared to the MRI dated 7/10/2018, the biopsy-proven GBM appears  to have reduced in size as described above..  8/17/18 - - neuro-oncology follow-up, Dr. Armstrong.  8/24/18--  neuro-oncology follow-up, Dr. Armstrong.      Interim History:  Mr. Iglesias returns today with his wife.     He comes in for reconsideration of lomustine at this time.  I had met with him 7 days ago.  At that time, I prescribed a dexamethasone taper as he has been on high steroid doses throughout July and the first part of August.  His wife confirms for me that as of now, he is taking 4 mg capsules, 1 tablet of 4 mg, in other words at 8 a.m. and 4 p.m. twice a day, so we discussed further tapering.  He generally is feeling well.  [place].  He has some characteristic cushingoid faces from the dexamethasone.  He has not had any rebound headache.  He has some trouble getting around and has to get  "up in the middle of the night to urinate, but he has not sustained any falls since last week.       We had a reassessment of his thrombocytopenia.  I did a peripheral blood smear.  There was no evidence of pseudothrombocytopenia or other abnormal platelet issues.  His platelets were rechecked on  and were 38 K.  We rechecked , which was earlier this week, and it was 91.  Today it is approximately 120-130K range.                 Review Of Systems:  Complete 12-point ROS reviewed. Pertinent symptoms reviewed above per HPI.    PAST MEDICAL HISTORY:   1.  Glioblastoma, status post gross total resection on 10/03/2017 of the right temporal lobe preceded and indicated by some seizure-like activity without loss of consciousness.   2.  Chronic back pain.   3.  Ganglionic cysts.   4.  Hyperlipidemia.   5.  He has psoriasis and was taking what sounds like a TNF and an alpha inhibitor up until the summer of .       PAST SURGICAL HISTORY:     1.  The aforementioned right temporal craniotomy for gross total resection of right temporal glioblastoma done 10/03/2017 with Dr. Harry Wiggins at the [facility].   2.  Colonoscopy performed 2011, unremarkable.   3. Surgical debulking of recurrent GB tumor, May 17 2018.      FAMILY HISTORY:  He had a Father and 2 paternal uncles who had prostate cancer.  His mother had some sort of \"tumor of the rib,\" and  at age 54 of this unknown cancer.       SOCIAL HISTORY:  The patient lives in [place], [place].  He is accompanied by his wife, who is extremely supportive.  They have 3 sons ages 18, 23 and 25.  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  He has since then been working for [employer], about 27 miles from his home.    Tobacco:  Never smoker.    Allergies:none    Current Medications:   Current Outpatient Prescriptions   Medication     dexamethasone " "(DECADRON) 4 MG tablet     GLEOSTINE 100 MG capsule CHEMO     GLEOSTINE 40 MG capsule CHEMO     levETIRAcetam (KEPPRA) 1000 MG TABS     Multiple Vitamins-Minerals (MULTIVITAMIN & MINERAL PO)     ondansetron (ZOFRAN-ODT) 8 MG ODT tab     oxyCODONE IR (ROXICODONE) 5 MG tablet     pantoprazole (PROTONIX) 40 MG EC tablet     polyethylene glycol (MIRALAX/GLYCOLAX) Packet     tamsulosin (FLOMAX) 0.4 MG capsule     [DISCONTINUED] Temozolomide (TEMODAR) 180 MG capsule     No current facility-administered medications for this visit.          Physical Exam:  BP (!) 130/96 (BP Location: Left arm, Patient Position: Sitting, Cuff Size: Adult Regular)  Pulse 111  Temp 97.6  F (36.4  C) (Oral)  Resp 18  Ht 1.753 m (5' 9.02\")  Wt 96.5 kg (212 lb 11.2 oz)  SpO2 96%  BMI 31.4 kg/m2  KPS 70  GENERAL:  Very pleasant middle-aged  gentleman who appears in no acute distress, alert and oriented x3.  Speech is fluent.  He does not have any evidence of receptive or expressive aphasia.   HEAD: Right frontal region of craniotomy is fully healed, indented, no overlying fluctuance, erythema or tenderness.  HEENT:  Anicteric sclerae.  Oropharynx is without evidence of mucositis or thrush. PERRL; no vertical nor horizontal nystagmus.  NEUROLOGIC:  Cranial nerves II-XII grossly intact.  Motor strength was 5/5 on the right, 4+/5 on the left upper and lower extremities - stable since 7/20/18.    Sensation is grossly intact throughout.  No dysdiadochokinesia.   His gait is wide-based and continues to favor the left side.  Negative Romberg sign. + left-sided dysmetria.          Laboratory/Imaging Studies    Lab Results   Component Value Date    WBC 3.8 08/24/2018     Lab Results   Component Value Date    RBC 3.80 08/24/2018     Lab Results   Component Value Date    HGB 13.0 08/24/2018     Lab Results   Component Value Date    HCT 36.7 08/24/2018     No components found for: MCT  Lab Results   Component Value Date    MCV 97 08/24/2018 "     Lab Results   Component Value Date    MCH 34.2 08/24/2018     Lab Results   Component Value Date    MCHC 35.4 08/24/2018     Lab Results   Component Value Date    RDW 15.1 08/24/2018     Lab Results   Component Value Date     08/24/2018         ASSESSMENT/PLAN:  Seth Iglesias is a 59 y/o man with right temporal glioblastoma (WHO grade IV). He had gross total resection on 10/3/17 and completed adjuvant chemorads on 12/22/17. His 4 week post-treatment brain MRI showed areas of enhancement around the resection cavity thought to represent radiation necrosis rather than recurrence. He then started temozolomide maintenance in January 2018, at a dose of 150 mg/m2 for cycle 1. This was increased to 200 mg/m2 for cycle 2. He also started the Optune device in January 2018.     Upon progression of disease after about less than 6 months of temozolomide in the adjuvant setting, he underwent surgical debulking following randomization on the Tocagen trial to the non-viral therapeutic arm.  He underwent surgical debulking.  Dr. Gracia was not able to obtain a full total gross resection for this secondary surgery due to the involvement of some of the vessels.  He sustained clinical decline and worsening neurologic compromise in late June/early July after randomizing to the non-viral arm on the Tocagen trial.  We had recommended and attempted to initiate standard-of-care lomustine in late June.  It was prescribed to him on 06/30. However, the patient did not begin taking this medication until 07/04/18.  Within a few days of that, he developed left-sided weakness.        At this point with a significant rebound in his platelets, which I attribute partially to the high-dose dexamethasone, we will proceed with caution with the second cycle of lomustine.  He will follow up with Helena Landin from our physician assistant team in 10-11 days' time.  He will have a CBC and CMP drawn 45 minutes prior to that appointment.  The  purpose of that appointment is general a check up to make sure he is doing well.      In the meantime, I also prescribed dexamethasone taper for him to go down to 4 mg in a.m. and then 2 mg at 4 p.m. for 5 days and then they will go 2 mg in the morning and 2 mg at 4:00 p.m. That will be a 10-day total taper by which time he will have followed up with Helena or any other available RACHEL in our clinic awaiting further instruction.  If he is doing well without rebound headache by that point then we can go down to a continued slow taper with 2 mg in the morning and 1 mg in the afternoon for 5 days, then 1 mg twice a day for 5 days, then 1 mg in the morning for 5 days on and then off.  In addition, last week we did process [employer] genomic profiling testing.  This has been sent off and the results are not yet back.  His wife asked about it.  I stated it would not impact the recommendation for proceeding with lomustine on trial at this time, but it may provide future treatment options on or off clinical trial based on the genomic results.  She stated full understanding.  They will call us in the interim or come back earlier if they develop any adverse Issues.               I spent 45 minutes in consultation, including H&P and Discussion. >50% of time was spent in counseling and in coordination of care.    Augustus Armstrong MD, PhD

## 2018-08-24 NOTE — NURSING NOTE
"Oncology Rooming Note    August 24, 2018 11:28 AM   Seth Iglesias is a 58 year old male who presents for:    Chief Complaint   Patient presents with     Blood Draw     Vitals and labs drawn by CMA.     Oncology Clinic Visit     Return: Glioblastoma     Initial Vitals: BP (!) 130/96 (BP Location: Left arm, Patient Position: Sitting, Cuff Size: Adult Regular)  Pulse 111  Temp 97.6  F (36.4  C) (Oral)  Resp 18  Ht 1.753 m (5' 9.02\")  Wt 96.5 kg (212 lb 11.2 oz)  SpO2 96%  BMI 31.4 kg/m2 Estimated body mass index is 31.4 kg/(m^2) as calculated from the following:    Height as of this encounter: 1.753 m (5' 9.02\").    Weight as of this encounter: 96.5 kg (212 lb 11.2 oz). Body surface area is 2.17 meters squared.  No Pain (0) Comment: Data Unavailable   No LMP for male patient.  Allergies reviewed: Yes  Medications reviewed: Yes    Medications: MEDICATION REFILLS NEEDED TODAY. Provider was notified.  Pharmacy name entered into donde[facility]staâ„¢:    [id number] - [place], [place] - [address]  [id number] [place] [place] - [address]    Clinical concerns: no new concerns today Dr. Armstrong was notified.    10 minutes for nursing intake (face to face time)     Corinne Michael CMA              "

## 2018-08-24 NOTE — MR AVS SNAPSHOT
After Visit Summary   8/24/2018    Seth Iglesias    MRN: [id number]           Patient Information     Date Of Birth          1960        Visit Information        Provider Department      8/24/2018 11:15 AM Augustus Armstrong MD [facility]        Today's Diagnoses     Glioblastoma multiforme of temporal lobe (H)        Examination of participant in clinical trial           Follow-ups after your visit        Follow-up notes from your care team     Return in about 10 days (around 9/3/2018).      Your next 10 appointments already scheduled     Sep 04, 2018  3:30 PM CDT   M[facility] Lab Draw with  M[facility] LAB DRAW   [facility]    [address]  [address]  [place] [address]   [phone]            Sep 04, 2018  4:20 PM CDT   (Arrive by 4:05 PM)   Return Visit with Helena Landin PA-C   [facility] ([facility])    [address]  [address]  [place] [address]   [phone]            Sep 24, 2018  9:00 AM CDT   (Arrive by 8:30 AM)   MR BRAIN W/O & W CONTRAST with UUMR1   [facility] [place] ([facility])    [address] [address]   [phone]           Take your medicines as usual, unless your doctor tells you not to. Bring a list of your current medicines to your exam (including vitamins, minerals and over-the-counter drugs).  You may or may not receive intravenous (IV) contrast for this exam pending the discretion of the Radiologist.  You do not need to do anything special to prepare.  The MRI machine uses a strong magnet. Please wear clothes without metal (snaps, zippers). A sweatsuit works well, or we may give you a hospital gown.  Please remove any body piercings and hair extensions before you arrive. You will also remove watches, jewelry, hairpins, wallets, dentures,  partial dental plates and hearing aids. You may wear contact lenses, and you may be able to wear your rings. We have a safe place to keep your personal items, but it is safer to leave them at home.  **IMPORTANT** THE INSTRUCTIONS BELOW ARE ONLY FOR THOSE PATIENTS WHO HAVE BEEN PRESCRIBED SEDATION OR GENERAL ANESTHESIA DURING THEIR MRI PROCEDURE:  IF YOUR DOCTOR PRESCRIBED ORAL SEDATION (take medicine to help you relax during your exam):   You must get the medicine from your doctor (oral medication) before you arrive. Bring the medicine to the exam. Do not take it at home. You ll be told when to take it upon arriving for your exam.   Arrive one hour early. Bring someone who can take you home after the test. Your medicine will make you sleepy. After the exam, you may not drive, take a bus or take a taxi by yourself.  IF YOUR DOCTOR PRESCRIBED IV SEDATION:   Arrive one hour early. Bring someone who can take you home after the test. Your medicine will make you sleepy. After the exam, you may not drive, take a bus or take a taxi by yourself.   No eating 6 hours before your exam. You may have clear liquids up until 4 hours before your exam. (Clear liquids include water, clear tea, black coffee and fruit juice without pulp.)  IF YOUR DOCTOR PRESCRIBED ANESTHESIA (be asleep for your exam):   Arrive 1 1/2 hours early. Bring someone who can take you home after the test. You may not drive, take a bus or take a taxi by yourself.   No eating 8 hours before your exam. You may have clear liquids up until 4 hours before your exam. (Clear liquids include water, clear tea, black coffee and fruit juice without pulp.)   You will spend four to five hours in the recovery room.  Please call the Imaging Department at your exam site with any questions.            Oct 01, 2018  8:30 AM [facility]   Masonic Lab Draw with  MASONIC LAB DRAW   [facility] Masonic Lab Draw ([facility])    [address]  [address]  [place] [place] [id number] [phone]            Oct 01, 2018  9:15 AM CDT   (Arrive by 9:00 AM)   Return Visit with Augustus Armstrong MD   [facility] ([facility])    [address]  [address]  [place] [place] [id number] [phone]              Future tests that were ordered for you today     Open Future Orders        Priority Expected Expires Ordered    CBC with platelets differential Routine 9/10/2018 10/18/2018 8/24/2018    Comprehensive metabolic panel Routine 9/10/2018 10/18/2018 8/24/2018            Who to contact     If you have questions or need follow up information about today's clinic visit or your schedule please contact [facility] directly at [phone].  Normal or non-critical lab and imaging results will be communicated to you by [employer], letter or phone within 4 business days after the clinic has received the results. If you do not hear from us within 7 days, please contact the clinic through [employer] or phone. If you have a critical or abnormal lab result, we will notify you by phone as soon as possible.  Submit refill requests through [employer] or call your pharmacy and they will forward the refill request to us. Please allow 3 business days for your refill to be completed.          Additional Information About Your Visit        [employer] Information     [employer] gives you secure access to your electronic health record. If you see a primary care provider, you can also send messages to your care team and make appointments. If you have questions, please call your primary care clinic.  If you do not have a primary care provider, please call [phone] and they will assist you.        Care EveryWhere ID     This is your Care EveryWhere ID. This could be used by other organizations to access your [place] medical records  [id number]        Your Vitals Were     Pulse Temperature Respirations Height Pulse Oximetry BMI (Body Mass  "Index)    111 97.6  F (36.4  C) (Oral) 18 1.753 m (5' 9.02\") 96% 31.4 kg/m2       Blood Pressure from Last 3 Encounters:   08/24/18 (!) 130/96   08/17/18 117/76   08/01/18 (!) 140/103    Weight from Last 3 Encounters:   08/24/18 96.5 kg (212 lb 11.2 oz)   08/17/18 97.9 kg (215 lb 14.4 oz)   08/01/18 97.2 kg (214 lb 4.6 oz)              We Performed the Following     CBC with platelets differential     Comprehensive metabolic panel     Lactate Dehydrogenase     Uric acid          Today's Medication Changes          These changes are accurate as of 8/24/18 11:59 PM.  If you have any questions, ask your nurse or doctor.               These medicines have changed or have updated prescriptions.        Dose/Directions    * GLEOSTINE 100 MG capsule CHEMO   This may have changed:  Another medication with the same name was added. Make sure you understand how and when to take each.   Generic drug:  lomustine   Changed by:  Lizzie Coffman RN        Take 240 mg every 6 weeks.   Refills:  0       * GLEOSTINE 40 MG capsule CHEMO   This may have changed:  Another medication with the same name was added. Make sure you understand how and when to take each.   Generic drug:  lomustine   Changed by:  Lizzie Coffman RN        Take 240 mg every 6 weeks   Refills:  0       * lomustine 40 MG capsule CHEMO   Commonly known as:  CEENU   This may have changed:  You were already taking a medication with the same name, and this prescription was added. Make sure you understand how and when to take each.   Used for:  Examination of participant in clinical trial, Glioblastoma multiforme of temporal lobe (H)   Changed by:  Lizzie Coffman RN        Dose:  110 mg/m2   Take 6 capsules (240 mg) by mouth once for 1 dose Take on an empty stomach.   Quantity:  6 capsule   Refills:  0       * Notice:  This list has 3 medication(s) that are the same as other medications prescribed for you. Read the directions carefully, and ask your doctor " or other care provider to review them with you.         Where to get your medicines      Information about where to get these medications is not yet available     ! Ask your nurse or doctor about these medications     lomustine 40 MG capsule CHEMO                Primary Care Provider Office Phone # Fax #    R Nigel Salmon [phone] [address] [address]        Equal Access to Services     [facility]: Hadii aad ku hadasho Soomaali, waaxda luqadaha, qaybta kaalmada adeegyada, waxay idiin hayaan adeeg kharash laJessicaaan . So [facility] [phone].    ATENCIÓN: Si habla español, tiene a granados disposición servicios gratuitos de asistencia lingüística. Llame al [phone].    We comply with applicable federal civil rights laws and [place] laws. We do not discriminate on the basis of race, color, national origin, age, disability, sex, sexual orientation, or gender identity.            Thank you!     Thank you for choosing [facility]  for your care. Our goal is always to provide you with excellent care. Hearing back from our patients is one way we can continue to improve our services. Please take a few minutes to complete the written survey that you may receive in the mail after your visit with us. Thank you!             Your Updated Medication List - Protect others around you: Learn how to safely use, store and throw away your medicines at www.disposemymeds.org.          This list is accurate as of 8/24/18 11:59 PM.  Always use your most recent med list.                   Brand Name Dispense Instructions for use Diagnosis    dexamethasone 4 MG tablet    DECADRON    85 tablet    Take 1 tablet (4 mg) by mouth every 6 hours Or as directed by provider    GBM (glioblastoma multiforme) (H), LUE weakness       * GLEOSTINE 100 MG capsule CHEMO   Generic drug:  lomustine      Take 240 mg every 6 weeks.        * GLEOSTINE 40 MG capsule CHEMO   Generic drug:  lomustine       Take 240 mg every 6 weeks        * lomustine 40 MG capsule CHEMO    CEENU    6 capsule    Take 6 capsules (240 mg) by mouth once for 1 dose Take on an empty stomach.    Examination of participant in clinical trial, Glioblastoma multiforme of temporal lobe (H)       levETIRAcetam 1000 MG Tabs    KEPPRA    180 tablet    Take 1,000 mg by mouth every 12 hours    Brain mass       MULTIVITAMIN & MINERAL PO      Take 1 tablet by mouth daily        ondansetron 8 MG ODT tab    ZOFRAN-ODT    60 tablet    Take 1 tablet (8 mg) by mouth every 8 hours as needed    GBM (glioblastoma multiforme) (H)       oxyCODONE IR 5 MG tablet    ROXICODONE    45 tablet    Take 1-2 tablets (5-10 mg) by mouth every 3 hours as needed for other (pain control or improvement in physical function. Hold dose for analgesic side effects.)    S/P craniotomy       pantoprazole 40 MG EC tablet    PROTONIX    90 tablet    Take 1 tablet (40 mg) by mouth every morning (before breakfast)    GBM (glioblastoma multiforme) (H)       polyethylene glycol Packet    MIRALAX/GLYCOLAX    7 packet    Take 17 g by mouth daily    S/P craniotomy       tamsulosin 0.4 MG capsule    FLOMAX     Take 0.4 mg by mouth daily        * Notice:  This list has 3 medication(s) that are the same as other medications prescribed for you. Read the directions carefully, and ask your doctor or other care provider to review them with you.

## 2018-08-24 NOTE — NURSING NOTE
[id number]: Study Visit Note   Subject name: Seth Iglesias     Visit: Unscheduled    Did the study visit occur within the appropriate window allowed by the protocol? Yes    Since the last study visit, He has been doing well.  No new issues or concerns.  Patient's labs reviewed by Dr. Armstrong and ok to start Lomustine again today.       I have personally interviewed Seth Iglesias and reviewed his medical record for adverse events and concomitant medications and these have been recorded on the corresponding logs in Seth Iglesias's research file.     Seth Iglesias was given the opportunity to ask any trial related questions.  Please see provider progress note for physical exam and other clinical information. Labs were reviewed - any significant lab values were addressed and reviewed.    Lizzie Coffman RN     Pager [phone]

## 2018-08-24 NOTE — LETTER
8/24/2018       RE: Seth Iglesias  [address]  [facility] [address]     Dear Colleague,    Thank you for referring your patient, Seth Iglesias, to the [facility]. Please see a copy of my visit note below.    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 MGMT promoter methylation was absent.     Treatment to date: status post gross total resection 10/3/17; initiated concurrent chemoradiation with temozolomide on 11/13/17 and completed 12/22/17. Adjuvant temozolomide and Optune device started on 1/22/18.  Following progression of disease documented spring 2018, patient enrolled in the Toca trial underwent surgical debulking May 17, 2018; he was randomized to the standard of care arm (ie he did not receive the therapeutic virus intraoperatively). He developed left sided weakness in early July, within days of initiating lomustine therapy 7/4/18 (patient opted to delay starting this medication treatment by 1-2 weeks), MRI indicated acute increase in intracranial edema that improved significantly with high-dose dexamethasone in July/August.     Referring physicians:  Dr. Harry Wiggins and Dr. Beto Gracia, Neurosurgical Service, [facility]  Dr. Dominic Louis, radiation oncology, [facility]  Oncology History:   Seth presented in September 2017 with dizzy spells, headache, and increasing somnolence. He was admitted on 9/28/17 after imaging in ED found a right temporal lobe mass. He went on to have gross total resection on 10/3/17.   10/27/17 - - neuro-oncology/medical oncology consultation, Dr. Armstrong.  11/12/17 through December 22, 2017 adjuvant treatment with concurrent chemoradiation with temozolomide. 6000 cGy, and 30 fractions, standard 200 cGy per fraction  1/19/18 - - brain MRI: Impression: 1. 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 Recommend attention on follow-up. Postsurgical changes of prior resection of right temporal glioblastoma. Resolution of right frontotemporal subdural hematoma and T2 hyperintensity along the right internal capsule and commisural white matter.    1/22/18 - - oncology/medical oncology follow-up, Dr. Armstrong. Plan: initiate adjuvant five day temozolomide and OPTune device. Cycle 1/day one of temozolomide, 150 mg/m  on days one through five of each 28 day cycle.    2/20/18 - - oncology follow-up, SCARLETT Hillman. Cycle 2/day one of five day temozolomide. Dose increased to 200 mg/m .    3/15/18 - - brain MRI: Impression: In this patient with glioblastoma multiforme status post gross total resection, chemoradiation and adjuvant Temodar and Optune, there is increased nodular contrast enhancement and T2 hyperintensity at the margins of the right temporal resection cavity. Perfusion characteristics suggestive of tumor recurrence, particularly medially.    3/19/18 - - neuro-oncology/medical oncology follow-up, Dr. Armstrong.    4/6/18 - - neurology follow-up, Dr. Boyer. plan to continue [facility]     4/19/18 MRI brain   1. Increased size of enhancing mass and surrounding vasogenic edema in  the right temporal lobe, which is suggestive of recurrence of  high-grade tumor, while the MR perfusion and diffusion imaging  characteristics are indeterminate for recurrent tumor (described in  detail above). Therefore, recommend shorter term follow-up study such  as one month.  2. Increasing mass effect has caused further compression of the right  lateral ventricle. No hydrocephalus at this time.    4/23/18 -- neuro-oncology follow-up, Dr. Armstrong  Consensus of multidisciplinary neuro-oncology tumor board: MRI consistent with progression of disease while on Optune and  Temozolomide.  5/4/18-neurosurgical consultation with Dr. Beto Gracia re: TOCA trial  5/11/18--neuro-oncology follow-up, Dr. Armstrong. Plan: proceed with surgical debulking/enrollment and randomization on the Toca trial.  5/17/18 - -Procedure:  tumor debulking (a portion of the tumor adherent vessels could not be resected). Patient was randomized to trial arm that did not include the Toca therapeutic virus.  1) Right craniotomy for tumor resection  2) Neuronavigation  3) Microdissection      Surgeon: Beto Gracia. MD, PhD  6/1/18 - - neurosurgery follow-up, Dr. Gracia. Again noted. The patient was randomized to standard therapy.  6/19/18 - - brain MRI - stable since 5/18 post-op scan.  6/22/18--neuro-oncology follow-up, Dr. Armstrong. Plan: initiate 2nd-line treatment with lomustine ([facility]) once cleared by insurance.  NOTE: patient delayed initiation of chemotherapy with lomustine until on or around July 4, but did not notify the care team of this fact until one week later.  7/9/18: patient s wife called in due to patient being last arousable, severely worsening fatigue, slouching the left side with left-sided facial weakness, and worsening gait.  7/10/18 - - oncology follow-up, SCARLETT Hillman.  Plan: direct admission to the ER for concern for acute stroke versus progression of disease.  7/10/18 - - brain MRI, compared to June 19 MRI: Impression:  1. Right anterior temporal lobe tumor progression. Progression of  tumor into the right corpus striatum, which may explain the patient's  left upper extremity motor weakness. Extensive associated edema with 5  mm of leftward midline shift and borderline right uncal herniation.  2. No ischemic infarct.     [Result: Enlarging right temporal lobe mass]  7/10 through 7/12/18 - - hospitalization at the [facility] for severely worsening left-sided weakness and cerebral edema.  MRI was done which showed increased contrast-enhancing region and edema surrounding the resection  cavity concerning for tumor progression.  Patient was administered course of dexamethasone and prolonged course of steroids, with plans for potential repeat resection by Dr. Gracia in end of July.  7/20/18 - - neuro-oncology follow-up, Dr. Armstrong.  7/20/18 - - neurosurgical follow-up, Dr. Gracia. Impression/plan: given the size of the lesion, increased between June 19 and July 10, initial consideration was given for repeat surgical resection; however, after trial of dexamethasone steroids, with improvement in symptoms and FLAIR signal on MRI performed August 1, was recommended to resume systemic therapy rather than undergo repeat resection at this time.  8/6/18 - - recommendation was given for dexamethasone taper down to 4 mg three times a day for the next week, then 4 mg BID. However, confirmed during 8/16 visit that patient did not follow this recommendation.  8/14/18 - - brain MRI compared to July 10: Impression:.      1. Postsurgical changes from previous anterior right temporal tumor  resection with stable appearing resection cavity  2. Reduction in size and intensity of FLAIR hyperintensities in the  right frontal, parietal, and temporal lobes.  3. Stable nodular enhancement of the inferior medial portion of the  lesion, concerning for tumor recurrence.  4. Compared to the MRI dated 7/10/2018, the biopsy-proven GBM appears  to have reduced in size as described above..  8/17/18 - - neuro-oncology follow-up, Dr. Armstrong.  8/24/18--  neuro-oncology follow-up, Dr. Armstrong.      Interim History:  Mr. Iglesias returns today with his wife.     He comes in for reconsideration of lomustine at this time.  I had met with him 7 days ago.  At that time, I prescribed a dexamethasone taper as he has been on high steroid doses throughout July and the first part of August.  His wife confirms for me that as of now, he is taking 4 mg capsules, 1 tablet of 4 mg, in other words at 8 a.m. and 4 p.m. twice a day, so we discussed further tapering.   "He generally is feeling well.  [place].  He has some characteristic cushingoid faces from the dexamethasone.  He has not had any rebound headache.  He has some trouble getting around and has to get up in the middle of the night to urinate, but he has not sustained any falls since last week.       We had a reassessment of his thrombocytopenia.  I did a peripheral blood smear.  There was no evidence of pseudothrombocytopenia or other abnormal platelet issues.  His platelets were rechecked on  and were 38 K.  We rechecked , which was earlier this week, and it was 91.  Today it is approximately 120-130K range.                 Review Of Systems:  Complete 12-point ROS reviewed. Pertinent symptoms reviewed above per HPI.    PAST MEDICAL HISTORY:   1.  Glioblastoma, status post gross total resection on 10/03/2017 of the right temporal lobe preceded and indicated by some seizure-like activity without loss of consciousness.   2.  Chronic back pain.   3.  Ganglionic cysts.   4.  Hyperlipidemia.   5.  He has psoriasis and was taking what sounds like a TNF and an alpha inhibitor up until the summer of 2017.       PAST SURGICAL HISTORY:     1.  The aforementioned right temporal craniotomy for gross total resection of right temporal glioblastoma done 10/03/2017 with Dr. Harry Wiggins at the [facility].   2.  Colonoscopy performed 2011, unremarkable.   3. Surgical debulking of recurrent GB tumor, May 17 2018.      FAMILY HISTORY:  He had a Father and 2 paternal uncles who had prostate cancer.  His mother had some sort of \"tumor of the rib,\" and  at age 54 of this unknown cancer.       SOCIAL HISTORY:  The patient lives in [place], [place].  He is accompanied by his wife, who is extremely supportive.  They have 3 sons ages 18, 23 and 25.  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  He has since then been working for [employer], about 27 miles from his home.    Tobacco:  Never smoker.    Allergies:none    Current Medications:   Current Outpatient Prescriptions   Medication     dexamethasone (DECADRON) 4 MG tablet     GLEOSTINE 100 MG capsule CHEMO     GLEOSTINE 40 MG capsule CHEMO     levETIRAcetam (KEPPRA) 1000 MG TABS     Multiple Vitamins-Minerals (MULTIVITAMIN & MINERAL PO)     ondansetron (ZOFRAN-ODT) 8 MG ODT tab     oxyCODONE IR (ROXICODONE) 5 MG tablet     pantoprazole (PROTONIX) 40 MG EC tablet     polyethylene glycol (MIRALAX/GLYCOLAX) Packet     tamsulosin (FLOMAX) 0.4 MG capsule     [DISCONTINUED] Temozolomide (TEMODAR) 180 MG capsule     No current facility-administered medications for this visit.          Physical Exam:  BP (!) 130/96 (BP Location: Left arm, Patient Position: Sitting, Cuff Size: Adult Regular)  Pulse 111  Temp 97.6  F (36.4  C) (Oral)  Resp 18  Ht 1.753 m (5' 9.02\")  Wt 96.5 kg (212 lb 11.2 oz)  SpO2 96%  BMI 31.4 kg/m2  KPS 70  GENERAL:  Very pleasant middle-aged  gentleman who appears in no acute distress, alert and oriented x3.  Speech is fluent.  He does not have any evidence of receptive or expressive aphasia.   HEAD: Right frontal region of craniotomy is fully healed, indented, no overlying fluctuance, erythema or tenderness.  HEENT:  Anicteric sclerae.  Oropharynx is without evidence of mucositis or thrush. PERRL; no vertical nor horizontal nystagmus.  NEUROLOGIC:  Cranial nerves II-XII grossly intact.  Motor strength was 5/5 on the right, 4+/5 on the left upper and lower extremities - stable since 7/20/18.    Sensation is grossly intact throughout.  No dysdiadochokinesia.   His gait is wide-based and continues to favor the left side.  Negative Romberg sign. + left-sided dysmetria.          Laboratory/Imaging Studies    Lab Results   Component Value Date    WBC 3.8 08/24/2018     Lab Results   Component Value Date    " RBC 3.80 08/24/2018     Lab Results   Component Value Date    HGB 13.0 08/24/2018     Lab Results   Component Value Date    HCT 36.7 08/24/2018     No components found for: MCT  Lab Results   Component Value Date    MCV 97 08/24/2018     Lab Results   Component Value Date    MCH 34.2 08/24/2018     Lab Results   Component Value Date    MCHC 35.4 08/24/2018     Lab Results   Component Value Date    RDW 15.1 08/24/2018     Lab Results   Component Value Date     08/24/2018         ASSESSMENT/PLAN:  Seth Iglesias is a 59 y/o man with right temporal glioblastoma (WHO grade IV). He had gross total resection on 10/3/17 and completed adjuvant chemorads on 12/22/17. His 4 week post-treatment brain MRI showed areas of enhancement around the resection cavity thought to represent radiation necrosis rather than recurrence. He then started temozolomide maintenance in January 2018, at a dose of 150 mg/m2 for cycle 1. This was increased to 200 mg/m2 for cycle 2. He also started the Optune device in January 2018.     Upon progression of disease after about less than 6 months of temozolomide in the adjuvant setting, he underwent surgical debulking following randomization on the Tocagen trial to the non-viral therapeutic arm.  He underwent surgical debulking.  Dr. Gracia was not able to obtain a full total gross resection for this secondary surgery due to the involvement of some of the vessels.  He sustained clinical decline and worsening neurologic compromise in late June/early July after randomizing to the non-viral arm on the Tocagen trial.  We had recommended and attempted to initiate standard-of-care lomustine in late June.  It was prescribed to him on 06/30. However, the patient did not begin taking this medication until 07/04/18.  Within a few days of that, he developed left-sided weakness.        At this point with a significant rebound in his platelets, which I attribute partially to the high-dose dexamethasone, we  will proceed with caution with the second cycle of lomustine.  He will follow up with Helena Landin from our physician assistant team in 10-11 days' time.  He will have a CBC and CMP drawn 45 minutes prior to that appointment.  The purpose of that appointment is general a check up to make sure he is doing well.      In the meantime, I also prescribed dexamethasone taper for him to go down to 4 mg in a.m. and then 2 mg at 4 p.m. for 5 days and then they will go 2 mg in the morning and 2 mg at 4:00 p.m. That will be a 10-day total taper by which time he will have followed up with Helena or any other available RACHEL in our clinic awaiting further instruction.  If he is doing well without rebound headache by that point then we can go down to a continued slow taper with 2 mg in the morning and 1 mg in the afternoon for 5 days, then 1 mg twice a day for 5 days, then 1 mg in the morning for 5 days on and then off.  In addition, last week we did process [employer] genomic profiling testing.  This has been sent off and the results are not yet back.  His wife asked about it.  I stated it would not impact the recommendation for proceeding with lomustine on trial at this time, but it may provide future treatment options on or off clinical trial based on the genomic results.  She stated full understanding.  They will call us in the interim or come back earlier if they develop any adverse Issues.         I spent 45 minutes in consultation, including H&P and Discussion. >50% of time was spent in counseling and in coordination of care.    Augustus Armstrong MD, PhD

## 2018-08-27 NOTE — TELEPHONE ENCOUNTER
"Requested Prescriptions   Pending Prescriptions Disp Refills     tamsulosin (FLOMAX) 0.4 MG capsule [Pharmacy Med Name: TAMSULOSIN 0.4MG CAP]  .Historical 30 capsule      Sig: TAKE 1 CAPSULE BY MOUTH AT BEDTIME    Alpha Blockers Failed    8/27/2018  9:59 AM       Failed - Blood pressure under 140/90 in past 12 months    BP Readings from Last 3 Encounters:   08/24/18 (!) 130/96   08/17/18 117/76   08/01/18 (!) 140/103          Passed - Recent (12 mo) or future (30 days) visit within the authorizing provider's specialty    Patient had office visit in the last 12 months or has a visit in the next 30 days with authorizing provider or within the authorizing provider's specialty.  See \"Patient Info\" tab in inbasket, or \"Choose Columns\" in Meds & Orders section of the refill encounter.           Passed - Patient does not have Tadalafil, Vardenafil, or Sildenafil on their medication list       Passed - Patient is 18 years of age or older          "

## 2018-09-04 NOTE — NURSING NOTE
"Oncology Rooming Note    September 4, 2018 5:09 PM   Seth Iglesias is a 58 year old male who presents for:    Chief Complaint   Patient presents with     Blood Draw     labs drawn via VPT. Patient checked in for next appt. VS taken.     Oncology Clinic Visit     Return Glioblastoma     Initial Vitals: /74 (BP Location: Right arm, Patient Position: Sitting, Cuff Size: Adult Regular)  Pulse 83  Temp 98.2  F (36.8  C) (Oral)  Resp 16  Ht 1.753 m (5' 9\")  Wt 95.3 kg (210 lb)  SpO2 98%  BMI 31.01 kg/m2 Estimated body mass index is 31.01 kg/(m^2) as calculated from the following:    Height as of this encounter: 1.753 m (5' 9\").    Weight as of this encounter: 95.3 kg (210 lb). Body surface area is 2.15 meters squared.  No Pain (0) Comment: Data Unavailable   No LMP for male patient.  Allergies reviewed: Yes  Medications reviewed: Yes    Medications: Medication refills not needed today.  Pharmacy name entered into [facility]:    [id number] - [place], [place] - [address]  [id number] - [place], [place] - [address]    Clinical concerns: fatigue and gassy; uncomfortable     6 minutes for nursing intake (face to face time)     Kizzy [facility]              "

## 2018-09-04 NOTE — MR AVS SNAPSHOT
After Visit Summary   9/4/2018    Seth Iglesias    MRN: [id number]           Patient Information     Date Of Birth          1960        Visit Information        Provider Department      9/4/2018 4:20 PM Helena Landin PA-C [facility]        Today's Diagnoses     Glioblastoma multiforme of temporal lobe (H)    -  1    Encounter for long-term (current) use of medications           Follow-ups after your visit        Your next 10 appointments already scheduled     Sep 24, 2018  9:00 AM CDT   (Arrive by 8:30 AM)   MR BRAIN W/O & W CONTRAST with UUMR1   [facility] [place], MRI ([facility], [facility])    [address] [facility] [address] [phone]           Take your medicines as usual, unless your doctor tells you not to. Bring a list of your current medicines to your exam (including vitamins, minerals and over-the-counter drugs).  You may or may not receive intravenous (IV) contrast for this exam pending the discretion of the Radiologist.  You do not need to do anything special to prepare.  The MRI machine uses a strong magnet. Please wear clothes without metal (snaps, zippers). A sweatsuit works well, or we may give you a hospital gown.  Please remove any body piercings and hair extensions before you arrive. You will also remove watches, jewelry, hairpins, wallets, dentures, partial dental plates and hearing aids. You may wear contact lenses, and you may be able to wear your rings. We have a safe place to keep your personal items, but it is safer to leave them at home.  **IMPORTANT** THE INSTRUCTIONS BELOW ARE ONLY FOR THOSE PATIENTS WHO HAVE BEEN PRESCRIBED SEDATION OR GENERAL ANESTHESIA DURING THEIR MRI PROCEDURE:  IF YOUR DOCTOR PRESCRIBED ORAL SEDATION (take medicine to help you relax during your exam):   You must get the medicine from your doctor (oral medication) before you arrive. Bring the medicine to the  exam. Do not take it at home. You ll be told when to take it upon arriving for your exam.   Arrive one hour early. Bring someone who can take you home after the test. Your medicine will make you sleepy. After the exam, you may not drive, take a bus or take a taxi by yourself.  IF YOUR DOCTOR PRESCRIBED IV SEDATION:   Arrive one hour early. Bring someone who can take you home after the test. Your medicine will make you sleepy. After the exam, you may not drive, take a bus or take a taxi by yourself.   No eating 6 hours before your exam. You may have clear liquids up until 4 hours before your exam. (Clear liquids include water, clear tea, black coffee and fruit juice without pulp.)  IF YOUR DOCTOR PRESCRIBED ANESTHESIA (be asleep for your exam):   Arrive 1 1/2 hours early. Bring someone who can take you home after the test. You may not drive, take a bus or take a taxi by yourself.   No eating 8 hours before your exam. You may have clear liquids up until 4 hours before your exam. (Clear liquids include water, clear tea, black coffee and fruit juice without pulp.)   You will spend four to five hours in the recovery room.  Please call the Imaging Department at your exam site with any questions.            Oct 01, 2018  8:30 AM CDT   [employer] Lab Draw with  [email or web address] LAB DRAW   [facility] ([facility])    [address] [place] [address]  [address] [place] [address]   [phone]            Oct 01, 2018  9:15 AM CDT   (Arrive by 9:00 AM)   Return Visit with Augustus Armstrong MD   [facility] ([facility])    [address] [place] [address]  [address]  [facility] [address]   [phone]              Future tests that were ordered for you today     Open Standing Orders        Priority Remaining Interval Expires Ordered    CBC with platelets differential Routine 10/10 weekly  9/4/2019 9/4/2018            Who to contact     If you have questions or need follow  "up information about today's clinic visit or your schedule please contact [facility] directly at [phone].  Normal or non-critical lab and imaging results will be communicated to you by Number 100hart, letter or phone within 4 business days after the clinic has received the results. If you do not hear from us within 7 days, please contact the clinic through [employer] or phone. If you have a critical or abnormal lab result, we will notify you by phone as soon as possible.  Submit refill requests through [employer] or call your pharmacy and they will forward the refill request to us. Please allow 3 business days for your refill to be completed.          Additional Information About Your Visit        Number 100harDC Devices Information     [employer] gives you secure access to your electronic health record. If you see a primary care provider, you can also send messages to your care team and make appointments. If you have questions, please call your primary care clinic.  If you do not have a primary care provider, please call [phone] and they will assist you.        Care EveryWhere ID     This is your Care EveryWhere ID. This could be used by other organizations to access your [facility] medical records  [id number]        Your Vitals Were     Pulse Temperature Respirations Height Pulse Oximetry BMI (Body Mass Index)    83 98.2  F (36.8  C) (Oral) 16 1.753 m (5' 9\") 98% 31.01 kg/m2       Blood Pressure from Last 3 Encounters:   09/04/18 139/74   08/24/18 (!) 130/96   08/17/18 117/76    Weight from Last 3 Encounters:   09/04/18 95.3 kg (210 lb)   08/24/18 96.5 kg (212 lb 11.2 oz)   08/17/18 97.9 kg (215 lb 14.4 oz)              We Performed the Following     CBC with platelets differential     Comprehensive metabolic panel          Today's Medication Changes          These changes are accurate as of 9/4/18 11:59 PM.  If you have any questions, ask your nurse or doctor.               These medicines have changed or have " updated prescriptions.        Dose/Directions    * dexamethasone 4 MG tablet   Commonly known as:  DECADRON   This may have changed:  Another medication with the same name was added. Make sure you understand how and when to take each.   Used for:  GBM (glioblastoma multiforme) (H), LUE weakness   Changed by:  Helena Landin PA-C        Dose:  4 mg   Take 1 tablet (4 mg) by mouth every 6 hours Or as directed by provider   Quantity:  85 tablet   Refills:  0       * dexamethasone 1 MG tablet   Commonly known as:  DECADRON   This may have changed:  You were already taking a medication with the same name, and this prescription was added. Make sure you understand how and when to take each.   Used for:  Glioblastoma multiforme of temporal lobe (H)   Changed by:  Helena Landin PA-C        Take 2 mg in AM and 1 mg in PM x 5 days, then 1 mg in AM and 1 mg in PM x 5 days, then 1 mg in AM x 5 days, then off   Quantity:  60 tablet   Refills:  0       * Notice:  This list has 2 medication(s) that are the same as other medications prescribed for you. Read the directions carefully, and ask your doctor or other care provider to review them with you.         Where to get your medicines      These medications were sent to [facility] [address]     Phone:  [phone]     dexamethasone 1 MG tablet                Primary Care Provider Office Phone # Fax #    R Nigel Salmon [phone] [address] [facility] [address]        Equal Access to Services     [facility] AH: Hadii vero ku hadasho Soomaali, waaxda luqadaha, qaybta kaalmada adeegyada, héctor kerns. So [facility] [phone].    ATENCIÓN: Si habla español, tiene a granados disposición servicios gratuitos de asistencia lingüística. Llame al [phone].    We comply with applicable federal civil rights laws and [place]  laws. We do not discriminate on the basis of race, color, national origin, age, disability, sex, sexual orientation, or gender identity.            Thank you!     Thank you for choosing [facility]  for your care. Our goal is always to provide you with excellent care. Hearing back from our patients is one way we can continue to improve our services. Please take a few minutes to complete the written survey that you may receive in the mail after your visit with us. Thank you!             Your Updated Medication List - Protect others around you: Learn how to safely use, store and throw away your medicines at www.disposemymeds.org.          This list is accurate as of 9/4/18 11:59 PM.  Always use your most recent med list.                   Brand Name Dispense Instructions for use Diagnosis    * dexamethasone 4 MG tablet    DECADRON    85 tablet    Take 1 tablet (4 mg) by mouth every 6 hours Or as directed by provider    GBM (glioblastoma multiforme) (H), LUE weakness       * dexamethasone 1 MG tablet    DECADRON    60 tablet    Take 2 mg in AM and 1 mg in PM x 5 days, then 1 mg in AM and 1 mg in PM x 5 days, then 1 mg in AM x 5 days, then off    Glioblastoma multiforme of temporal lobe (H)       * GLEOSTINE 100 MG capsule CHEMO   Generic drug:  lomustine      Take 240 mg every 6 weeks.        * GLEOSTINE 40 MG capsule CHEMO   Generic drug:  lomustine      Take 240 mg every 6 weeks        levETIRAcetam 1000 MG Tabs    KEPPRA    180 tablet    Take 1,000 mg by mouth every 12 hours    Brain mass       MULTIVITAMIN & MINERAL PO      Take 1 tablet by mouth daily        ondansetron 8 MG ODT tab    ZOFRAN-ODT    60 tablet    Take 1 tablet (8 mg) by mouth every 8 hours as needed    GBM (glioblastoma multiforme) (H)       oxyCODONE IR 5 MG tablet    ROXICODONE    45 tablet    Take 1-2 tablets (5-10 mg) by mouth every 3 hours as needed for other (pain control or improvement in physical function. Hold dose for  analgesic side effects.)    S/P craniotomy       pantoprazole 40 MG EC tablet    PROTONIX    90 tablet    Take 1 tablet (40 mg) by mouth every morning (before breakfast)    GBM (glioblastoma multiforme) (H)       polyethylene glycol Packet    MIRALAX/GLYCOLAX    7 packet    Take 17 g by mouth daily    S/P craniotomy       * tamsulosin 0.4 MG capsule    FLOMAX     Take 0.4 mg by mouth daily        * tamsulosin 0.4 MG capsule    FLOMAX    30 capsule    TAKE 1 CAPSULE BY MOUTH AT BEDTIME    Urinary frequency       * Notice:  This list has 6 medication(s) that are the same as other medications prescribed for you. Read the directions carefully, and ask your doctor or other care provider to review them with you.

## 2018-09-04 NOTE — NURSING NOTE
Chief Complaint   Patient presents with     Blood Draw     labs drawn via VPT. Patient checked in for next appt. VS taken.     Labs collected from venipuncture by RN. Vitals taken. Checked in for appointment(s).    Carmela CLARKE RN PHN BSN  BMT/Oncology Lab

## 2018-09-04 NOTE — LETTER
"9/4/2018      RE: Seth Iglesias  [address]  [facility] [address]       Hematology-Oncology Visit  Sep 4, 2018    Reason for Visit: follow-up glioblastoma    HPI: Seth Iglesias is a 58 year old gentleman with past medical history of psoriasis, HLD with glioblastoma. He presented with \"dizzy spells\" 9/2017 and went to [facility] ED 9/28 with lethargy, confusion, and dizziness. CT was done showing R temporal lobe mass with edema. He was given IV steroids and had MRI showing large tumor in anterior right temporal lobe with associated intratumoral hemorrhage and cerebellar mass effect. Tumor was 4.4 cm. There was a potential additional lesion measuring 5 mm in paramedian right frontal lobe. He was admitted to [facility] and had right temporal craniotomy on 10/3 by Dr. Wiggins. He performed gross total resection of the tumor, confirming WHO grade 4 glioblastoma. IDH testing was negative. MGMT promoter methylation was absent. He started concurrent chemoradiation with Temodar on 11/12/17 and completed on 12/22/17. He started adjuvant Temodar + Optune device on 1/22/18. He received 5 cycles of adjuvant therapy and had clear progression of disease in R temporal lobe. He enrolled in TOCA trial and had resection on 5/17 with Dr. Gracia. He was randomized to standard of care arm. Of note, gross total resection was not possible due to vessel involvement. He had a baseline MRI on 6/19/18 with residual tumor. He was on dex taper, dex stopped 6/29. Started lomustine on 7/4. Called in on 7/7 with extreme fatigue and acute change in clinical status. He was told to be observed. Called in on 7/9 with continued symptoms and was added to my schedule today.     I saw him on 7/10. He had new L sided weakness and was extremely lethargic. I sent him to ED. At first he appeared to have had a stroke however had massive edema along R anterior temporal lobe tumor causing midline shift. He was given high dose steroids. Repeat resection was " "planned however his symptoms and edema improved significantly on dex. He saw Dr. Armstrong in follow-up 8/17 and 8/24. Cycle 2 of Lomustine was started on 8/24, delayed by one week due to thrombocytopenia. Adrián of 36K.     Interval History: Mr. Iglesias is here with his wife today. He is overall feeling well apart from fatigue. Fatigue has been worse since tapering dex and starting chemo. He has been on 2 mg BID for about 5 days. He did take cycle 2 on 8/24. He is resting for about 2 hours in the early afternoon otherwise has been active and is walking up to 1/2 block with his walker and taking stairs with assistance. He has not had any nausea. Has had a few episodes of gas pains and then having looser stools. This is happening infrequently so he is not taking anything for it beyond Gas-X. Denies any fevers/chills, cough, SOB, CP. His edema is slowly improving. Weakness feels about the same but his functionality has improved as above. He has no bleeding/bruising symptoms. No headaches or new neuro sxs. ROS otherwise negative.     Current Outpatient Prescriptions   Medication     dexamethasone (DECADRON) 4 MG tablet     GLEOSTINE 100 MG capsule CHEMO     GLEOSTINE 40 MG capsule CHEMO     levETIRAcetam (KEPPRA) 1000 MG TABS     Multiple Vitamins-Minerals (MULTIVITAMIN & MINERAL PO)     ondansetron (ZOFRAN-ODT) 8 MG ODT tab     oxyCODONE IR (ROXICODONE) 5 MG tablet     pantoprazole (PROTONIX) 40 MG EC tablet     polyethylene glycol (MIRALAX/GLYCOLAX) Packet     tamsulosin (FLOMAX) 0.4 MG capsule     tamsulosin (FLOMAX) 0.4 MG capsule     [DISCONTINUED] Temozolomide (TEMODAR) 180 MG capsule     No current facility-administered medications for this visit.        PHYSICAL EXAM:  /74 (BP Location: Right arm, Patient Position: Sitting, Cuff Size: Adult Regular)  Pulse 83  Temp 98.2  F (36.8  C) (Oral)  Resp 16  Ht 1.753 m (5' 9\")  Wt 95.3 kg (210 lb)  SpO2 98%  BMI 31.01 kg/m2  General: Alert, oriented, pleasant, " NAD. Examined in wheelchair.   HEENT: Normocephalic, atraumatic, PERRLA, EOMI. Moist mucus membranes, no lesions, or thrush  Lungs: CTA bilaterally, normal work of breathing  Cardiac: RRR, S1, S2, no murmurs  Abdomen: Soft, nontender, nondistended. Normoactive bowel sounds.   Neuro: CN II-XII intact, no longer has facial droop or tongue deviation. Minimal weakness in LUE compared to RUE, LE strength is equal.   Extremities: No pedal edema    Labs:    9/4/2018 17:11   Sodium 137   Potassium 4.0   Chloride 105   Carbon Dioxide 20   Urea Nitrogen 22   Creatinine 0.87   GFR Estimate 90   GFR Estimate If Black >90   Calcium 8.3 (L)   Anion Gap 12   Albumin 3.1 (L)   Protein Total 6.6 (L)   Bilirubin Total 0.6   Alkaline Phosphatase 86   ALT 53   AST 34   Glucose 108 (H)   WBC 10.1   Hemoglobin 13.2 (L)   Hematocrit 38.1 (L)   Platelet Count 140 (L)   RBC Count 3.82 (L)      MCH 34.6 (H)   MCHC 34.6   RDW 16.3 (H)   Diff Method Manual Differential   % Neutrophils 80.0   % Lymphocytes 7.0   % Monocytes 5.2   % Eosinophils 0.0   % Basophils 0.0   % Metamyelocytes 3.5   % Myelocytes 4.3   Nucleated RBCs 3 (H)   Absolute Neutrophil 8.1   Absolute Lymphocytes 0.7 (L)   Absolute Monocytes 0.5   Absolute Eosinophils 0.0   Absolute Basophils 0.0   Absolute Metamyelocytes 0.4 (H)   Absolute Myelocytes 0.4 (H)   Absolute Nucleated RBC 0.3   Anisocytosis Slight   Poikilocytosis Moderate   Teardrop Cells Slight   Camden Cells Moderate   Platelet Estimate Confirming automa...       Assessment & Plan:     1. Right temporal GBM: S/p gross total resection on 10/3/17 and chemoradiation 11/13-12/22/17. He started adjuvant Temodar and Optune device on 1/22/18. PD after 5 cycles. Had repeat resection per TOCA trial and randomized to standard of care with lomustine, started 7/4/18. Had residual tumor left upon starting treatment. After 1 cycle brain MRI was concerning for progression however majority of edema and FLAIR signal resolved on  high dose steroids. He proceeded with cycle 2  on 8/24. Tolerating well with manageable fatigue which is likely from dex taper as well. His counts are still recovering from prior cycle. Expect late rachael with lomustine--4-6 weeks after dose. Platelet rachael was 36K with cycle 1. Will check weekly labs to evaluate rachael with cycle 2. If he has platelets drop <75K or WBC <3, he would technically need a dose reduction per dosing guidelines.     Dex taper as follows 2 mg in AM, 1 mg in PM x 5 days, 1 mg BID x 5 days, 1 mg in AM x 5 days, then off.     Follow-up with Dr. Armstrong in October with restaging. Call with interval concerns.     Helena Landin PA-C    [facility]  [address] [place] [address] [phone]

## 2018-09-04 NOTE — PROGRESS NOTES
"Hematology-Oncology Visit  Sep 4, 2018    Reason for Visit: follow-up glioblastoma    HPI: Seth Iglesias is a 58 year old gentleman with past medical history of psoriasis, HLD with glioblastoma. He presented with \"dizzy spells\" 9/2017 and went to [facility] ED 9/28 with lethargy, confusion, and dizziness. CT was done showing R temporal lobe mass with edema. He was given IV steroids and had MRI showing large tumor in anterior right temporal lobe with associated intratumoral hemorrhage and cerebellar mass effect. Tumor was 4.4 cm. There was a potential additional lesion measuring 5 mm in paramedian right frontal lobe. He was admitted to [facility] and had right temporal craniotomy on 10/3 by Dr. Wiggins. He performed gross total resection of the tumor, confirming WHO grade 4 glioblastoma. IDH testing was negative. MGMT promoter methylation was absent. He started concurrent chemoradiation with Temodar on 11/12/17 and completed on 12/22/17. He started adjuvant Temodar + Optune device on 1/22/18. He received 5 cycles of adjuvant therapy and had clear progression of disease in R temporal lobe. He enrolled in TOCA trial and had resection on 5/17 with Dr. Gracia. He was randomized to standard of care arm. Of note, gross total resection was not possible due to vessel involvement. He had a baseline MRI on 6/19/18 with residual tumor. He was on dex taper, dex stopped 6/29. Started lomustine on 7/4. Called in on 7/7 with extreme fatigue and acute change in clinical status. He was told to be observed. Called in on 7/9 with continued symptoms and was added to my schedule today.     I saw him on 7/10. He had new L sided weakness and was extremely lethargic. I sent him to ED. At first he appeared to have had a stroke however had massive edema along R anterior temporal lobe tumor causing midline shift. He was given high dose steroids. Repeat resection was planned however his symptoms and edema improved significantly on dex. He saw Dr. Armstrong " "in follow-up 8/17 and 8/24. Cycle 2 of Lomustine was started on 8/24, delayed by one week due to thrombocytopenia. Adrián of 36K.     Interval History: Mr. Iglesias is here with his wife today. He is overall feeling well apart from fatigue. Fatigue has been worse since tapering dex and starting chemo. He has been on 2 mg BID for about 5 days. He did take cycle 2 on 8/24. He is resting for about 2 hours in the early afternoon otherwise has been active and is walking up to 1/2 block with his walker and taking stairs with assistance. He has not had any nausea. Has had a few episodes of gas pains and then having looser stools. This is happening infrequently so he is not taking anything for it beyond Gas-X. Denies any fevers/chills, cough, SOB, CP. His edema is slowly improving. Weakness feels about the same but his functionality has improved as above. He has no bleeding/bruising symptoms. No headaches or new neuro sxs. ROS otherwise negative.     Current Outpatient Prescriptions   Medication     dexamethasone (DECADRON) 4 MG tablet     GLEOSTINE 100 MG capsule CHEMO     GLEOSTINE 40 MG capsule CHEMO     levETIRAcetam (KEPPRA) 1000 MG TABS     Multiple Vitamins-Minerals (MULTIVITAMIN & MINERAL PO)     ondansetron (ZOFRAN-ODT) 8 MG ODT tab     oxyCODONE IR (ROXICODONE) 5 MG tablet     pantoprazole (PROTONIX) 40 MG EC tablet     polyethylene glycol (MIRALAX/GLYCOLAX) Packet     tamsulosin (FLOMAX) 0.4 MG capsule     tamsulosin (FLOMAX) 0.4 MG capsule     [DISCONTINUED] Temozolomide (TEMODAR) 180 MG capsule     No current facility-administered medications for this visit.        PHYSICAL EXAM:  /74 (BP Location: Right arm, Patient Position: Sitting, Cuff Size: Adult Regular)  Pulse 83  Temp 98.2  F (36.8  C) (Oral)  Resp 16  Ht 1.753 m (5' 9\")  Wt 95.3 kg (210 lb)  SpO2 98%  BMI 31.01 kg/m2  General: Alert, oriented, pleasant, NAD. Examined in wheelchair.   HEENT: Normocephalic, atraumatic, PERRLA, EOMI. Moist " mucus membranes, no lesions, or thrush  Lungs: CTA bilaterally, normal work of breathing  Cardiac: RRR, S1, S2, no murmurs  Abdomen: Soft, nontender, nondistended. Normoactive bowel sounds.   Neuro: CN II-XII intact, no longer has facial droop or tongue deviation. Minimal weakness in LUE compared to RUE, LE strength is equal.   Extremities: No pedal edema    Labs:    9/4/2018 17:11   Sodium 137   Potassium 4.0   Chloride 105   Carbon Dioxide 20   Urea Nitrogen 22   Creatinine 0.87   GFR Estimate 90   GFR Estimate If Black >90   Calcium 8.3 (L)   Anion Gap 12   Albumin 3.1 (L)   Protein Total 6.6 (L)   Bilirubin Total 0.6   Alkaline Phosphatase 86   ALT 53   AST 34   Glucose 108 (H)   WBC 10.1   Hemoglobin 13.2 (L)   Hematocrit 38.1 (L)   Platelet Count 140 (L)   RBC Count 3.82 (L)      MCH 34.6 (H)   MCHC 34.6   RDW 16.3 (H)   Diff Method Manual Differential   % Neutrophils 80.0   % Lymphocytes 7.0   % Monocytes 5.2   % Eosinophils 0.0   % Basophils 0.0   % Metamyelocytes 3.5   % Myelocytes 4.3   Nucleated RBCs 3 (H)   Absolute Neutrophil 8.1   Absolute Lymphocytes 0.7 (L)   Absolute Monocytes 0.5   Absolute Eosinophils 0.0   Absolute Basophils 0.0   Absolute Metamyelocytes 0.4 (H)   Absolute Myelocytes 0.4 (H)   Absolute Nucleated RBC 0.3   Anisocytosis Slight   Poikilocytosis Moderate   Teardrop Cells Slight   Denton Cells Moderate   Platelet Estimate Confirming automa...       Assessment & Plan:     1. Right temporal GBM: S/p gross total resection on 10/3/17 and chemoradiation 11/13-12/22/17. He started adjuvant Temodar and Optune device on 1/22/18. PD after 5 cycles. Had repeat resection per TOCA trial and randomized to standard of care with lomustine, started 7/4/18. Had residual tumor left upon starting treatment. After 1 cycle brain MRI was concerning for progression however majority of edema and FLAIR signal resolved on high dose steroids. He proceeded with cycle 2  on 8/24. Tolerating well with  manageable fatigue which is likely from dex taper as well. His counts are still recovering from prior cycle. Expect late rachael with lomustine--4-6 weeks after dose. Platelet rachael was 36K with cycle 1. Will check weekly labs to evaluate rachael with cycle 2. If he has platelets drop <75K or WBC <3, he would technically need a dose reduction per dosing guidelines.     Dex taper as follows 2 mg in AM, 1 mg in PM x 5 days, 1 mg BID x 5 days, 1 mg in AM x 5 days, then off.     Follow-up with Dr. Armstrong in October with restaging. Call with interval concerns.     Helena Landin PA-C    [facility]  [address] [place] [id number] [phone]

## 2018-09-18 NOTE — TELEPHONE ENCOUNTER
[facility] Telephone Triage Note    Assessment: Spouse/Partner Maryanne called in to triage reporting the following symptoms: patient currently tapered down to 1mg of dexamethasone in the am and 1mg of the dexamethasone in the pm but is very tired. Patient was to taper down to only 1mg in the am, no pm admin, but wife is wonderin if she should stay at least at te 1mg in the am and pm for now.     Action: Writer paged Dr. Augustus Armstrong at [phone]    Recommendations: Discussed with Dr. Armstrong.  Patient is to go up on his current dexamethasone regimen and start taking 2mg in the morning and 1mg in the evening..    Follow-Up: Instructed patient to seek care immediately for worsening symptoms, including: fever, chest pain, shortness of breath, dizziness. Patient voiced understanding of advice and/or instructions given.

## 2018-09-20 PROBLEM — D69.6 THROMBOCYTOPENIA (H): Status: ACTIVE | Noted: 2018-01-01

## 2018-09-20 NOTE — TELEPHONE ENCOUNTER
Writer spoke to Chastity TEJEDA who is already addressing Maryanne's concern. No additional follow-up required by triage department at this time.    Sirisha Uribe RN   [facility] Triage

## 2018-09-20 NOTE — PROGRESS NOTES
RN Care Coordination Note  Reason for Outgoing Call:   Message from Manisha Vargasill about f/u on critical labs 9/18. Chart reviewed.  Nursing Action/Patient Instruction:  Call to Pt's wife, who states she thinks he is much better after increasing dex 2 mg bid yesterday, still spending pretty much all day / night in bed but eating better and more cognitively together starting last night. States she sent MyChart message yesterday, see message. No Home Health in place. no s/sx of active bleeding or easy bruising noted.  Explained to Maryanne that Dr. Armstrong is out of town, that I will discuss above with Helena and we will make a plan. She is open to home health.  - discussed with SCARLETT Hillman who recommends lab check tomorrow 9/21, home care referral. If pt's wife worried about sx today, we will see him in clinic, otherwise continue dex 2 mg bid until MRI/ appt with Dr. Armstrong. If plt below 10K pt will need plt transfusion.  - Home Care referral placed, call to [facility] re: lab draw tomorrow am  - call to Maryanne:  She understands to continue dex 2 mg bid, to expect a call re:  HomeCare setting up services starting tomorrow am with lab draw. Explained to pt's wife that appt today is an option which she declines, stating she thinks he is better back on higher dex. We will review labs tomorrow and arrange plt transfusion if needed. Maryanne is to call with new sx in the interim.  Patient Response/Evaluation:   Maryanne voiced understanding of above instructions and information and denied further questions    Chastity Kaplan, RN, BSN, OCN  Care Coordinator  [facility]

## 2018-09-21 NOTE — PROGRESS NOTES
RN Care Coordination Note  Call to pt's wife to let her know that plts at 28K today, no plt transfusion needed. Reiterated sx to report. Pt's wife reports they were glad to have home care start today, and that Candelario is up out of bed and joking with the home care nurse today. She understands bleeding/bruising sx to monitor for and report and that we will plan for repeat CSC after MRI on 9/24 as scheduled.   Pt voiced understanding of above instructions and information and denied further questions  Chastity Kaplan, RN, BSN, OCN  Care Coordinator  [facility]

## 2018-09-21 NOTE — PROGRESS NOTES
Spoke with Maryanne to let her know patient would have to come to [facility] for law draw after MRI at [facility]. Verbalized understanding.     Pam Prabhakar, RN, BSN

## 2018-09-25 NOTE — PROGRESS NOTES
[facility] now requests orders and shares plan of care/discharge summaries for some patients through [employer].  Please REPLY TO THIS MESSAGE OR ROUTE BACK TO THE AUTHOR in order to give authorization for orders when needed.  This is considered a verbal order, you will still receive a faxed copy of orders for signature.  Thank you for your assistance in improving collaboration for our patients.    ORDER      Home Occupational Therapy cognition, ADLs, home safety. equipment and breathing. The plan will also include falls prevention plan.    1w1, 2w3

## 2018-09-27 PROBLEM — R22.0 FACIAL SWELLING: Status: ACTIVE | Noted: 2018-01-01

## 2018-09-27 NOTE — ED NOTES
Bed: IN02  Expected date:   Expected time:   Means of arrival:   Comments:  Seth Iglesias  MRN [id number]    Brain tumor  Facial swelling around R eye  Speech different this morning  SOB and fatigued  Home care RN called. Had a brain MRI on 9/24/18 showed that mass was growing.

## 2018-09-27 NOTE — IP AVS SNAPSHOT
MRN:[id number]                      After Visit Summary   9/27/2018    Seth Iglesias    MRN: [id number]           Thank you!     Thank you for choosing [facility] for your care. Our goal is always to provide you with excellent care. Hearing back from our patients is one way we can continue to improve our services. Please take a few minutes to complete the written survey that you may receive in the mail after you visit with us. Thank you!        Patient Information     Date Of Birth          1960        Designated Caregiver       Most Recent Value    Caregiver    Will someone help with your care after discharge? yes    Name of designated caregiver Maryanne    Phone number of caregiver [phone]    Caregiver address same as pt      About your hospital stay     You were admitted on:  September 27, 2018 You last received care in the:  [address] [facility] [place]    You were discharged on:  September 29, 2018        Reason for your hospital stay       Admitted for treatment of cellulitis.                  Who to Call     For medical emergencies, please call 911.  For non-urgent questions about your medical care, please call your primary care provider or clinic, [phone]          Attending Provider     Provider Specialty    Janeth Velasco MD Emergency Medicine Emergency Medical Services    JamieDonna moreno MD Internal Medicine       Primary Care Provider Office Phone # Fax #    R Nigel Salmon [phone]       When to contact your care team       [facility] triage line at [phone] for temp >100.4, uncontrolled nausea/vomiting/diarrhea/constipation, unrelieved pain, bleeding not relieved with pressure, dizziness, chest pain, shortness of breath, loss of consciousness, and any new or concerning symptoms.                  After Care Instructions     Activity       Your activity upon discharge: activity as tolerated            Diet       Follow this  diet upon discharge: Regular            Discharge Instructions       -Take Keflex as prescribed until complete.   -If you have worsening facial swelling, pain, redness, or return of presenting symptoms, please present to the ED for evaluation.                  Follow-up Appointments     Follow Up and recommended labs and tests       Follow up as scheduled below:                  Your next 10 appointments already scheduled     Oct 01, 2018  8:30 AM CDT   [facility] Lab Draw with [facility] LAB DRAW   [facility] Lab Draw ([facility])    [address]  [address]  [facility] [address]   [phone]            Oct 01, 2018  9:15 AM CDT   (Arrive by 9:00 AM)   Return Visit with Augustus Armstrong MD   [facility] ([facility])    [address] [place] [address]  [address]  [facility] [address]   [phone]            Nov 09, 2018  9:15 AM CST   MR BRAIN W/O & W CONTRAST with [id number]   [facility] MRI ([facility])    [address] [place] [address]  1st Floor  [facility] [address] [phone]           How do I prepare for my exam? (Food and drink instructions) **If you will be receiving sedation or general anesthesia, please see special notes below.**  How do I prepare for my exam? (Other instructions) Take your medicines as usual, unless your doctor tells you not to. You may or may not receive intravenous (IV) contrast for this exam pending the discretion of the Radiologist.  You do not need to do anything special to prepare.  **If you will be receiving sedation or general anesthesia, please see special notes below.**  What should I wear: The MRI machine uses a strong magnet. Please wear clothes without metal (snaps, zippers). A sweatsuit works well, or we may give you a hospital gown. Please remove any body piercings and hair extensions before you arrive. You will also remove watches, jewelry, hairpins, wallets,  dentures, partial dental plates and hearing aids. You may wear contact lenses, and you may be able to wear your rings. We have a safe place to keep your personal items, but it is safer to leave them at home.  How long does the exam take: Most tests take 30 to 60 minutes.  HOWEVER, IF YOUR DOCTOR PRESCRIBES ANESTHESIA please plan on spending four to five hours in the recovery room.  What should I bring:  Bring a list of your current medicines to your exam (including vitamins, minerals and over-the-counter drugs).  Do I need a :  **If you will be receiving sedation or general anesthesia, please see special notes below.**  What should I do after the exam: No Restrictions, You may resume normal activities.  What is this test: MRI (magnetic resonance imaging) uses a strong magnet and radio waves to look inside the body. An MRA (magnetic resonance angiogram) does the same thing, but it lets us look at your blood vessels. A computer turns the radio waves into pictures showing cross sections of the body, much like slices of bread. This helps us see any problems more clearly. You may receive fluid (called  contrast ) before or during your scan. The fluid helps us see the pictures better. We give the fluid through an IV (small needle in your arm).  Who should I call with questions:  Please call the Imaging Department at your exam site with any questions. Directions, parking instructions, and other information is available on our website, [email or web address].  How do I prepare if I m having sedation or anesthesia? **IMPORTANT** THE INSTRUCTIONS BELOW ARE ONLY FOR THOSE PATIENTS WHO HAVE BEEN TOLD THEY WILL RECEIVE SEDATION OR GENERAL ANESTHESIA DURING THEIR MRI PROCEDURE:  IF YOU WILL RECEIVE SEDATION (take medicine to help you relax during your exam): You must get the medicine from your doctor before you arrive. Bring the medicine to the exam. Do not take it at home. Arrive one hour early. Bring someone who can take  you home after the test. Your medicine will make you sleepy. After the exam, you may not drive, take a bus or take a taxi by yourself. No eating 8 hours before your exam. You may have clear liquids up until 4 hours before your exam. (Clear liquids include water, clear tea, black coffee and fruit juice without pulp.)  IF YOU WILL RECEIVE ANESTHESIA (be asleep for your exam): Arrive 1 1/2 hours early. Bring someone who can take you home after the test. You may not drive, take a bus or take a taxi by yourself. No eating 8 hours before your exam. You may have clear liquids up until 4 hours before your exam. (Clear liquids include water, clear tea, black coffee and fruit juice without pulp.)            Nov 12, 2018  7:30 AM CST   [facility] Lab Draw with  [facility] DRAW   [facility] Draw ([facility])    [address] [place] [address]  [address] [place] [id number] [phone]            Nov 12, 2018  8:15 AM CST   (Arrive by 8:00 AM)   Return Visit with Augustus Armstrong MD   [facility] ([facility])    [address] [place] [address]  [address] [place] [address] [id number] [phone]              Future tests that were ordered for you     CBC with platelets differential       Last Lab Result: Hemoglobin (g/dL)       Date                     Value                 09/29/2018               8.1 (L)          ----------            Comprehensive metabolic panel                 Pending Results     Date and Time Order Name Status Description    9/27/2018 1750 Blood culture Preliminary     9/27/2018 1750 Blood culture Preliminary             Statement of Approval     Ordered          09/29/18 1248  I have reviewed and agree with all the recommendations and orders detailed in this document.  EFFECTIVE NOW     Approved and electronically signed by:  Jojo Arevalo PA-C             Admission Information     Date & Time Provider Department Dept. Phone    9/27/2018  "Donna Ayala MD [address] [facility] [place] [phone]      Your Vitals Were     Blood Pressure Pulse Temperature Respirations Height Weight    127/85 (BP Location: Left arm) 102 97.2  F (36.2  C) (Oral) 16 1.753 m (5' 9\") 95.6 kg (210 lb 12.8 oz)    Pulse Oximetry BMI (Body Mass Index)                97% 31.13 kg/m2          MyChart Information     [facility] [employer] gives you secure access to your electronic health record. If you see a primary care provider, you can also send messages to your care team and make appointments. If you have questions, please call your primary care clinic.  If you do not have a primary care provider, please call [phone] and they will assist you.        Care EveryWhere ID     This is your Care EveryWhere ID. This could be used by other organizations to access your [facility] medical records  [id number]        Equal Access to Services     PERICO ESPINOZA AH: Beto Carrasco, waalex gautam, dashawn zaragozaalmaaddis iwnters, héctor kerns. So [facility] [phone].    ATENCIÓN: Si habla español, tiene a granados disposición servicios gratuitos de asistencia lingüística. Llame al [phone].    We comply with applicable federal civil rights laws and [place] laws. We do not discriminate on the basis of race, color, national origin, age, disability, sex, sexual orientation, or gender identity.               Review of your medicines      START taking        Dose / Directions    cephALEXin 500 MG capsule   Commonly known as:  KEFLEX   Indication:  Infection of the Skin and/or Related Soft Tissue   Used for:  Facial cellulitis        Dose:  500 mg   Take 1 capsule (500 mg) by mouth every 6 hours   Quantity:  33 capsule   Refills:  0       nystatin 193005 UNIT/ML suspension   Commonly known as:  MYCOSTATIN   Indication:  Candidiasis Fungal Infection of the Oropharynx   Used for:  Oral thrush        Dose:  595820 Units   Take 5 mLs (500,000 Units) by mouth 4 times " daily for 7 days   Quantity:  140 mL   Refills:  0         CONTINUE these medicines which have NOT CHANGED        Dose / Directions    dexamethasone 1 MG tablet   Commonly known as:  DECADRON        Take 2 mg by mouth in AM and 1 mg in PM x 5 days, then 1 mg in AM and 1 mg in PM x 5 days, then 1 mg in AM x 5 days, then off   Refills:  0       GLEOSTINE 100 MG capsule CHEMO   Generic drug:  lomustine        Take 240 mg by mouth once every 6 weeks.   Refills:  0       levETIRAcetam 1000 MG Tabs   Commonly known as:  KEPPRA   Used for:  Brain mass        Dose:  1000 mg   Take 1,000 mg by mouth every 12 hours   Quantity:  180 tablet   Refills:  11       MULTIVITAMIN & MINERAL PO        Dose:  1 tablet   Take 1 tablet by mouth daily   Refills:  0       ondansetron 8 MG ODT tab   Commonly known as:  ZOFRAN-ODT   Used for:  GBM (glioblastoma multiforme) (H)        Dose:  8 mg   Take 1 tablet (8 mg) by mouth every 8 hours as needed   Quantity:  60 tablet   Refills:  0       oxyCODONE IR 5 MG tablet   Commonly known as:  ROXICODONE   Used for:  S/P craniotomy        Dose:  5-10 mg   Take 1-2 tablets (5-10 mg) by mouth every 3 hours as needed for other (pain control or improvement in physical function. Hold dose for analgesic side effects.)   Quantity:  45 tablet   Refills:  0       pantoprazole 40 MG EC tablet   Commonly known as:  PROTONIX   Used for:  GBM (glioblastoma multiforme) (H)        Dose:  40 mg   Take 1 tablet (40 mg) by mouth every morning (before breakfast)   Quantity:  90 tablet   Refills:  0       polyethylene glycol Packet   Commonly known as:  MIRALAX/GLYCOLAX   Used for:  S/P craniotomy        Dose:  17 g   Take 17 g by mouth daily   Quantity:  7 packet   Refills:  1       tamsulosin 0.4 MG capsule   Commonly known as:  FLOMAX   Used for:  Urinary frequency        TAKE 1 CAPSULE BY MOUTH AT BEDTIME   Quantity:  30 capsule   Refills:  11            Where to get your medicines      These medications were  sent to [facility] [place] [place] - [address]  [address], [facility] [address]     Phone:  [phone]     cephALEXin 500 MG capsule    nystatin 134280 UNIT/ML suspension                Protect others around you: Learn how to safely use, store and throw away your medicines at www.disposemymeds.org.        ANTIBIOTIC INSTRUCTION     You've Been Prescribed an Antibiotic - Now What?  Your healthcare team thinks that you or your loved one might have an infection. Some infections can be treated with antibiotics, which are powerful, life-saving drugs. Like all medications, antibiotics have side effects and should only be used when necessary. There are some important things you should know about your antibiotic treatment.      Your healthcare team may run tests before you start taking an antibiotic.    Your team may take samples (e.g., from your blood, urine or other areas) to run tests to look for bacteria. These test can be important to determine if you need an antibiotic at all and, if you do, which antibiotic will work best.      Within a few days, your healthcare team might change or even stop your antibiotic.    Your team may start you on an antibiotic while they are working to find out what is making you sick.    Your team might change your antibiotic because test results show that a different antibiotic would be better to treat your infection.    In some cases, once your team has more information, they learn that you do not need an antibiotic at all. They may find out that you don't have an infection, or that the antibiotic you're taking won't work against your infection. For example, an infection caused by a virus can't be treated with antibiotics. Staying on an antibiotic when you don't need it is more likely to be harmful than helpful.      You may experience side effects from your antibiotic.    Like all medications, antibiotics have side effects. Some of these can be  serious.    Let you healthcare team know if you have any known allergies when you are admitted to the hospital.    One significant side effect of nearly all antibiotics is the risk of severe and sometimes deadly diarrhea caused by Clostridium difficile (C. Difficile). This occurs when a person takes antibiotics because some good germs are destroyed. Antibiotic use allows C. diificile to take over, putting patients at high risk for this serious infection.    As a patient or caregiver, it is important to understand your or your loved one's antibiotic treatment. It is especially important for caregivers to speak up when patients can't speak for themselves. Here are some important questions to ask your healthcare team.    What infection is this antibiotic treating and how do you know I have that infection?    What side effects might occur from this antibiotic?    How long will I need to take this antibiotic?    Is it safe to take this antibiotic with other medications or supplements (e.g., vitamins) that I am taking?     Are there any special directions I need to know about taking this antibiotic? For example, should I take it with food?    How will I be monitored to know whether my infection is responding to the antibiotic?    What tests may help to make sure the right antibiotic is prescribed for me?      Information provided by:  www.cdc.gov/getsmart  U.S. Department of Health and Human Services  Centers for disease Control and Prevention  National Center for Emerging and Zoonotic Infectious Diseases  Division of Healthcare Quality Promotion             Medication List: This is a list of all your medications and when to take them. Check marks below indicate your daily home schedule. Keep this list as a reference.      Medications           Morning Afternoon Evening Bedtime As Needed    cephALEXin 500 MG capsule   Commonly known as:  KEFLEX   Take 1 capsule (500 mg) by mouth every 6 hours   Last time this was given:   500 mg on 9/29/2018 11:10 AM   Next Dose Due:  9/29 6pm                                            dexamethasone 1 MG tablet   Commonly known as:  DECADRON   Take 2 mg by mouth in AM and 1 mg in PM x 5 days, then 1 mg in AM and 1 mg in PM x 5 days, then 1 mg in AM x 5 days, then off   Last time this was given:  2 mg on 9/29/2018  8:27 AM   Next Dose Due:  9/29 8pm                                      GLEOSTINE 100 MG capsule CHEMO   Take 240 mg by mouth once every 6 weeks.   Generic drug:  lomustine                                levETIRAcetam 1000 MG Tabs   Commonly known as:  KEPPRA   Take 1,000 mg by mouth every 12 hours   Last time this was given:  1,000 mg on 9/29/2018 11:10 AM   Next Dose Due:  9/29 11pm                                      MULTIVITAMIN & MINERAL PO   Take 1 tablet by mouth daily                                   nystatin 861629 UNIT/ML suspension   Commonly known as:  MYCOSTATIN   Take 5 mLs (500,000 Units) by mouth 4 times daily for 7 days   Last time this was given:  500,000 Units on 9/29/2018 11:10 AM   Next Dose Due:  9/29 4pm                                            ondansetron 8 MG ODT tab   Commonly known as:  ZOFRAN-ODT   Take 1 tablet (8 mg) by mouth every 8 hours as needed                                   oxyCODONE IR 5 MG tablet   Commonly known as:  ROXICODONE   Take 1-2 tablets (5-10 mg) by mouth every 3 hours as needed for other (pain control or improvement in physical function. Hold dose for analgesic side effects.)                                   pantoprazole 40 MG EC tablet   Commonly known as:  PROTONIX   Take 1 tablet (40 mg) by mouth every morning (before breakfast)   Last time this was given:  40 mg on 9/29/2018  8:27 AM   Next Dose Due:  9/30 7am                                   polyethylene glycol Packet   Commonly known as:  MIRALAX/GLYCOLAX   Take 17 g by mouth daily   Last time this was given:  17 g on 9/29/2018  8:27 AM   Next Dose Due:  9/30 8am                                    tamsulosin 0.4 MG capsule   Commonly known as:  FLOMAX   TAKE 1 CAPSULE BY MOUTH AT BEDTIME   Last time this was given:  0.4 mg on 9/28/2018  9:02 PM   Next Dose Due:  9/29 8pm

## 2018-09-27 NOTE — IP AVS SNAPSHOT
[address] [address]    Phone:  [phone]                                       After Visit Summary   9/27/2018    Seth Iglesias    MRN: [id number]           After Visit Summary Signature Page     I have received my discharge instructions, and my questions have been answered. I have discussed any challenges I see with this plan with the nurse or doctor.    ..........................................................................................................................................  Patient/Patient Representative Signature      ..........................................................................................................................................  Patient Representative Print Name and Relationship to Patient    ..................................................               ................................................  Date                                   Time    ..........................................................................................................................................  Reviewed by Signature/Title    ...................................................              ..............................................  Date                                               Time          22EPIC Rev 08/18

## 2018-09-27 NOTE — ED TRIAGE NOTES
Pt arrives to ED with complaints of facial swelling and increased weakness that started yesterday. Pt denies SOB, VSS.

## 2018-09-27 NOTE — TELEPHONE ENCOUNTER
"Homecare RYAN Walker called in to triage, sent out to pt home today after they called homecare regarding facial swelling and sob. Pt stated SOB started this morning, NICKERSON walking just a few steps to the couch. Facial swelling R>L, R orbit slightly swollen, cheeks look red and feel hot. No temp. Spouse stated speech seems a little different \"thick tongued\", pt state dry mouth but no difficulty swallowing, eating or drinking. /80 P 121 T 96.7 O2 sats 97% RA. No reports of bleeding or bruising. Currently on dexa taper at 1mg daily.    Per Helena Landin PA: recommend ED evaluation. Spoke with Denise and relayed instructions, pt and wife will come to [facility] ED. Report called to ED RYAN Go.  "

## 2018-09-27 NOTE — PHARMACY-VANCOMYCIN DOSING SERVICE
Pharmacy Vancomycin Initial Note  Date of Service 2018  Patient's  1960  58 year old, male    Indication: Skin and Soft Tissue Infection    Current estimated CrCl = Estimated Creatinine Clearance: 99.4 mL/min (based on Cr of 0.9).    Creatinine for last 3 days  No results found for requested labs within last 72 hours.    Recent Vancomycin Level(s) for last 3 days  No results found for requested labs within last 72 hours.      Vancomycin IV Administrations (past 72 hours)      No vancomycin orders with administrations in past 72 hours.                Nephrotoxins and other renal medications (Future)    Start     Dose/Rate Route Frequency Ordered Stop    18 0700  vancomycin (VANCOCIN) 1,750 mg in sodium chloride 0.9 % 500 mL intermittent infusion      1,750 mg  over 2 Hours Intravenous EVERY 12 HOURS 18 1804      18 1805  vancomycin (VANCOCIN) 1,750 mg in sodium chloride 0.9 % 500 mL intermittent infusion      1,750 mg  over 2 Hours Intravenous ONCE 18 1804      18 1751  ampicillin-sulbactam (UNASYN) 3 g vial to attach to  mL bag      3 g  over 1 Hours Intravenous EVERY 6 HOURS 18 1750            Contrast Orders - past 72 hours     None                Plan:  1.  Start vancomycin  1750 mg (18.4 mg/kg) IV q12h.   2.  Goal Trough Level: 15-20 mg/L   3.  Pharmacy will check trough levels as appropriate in 1-3 Days.    4. Serum creatinine levels will be ordered daily for the first week of therapy and at least twice weekly for subsequent weeks.    5. [place] method utilized to dose vancomycin therapy: Method 2    Milton Clayton, MonikaD

## 2018-09-27 NOTE — ED PROVIDER NOTES
"    [place] EMERGENCY DEPARTMENT ([facility])  9/27/18   History     Chief Complaint   Patient presents with     Facial Swelling     The history is provided by the spouse, a relative, the patient and medical records.     Seth Iglesias is a 58 year old male with a medical history significant for right temporal glioblastoma s/p gross total resection (10/3/2017), chemoradiation treatment with temozolomide (completed on 12/22/2017), adjuvant temozolomide and Optune device started on 1/22/2017), documented progression of disease in spring 2018.  Patient was then enrolled in the Toca trial and underwent surgical debulking (5/17/2018).  Patient presents to the Emergency Department today for evaluation of facial swelling.  Patient last week was having some difficulties with his speech and cognitive ability.  The patient's family was concerned about a steady increase in how fatigued the patient was becoming with tapering of his steroids.  Patient's family can typically able to tell when these symptoms are secondary to fatigue from being on chemotherapy treatment versus true cognitive decrease.  The patient's family brought this up to his oncologist.  The patient at the time was on 2 mg of dexamethasone in the morning and 1 mg at night; this was increased to 2 mg twice daily.  Patient had improvement of these symptoms.  However, since yesterday the patient has had some notable facial swelling, facial redness, swelling in the back of his neck and some cognitive decrease once again.  Patient also has associated redness of the skin. The patient has not had any leg swelling or swelling anywhere else.  The patient's family does report that the patient's speech has changed since yesterday.  The patient reports that his tongue does feel somewhat abnormal and feels \"heavier\".  The patient denies any lip swelling.  The family also notes that the patient has been more labored in his breathing, even when walking short " distances. The patient denies any fevers, drainage from his eyes, drainage from his ears, ear pain, nasal discharge, sore throat or difficulty swallowing.   Patient denies any cough or positive sick contacts.  He denies any recent falls or traumas.  The patient denies any nausea or vomiting.  The patient does have urinary frequency, but this has been an ongoing issue and is unchanged.  Patient denies any significant abdominal pain.  The patient is on his sixth week of 6 weeks for his cycle of chemotherapy treatment.  The patient is scheduled to see his oncologist next week.  Of note, the patient did not receive his dexamethasone dose this morning, but did get the remainder of his medications.     Per review of patient's chart, patient did have an MRI brain done on 9/24/2018.    MR BRAIN W/O & W CONTRAST 9/24/2018  Impression:  Findings suspicious for progression of locally recurrent tumor  surrounding the right temporal resection cavity with extension into  the right basal ganglia and right frontal lobe since 8/14/2018.    Patient's last laboratory studies prior to today, were done on 9/24/2018.  Patient had a CBC done that showed a WBC of 2.6, hemoglobin of 9.1, platelet count of 86 and ANC of 1.8.    I have reviewed the Medications, Allergies, Past Medical and Surgical History, and Social History in the [employer] system.    Past Medical History:   Diagnosis Date     Gastroesophageal reflux disease      Glioblastoma (H)      Psoriasis     On Enbrel Injections Q 1 month for about 10 years, [facility] [place]     Seizures (H)        Past Surgical History:   Procedure Laterality Date     COLONOSCOPY  4/4/2011    Procedure:COLONOSCOPY; Surgeon:TC HARE; Location:[place] [facility]     OPTICAL TRACKING SYSTEM CRANIOTOMY, EXCISE TUMOR, COMBINED Right 10/3/2017    Procedure: COMBINED OPTICAL TRACKING SYSTEM CRANIOTOMY, EXCISE TUMOR;  Stealth Assisted Right Temporal Craniotomy For Tumor Resection;  Surgeon: Feliciano  Harry Luz MD;  Location:  OR     OPTICAL TRACKING SYSTEM CRANIOTOMY, EXCISE TUMOR, COMBINED N/A 5/17/2018    Procedure: COMBINED OPTICAL TRACKING SYSTEM CRANIOTOMY, EXCISE TUMOR;  Stealth Assisted Right Craniotomy And Tumor Resection ;  Surgeon: Beto Gracia MD;  Location:  OR     SURGICAL HISTORY OF -   02/05/75    Torn Meniscus Right Knee     SURGICAL HISTORY OF -   11/30/73    Arthrotomy & medial meniscectomy left knee       Family History   Problem Relation Age of Onset     C.A.D. Father      age 52     Cancer Father      prostate CA     Lung Cancer Mother      C.A.D. Paternal Uncle      50's       Social History   Substance Use Topics     Smoking status: Never Smoker     Smokeless tobacco: Never Used     Alcohol use No      Comment: occasionally       Current Facility-Administered Medications   Medication     0.9% sodium chloride BOLUS    Followed by     sodium chloride 0.9% infusion     ampicillin-sulbactam (UNASYN) 3 g vial to attach to  mL bag     Current Outpatient Prescriptions   Medication     dexamethasone (DECADRON) 1 MG tablet     dexamethasone (DECADRON) 4 MG tablet     GLEOSTINE 100 MG capsule CHEMO     GLEOSTINE 40 MG capsule CHEMO     levETIRAcetam (KEPPRA) 1000 MG TABS     Multiple Vitamins-Minerals (MULTIVITAMIN & MINERAL PO)     ondansetron (ZOFRAN-ODT) 8 MG ODT tab     oxyCODONE IR (ROXICODONE) 5 MG tablet     pantoprazole (PROTONIX) 40 MG EC tablet     polyethylene glycol (MIRALAX/GLYCOLAX) Packet     tamsulosin (FLOMAX) 0.4 MG capsule     tamsulosin (FLOMAX) 0.4 MG capsule     [DISCONTINUED] Temozolomide (TEMODAR) 180 MG capsule        Allergies   Allergen Reactions     Nkda [No Known Drug Allergies]          Review of Systems   Constitutional: Negative for fever.   HENT: Positive for facial swelling and rhinorrhea. Negative for ear discharge, ear pain, sore throat and trouble swallowing.         Positive for swelling of back of neck   Eyes: Negative for discharge.  "  Respiratory: Positive for shortness of breath. Negative for cough.    Cardiovascular: Negative for leg swelling.   Gastrointestinal: Negative for abdominal pain, nausea and vomiting.   Genitourinary: Positive for frequency (chronic; unchanged).   Skin: Positive for color change (redness of face).   All other systems reviewed and are negative.      Physical Exam   BP: 117/77  Pulse: 133  Temp: 98.6  F (37  C)  Resp: 18  Height: 175.3 cm (5' 9\")  SpO2: 97 %      Physical Exam   General: patient is alert, intermittently confused with requirements for re-orientation  Head: craniotomy scars with some skull deformity for previous surgical interventions   EENT: moist mucus membranes without tonsillar erythema or exudates, uvula is midline, no peritonsillar swelling, no trismus, pupils 2 mm round and reactive, full extraocular movements intact, sclerae anicteric, TMs pearly gray bilaterally without bulging or effusion, no purulence or necrosis of the nasal turbinates  Neck: supple with full range of motion  Cardiovascular: Sinus tachycardia, extremities warm and well perfused, no lower extremity edema  Pulmonary: lungs clear to auscultation bilaterally without wheezing  Abdomen: soft, non-tender   Musculoskeletal: normal range of motion   Neurological: alert, moving all extremities symmetrically, slight weakness of left  strength which is at baseline,  gait normal   Skin: warm, dry, erythema, warmth and induration of the skin on both cheeks as well as at the bridge of the nose      ED Course   5:32 PM  The patient was seen and examined by Janeth Velasco MD in Room ED18.     ED Course     Procedures             Critical Care time:  none       The patient has signs of Severe Sepsis as evidenced by:    1. 2 SIRS criteria, AND  2. Suspected infection, AND   3. Organ dysfunction: Lactic Acid > 1.9    Time severe sepsis diagnosis confirmed = 1810 as this was the time when Lactate resulted, and the level was > 1.9      3 Hour " Severe Sepsis Bundle Completion:  1. Initial Lactic Acid Result:   Recent Labs   Lab Test  09/27/18   1807  07/10/18   1211   LACT  2.4*  1.3     2. Blood Cultures before Antibiotics: Yes  3. Broad Spectrum Antibiotics Administered: Yes     Anti-infectives (Future)    Start     Dose/Rate Route Frequency Ordered Stop    09/27/18 1751  ampicillin-sulbactam (UNASYN) 3 g vial to attach to  mL bag      3 g  over 1 Hours Intravenous EVERY 6 HOURS 09/27/18 1750          4. 2000 ml of IV fluids.  Ideal body weight: 70.7 kg (155 lb 13.8 oz)  Adjusted ideal body weight: 80 kg (176 lb 5.7 oz)            Labs Ordered and Resulted from Time of ED Arrival Up to the Time of Departure from the ED - No data to display         Assessments & Plan (with Medical Decision Making)   Mr. Iglesias is a 58-year-old male with a history of glioblastoma and seizures who presents with facial swelling.  Differential diagnosis includes, but is not limited to facial cellulitis, sinus infection, invasive sinus infection such as mucormycosis, drug reaction, allergic reaction.  On exam, the patient is tachycardic into the 130s.  He is otherwise normotensive and is afebrile.  Patient is in no respiratory distress at this time.  He does have significant warmth and induration of the skin of his face and something of a butterfly distribution.  On exam, I do not appreciate any necrosis on nasal speculum exam or significant purulent drainage; however, he is immunosuppressed on chemotherapy and steroid treatment, potential for invasive sinus infection is certainly a consideration.  He did go for a CT of the head as well as facial bones.  He has no evidence of sinus involvement or bony destruction.  No evidence of proptosis.  Labs including CBC, CMP, lactate, ESR, CRP and blood cultures were drawn.  Patient has a white count of 2.3 with an absolute neutrophil count of 1.0.  His lactate is elevated at 2.4.  Remainder of his electrolytes are within normal  limits.  His lactate is elevated at 2.37.  He was given 2 L of IV fluids and was started on vancomycin and Unasyn.  He is tolerating fluids without any difficulty and is in no respiratory distress at this time.  Patient will be admitted to oncology for facial cellulitis.    This part of the medical record was transcribed by Vinh Royal, Medical Scribe, from a dictation done by Janeth Velasco MD.       I have reviewed the nursing notes.    I have reviewed the findings, diagnosis, plan and need for follow up with the patient.    Current Discharge Medication List          Final diagnoses:   Facial cellulitis   Glioblastoma (H)     I, Vinh Royal, am serving as a trained medical scribe to document services personally performed by Janeth Velasco MD, based on the provider's statements to me.   I, Janeth Velasco MD, was physically present and have reviewed and verified the accuracy of this note documented by Vinh Royal.    9/27/2018   [facility], [place], EMERGENCY DEPARTMENT     Janeth Velasco MD  09/28/18 0149

## 2018-09-28 NOTE — H&P
"Heme/Onc History and Physical    Seth Iglesias MRN# [id number]   Age: 58 year old YOB: 1960     Date of Admission:  9/27/2018    Primary care provider: JUSTYN Salmon          Assessment and Plan:   Assessment:  Patient is a 58 year old male with stage IV right temporal GBM on chronic steroids who is presenting with acute worsening of diffuse facial swelling concerning for cellulitis.     Plan:  #Facial swelling:  Patient had generalized facial swelling at baseline but noted to have been increasing since yesterday. Most conspicuous over the cheeks, malar eminences, and periorbital regions, and also involves the glabella of the nose and neck. Associated with pronounced rash/erythema and slight induration and skin is warm to touch. Patient also describes an increasing \"lump\" behind the neck and subjective increased abdominal obesity. No fevers, chills, insect bites, vision loss, photophobia, headache, dizziness, syncope, trauma, neck pain or stiffness. CRP elevated at 130.0. Lactic acid is mildly elevated at 2.4 compared with normal 1.3 on 7/10/18. CT neck has been preliminary read as negative and demonstrates increased subcutaneous stranding on my personal review.     Likely represents chronic glucocorticoid-related cushingoid appearance rather than cellulitis.     - Follow final CT read.   - Was started on both vancomycin and Unasyn in ED. Will discontinue vanco and keep Unasyn for now. Consider switching to Augmentin or discontinuing antibiotics completely as course evolves.    - Follow blood cultures and UA/UC sent in ED.  - Trend CRP.    #Anion gap metabolic acidosis  #Mild lactic acidosis:   Albumin-adjusted anion gap is 14.5 (albumin 2.6) with mild lactic acidosis of 2.4. Likely related to malignancy versus cellulitis. No suspicion of infection elsewhere based on presentation or work up so far. S/P 2L IV fluids in ED.   - INF NS at 75 ml/hr.  - Repeat lactic acid in AM.     "   #Pancytopenia:  His counts are recovering from prior cycles of lomustine. Has been getting weekly CBC checks. Platelet rachael was 20K with cycle 2 and plts have now increased to 203K without any transfusions. Platelet rachael was 36K with cycle 1. WBC count is still decreased at 2.3 on admission.   - Monitor CBC.   - Might need dose reduction per dosing guidelines for next cycles as had platelets drop <75K and WBC <3 (outpatient note from 9/4/18).    #Chronic glucorticoid therapy:  Was treated with high dose steroids (dexamethasone 4 mg every 6 hours) for extensive intracranial edema during last [facility] admission (7/10-7/12). Has since been on a slow steroid taper. Patient has had difficulty decreasing/discontinuing steroid doses. Was down to 1 mg dex BID but, due to increased fatigue, weakness, and cognitive impairment, family requested it to be increased back to 2 mg BID around 9/18/18. Likely secondary adrenal insufficiency versus steroid withdrawal syndrome.  - Check morning cortisol level (expect it to be low).  - Endocrine consultation. Case discussed with them.     #Stage 4 right temporal GBM:  S/P right temporal craniotomy and gross total resection on 10/3/17 and concurrent chemoradiation 11/12-12/22/17. Started adjuvant Temodar and Optune device on 1/22/18. Received 5 cycles of adjuvant therapy and had progression of disease in R temporal lobe. He enrolled in TOCA trial and had repeat resection on 5/17. He was randomized to standard of care arm with lomustine (i.e. he did not receive the therapeutic virus intraoperatively), started 7/4/18. Of note, gross total resection was not possible due to vessel involvement. He had a baseline MRI on 6/19/18 with residual tumor. After cycle 1, he developed extreme lethargy and new left-sided weakness which led to an admission at [facility] from 7/10-7/12. MRI brain was concerning for tumor progression with extensive associated intracranial edema. Repeat surgical resection was  "initially considered. However, given rapid clinical and radiographic response after high dose steroids on follow up MRI on 8/1, it was decided to resume systemic therapy rather than undergo repeat resection. He proceeded with cycle 2 on 8/24.    - Continue home keppra at 1000 mg BID.    #BPH:  - Continue home tamsulosin.    FEN:   - Regular diet as tolerated.  - NS at 75 ml/hr.    Prophylaxis:   - Lovenox for VTE ppx  - Scheduled bowel regimen for constipation ppx  - PPI for GI/PUD ppx  - Patient gets home PT/OT     Code Status: FULL    Disposition: Inpatient for management of increased facial swelling.      Dwight Sue MD  Hospitalist, Hematology and Oncology  09/27/2018              Chief Complaint:   Increased facial swelling.         History of Present Illness:   Patient is a 58 year old male with stage IV right temporal GBM on chronic steroids who is presenting with acute worsening of diffuse facial swelling concerning for cellulitis.     Patient had generalized facial swelling at baseline but noted to have been increasing since yesterday. Most conspicuous over the cheeks, malar eminences, and periorbital regions, and also involves the glabella of the nose and neck. Associated with pronounced rash/erythema and slight induration and skin is warm to touch. Patient also describes an increasing \"lump\" behind the neck and subjective increased abdominal obesity. No fevers, chills, insect bites, vision loss, photophobia, headache, dizziness, syncope, trauma, neck pain or stiffness.     He has been ongoing fatigue, weakness, NICKERSON, and lethargy which was exacerbated when he tried down on steroids last week. Per family, he is not ambulating much and finds it hard to even go to the bathroom.          Review of Systems:     14-point review of systems was otherwise negative except as noted in HPI.          Past Medical History:   Past medical history was reviewed.   Past Medical History:   Diagnosis Date     " Gastroesophageal reflux disease      Glioblastoma (H)      Psoriasis     On Enbrel Injections Q 1 month for about 10 years, [facility] [place]     Seizures (H)              Past Surgical History:   Past surgical history was reviewed.   Past Surgical History:   Procedure Laterality Date     COLONOSCOPY  4/4/2011    Procedure:COLONOSCOPY; Surgeon:TC HARE; Location:[facility]     OPTICAL TRACKING SYSTEM CRANIOTOMY, EXCISE TUMOR, COMBINED Right 10/3/2017    Procedure: COMBINED OPTICAL TRACKING SYSTEM CRANIOTOMY, EXCISE TUMOR;  Stealth Assisted Right Temporal Craniotomy For Tumor Resection;  Surgeon: Harry Wiggins MD;  Location:  OR     OPTICAL TRACKING SYSTEM CRANIOTOMY, EXCISE TUMOR, COMBINED N/A 5/17/2018    Procedure: COMBINED OPTICAL TRACKING SYSTEM CRANIOTOMY, EXCISE TUMOR;  Stealth Assisted Right Craniotomy And Tumor Resection ;  Surgeon: Beto Gracia MD;  Location:  OR     SURGICAL HISTORY OF -   02/05/75    Torn Meniscus Right Knee     SURGICAL HISTORY OF -   11/30/73    Arthrotomy & medial meniscectomy left knee             Social History:   Social history was reviewed.   Social History   Substance Use Topics     Smoking status: Never Smoker     Smokeless tobacco: Never Used     Alcohol use No      Comment: occasionally             Family History:   Family history was reviewed.  Family History   Problem Relation Age of Onset     C.A.D. Father      age 52     Cancer Father      prostate CA     Lung Cancer Mother      C.A.D. Paternal Uncle      50's             Allergies:     Allergies   Allergen Reactions     Nkda [No Known Drug Allergies]              Medications:   Medications Reviewed.   Current Facility-Administered Medications   Medication     0.9% sodium chloride BOLUS     ampicillin-sulbactam (UNASYN) 3 g vial to attach to  mL bag     sodium chloride 0.9% infusion     vancomycin (VANCOCIN) 1,750 mg in sodium chloride 0.9 % 500 mL intermittent infusion      [START ON 9/28/2018] vancomycin (VANCOCIN) 1,750 mg in sodium chloride 0.9 % 500 mL intermittent infusion     Current Outpatient Prescriptions   Medication Sig     dexamethasone (DECADRON) 1 MG tablet Take 2 mg by mouth in AM and 1 mg in PM x 5 days, then 1 mg in AM and 1 mg in PM x 5 days, then 1 mg in AM x 5 days, then off     levETIRAcetam (KEPPRA) 1000 MG TABS Take 1,000 mg by mouth every 12 hours     pantoprazole (PROTONIX) 40 MG EC tablet Take 1 tablet (40 mg) by mouth every morning (before breakfast)     tamsulosin (FLOMAX) 0.4 MG capsule TAKE 1 CAPSULE BY MOUTH AT BEDTIME     GLEOSTINE 100 MG capsule CHEMO Take 240 mg by mouth once every 6 weeks.     Multiple Vitamins-Minerals (MULTIVITAMIN & MINERAL PO) Take 1 tablet by mouth daily     ondansetron (ZOFRAN-ODT) 8 MG ODT tab Take 1 tablet (8 mg) by mouth every 8 hours as needed (Patient not taking: Reported on 9/4/2018)     oxyCODONE IR (ROXICODONE) 5 MG tablet Take 1-2 tablets (5-10 mg) by mouth every 3 hours as needed for other (pain control or improvement in physical function. Hold dose for analgesic side effects.) (Patient not taking: Reported on 9/4/2018)     polyethylene glycol (MIRALAX/GLYCOLAX) Packet Take 17 g by mouth daily (Patient not taking: Reported on 9/4/2018)     [DISCONTINUED] dexamethasone (DECADRON) 1 MG tablet Take 2 mg in AM and 1 mg in PM x 5 days, then 1 mg in AM and 1 mg in PM x 5 days, then 1 mg in AM x 5 days, then off (Patient taking differently: No sig reported)     [DISCONTINUED] dexamethasone (DECADRON) 4 MG tablet Take 1 tablet (4 mg) by mouth every 6 hours Or as directed by provider     [DISCONTINUED] GLEOSTINE 40 MG capsule CHEMO Take 240 mg every 6 weeks     [DISCONTINUED] Temozolomide (TEMODAR) 180 MG capsule Take 1 capsule (180 mg) by mouth daily in addition to 250 mg capsule for 5 days (Patient not taking: Reported on 5/4/2018)             Physical Exam:   Vitals were reviewed.  Blood pressure 98/69, pulse 133,  "temperature 98.6  F (37  C), temperature source Oral, resp. rate 18, height 1.753 m (5' 9\"), SpO2 93 %.    Constitutional: NAD  HEENT: Generalized facial swelling, erythema on the cheeks  CV: RRR, no murmurs appreciated  Resp: Clear to auscultation bilaterally, breathing comfortably on room air, no wheezes, rhonchi  Abd: Soft, non-tender, BS+, no masses appreciated  MSK: Warm, FROM, no joint swelling  Neuro: CN2-12 grossly intact, no lateralizing symptoms or focal neurologic deficits         Data:        ROUTINE LABS (Last four results)  CMP  Recent Labs  Lab 09/27/18  1757      POTASSIUM 3.4   CHLORIDE 106   CO2 21   ANIONGAP 11   GLC 84   BUN 15   CR 1.09   GFRESTIMATED 69   GFRESTBLACK 83   ARA 8.2*   MAG 2.0   PROTTOTAL 6.6*   ALBUMIN 2.6*   BILITOTAL 0.7   ALKPHOS 123   AST 22   ALT 34     CBC  Recent Labs  Lab 09/27/18  1757 09/24/18  0944 09/21/18  0928   WBC 2.3* 2.6* 3.4*   RBC 2.94* 2.75* 2.57*   HGB 9.9* 9.1* 8.8*   HCT 31.0* 28.5* 27.2*   * 104* 106*   MCH 33.7* 33.1* 34.2*   MCHC 31.9 31.9 32.4   RDW 19.3* 18.9* 18.4*    86* 28*     INRNo lab results found in last 7 days.  Arterial Blood GasNo lab results found in last 7 days.        "

## 2018-09-28 NOTE — PLAN OF CARE
Problem: Urinary Elimination/Continence Impairment (IRF) (Adult)  Goal: Urinary Elimination Establish/Manage  Outcome: No Change  , OVSS, no c/o pain or nausea, pt has increased urinary frequency of late which has impaired his sleep. External condom catheter placed per pt/family request. NS @ 100ml/hr to PIV. Admission process begun but not finished as pt was admitted just prior to shift change; will pass on next steps to night shift nurse.

## 2018-09-28 NOTE — PROGRESS NOTES
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      #Facial swelling.  #Mild increase in erythema.  Patient had generalized facial swelling at baseline (likely 2/2 steroids) but noted to have been increasing since yesterday. Most conspicuous over the cheeks, malar eminences, and periorbital regions, and also involves the glabella of the nose and neck. Associated with pronounced rash/erythema and slight induration and skin is warm to touch. Patient also describes an increasing \"lump\" behind the neck and subjective increased abdominal obesity. No fevers, chills, insect bites, vision loss, photophobia, headache, dizziness, syncope, trauma, neck pain or stiffness. CRP elevated at 130.0. Lactic acid is mildly elevated at 2.4 compared with normal 1.3 on 7/10/18.  - CT head with mild-moderate improvement in associated edema and mass effect compared to 7/10/18, R temporal lobe heterogeneous mass better since MRI 9/24.   -CT facial bones with \"potential swelling overlying R lower forehead and above R orbit (glabella, forehead, and above R eye). No bone erosion or sinus infection identified.   - Vancomycin+Unasyn in ED, continued at this time.  - BCx NGTD, UA negative, UCx pending.   - Trend CRP, stably elevated today at 130.     #Anion gap metabolic acidosis.  #Mild lactic acidosis.  Albumin-adjusted anion gap is 14.5 (albumin 2.6) with mild lactic acidosis of 2.4. Likely related to malignancy versus cellulitis. No suspicion of infection elsewhere based on presentation or work up so far. S/P 2L IV fluids in ED. Repeat lactic acid not drawn.  - INF NS " at 75 ml/hr.      #Pancytopenia. 2/2 chemotherapy.  His counts are recovering from prior cycles of lomustine. Has been getting weekly CBC checks. Platelet rachael was 20K with cycle 2 and plts have now increased to 203K without any transfusions. Platelet rachael was 36K with cycle 1. WBC count is still decreased at 2.3 on admission.   - Monitor CBC.   - Might need dose reduction per dosing guidelines for next cycles as had platelets drop <75K and WBC <3 (outpatient note from 9/4/18).  - Other cells resulted on diff 9/28 suspicious for blasts per pathology.     #Chronic glucorticoid therapy.  Was treated with high dose steroids (dexamethasone 4 mg every 6 hours) for extensive intracranial edema during last [facility] admission (7/10-7/12). Has since been on a slow steroid taper. Patient has had difficulty decreasing/discontinuing steroid doses. Was down to 1 mg dex BID but, due to increased fatigue, weakness, and cognitive impairment, family requested it to be increased back to 2 mg BID around 9/18/18. Likely secondary adrenal insufficiency versus steroid withdrawal syndrome.  - Morning cortisol level (expect it to be low), 2.2 (low).  - Endocrine consult, appreciate recs. Recommend continuing dex 2 mg bid (benefit outweighs risk), left recs regarding idea for taper (See note from 9/28).  - Consider Optho consult if worsening periorbital edema or pain with extraocular eye movements.     #Stage 4 right temporal GBM.  S/P right temporal craniotomy and gross total resection on 10/3/17 and concurrent chemoradiation 11/12-12/22/17. Started adjuvant Temodar and Optune device on 1/22/18. Received 5 cycles of adjuvant therapy and had progression of disease in R temporal lobe. He enrolled in TOCA trial and had repeat resection on 5/17. He was randomized to standard of care arm with lomustine (i.e. he did not receive the therapeutic virus intraoperatively), started 7/4/18. Of note, gross total resection was not possible due to vessel  involvement. He had a baseline MRI on 6/19/18 with residual tumor. After cycle 1, he developed extreme lethargy and new left-sided weakness which led to an admission at [facility] from 7/10-7/12. MRI brain was concerning for tumor progression with extensive associated intracranial edema. Repeat surgical resection was initially considered. However, given rapid clinical and radiographic response after high dose steroids on follow up MRI on 8/1, it was decided to resume systemic therapy rather than undergo repeat resection. He proceeded with cycle 2 on 8/24.    - Continue home keppra at 1000 mg BID.  - C3D1 tentatively planned for 10/1.   - Ensure close follow up at discharge.     #BPH:  - Continue home tamsulosin.     FEN:   - Regular diet as tolerated.  - NS at 75 ml/hr.     Prophylaxis:   - Lovenox for VTE ppx  - Scheduled bowel regimen for constipation ppx  - PPI for GI/PUD ppx  - Patient gets home PT/OT, consulted for eval while inpatient.                Code Status: FULL     Disposition: Pending improvement in acute issue, likely 1-2 day stay. Possible discharge 9/30.    Jojo Arevalo PA-C  Hematology/Oncology  Pager #[phone]    Interval History   Candelario is doing okay today. Wife noted some swelling in his face which prompted presentation to ED. More notable swelling of glabella, cheeks, and above R eye, mildly erythematous. Denies fevers at home. No vision loss or changes. No spots in vision. Denies headache or sinus pain. Has continued to feel fatigued, weak, and lethargic at home, especially when tapering down on steroids. These symptoms improved some with steroids at 2 mg bid. States he is feeling wobbly when up and about. Denies nausea, vomiting, diarrhea, chest pain, SOB at rest, or abdominal pain. No dysuria.     Physical Exam   Temp: 96.7  F (35.9  C) Temp src: Oral BP: 113/76 Pulse: 102 Heart Rate: 100 Resp: 20 SpO2: 96 % O2 Device: None (Room air)    Vitals:    09/27/18 2213 09/28/18 0742   Weight: 93.9 kg (207  lb 1.6 oz) 93.1 kg (205 lb 4.8 oz)     Vital Signs with Ranges  Temp:  [96.5  F (35.8  C)-98.6  F (37  C)] 96.7  F (35.9  C)  Pulse:  [102-133] 102  Heart Rate:  [100-102] 100  Resp:  [18-20] 20  BP: ()/(66-79) 113/76  SpO2:  [92 %-98 %] 96 %  I/O last 3 completed shifts:  In: 2100 [P.O.:1150; I.V.:950]  Out: 575 [Urine:575]    Constitutional: Pleasant male seen lying in bed, in NAD. Alert and interactive.   HEENT: NCAT, cushingoid appearance of face, anicteric sclerae, conjunctiva clear. Moist mucous membranes.  Respiratory: Non-labored breathing, good air exchange. Lungs are clear to auscultation bilaterally, without wheezing, crackles or rhonchi. No cough noted.   Cardiovascular: Regular rate and rhythm with no murmur, rub or gallop.  GI: Normoactive BS. Abdomen is soft, non-distended, and non-tender to palpation. No rigidity or guarding.  Skin: Warm and dry. Erythema over cheeks bilaterally with mild induration, non-tender. Patch of swelling and erythema over glabella of face, tender to palpation. R>L periorbital edema with tenderness to palpation of R superior eye lid. Skin on face with equal warmth. One lesion posterior to R ear that is about dime sized with central scale, not indurated, painful, or pruritic.  Musculoskeletal: Extremities grossly normal. No tenderness or edema present.   Neurologic: A &O x4, speech normal, answering questions appropriately. Moves all extremities spontaneously. Grossly non-focal.  Neuropsychiatric: Mentation and affect normal/appropriate.    Medications   Current Facility-Administered Medications   Medication     acetaminophen (TYLENOL) tablet 650 mg     ampicillin-sulbactam (UNASYN) 3 g vial to attach to  mL bag     dexamethasone (DECADRON) tablet 2 mg     enoxaparin (LOVENOX) injection 40 mg     influenza recomb quadrivalent PF (FLUBLOK) injection 0.5 mL     levETIRAcetam (KEPPRA) tablet 1,000 mg     LORazepam (ATIVAN) tablet 0.5-1 mg    Or     LORazepam (ATIVAN)  injection 0.5-1 mg     Medication Instruction     ondansetron (ZOFRAN) injection 4 mg     pantoprazole (PROTONIX) EC tablet 40 mg     polyethylene glycol (MIRALAX/GLYCOLAX) Packet 17 g     sodium chloride 0.9% infusion     tamsulosin (FLOMAX) capsule 0.4 mg     vancomycin (VANCOCIN) 1,500 mg in sodium chloride 0.9 % 250 mL intermittent infusion       Data   CBC  Recent Labs  Lab 09/28/18  0537 09/27/18  1757 09/24/18  0944   WBC 2.5* 2.3* 2.6*   RBC 2.65* 2.94* 2.75*   HGB 8.8* 9.9* 9.1*   HCT 27.5* 31.0* 28.5*   * 105* 104*   MCH 33.2* 33.7* 33.1*   MCHC 32.0 31.9 31.9   RDW 18.9* 19.3* 18.9*    203 86*     CMP  Recent Labs  Lab 09/28/18  0537 09/27/18  1757    138   POTASSIUM 3.6 3.4   CHLORIDE 112* 106   CO2 21 21   ANIONGAP 9 11   GLC 89 84   BUN 10 15   CR 0.92 1.09   GFRESTIMATED 84 69   GFRESTBLACK >90 83   ARA 7.8* 8.2*   MAG 2.0 2.0   PHOS 4.1  --    PROTTOTAL  --  6.6*   ALBUMIN  --  2.6*   BILITOTAL  --  0.7   ALKPHOS  --  123   AST  --  22   ALT  --  34     INRNo lab results found in last 7 days.    Results for orders placed or performed during the hospital encounter of 09/27/18   XR Chest 2 Views    Narrative    Exam: XR CHEST 2 VW, 9/27/2018 7:32 PM    Indication: dyspnea, rule out edema;     Comparison: None    Findings:   AP and lateral upright view of the chest. The cardiomediastinal  silhouette and upper abdomen are unremarkable.  There is no pleural  effusion. No pneumothorax. No focal airspace opacities. Visualized  upper abdomen is unremarkable. No acute bony abnormality.      Impression    IMPRESSION: No acute cardiopulmonary findings.    I have personally reviewed the examination and initial interpretation  and I agree with the findings.    THU GUTIERREZ MD   Head CT w/o contrast    Narrative    CT HEAD W/O CONTRAST 9/27/2018 8:11 PM    Provided History: confusion, h/o glioblastoma, facial swelling;     Comparison: MR brain dated 9/24/2018 and CT from  7/10/2018.    Technique: Using multidetector thin collimation helical acquisition  technique, axial, coronal and sagittal CT images from the skull base  to the vertex were obtained without intravenous contrast.     Findings:    Surgical changes consistent with previous craniotomy and debulking of  the right temporal lobe glioblastoma. Heterogeneous mass of the right  temporal lobe which is similar in appearance when correlated with MR  brain dated 9/24/2018, with decreased mass effect from 7/10/2018 CT.  It measures approximately 5.6 x 2.9 cm its maximal axial dimensions  (series 2 image 15). There appears to be in interval decrease in the  amount of white matter edema in the vicinity to this lesion, but  continues to extend into the frontal lobe. No new intracranial  hemorrhage. There is persistent mild effacement of the right  ventricle. White matter low-attenuation surrounding the ventricles  likely related to underlying vasculopathy and therapy. The basal  cisterns are patent.    The visualized paranasal sinuses are clear. The mastoid air cells are  clear.       Impression    Impression:   1. Redemonstration of the right temporal lobe heterogeneous mass which  is better delineated on brain MRI dated 9/24/2018, but is today  similar in appearance.   2. Mild to moderate improvement in the appearance of the associated  edema and mass effect as compared to CT dated 7/10/2018.    I have personally reviewed the examination and initial interpretation  and I agree with the findings.    MARY GALLAGHER MD   CT Facial Bones with Contrast    Narrative    CT FACIAL BONES WITH CONTRAST 9/27/2018 8:13 PM    History:  facial swelling, ?sinus infection;     Comparison:  Correlations made with previous head CT dated 7/10/2018.       Technique: Using thin collimation multidetector helical acquisition  technique, axial and coronal thin section CT images were reconstructed  through the facial bones. Images were reviewed in bone  and soft tissue  windows.    Findings:  There is mild swelling on soft tissue windows overlying the  right side of the lower forehead, glabella, and above the right eye  lid, extending towards midline. This does not extend into the right  orbit or globe. This is nonspecific but could represent cellulitis.  There is no evident fracture or erosion of the facial bones. The  cribriform plate appears intact. Alignment of the facial bones appears  normal.     There is no hematoma, soft tissue mass or gas visualized within the  orbits. The visualized portions of the paranasal sinuses are clear.       Impression    Impression: Potential swelling that is new overlying right lower  forehead and above right orbit. Recommend clinical correlation if this  represents cellulitis.    I have personally reviewed the examination and initial interpretation  and I agree with the findings.    MARY GALLAGHER MD

## 2018-09-28 NOTE — PROGRESS NOTES
Nursing Focus: Admission  D: Arrived at 2210 from ED via pt transport. Patient accompanied by wife, son. Admitted for facial swelling. Complains of urinary frequency.      I: Admission process began.  Patient oriented to room, enviroment, call light.  MD notified of patient's arrival on unit.     A: Vital signs stable, afebrile.  Patient stable at this time.  Complaining of urinary frequency     P: Implement plan of care when available. Continue to monitor patient. Nursing interventions as appropriate. Notify MD with changes in pt status.

## 2018-09-28 NOTE — PLAN OF CARE
Problem: Urinary Elimination/Continence Impairment (IRF) (Adult)  Goal: Urinary Elimination Establish/Manage  Outcome: No Change  Pt appeared to sleep soundly between cares overnight. Denies pain. Continues to have facial swelling that is unchanged. Pt has peripheral vision loss at baseline. Call light in reach. HR continue to be tachy in low 100's. Condom cath fell off during the morning and pt was incontinent of large amount of urine. Bed changed. Bed alarm on for safety. Will continue to monitor pt.

## 2018-09-28 NOTE — PHARMACY-VANCOMYCIN DOSING SERVICE
Pharmacy Vancomycin Initial Note  Date of Service 2018  Patient's  1960  58 year old, male    Indication: Skin and Soft Tissue Infection    Current estimated CrCl = Estimated Creatinine Clearance: 96.3 mL/min (based on Cr of 0.92).    Creatinine for last 3 days  2018:  5:57 PM Creatinine 1.09 mg/dL  2018:  5:37 AM Creatinine 0.92 mg/dL    Recent Vancomycin Level(s) for last 3 days  No results found for requested labs within last 72 hours.      Vancomycin IV Administrations (past 72 hours)                   vancomycin (VANCOCIN) 1,750 mg in sodium chloride 0.9 % 500 mL intermittent infusion (mg) 1,750 mg New Bag 18                Nephrotoxins and other renal medications (Future)    Start     Dose/Rate Route Frequency Ordered Stop    18 175  ampicillin-sulbactam (UNASYN) 3 g vial to attach to  mL bag      3 g  over 1 Hours Intravenous EVERY 6 HOURS 18 175            Contrast Orders - past 72 hours (72h ago through future)    Start     Dose/Rate Route Frequency Ordered Stop    18  iopamidol (ISOVUE-370) solution 75 mL      75 mL Intravenous ONCE 18                Plan:  1.  Start vancomycin  1500 mg IV q12h.   2.  Goal Trough Level: 10-15 mg/L   3.  Pharmacy will check trough levels as appropriate in 1-3 Days.    4. Serum creatinine levels will be ordered daily for the first week of therapy and at least twice weekly for subsequent weeks.      Jihyeon Shin, PharmD Student

## 2018-09-28 NOTE — PHARMACY-ADMISSION MEDICATION HISTORY
Admission medication history interview status for the 9/27/2018 admission is complete. See Epic admission navigator for allergy information, pharmacy, prior to admission medications and immunization status.     Medication history interview sources:  Patient, Patient's spouse, Surescripts    Changes made to PTA medication list (reason)    Added: n/a    Deleted:   -dexamethasone 4 mg  -other duplicate meds    Changed: n/a      Additional medication history information (including reliability of information, actions taken by pharmacist):  -Patient and spouse knew medication list very well. Both were reliable historians.  -Patient's current dose of dexamethasone is 2 mg in the AM and 1 mg in the PM. Patient started taper on 9/4, and was at 1 mg bid dosing schedule, but provider increased dose back up to current dose on 9/18, per telephone encounter.    Prior to Admission medications    Medication Sig Last Dose Taking? Auth Provider   dexamethasone (DECADRON) 1 MG tablet Take 2 mg by mouth in AM and 1 mg in PM x 5 days, then 1 mg in AM and 1 mg in PM x 5 days, then 1 mg in AM x 5 days, then off 9/26/2018 at PM Yes Unknown, Entered By History   levETIRAcetam (KEPPRA) 1000 MG TABS Take 1,000 mg by mouth every 12 hours 9/27/2018 at AM Yes Rafael Boyer MD   pantoprazole (PROTONIX) 40 MG EC tablet Take 1 tablet (40 mg) by mouth every morning (before breakfast) 9/27/2018 at AM Yes Augustus Armstrong MD   tamsulosin (FLOMAX) 0.4 MG capsule TAKE 1 CAPSULE BY MOUTH AT BEDTIME 9/26/2018 at PM Yes JUSTYN Salmon MD   GLEOSTINE 100 MG capsule CHEMO Take 240 mg by mouth once every 6 weeks. 8/24/2018  Reported, Patient   Multiple Vitamins-Minerals (MULTIVITAMIN & MINERAL PO) Take 1 tablet by mouth daily More than a month at Unknown time  Reported, Patient   ondansetron (ZOFRAN-ODT) 8 MG ODT tab Take 1 tablet (8 mg) by mouth every 8 hours as needed  Patient not taking: Reported on 9/4/2018 More than a month at Unknown time  Layne Medina  P, APRN CNP   oxyCODONE IR (ROXICODONE) 5 MG tablet Take 1-2 tablets (5-10 mg) by mouth every 3 hours as needed for other (pain control or improvement in physical function. Hold dose for analgesic side effects.)  Patient not taking: Reported on 9/4/2018 More than a month at Unknown time  Cheli Salazar APRN CNP   polyethylene glycol (MIRALAX/GLYCOLAX) Packet Take 17 g by mouth daily  Patient not taking: Reported on 9/4/2018 More than a month at Unknown time  Cheli Salazar APRN CNP         Medication history completed by: Chastity Morales, Pharmacy student  =============================================================  I have reviewed the student's medication history and agree with the note.  Milton Clayton, MonikaD

## 2018-09-28 NOTE — PLAN OF CARE
Problem: Infection, Risk/Actual (Adult)  Goal: Identify Related Risk Factors and Signs and Symptoms  Related risk factors and signs and symptoms are identified upon initiation of Human Response Clinical Practice Guideline (CPG).   Outcome: No Change    [id number]: Mildly tachycardic, OVSS. Afebrile. Pt is alert and orientated x4, but at times slow/jerky in following commands. Denies pain/nausea. Continues w/ facial edema, wife states swelling looks improved from yesterday. Vancomycin started, also on IV Unasyn. Up w/ 1-assist, pt got cleaned up and is calling appropriately to use urinal at bedside. PT/OT consulted. Bed alarm on for safety when family not present. Voiding frequently, may want condom cath again overnight. Good PO intake, RN/wife ordered meals for pt. NS infusing at 75mL/hr via PIC. Possible transition to PO ABX tomorrow. Continue to monitor and w/ POC.

## 2018-09-28 NOTE — CONSULTS
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 Endocrinology was consult to assist with difficulty to taper off steroid dosing.    # Secondary adrenal insufficiency  # GBM s/p partial resection c/b brain edema and seizures on chronic steroids  # Facial edema 2/2 preorbital cellulitis managed by primary team    Pt's presenting symptoms consistent with cellulitis in setting of chronic steroid use. He has Cushingoid features such as moon facies, buffalo hump, central obesity, and proximal leg weakness. AM Cortisol level of 2.2 on 9/28 consistent with secondary adrenal insufficiency, would be expected given the length of steroid use. In the setting of acute illness, it would be best to continue on his current steroid regimen of dexamethasone 2 mg BID.    - continue dexamethasone 2 mg BID; benefits of maintaining steroid level in setting of infection and chronic brain edema outweigh risks of starting taper at this time  - if goal is for patient to be completely off steroids in the long term, could consider decreasing steroid dose 0.5-1 mg every 5 days, but would defer this question to primary oncology team. Taper has to be outweighed against overall prognosis and survival. Prior taper led to worsening of mental status which family did not prefer. As long as family understands the risk of high dose steroids against benefit it is rendering now, and if goal is to improve mental status, recommend holding off taper plan.   - consider nystatin swish and spit for oral thrush    Patient seen and discussed with attending physician Dr. Mobley who agrees with the assessment and plan.    Dominic Cedillo MD  Internal Medicine  PGY-2  Pager [phone]    Attending Note:     Patient was seen and examined with fellow Dr. Larose and Dr Cedillo. The note reflects our mutual findings and plan.     Guanaco Mobley MD  [id number]  Endocrinology Service      ==================================================================    Chief complaint:  Seth is a 58 year old male seen in consultation at the request of Dr. Sue for assistance of steroid management.    HISTORY OF PRESENT ILLNESS  Seth Iglesias is a 59 yo male with a h/o GBM of right temporal area s/p partial gross resection c/b seizures, secondary adrenal insufficiency, psoriasis, and GERD who presented with 1 day of facial swelling and erythema admitted for concern of preorbital cellulitis. He and his son noted that his face was more swollen on Wednesday AM, associated with a hoarse voice, and wife noted a new neck lump and increased abdominal girth. He denies any abrasions or cuts to the face, fevers, or chills. Son did note that he has had psoriasis behind in left ear in the past, but that has not been an issue. Pt does not recall consuming any new food, starting a new medication, or any known allergies. After receiving fluids and antibiotics he feels that the facial redness is much improved.    His oncologist has been trying to taper him off dexamethasone, but has had trouble due to fatigue and general malaise. He was tapered down to 1 mg BID, but was uptitrated back to 2 mg BID 10 days ago. He has essentially been on steroids since his diagnosis of GBM in 8/2017, though wife noted that he had briefly been off the steroids in late June/early July. He was restarted because of question of increased brain swelling in early July. He notes some chronic peripheral vision loss, b/l proximal leg weakness, but no issues with cognition or behavior. He feels much better being on steroids than off.    Of note, his admission labs were significant for K 3.4, , WBC 2.3, ANC 1.9, Lac 2.4,  head CT showing similar sized GBM mass with mild improvement in edema compared to imaging in 7/2018. CT orbit showed swelling around R low forehead and orbit.     REVIEW OF SYSTEMS    10 system ROS otherwise as per the HPI or negative    Past Medical History  Past Medical History:   Diagnosis Date     Gastroesophageal reflux disease      Glioblastoma (H)      Psoriasis     On Enbrel Injections Q 1 month for about 10 years, [facility] [place]     Seizures (H)        Medications  Current Facility-Administered Medications   Medication     acetaminophen (TYLENOL) tablet 650 mg     ampicillin-sulbactam (UNASYN) 3 g vial to attach to  mL bag     dexamethasone (DECADRON) tablet 2 mg     enoxaparin (LOVENOX) injection 40 mg     influenza recomb quadrivalent PF (FLUBLOK) injection 0.5 mL     levETIRAcetam (KEPPRA) tablet 1,000 mg     LORazepam (ATIVAN) tablet 0.5-1 mg    Or     LORazepam (ATIVAN) injection 0.5-1 mg     Medication Instruction     ondansetron (ZOFRAN) injection 4 mg     pantoprazole (PROTONIX) EC tablet 40 mg     polyethylene glycol (MIRALAX/GLYCOLAX) Packet 17 g     sodium chloride 0.9% infusion     tamsulosin (FLOMAX) capsule 0.4 mg     vancomycin (VANCOCIN) 1,500 mg in sodium chloride 0.9 % 250 mL intermittent infusion       Current Outpatient Prescriptions   Medication Sig Dispense Refill     [DISCONTINUED] Temozolomide (TEMODAR) 180 MG capsule Take 1 capsule (180 mg) by mouth daily in addition to 250 mg capsule for 5 days (Patient not taking: Reported on 5/4/2018) 5 capsule 0     Allergies  Allergies   Allergen Reactions     Nkda [No Known Drug Allergies]      Family History  family history includes C.A.D. in his father and paternal uncle; Cancer in his father; Lung Cancer in his mother.   Negative family history for endocrine disorders.    Social History  Currently lives with wife and son at home who are very supportive of pt.    Physical Exam  BP 96/68 (BP Location: Right arm)  Pulse  "102  Temp 97.7  F (36.5  C) (Oral)  Resp 20  Ht 1.753 m (5' 9\")  Wt 93.1 kg (205 lb 4.8 oz)  SpO2 94%  BMI 30.32 kg/m2  Body mass index is 30.32 kg/(m^2).  GENERAL :  Sitting upright in bed, NAD  SKIN: Mild erythema and induration around glabella and bilateral cheeks. No abrasions or plaques. No evidence of abdominal striae.  EYES: PERRL, EOMI, No scleral icterus, no proptosis, conjunctival redness, stare, retraction  MOUTH: Moist, pink; pharynx with white oral thrush  NECK: Thickened neck, buffalo hump, no visible masses. No palpable adenopathy, or masses. No carotid bruits.   THYROID:  Normal, nontender, smooth / firm texture,  no nodules  RESP: Lungs clear to auscultation bilaterally  CARDIAC: Regular rate and rhythm, normal S1 S2, without murmurs, rubs or gallops  ABDOMEN: Obese habitus, normal bowel sounds; soft, nontender, no HSM or masses       NEURO: Awake, alert, responds appropriately to questions. Symmetric facial expressions. Moves all extremities; Gait normal. B/l UE strength 4/4, b/l LE strength 2/4.   EXTREMITIES: No clubbing, cyanosis or edema.    DATA REVIEW  Cortisol (9/28/18 5:30 AM): 2.2    Lac 2.4    CT Facial Bones (9/27/18):  Impression: Potential swelling that is new overlying right lower  forehead and above right orbit. Recommend clinical correlation if this  represents cellulitis.    CT Head (9/27/18):  Impression:   1. Redemonstration of the right temporal lobe heterogeneous mass which  is better delineated on brain MRI dated 9/24/2018, but is today  similar in appearance.   2. Mild to moderate improvement in the appearance of the associated  edema and mass effect as compared to CT dated 7/10/2018.    MRI head (9/24/18):  Impression:  Findings suspicious for progression of locally recurrent tumor  surrounding the right temporal resection cavity with extension into  the right basal ganglia and right frontal lobe since 8/14/2018.    "

## 2018-09-28 NOTE — ED NOTES
[facility], [place]   ED Nurse to Floor Handoff     Seth Iglesias is a 58 year old male who speaks English and lives with a spouse,  in a home  They arrived in the ED by unknown from home    ED Chief Complaint: Facial Swelling    ED Dx;   Final diagnoses:   Facial cellulitis   Glioblastoma (H)         Needed?: No    Allergies:   Allergies   Allergen Reactions     Nkda [No Known Drug Allergies]    .  Past Medical Hx:   Past Medical History:   Diagnosis Date     Gastroesophageal reflux disease      Glioblastoma (H)      Psoriasis     On Enbrel Injections Q 1 month for about 10 years, [facility] [place]     Seizures (H)       Baseline Mental status: WDL and cognitively impaired  Current Mental Status changes: at basesline    Infection present or suspected this encounter: cultures pending  Sepsis suspected: Yes  Isolation type: No active isolations     Activity level - Baseline/Home:  Independent  Activity Level - Current:   Independent    Bariatric equipment needed?: No    In the ED these meds were given:   Medications   0.9% sodium chloride BOLUS (1,000 mLs Intravenous New Bag 9/27/18 1810)     Followed by   sodium chloride 0.9% infusion (not administered)   ampicillin-sulbactam (UNASYN) 3 g vial to attach to  mL bag (0 g Intravenous Stopped 9/27/18 2007)   vancomycin (VANCOCIN) 1,750 mg in sodium chloride 0.9 % 500 mL intermittent infusion (1,750 mg Intravenous New Bag 9/27/18 2006)   vancomycin (VANCOCIN) 1,750 mg in sodium chloride 0.9 % 500 mL intermittent infusion (not administered)   0.9% sodium chloride BOLUS (not administered)   iopamidol (ISOVUE-370) solution 75 mL (75 mLs Intravenous Given 9/27/18 1951)   sodium chloride (PF) 0.9% PF flush 70 mL (70 mLs Intravenous Given 9/27/18 1952)       Drips running?  Yes    Home pump  No    Current LDAs  Peripheral IV 09/27/18 Right Lower forearm (Active)   Site Assessment WDL 9/27/2018  7:28 PM   Line  Status Saline locked 9/27/2018  7:28 PM   Dressing Intervention New dressing  9/27/2018  7:28 PM   Number of days:0       Incision/Surgical Site 10/03/17 Right Scalp (Active)   Number of days:359       Incision/Surgical Site 05/17/18 Right Head (Active)   Number of days:133       Labs results:   Labs Ordered and Resulted from Time of ED Arrival Up to the Time of Departure from the ED   CBC WITH PLATELETS DIFFERENTIAL - Abnormal; Notable for the following:        Result Value    WBC 2.3 (*)     RBC Count 2.94 (*)     Hemoglobin 9.9 (*)     Hematocrit 31.0 (*)      (*)     MCH 33.7 (*)     RDW 19.3 (*)     Nucleated RBCs 14 (*)     Absolute Neutrophil 1.0 (*)     All other components within normal limits   COMPREHENSIVE METABOLIC PANEL - Abnormal; Notable for the following:     Calcium 8.2 (*)     Albumin 2.6 (*)     Protein Total 6.6 (*)     All other components within normal limits   CRP INFLAMMATION - Abnormal; Notable for the following:     CRP Inflammation 130.0 (*)     All other components within normal limits   ERYTHROCYTE SEDIMENTATION RATE AUTO - Abnormal; Notable for the following:     Sed Rate 46 (*)     All other components within normal limits   ISTAT  GASES LACTATE BOB POCT - Abnormal; Notable for the following:     Ph Venous 7.47 (*)     PCO2 Venous 31 (*)     Lactic Acid 2.4 (*)     All other components within normal limits   MAGNESIUM   TROPONIN I   ROUTINE UA WITH MICROSCOPIC   ISTAT CG4 GASES LACTATE BOB NURSING POCT   BLOOD CULTURE   BLOOD CULTURE       Imaging Studies:   Recent Results (from the past 24 hour(s))   XR Chest 2 Views    Narrative    Exam: XR CHEST 2 VW, 9/27/2018 7:32 PM    Indication: dyspnea, rule out edema;     Comparison: None    Findings:   AP and lateral upright view of the chest. The cardiomediastinal  silhouette and upper abdomen are unremarkable.  There is no pleural  effusion. No pneumothorax. No focal airspace opacities. Visualized  upper abdomen is unremarkable. No  acute bony abnormality.      Impression    IMPRESSION: No acute cardiopulmonary findings.    I have personally reviewed the examination and initial interpretation  and I agree with the findings.    THU GUTIERREZ MD   Head CT w/o contrast    Narrative    CT HEAD W/O CONTRAST 9/27/2018 8:11 PM    Provided History: confusion, h/o glioblastoma, facial swelling;     Comparison: MR brain dated 9/24/2018 and CT from 7/10/2018.    Technique: Using multidetector thin collimation helical acquisition  technique, axial, coronal and sagittal CT images from the skull base  to the vertex were obtained without intravenous contrast.     Findings:    Surgical changes consistent with previous craniotomy and debulking of  the right temporal lobe glioblastoma. Heterogeneous mass of the right  temporal lobe which is similar in appearance when correlated with MR  brain dated 9/24/2018, with decreased mass effect from 7/10/2018 CT.  It measures approximately 5.6 x 2.9 cm its maximal axial dimensions  (series 2 image 15). There appears to be in interval decrease in the  amount of white matter edema in the vicinity to this lesion, but  continues to extend into the frontal lobe. No new intracranial  hemorrhage. There is persistent mild effacement of the right  ventricle. White matter low-attenuation surrounding the ventricles  likely related to underlying vasculopathy and therapy. The basal  cisterns are patent.    The visualized paranasal sinuses are clear. The mastoid air cells are  clear.       Impression    Impression:   1. Redemonstration of the right temporal lobe heterogeneous mass which  is better delineated on brain MRI dated 9/24/2018, but is today  similar in appearance.   2. Mild to moderate improvement in the appearance of the associated  edema and mass effect as compared to CT dated 7/10/2018.    I have personally reviewed the examination and initial interpretation  and I agree with the findings.    MARY GALLAGHER MD   CT  "Facial Bones with Contrast    Narrative    PRELIMINARY REPORT - The following report is a preliminary  interpretation.      Impression    Impression: Normal CT study of the facial bones. For intracranial  findings please see same day head CT.         Recent vital signs:   BP 98/69  Pulse 133  Temp 98.6  F (37  C) (Oral)  Resp 18  Ht 1.753 m (5' 9\")  SpO2 93%  BMI 31.01 kg/m2    Cardiac Rhythm: Normal Sinus  Pt needs tele? No  Skin/wound Issues: facial swelling     Code Status: Full Code    Pain control: fair    Nausea control: good    Abnormal labs/tests/findings requiring intervention: elevated sed rate, abnormal CRP, low WBC, Abnormal ISTAT CG4    Family present during ED course? Yes   Family Comments/Social Situation comments: At bedside     Tasks needing completion: TENNILLE Bowles, RN    [phone]      "

## 2018-09-29 NOTE — PLAN OF CARE
Problem: Patient Care Overview  Goal: Plan of Care/Patient Progress Review  Outcome: Improving  3100-2454: AVSS on RA, intermittently tachycardic. Denies pain or nausea. A&Ox4, at times slower to respond, did need some reminding that it was nighttime vs daytime. Bilateral face/neck continue to have mild (2+) edema. PIV infusing NS at 75 ml/hr. Continues on vanco and unasyn. Voiding adequately, declined use of condom catheter overnight, used urinal independently in bed. Bed alarm on for safety overnight. Repositioning self. Pt slept between cares. Wife Maryanne updated via phone. Possible transition to PO antibiotics today, pt and wife hope to discharge today. Continue with POC.

## 2018-09-29 NOTE — PLAN OF CARE
Problem: Patient Care Overview  Goal: Plan of Care/Patient Progress Review  PT 7D: Up with CGA x 1 + FWW -- will requiring cueing to maintain close proximity to FWW, do not ambulate a far distance    Discharge Planner PT   Patient plan for discharge: Home with wife (24/7 support) and HHPT/OT  Current status: Engaged pt in bed mobility at min A, sit <> stand transfers at CGA with and without 4WW, gait training with 4WW for ~100ft at CGA with consistent cues for upright posture and closer proximity to 4WW, and reviewed seated LE strengthening exercises. Wife present and supportive of patient. Pt intermittently impulsive and demonstrating impaired safety awareness, however, this has been baseline for him recently.   Barriers to return to prior living situation: Medical status  Recommendations for discharge: Home with continued HHPT  Rationale for recommendations: Pt is below his baseline for functional mobility secondary to impaired activity tolerance, strength, and balance. Pt was previously IND and has recently been requiring 24/7 assist and use of adaptive equipment for gait and ADLs - pt's long term goal is return to IND.        Entered by: Yana Chen 09/29/2018 9:08 AM

## 2018-09-29 NOTE — DISCHARGE SUMMARY
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 Initiated on Unasyn and IV Vanc with significant improvement in 24 hours. Less erythematous, less tender, less indurated. Will discharge on Keflex 500 mg q6hrs x 10 days total. Follow up with Dr. Armstrong 10/1.      Hospital Course   Seth Iglesias was admitted on 9/27/2018.  The following problems were addressed during his hospitalization:    #Facial cellulitis.  Patient had generalized facial swelling at baseline (likely 2/2 steroids) but noted to have been increasing since yesterday. Most conspicuous over the cheeks, malar eminences, and periorbital regions, and also involves the glabella of the nose and neck. Associated with pronounced rash/erythema and slight induration and skin is warm to touch. Patient also describes an increasing \"lump\" behind the neck and subjective increased abdominal obesity. No fevers, chills, insect bites, vision loss, photophobia, headache, dizziness, syncope, trauma, neck pain or stiffness. CRP elevated at 130.0. Lactic acid is mildly elevated at 2.4 compared with normal 1.3 on 7/10/18.  - CT head with mild-moderate improvement in associated edema and mass effect compared to 7/10/18, R temporal lobe heterogeneous mass better since MRI 9/24.   -CT facial bones with \"potential swelling overlying R lower forehead and above R orbit (glabella, forehead, and above R eye). No bone erosion or sinus " infection identified.   - Vancomycin+Unasyn in ED-->transitioned to Keflex 500 mg q6hrs to complete a 10 day course.   - BCx NGTD, UA negative, UCx pending.   - Trend CRP, stably elevated today at 130.      #Anion gap metabolic acidosis.  #Mild lactic acidosis.  Albumin-adjusted anion gap is 14.5 (albumin 2.6) with mild lactic acidosis of 2.4. Likely related to malignancy versus cellulitis. No suspicion of infection elsewhere based on presentation or work up so far. S/P 2L IV fluids in ED. Repeat lactic acid not drawn.  - INF NS at 75 ml/hr.      #Pancytopenia. 2/2 chemotherapy.  His counts are recovering from prior cycles of lomustine. Has been getting weekly CBC checks. Platelet rachael was 20K with cycle 2 and plts have now increased to 203K without any transfusions. Platelet rachael was 36K with cycle 1. WBC count is still decreased at 2.3 on admission.   - Monitor CBC.   - Might need dose reduction per dosing guidelines for next cycles as had platelets drop <75K and WBC <3 (outpatient note from 9/4/18).  - Other cells resulted on diff 9/28 suspicious for blasts per pathology. Appears to have had these in previous evaluations.     #Chronic glucorticoid therapy.  Was treated with high dose steroids (dexamethasone 4 mg every 6 hours) for extensive intracranial edema during last [facility] admission (7/10-7/12). Has since been on a slow steroid taper. Patient has had difficulty decreasing/discontinuing steroid doses. Was down to 1 mg dex BID but, due to increased fatigue, weakness, and cognitive impairment, family requested it to be increased back to 2 mg BID around 9/18/18. Likely secondary adrenal insufficiency versus steroid withdrawal syndrome.  - Morning cortisol level (expect it to be low), 2.2 (low).  - Endocrine consult, appreciate recs. Recommend continuing dex 2 mg bid (benefit outweighs risk), left recs regarding idea for taper (See note from 9/28).      #Stage 4 right temporal GBM.  S/P right temporal craniotomy  and gross total resection on 10/3/17 and concurrent chemoradiation 11/12-12/22/17. Started adjuvant Temodar and Optune device on 1/22/18. Received 5 cycles of adjuvant therapy and had progression of disease in R temporal lobe. He enrolled in TOCA trial and had repeat resection on 5/17. He was randomized to standard of care arm with lomustine (i.e. he did not receive the therapeutic virus intraoperatively), started 7/4/18. Of note, gross total resection was not possible due to vessel involvement. He had a baseline MRI on 6/19/18 with residual tumor. After cycle 1, he developed extreme lethargy and new left-sided weakness which led to an admission at [facility] from 7/10-7/12. MRI brain was concerning for tumor progression with extensive associated intracranial edema. Repeat surgical resection was initially considered. However, given rapid clinical and radiographic response after high dose steroids on follow up MRI on 8/1, it was decided to resume systemic therapy rather than undergo repeat resection. He proceeded with cycle 2 on 8/24.    - Continue home keppra at 1000 mg BID.  - C3D1 tentatively planned for 10/1.       #BPH:  - Continue home tamsulosin.    #Oral thrush.   -Started nystatin x 7 days (x9/29).      FEN:   - Regular diet as tolerated.  - NS at 75 ml/hr.      Prophylaxis:   - Lovenox for VTE ppx  - Scheduled bowel regimen for constipation ppx  - PPI for GI/PUD ppx  - Patient gets home PT/OT, consulted for eval while inpatient.                 Code Status: FULL    Patient and plan discussed with Dr. Ayala.      Jojo Arevalo PA-C  Hematology/Oncology  Pager #[phone]    Significant Results and Procedures   See above CT scan results.    Pending Results   These results will be followed up by Dr. Armstrong.   Unresulted Labs Ordered in the Past 30 Days of this Admission     Date and Time Order Name Status Description    9/27/2018 1750 Blood culture Preliminary     9/27/2018 1750 Blood culture Preliminary           Code  Status   Full Code    Primary Care Physician   JUSTYN Manning Post    Physical Exam   Temp: 97.2  F (36.2  C) Temp src: Oral BP: 127/85   Heart Rate: 98 Resp: 16 SpO2: 97 % O2 Device: None (Room air)    Vitals:    09/27/18 2213 09/28/18 0742 09/29/18 0851   Weight: 93.9 kg (207 lb 1.6 oz) 93.1 kg (205 lb 4.8 oz) 95.6 kg (210 lb 12.8 oz)     Vital Signs with Ranges  Temp:  [95.7  F (35.4  C)-97.5  F (36.4  C)] 97.2  F (36.2  C)  Heart Rate:  [] 98  Resp:  [16-20] 16  BP: (101-127)/(64-85) 127/85  SpO2:  [93 %-97 %] 97 %  I/O last 3 completed shifts:  In: 2270 [P.O.:520; I.V.:1750]  Out: 525 [Urine:525]    Constitutional: Pleasant male seen lying in bed, in NAD. Alert and interactive.   HEENT: NCAT, cushingoid appearance of face, anicteric sclerae, conjunctiva clear. Moist mucous membranes, with white plaquing on tongue consistent with thrush.  Respiratory: Non-labored breathing, good air exchange. Lungs are clear to auscultation bilaterally, without wheezing, crackles or rhonchi. No cough noted.   Cardiovascular: Regular rate and rhythm with no murmur, rub or gallop.  GI: Normoactive BS. Abdomen is soft, non-distended, and non-tender to palpation. No rigidity or guarding.  Skin: Warm and dry. Erythema over cheeks bilaterally significantly improved as well as induration. Patch of swelling and erythema over glabella of face also much improved, minimally pink, less tender and less indurated. One lesion posterior to R ear that is about dime sized with central scale, not indurated, painful, or pruritic.  Musculoskeletal: Extremities grossly normal. No tenderness or edema present.   Neurologic: A &O x4, speech normal, answering questions appropriately. Moves all extremities spontaneously. Grossly non-focal.  Neuropsychiatric: Mentation and affect normal/appropriate.    Discharge Disposition   Discharged to home  Condition at discharge: Stable    Consultations This Hospital Stay   PHARMACY TO DOSE VANCO  MEDICATION HISTORY IP  PHARMACY CONSULT  ENDOCRINE NON-DIABETES ADULT IP CONSULT  PHARMACY TO DOSE VANCO  PHYSICAL THERAPY ADULT IP CONSULT  OCCUPATIONAL THERAPY ADULT IP CONSULT    Discharge Orders     CBC with platelets differential   Last Lab Result: Hemoglobin (g/dL)      Date                     Value                09/29/2018               8.1 (L)          ----------     Comprehensive metabolic panel     Reason for your hospital stay   Admitted for treatment of cellulitis.     Follow Up and recommended labs and tests   Follow up as scheduled below:     Activity   Your activity upon discharge: activity as tolerated     When to contact your care team   [facility] [place] [facility] [place] at [phone] for temp >100.4, uncontrolled nausea/vomiting/diarrhea/constipation, unrelieved pain, bleeding not relieved with pressure, dizziness, chest pain, shortness of breath, loss of consciousness, and any new or concerning symptoms.     Discharge Instructions   -Take Keflex as prescribed until complete.   -If you have worsening facial swelling, pain, redness, or return of presenting symptoms, please present to the ED for evaluation.     Diet   Follow this diet upon discharge: Regular       Discharge Medications   Discharge Medication List as of 9/29/2018 12:53 PM      START taking these medications    Details   cephALEXin (KEFLEX) 500 MG capsule Take 1 capsule (500 mg) by mouth every 6 hours, Disp-33 capsule, R-0, E-Prescribe      nystatin (MYCOSTATIN) 299569 UNIT/ML suspension Take 5 mLs (500,000 Units) by mouth 4 times daily for 7 daysDisp-140 mL, [id number]         CONTINUE these medications which have NOT CHANGED    Details   dexamethasone (DECADRON) 1 MG tablet Take 2 mg by mouth in AM and 1 mg in PM x 5 days, then 1 mg in AM and 1 mg in PM x 5 days, then 1 mg in AM x 5 days, then off, Historical      levETIRAcetam (KEPPRA) 1000 MG TABS Take 1,000 mg by mouth every 12 hours, Disp-180 tablet, R-11, E-Prescribe      pantoprazole  (PROTONIX) 40 MG EC tablet Take 1 tablet (40 mg) by mouth every morning (before breakfast), Disp-90 tablet, R-0, E-Prescribe      tamsulosin (FLOMAX) 0.4 MG capsule TAKE 1 CAPSULE BY MOUTH AT BEDTIME, Disp-30 capsule, R-11, E-PrescribeOUT OF RF NEED NEW RX PLEASE 8/27/18      GLEOSTINE 100 MG capsule CHEMO Take 240 mg by mouth once every 6 weeks., ELENA, Historical      Multiple Vitamins-Minerals (MULTIVITAMIN & MINERAL PO) Take 1 tablet by mouth daily, Historical      ondansetron (ZOFRAN-ODT) 8 MG ODT tab Take 1 tablet (8 mg) by mouth every 8 hours as needed, Disp-60 tablet, R-0, E-Prescribe      oxyCODONE IR (ROXICODONE) 5 MG tablet Take 1-2 tablets (5-10 mg) by mouth every 3 hours as needed for other (pain control or improvement in physical function. Hold dose for analgesic side effects.), Disp-45 tablet, R-0, Local Print      polyethylene glycol (MIRALAX/GLYCOLAX) Packet Take 17 g by mouth daily, Disp-7 packet, R-1, E-Prescribe           Allergies   Allergies   Allergen Reactions     Nkda [No Known Drug Allergies]      Data   Most Recent 3 CBC's:  Recent Labs   Lab Test  09/29/18   0630 09/28/18   0537  09/27/18   1757   WBC  2.9*  2.5*  2.3*   HGB  8.1*  8.8*  9.9*   MCV  107*  104*  105*   PLT  228  198  203      Most Recent 3 BMP's:  Recent Labs   Lab Test  09/29/18   0630  09/28/18   0537  09/27/18   1757   NA  142  141  138   POTASSIUM  4.0  3.6  3.4   CHLORIDE  114*  112*  106   CO2  20  21  21   BUN  10  10  15   CR  0.82  0.92  1.09   ANIONGAP  8  9  11   ARA  8.1*  7.8*  8.2*   GLC  94  89  84     Most Recent 2 LFT's:  Recent Labs   Lab Test  09/27/18 1757  09/18/18   1406   AST  22  47*   ALT  34  59   ALKPHOS  123  118   BILITOTAL  0.7  1.5*     Most Recent INR's and Anticoagulation Dosing History:  Anticoagulation Dose History     Recent Dosing and Labs Latest Ref Rng & Units 9/28/2017 10/3/2017 5/17/2018 8/1/2018    INR 0.86 - 1.14 1.14 1.12 1.11 1.20(H)        Most Recent 3 Troponin's:  Recent  Labs   Lab Test  09/27/18   1757  09/28/17   0950  07/31/10   1950   TROPI  0.017  <0.015  <0.012     Most Recent Cholesterol Panel:  Recent Labs   Lab Test  07/08/16   1118   CHOL  199   LDL  120*   HDL  62   TRIG  87     Most Recent 6 Bacteria Isolates From Any Culture (See EPIC Reports for Culture Details):  Recent Labs   Lab Test  09/27/18   1834  09/27/18   1757  05/17/18   1556  08/02/10   1309  07/31/10   1950   CULT  No growth after 2 days  No growth after 2 days  Culture negative after 4 weeks  No anaerobes isolated  No growth  Canceled, Test credited  Inappropriate specimen type  [facility] DOES NOT ACCEPT SPECIMENS ON SWABS  Notification of test cancellation was given to  DR BRISSA WILKINSON    Moderate growth Staphylococcus aureus  No growth after 6 days  No growth after 6 days     Most Recent TSH, T4 and A1c Labs:  Recent Labs   Lab Test  09/30/17   0850   A1C  5.3     Results for orders placed or performed during the hospital encounter of 09/27/18   XR Chest 2 Views    Narrative    Exam: XR CHEST 2 VW, 9/27/2018 7:32 PM    Indication: dyspnea, rule out edema;     Comparison: None    Findings:   AP and lateral upright view of the chest. The cardiomediastinal  silhouette and upper abdomen are unremarkable.  There is no pleural  effusion. No pneumothorax. No focal airspace opacities. Visualized  upper abdomen is unremarkable. No acute bony abnormality.      Impression    IMPRESSION: No acute cardiopulmonary findings.    I have personally reviewed the examination and initial interpretation  and I agree with the findings.    THU GUTIERREZ MD   Head CT w/o contrast    Narrative    CT HEAD W/O CONTRAST 9/27/2018 8:11 PM    Provided History: confusion, h/o glioblastoma, facial swelling;     Comparison: MR brain dated 9/24/2018 and CT from 7/10/2018.    Technique: Using multidetector thin collimation helical acquisition  technique, axial, coronal and sagittal CT images from the skull base  to the vertex were obtained  without intravenous contrast.     Findings:    Surgical changes consistent with previous craniotomy and debulking of  the right temporal lobe glioblastoma. Heterogeneous mass of the right  temporal lobe which is similar in appearance when correlated with MR  brain dated 9/24/2018, with decreased mass effect from 7/10/2018 CT.  It measures approximately 5.6 x 2.9 cm its maximal axial dimensions  (series 2 image 15). There appears to be in interval decrease in the  amount of white matter edema in the vicinity to this lesion, but  continues to extend into the frontal lobe. No new intracranial  hemorrhage. There is persistent mild effacement of the right  ventricle. White matter low-attenuation surrounding the ventricles  likely related to underlying vasculopathy and therapy. The basal  cisterns are patent.    The visualized paranasal sinuses are clear. The mastoid air cells are  clear.       Impression    Impression:   1. Redemonstration of the right temporal lobe heterogeneous mass which  is better delineated on brain MRI dated 9/24/2018, but is today  similar in appearance.   2. Mild to moderate improvement in the appearance of the associated  edema and mass effect as compared to CT dated 7/10/2018.    I have personally reviewed the examination and initial interpretation  and I agree with the findings.    MARY GALLAGHER MD   CT Facial Bones with Contrast    Narrative    CT FACIAL BONES WITH CONTRAST 9/27/2018 8:13 PM    History:  facial swelling, ?sinus infection;     Comparison:  Correlations made with previous head CT dated 7/10/2018.       Technique: Using thin collimation multidetector helical acquisition  technique, axial and coronal thin section CT images were reconstructed  through the facial bones. Images were reviewed in bone and soft tissue  windows.    Findings:  There is mild swelling on soft tissue windows overlying the  right side of the lower forehead, glabella, and above the right eye  lid,  extending towards midline. This does not extend into the right  orbit or globe. This is nonspecific but could represent cellulitis.  There is no evident fracture or erosion of the facial bones. The  cribriform plate appears intact. Alignment of the facial bones appears  normal.     There is no hematoma, soft tissue mass or gas visualized within the  orbits. The visualized portions of the paranasal sinuses are clear.       Impression    Impression: Potential swelling that is new overlying right lower  forehead and above right orbit. Recommend clinical correlation if this  represents cellulitis.    I have personally reviewed the examination and initial interpretation  and I agree with the findings.    MARY GALLAGHER MD

## 2018-09-29 NOTE — PLAN OF CARE
Problem: Patient Care Overview  Goal: Plan of Care/Patient Progress Review  Outcome: Adequate for Discharge Date Met: 09/29/18    Discharge  D: Orders for discharge and outpatient medications written.  I: Home medications and return to clinic schedule reviewed with patient. Discharge instructions and parameters for calling Health Care Provider reviewed. PIV removed. Patient left at 1330 accompanied by wife.   A: Patient/family verbalized understanding and was ready for discharge.   P: Patient instructed to  medications in Pharmacy. Follow up as scheduled.

## 2018-09-30 NOTE — PROGRESS NOTES
Physical Therapy Discharge Summary    Reason for therapy discharge:    Discharged to home with home therapy.    Progress towards therapy goal(s). See goals on Care Plan in [facility] electronic health record for goal details.  Goals not met.  Barriers to achieving goals:   discharge from facility.    Therapy recommendation(s):    Continued therapy is recommended.  Rationale/Recommendations:  to continue progression of his IND and safety with mobility and ADLs. .

## 2018-10-01 ENCOUNTER — TELEPHONE (OUTPATIENT)
Dept: FAMILY MEDICINE | Facility: CLINIC | Age: 58
End: 2018-10-01

## 2018-10-01 ENCOUNTER — TELEPHONE (OUTPATIENT)
Dept: CARE COORDINATION | Facility: CLINIC | Age: 58
End: 2018-10-01

## 2018-10-01 NOTE — TELEPHONE ENCOUNTER
[facility] now requests orders and shares plan of care/discharge summaries for some patients through [employer].  Please REPLY TO THIS MESSAGE OR ROUTE BACK TO THE AUTHOR in order to give authorization for orders when needed.  This is considered a verbal order, you will still receive a faxed copy of orders for signature.  Thank you for your assistance in improving collaboration for our patients.    ORDER  PT eval for strengthening, endurance, mobility, HEP, and falls prevention.     Unable to see pt in time span of original eval order

## 2018-10-01 NOTE — TELEPHONE ENCOUNTER
Helena from [facility] office is calling and wanting a verbal ok from Dr. Salmon that he will sign the Death Certificate, so she can notify the Mortuary. Please call [phone].  [place]  [facility]  Flex

## 2018-10-02 ENCOUNTER — TELEPHONE (OUTPATIENT)
Dept: ONCOLOGY | Facility: CLINIC | Age: 58
End: 2018-10-02

## 2018-10-02 NOTE — TELEPHONE ENCOUNTER
"MEDICAL ONCOLOGY ATTENDING PHYSICIAN TELEPHONE NOTE    2018 -8:50-9:10AM    I called the patient's wife, Mrs. Maryanne Iglesias, to express condolences on the sudden passing of her , Mr. Seth Iglesias, 3 days ago. It happened instantly following hospital discharge earlier that day. He was in the car and told her he was very tired; she remarked he had shallow breathing, and he collapsed in the doorway of their home. EMS was called and he was declared . Her middle son and the son's friend had helped perform CPR.    She stated a great deal of appreciation for the joint care teams in oncology and neurosurgery, and also had some questions mainly relating to what she described as feelings of guilt that \"I could have done something more.\" I provided reassurance that she did a tremendous amount to help her  since his GBM diagnosis, and she stated appreciation that he had had time this summer to spend with family and friends. She did ask me to summarize the results of the recent MRI, and I did that as well. I did state that as the patient's spouse, she did have the right to request autopsy if she felt strongly that she would want to know details about the potential exact cause of instant death. She stated that cremation was scheduled for today, and that the  did offer that option as well, but that she declined and instead elected to proceed with cremation of his remains.    At the end of the call, she again thanked me for the call and for excellent care from the entire clinical team.    Augustus Armstrong MD PhD  "

## 2018-10-03 LAB
BACTERIA SPEC CULT: NO GROWTH
BACTERIA SPEC CULT: NO GROWTH
Lab: NORMAL
Lab: NORMAL
SPECIMEN SOURCE: NORMAL
SPECIMEN SOURCE: NORMAL

## 2018-11-05 ENCOUNTER — DOCUMENTATION ONLY (OUTPATIENT)
Dept: NEUROSURGERY | Facility: CLINIC | Age: 58
End: 2018-11-05

## 2018-11-05 NOTE — PROGRESS NOTES
Documentation for clinical trial enrollment    This note will document that the patient's MRI prior to Tocagen enrollment demonstrated a tumor where >80% resection can be achieved. >80% resection was achieved during the surgery.

## 2019-01-03 ENCOUNTER — RESEARCH ENCOUNTER (OUTPATIENT)
Dept: ONCOLOGY | Facility: CLINIC | Age: 59
End: 2019-01-03

## 2019-01-03 NOTE — NURSING NOTE
LATE RESEARCH NOTE:  OPTUNE Device Usage      Patient was consented to the TOCA trial on [id number].    At the time of consenting, he did not have the OPTUNE Device on.  I am unaware of the specific date he stopped using the device.    Manisha Castellanos RN  Clinical Research Coordinator- RN

## 2022-04-03 NOTE — LETTER
6/22/2018       RE: Seth Iglesias  [address]  [place] [id number]     Dear Colleague,    Thank you for referring your patient, Seth Iglesias, to the [facility]. Please see a copy of my visit note below.    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 MGMT promoter methylation was absent.     Treatment to date: status post gross total resection 10/3/17; initiated concurrent chemoradiation with temozolomide on 11/13/17 and completed 12/22/17. Adjuvant temozolomide and Optune device started on 1/22/18.  Following progression of disease documented spring 2018, patient enrolled in the Toca trial underwent surgical debulking May 17, 2018; he was randomized to the standard of care arm (ie he did not receive the therapeutic virus intraoperatively).     Referring physicians:  Dr. Harry Wiggins and Dr. Beto Gracia, Neurosurgical Service, [facility]  Dr. Dominic Louis, radiation oncology, [facility]  Oncology History:   Seth presented in September 2017 with dizzy spells, headache, and increasing somnolence. He was admitted on 9/28/17 after imaging in ED found a right temporal lobe mass. He went on to have gross total resection on 10/3/17.   10/27/17 - - neuro-oncology/medical oncology consultation, Dr. Armstrong.  11/12/17 through December 22, 2017 adjuvant treatment with concurrent chemoradiation with temozolomide. 6000 cGy, and 30 fractions, standard 200 cGy per fraction  1/19/18 - - brain MRI: Impression: 1. 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 Recommend attention on follow-up. Postsurgical changes of prior resection of right temporal glioblastoma. Resolution of  right frontotemporal subdural hematoma and T2 hyperintensity along the right internal capsule and commisural white matter.    1/22/18 - - oncology/medical oncology follow-up, Dr. Armstrong. Plan: initiate adjuvant five day temozolomide and OPTune device. Cycle 1/day one of temozolomide, 150 mg/m  on days one through five of each 28 day cycle.    2/20/18 - - oncology follow-up, SCARLETT Hillman. Cycle 2/day one of five day temozolomide. Dose increased to 200 mg/m .    3/15/18 - - brain MRI: Impression: In this patient with glioblastoma multiforme status post gross total resection, chemoradiation and adjuvant Temodar and Optune, there is increased nodular contrast enhancement and T2 hyperintensity at the margins of the right temporal resection cavity. Perfusion characteristics suggestive of tumor recurrence, particularly medially.    3/19/18 - - neuro-oncology/medical oncology follow-up, Dr. Armstrong.    4/6/18 - - neurology follow-up, Dr[facility] Boyer. [facility]     4/19/18 MRI brain   1. Increased size of enhancing mass and surrounding vasogenic edema in  the right temporal lobe, which is suggestive of recurrence of  high-grade tumor, while the MR perfusion and diffusion imaging  characteristics are indeterminate for recurrent tumor (described in  detail above). Therefore, recommend shorter term follow-up study such  as one month.  2. Increasing mass effect has caused further compression of the right  lateral ventricle. No hydrocephalus at this time.    4/23/18 -- neuro-oncology follow-up, Dr. Armstrong  Consensus of multidisciplinary neuro-oncology tumor board: MRI consistent with progression of disease while on Optune and Temozolomide.  5/4/18-neurosurgical consultation with Dr. Beto Gracia re: TOCA trial  5/11/18--neuro-oncology follow-up, Dr. Armstrong. Plan: proceed with surgical debulking/enrollment and randomization on the Toca trial.  5/17/18 - -Procedure:  tumor debulking (a portion of the tumor adherent vessels could  not be resected). Patient was randomized to trial arm that did not include the Toca therapeutic virus.  1) Right craniotomy for tumor resection  2) Neuronavigation  3) Microdissection      Surgeon: Beto Gracia. MD, PhD  6/1/18 - - neurosurgery follow-up, Dr. Gracia. Again noted. The patient was randomized to standard therapy.  6/19/18 - - brain MRI - stable since 5/18 post-op scan.  6/22/18--neuro-oncology follow-up, Dr. Armstrong. Plan: initiated 2nd-line treatment with lomustine ([facility]) once cleared by insurance.    Interim History:  Mr. Iglesias returns today with his wife.  He is approximately 4 weeks out from debulking surgery performed by Dr. Beto Gracia from our Neurosurgery team.  In reading the operative note, it was apparent that some portion of the tumor was adherent to the vessels, thus this portion of the tumor could not be resected.  Postoperative MRI was done 24 hours later on 05/18 and showed residual tumor with some information.  There was some hematoma that has since resolved.  Based on MRI done earlier this week, we noted interval change from the size of the tumor, but there is some decreased edema.  The patient was randomized to arm of the trial that did not administer therapeutic virus.      He remains on the trial protocol though which permits standard of care therapy.  The options include temozolomide, bevacizumab or lomustine.  After strong consideration, he opted to go forward with lomustine.  I am holding off on bevacizumab as he is not having full intact tumor and has already started to taper the steroids, so in order to preserve him for future eligibility for other clinical trials, we will forego the Avastin at this time.  In addition, as the temozolomide was not effective in his tumor in the first-line setting, temozolomide would likely not be effective at this point, so that left lomustine.  He is generally doing well.  His wife states that since the debulking surgery, his cognitive process has  "been much better.  He states that fatigue is his most significant symptom, but he is slowly and steadily improving.  He is on a long-term dexamethasone taper at this point.  For the last week or so, he has been on 2 mg twice a day and we are reinitiating a taper and that is part of our discussion today.  He is denying any pain or any other neurologic complaints or symptoms at this time.  He remains on levetiracetam for seizure prophylaxis under the care of Dr. Boyer from our Neurology team.               Review Of Systems:  Complete ROS reviewed. Pertinent symptoms reviewed above per HPI.    PAST MEDICAL HISTORY:   1.  Glioblastoma, status post gross total resection on 10/03/2017 of the right temporal lobe preceded and indicated by some seizure-like activity without loss of consciousness.   2.  Chronic back pain.   3.  Ganglionic cysts.   4.  Hyperlipidemia.   5.  He has psoriasis and was taking what sounds like a TNF and an alpha inhibitor up until the summer of .       PAST SURGICAL HISTORY:     1.  The aforementioned right temporal craniotomy for gross total resection of right temporal glioblastoma done 10/03/2017 with Dr. Harry Wiggins at the [facility].   2.  Colonoscopy performed 2011, unremarkable.   3. Surgical debulking of recurrent GB tumor, May 17 2018.      FAMILY HISTORY:  He had a Father and 2 paternal uncles who had prostate cancer.  His mother had some sort of \"tumor of the rib,\" and  at age 54 of this unknown cancer.       SOCIAL HISTORY:  The patient lives in [place], [place].  He is accompanied by his wife, who is extremely supportive.  They have 3 sons ages 18, 23 and 25.  Occupation:  He worked for [employer] for 28 years, and had to retire in  when the [employer] line shut down.  He has since then been working for [employer], about 27 miles from his home.    Tobacco:  Never smoker.    Allergies:none    Current Medications:   Current " "Outpatient Prescriptions   Medication     dexamethasone (DECADRON) 2 MG tablet     levETIRAcetam (KEPPRA) 1000 MG TABS     Multiple Vitamins-Minerals (MULTIVITAMIN & MINERAL PO)     Ondansetron (ZOFRAN ODT PO)     oxyCODONE IR (ROXICODONE) 5 MG tablet     polyethylene glycol (MIRALAX/GLYCOLAX) Packet     RANITIDINE HCL PO     senna-docusate (SENOKOT-S;PERICOLACE) 8.6-50 MG per tablet     [DISCONTINUED] Temozolomide (TEMODAR) 180 MG capsule     No current facility-administered medications for this visit.          Physical Exam:  /62  Pulse 65  Temp 97.9  F (36.6  C) (Oral)  Resp 16  Ht 1.753 m (5' 9\")  Wt 97.6 kg (215 lb 3.2 oz)  SpO2 96%  BMI 31.78 kg/m2  KPS 80  GENERAL:  Very pleasant middle-aged  gentleman who appears in no acute distress, alert and oriented x3.  Speech is fluent.  He does not have any evidence of receptive or expressive aphasia.   HEENT:  Anicteric sclerae.  Oropharynx is without evidence of mucositis or thrush.  Surgical wound from right temporal craniotomy May 2018 has fully healed with several absorbable sutures still in place.  CARDIOVASCULAR:  Regular rate and rhythm, normal S1, S2.  No murmurs, gallops or rubs.   LUNGS:  Clear to auscultation throughout.   NEUROLOGIC:  Cranial nerves II-XII grossly intact.  Motor strength was 5/5 throughout and symmetric.  Sensation is grossly intact throughout.  No evidence of cerebellar signs, as finger-to-nose is unremarkable without tremor.  No dysdiadochokinesia.  No evidence of visual field abnormalities.  His gait is unremarkable as normal.  Negative Romberg sign.           Laboratory/Imaging Studies  Results for orders placed or performed during the hospital encounter of 06/19/18   MRI Brain w & w/o contrast    Narrative    Brain MRI without and with contrast    History: ; Glioblastoma multiforme of temporal lobe (H); Examination  of participant in clinical trial.  ICD-10: Glioblastoma multiforme of temporal lobe (H); " Examination of  participant in clinical trial  Comparison: Multiple prior, most recently 5/18/2018    Technique: Axial T1-weighted, axial FLAIR and susceptibility images  were obtained without intravenous contrast. Following intravenous  gadolinium-based contrast administration, axial T2-weighted,  diffusion, and T1-weighted images (in multiple planes) were obtained.  Contrast: 10ml Gadavist    Findings:  As in the prior examination, there is a right anterior temporal  resection site, with underlying vasogenic edema in the right anterior  temporal lobe, small amount of hemorrhage, and T2 hyperintensity  extending up into the right inferior frontal region, right temporal  lobe anteriorly, and subinsular white matter. The overall extent of T2  hyperintensity there is about the same or minimally decreased, and  there is no midline shift. The ventricles are not enlarged. However,  there is slight enlargement of the right frontal horn chronically due  to ex vacuo dilatation from focal atrophy post resection on the right  side.    Postcontrast images demonstrate a 3.0 x 2.4 x 2.4 cm irregular  enhancing region medial to the cystic resection cavity, with a  loculated rim-enhancing collection that is 3.0 x 2.1 x 2.3 cm just  anterior to that. The enhancing region medially within the temporal  lobe is unchanged, although the cystic, loculated region of  enhancement anteriorly relative to the resection site has increased  from 2.7 x 1.8 x 1.5 cm previously. Hence, the more solid appearing  component of enhancement is grossly not increased in size, although  the cystic, loculated collection anteriorly along the resection site  is slightly increased in size. On limited MR perfusion imaging, there  is limited evaluation due to the presence of the previously mentioned  extra-axial hematoma at the operative site, which is decreasing, now  less than 1 cm size.      Impression    Impression:     1. Enhancing lesion in extensive T2  hyperintensity at the operative  site are grossly unchanged in size in a patient with a history of  glioblastoma. However, the loculated enhancing and cystic component  along the anterior edge of the resection site is slightly increased.  2. Decreasing extra-axial hematoma post resection without midline  shift.    MARY GALLAGHER MD       Lab Results   Component Value Date    WBC 7.6 06/22/2018     Lab Results   Component Value Date    RBC 4.18 06/22/2018     Lab Results   Component Value Date    HGB 13.6 06/22/2018     Lab Results   Component Value Date    HCT 39.9 06/22/2018     No components found for: MCT  Lab Results   Component Value Date    MCV 96 06/22/2018     Lab Results   Component Value Date    MCH 32.5 06/22/2018     Lab Results   Component Value Date    MCHC 34.1 06/22/2018     Lab Results   Component Value Date    RDW 15.1 06/22/2018     Lab Results   Component Value Date     06/22/2018           ASSESSMENT/PLAN:  Seth Iglesias is a 57 y/o man with right temporal glioblastoma (WHO grade IV). He had gross total resection on 10/3/17 and completed adjuvant chemorads on 12/22/17. His 4 week post-treatment brain MRI showed areas of enhancement around the resection cavity thought to represent radiation necrosis rather than recurrence.     He then started temozolomide maintenance in January 2018, at a dose of 150 mg/m2 for cycle 1. This was increased to 200 mg/m2 for cycle 2. He also started the Optune device in January 2018.     Upon progression of disease after about less than 6 months of temozolomide in the adjuvant setting, he underwent surgical debulking following randomization on the Tocagen trial to the non-viral therapeutic arm.  He underwent surgical debulking.  Dr. Gracia was not able to obtain a full total gross resection for this secondary surgery due to the involvement of some of the vessels.  The patient says in a palliative sense that he has improved in terms of cognitive  processing since surgery.  He has recovered relatively well, although he remains significantly fatigued.      He is on the dexamethasone taper.  Of note, looking his labs, he has had some mild thrombocytopenia with platelets in the 90,000-110,000 range since early May.  Prior to that while he was on temozolomide, he was not having significant thrombocytopenia.  I reviewed the medication list with him.  He states that he is taking ranitidine twice a week, but as he likely was having stomach upset while on the higher dose dexamethasone, as he is tapering that to almost zero, I encouraged him to not take it unless it is absolutely necessary, to avoid significant thrombocytopenia and synergistic effects of both medications.  He remains on the Keppra in necessity under the care of Dr. Boyer and will continue to do so.      I have prescribed a dexamethasone taper to complete by dropping the evening dose of dexamethasone today.  He will continue with the 2 mg 1 tablet over the next 7 days and then stop altogether, and he will report to us whether or not he has any rebound headache or any other neurologic symptoms following the completion of the dexamethasone taper.  In terms of treatment, we will go forward with lomustine as noted.  Manisha Velasco from our clinical trial nursing team has met with him today to do extensive chemotherapy teaching.  We are just waiting full clearance from his insurance, and then he can come back to get the lomustine next week.      We will have him see Helena Landin from our physician assistant team within 2 weeks with a CBC in advance to ensure he is doing well and tolerating chemotherapy.  At that point, we will make plans for further visits.  Lomustine is given on 2-month cycles, so we will make sure to schedule that at that time.  I reviewed with him all of the above.  We discussed the Novocure device Optune which he had been on in the first-line adjuvant setting.  I stated that there is  no clear evidence at this time to support or argue against the efficacy of Optune in the second-line setting.  There is some thought about continuation of Optune through chemotherapy resistance, but there is no firm evidence either way.  His wife stated that the [facility] team has reached out to him to determine whether or not he wishes to continue Optune, and I stated that would be fine either way.  We will first confirm with the [facility] trial team whether or not it would be prohibited in the context of the trial.  If it is not, that would be fine if the patient wishes to resume the Optune device, but if he does not wish to for any reason or if it is cost prohibitive, that would be fine as well from my angle as long as we move forth with lomustine, again under the auspices of the trial.           I spent 30 minutes in consultation, including H&P and Discussion. >50% of time was spent in counseling and in coordination of care.    Augustus Armstrong MD, PhD      ADDENDUM: Following the visit, trial nurse Manisha Castellanos RN confirmed with the [facility] lead staff that the use of Optune in this setting is NOT permitted while on this part of the clinical trial.    Augustus Armstrong MD PhD           (1) body pink, extremities blue
